# Patient Record
Sex: FEMALE | Race: WHITE | HISPANIC OR LATINO | Employment: OTHER | URBAN - METROPOLITAN AREA
[De-identification: names, ages, dates, MRNs, and addresses within clinical notes are randomized per-mention and may not be internally consistent; named-entity substitution may affect disease eponyms.]

---

## 2017-02-03 DIAGNOSIS — R14.0 ABDOMINAL BLOATING: ICD-10-CM

## 2017-02-03 DIAGNOSIS — K21.9 GASTROESOPHAGEAL REFLUX DISEASE, ESOPHAGITIS PRESENCE NOT SPECIFIED: ICD-10-CM

## 2017-02-03 RX ORDER — OMEPRAZOLE 20 MG/1
CAPSULE, DELAYED RELEASE ORAL
Qty: 90 CAPSULE | Refills: 0 | Status: SHIPPED | OUTPATIENT
Start: 2017-02-03 | End: 2017-05-23 | Stop reason: SDUPTHER

## 2017-03-13 RX ORDER — VALSARTAN 160 MG/1
160 TABLET ORAL DAILY
Qty: 90 TABLET | Refills: 1 | Status: SHIPPED | OUTPATIENT
Start: 2017-03-13 | End: 2017-09-15 | Stop reason: SDUPTHER

## 2017-03-13 RX ORDER — LEVOTHYROXINE SODIUM 125 UG/1
125 TABLET ORAL DAILY
Qty: 90 TABLET | Refills: 1 | Status: SHIPPED | OUTPATIENT
Start: 2017-03-13 | End: 2017-09-15 | Stop reason: SDUPTHER

## 2017-05-23 ENCOUNTER — OFFICE VISIT (OUTPATIENT)
Dept: FAMILY MEDICINE | Facility: CLINIC | Age: 82
End: 2017-05-23
Payer: MEDICARE

## 2017-05-23 VITALS
DIASTOLIC BLOOD PRESSURE: 78 MMHG | BODY MASS INDEX: 34.02 KG/M2 | HEIGHT: 63 IN | HEART RATE: 65 BPM | WEIGHT: 192 LBS | SYSTOLIC BLOOD PRESSURE: 136 MMHG | OXYGEN SATURATION: 97 %

## 2017-05-23 DIAGNOSIS — R07.81 COSTAL MARGIN PAIN: ICD-10-CM

## 2017-05-23 DIAGNOSIS — K76.0 FATTY LIVER: ICD-10-CM

## 2017-05-23 DIAGNOSIS — E78.5 DYSLIPIDEMIA: ICD-10-CM

## 2017-05-23 DIAGNOSIS — I10 ESSENTIAL HYPERTENSION: ICD-10-CM

## 2017-05-23 DIAGNOSIS — N18.30 CKD (CHRONIC KIDNEY DISEASE) STAGE 3, GFR 30-59 ML/MIN: Primary | ICD-10-CM

## 2017-05-23 DIAGNOSIS — Z23 NEED FOR SHINGLES VACCINE: ICD-10-CM

## 2017-05-23 DIAGNOSIS — R14.0 ABDOMINAL BLOATING: ICD-10-CM

## 2017-05-23 DIAGNOSIS — K21.9 GASTROESOPHAGEAL REFLUX DISEASE, ESOPHAGITIS PRESENCE NOT SPECIFIED: ICD-10-CM

## 2017-05-23 PROCEDURE — 3078F DIAST BP <80 MM HG: CPT | Mod: S$GLB,,, | Performed by: FAMILY MEDICINE

## 2017-05-23 PROCEDURE — 99214 OFFICE O/P EST MOD 30 MIN: CPT | Mod: S$GLB,,, | Performed by: FAMILY MEDICINE

## 2017-05-23 PROCEDURE — 1160F RVW MEDS BY RX/DR IN RCRD: CPT | Mod: S$GLB,,, | Performed by: FAMILY MEDICINE

## 2017-05-23 PROCEDURE — 99999 PR PBB SHADOW E&M-EST. PATIENT-LVL IV: CPT | Mod: PBBFAC,,, | Performed by: FAMILY MEDICINE

## 2017-05-23 PROCEDURE — 1159F MED LIST DOCD IN RCRD: CPT | Mod: S$GLB,,, | Performed by: FAMILY MEDICINE

## 2017-05-23 PROCEDURE — 99499 UNLISTED E&M SERVICE: CPT | Mod: S$GLB,,, | Performed by: FAMILY MEDICINE

## 2017-05-23 PROCEDURE — 1125F AMNT PAIN NOTED PAIN PRSNT: CPT | Mod: S$GLB,,, | Performed by: FAMILY MEDICINE

## 2017-05-23 PROCEDURE — 3075F SYST BP GE 130 - 139MM HG: CPT | Mod: S$GLB,,, | Performed by: FAMILY MEDICINE

## 2017-05-23 RX ORDER — OMEPRAZOLE 20 MG/1
CAPSULE, DELAYED RELEASE ORAL
Qty: 90 CAPSULE | Refills: 0 | Status: SHIPPED | OUTPATIENT
Start: 2017-05-23 | End: 2017-09-15 | Stop reason: SDUPTHER

## 2017-05-23 RX ORDER — TRAMADOL HYDROCHLORIDE AND ACETAMINOPHEN 37.5; 325 MG/1; MG/1
1 TABLET, FILM COATED ORAL 2 TIMES DAILY PRN
Qty: 40 TABLET | Refills: 0 | Status: SHIPPED | OUTPATIENT
Start: 2017-05-23 | End: 2017-06-02

## 2017-05-23 NOTE — PROGRESS NOTES
Subjective:       Patient ID: Celine Mahan is a 81 y.o. female.    Chief Complaint: Follow-up; Back Pain; and Knee Pain    81 years old female came to the clinic with right costal pain for the last several months.  The pain is 3 out of 10 in intensity on and overall aggravated with activity and better with rest.  Patient with normal colonoscopy and ultrasound with evidence of fatty liver and chronic kidney disease.  Patient with a BMI of 34 currently trying to lose weight.  Patient with decreased kidney function but stable in comparison with previous reports.  Blood pressure today stable.  No chest pain palpitations orthopnea or PND.      Back Pain   Pertinent negatives include no chest pain.   Knee Pain        Review of Systems   Constitutional: Negative.    HENT: Negative.    Eyes: Negative.    Respiratory: Negative.    Cardiovascular: Negative.  Negative for chest pain, palpitations and leg swelling.   Gastrointestinal: Negative.    Genitourinary: Negative.    Musculoskeletal: Positive for back pain.   Skin: Negative.    Neurological: Negative.    Psychiatric/Behavioral: Negative.        Objective:      Physical Exam   Constitutional: She is oriented to person, place, and time. She appears well-developed and well-nourished. No distress.   HENT:   Head: Normocephalic and atraumatic.   Right Ear: External ear normal.   Left Ear: External ear normal.   Nose: Nose normal.   Mouth/Throat: Oropharynx is clear and moist. No oropharyngeal exudate.   Eyes: Conjunctivae and EOM are normal. Pupils are equal, round, and reactive to light. Right eye exhibits no discharge. Left eye exhibits no discharge. No scleral icterus.   Neck: Normal range of motion. Neck supple. No JVD present. No tracheal deviation present. No thyromegaly present.   Cardiovascular: Normal rate, regular rhythm, normal heart sounds and intact distal pulses.  Exam reveals no gallop and no friction rub.    No murmur heard.  Pulmonary/Chest: Effort normal  and breath sounds normal. No stridor. No respiratory distress. She has no wheezes. She has no rales. She exhibits no tenderness.   Abdominal: Soft. Bowel sounds are normal. She exhibits no distension and no mass. There is tenderness in the right upper quadrant. There is no rebound and no guarding.   Musculoskeletal: Normal range of motion. She exhibits no edema or tenderness.   Lymphadenopathy:     She has no cervical adenopathy.   Neurological: She is alert and oriented to person, place, and time. She has normal reflexes. No cranial nerve deficit. She exhibits normal muscle tone. Coordination normal.   Skin: Skin is warm and dry. No rash noted. She is not diaphoretic. No erythema. No pallor.   Psychiatric: She has a normal mood and affect. Her behavior is normal. Judgment and thought content normal.       Assessment:       1. CKD (chronic kidney disease) stage 3, GFR 30-59 ml/min    2. Essential hypertension    3. Costal margin pain    4. Fatty liver    5. BMI 34.0-34.9,adult    6. Dyslipidemia    7. Gastroesophageal reflux disease, esophagitis presence not specified    8. Abdominal bloating    9. Need for shingles vaccine        Plan:         Celine was seen today for follow-up, back pain and knee pain.    Diagnoses and all orders for this visit:    CKD (chronic kidney disease) stage 3, GFR 30-59 ml/min  -     Comprehensive metabolic panel; Future    Essential hypertension  -     Comprehensive metabolic panel; Future  -     Lipid panel; Future  -     CBC auto differential; Future    Costal margin pain    Fatty liver  -     Comprehensive metabolic panel; Future    BMI 34.0-34.9,adult  -     Lipid panel; Future    Dyslipidemia  -     Comprehensive metabolic panel; Future  -     Lipid panel; Future    Gastroesophageal reflux disease, esophagitis presence not specified  -     omeprazole (PRILOSEC) 20 MG capsule; TAKE 1 CAPSULE(20 MG) BY MOUTH BEFORE BREAKFAST    Abdominal bloating  -     omeprazole (PRILOSEC) 20 MG  capsule; TAKE 1 CAPSULE(20 MG) BY MOUTH BEFORE BREAKFAST    Need for shingles vaccine  -     Discontinue: zoster vaccine live, PF, (ZOSTAVAX, PF,) 19,400 unit/0.65 mL injection; Inject 19,400 Units into the skin once.  -     zoster vaccine live, PF, (ZOSTAVAX, PF,) 19,400 unit/0.65 mL injection; Inject 19,400 Units into the skin once.    Other orders  -     tramadol-acetaminophen 37.5-325 mg (ULTRACET) 37.5-325 mg Tab; Take 1 tablet by mouth 2 (two) times daily as needed for Pain.    Continue monitoring blood pressure at home, low sodium diet.   Diet and physical activity to promote weight loss.  Antireflux measures.

## 2017-05-23 NOTE — PATIENT INSTRUCTIONS
Consejos para controlar el reflujo de ácido (agruras)    Para controlar el reflujo de ácido es necesario hacer algunos cambios básicos en la alimentación y el estilo de cristina. Los siguientes consejos pueden ser suficientes para aliviar el malestar.  Vigile lo que come  · Evite los alimentos grasosos o demasiado picantes.  · Coma menos alimentos ácidos, chrissy cítricos y alimentos a base de tomates, ya que pueden agravar los síntomas.  · Limite el consumo de alcohol, cafeína y bebidas gaseosas. Todas estas bebidas aumentan el reflujo.  · Intente limitar el consumo de chocolate y menta. Ruthann puede empeorar el reflujo de ácido en algunas personas.  Vigile cuándo come  · Evite acostarse eduar 3 horas después de rosibel comido.  · No coma nada antes de irse a la cama.  Mantenga la yg levantada  Mantener la yg y el pecho elevados unas 4 a 6 pulgadas, le ayudará a aliviar el reflujo cuando esté acostado. Ponga soportes debajo de la cabecera de la cama para elevarla.  Otros cambios  · Pierda el exceso de peso.  · No joe ejercicio poco antes de acostarse.  · Evite usar ropas demasiado ajustadas.  · Limite el uso de aspirina e ibuprofeno.  · Deje de fumar.   Date Last Reviewed: 3/14/2014  © 0774-2578 [employer]. [address] [place] [address], [place] [address]. Todos los derechos reservados. Esta información no pretende sustituir la atención médica profesional. Sólo conde médico puede diagnosticar y tratar un problema de celio.

## 2017-05-24 ENCOUNTER — LAB VISIT (OUTPATIENT)
Dept: LAB | Facility: HOSPITAL | Age: 82
End: 2017-05-24
Attending: FAMILY MEDICINE
Payer: MEDICARE

## 2017-05-24 DIAGNOSIS — K76.0 FATTY LIVER: ICD-10-CM

## 2017-05-24 DIAGNOSIS — N18.30 CKD (CHRONIC KIDNEY DISEASE) STAGE 3, GFR 30-59 ML/MIN: ICD-10-CM

## 2017-05-24 DIAGNOSIS — I10 ESSENTIAL HYPERTENSION: ICD-10-CM

## 2017-05-24 DIAGNOSIS — E78.5 DYSLIPIDEMIA: ICD-10-CM

## 2017-05-24 LAB
ALBUMIN SERPL BCP-MCNC: 3.6 G/DL
ALP SERPL-CCNC: 82 U/L
ALT SERPL W/O P-5'-P-CCNC: 12 U/L
ANION GAP SERPL CALC-SCNC: 9 MMOL/L
AST SERPL-CCNC: 17 U/L
BASOPHILS # BLD AUTO: 0.06 K/UL
BASOPHILS NFR BLD: 1.3 %
BILIRUB SERPL-MCNC: 0.5 MG/DL
BUN SERPL-MCNC: 19 MG/DL
CALCIUM SERPL-MCNC: 9 MG/DL
CHLORIDE SERPL-SCNC: 107 MMOL/L
CHOLEST/HDLC SERPL: 3.3 {RATIO}
CO2 SERPL-SCNC: 25 MMOL/L
CREAT SERPL-MCNC: 1.3 MG/DL
DIFFERENTIAL METHOD: ABNORMAL
EOSINOPHIL # BLD AUTO: 0.4 K/UL
EOSINOPHIL NFR BLD: 8.2 %
ERYTHROCYTE [DISTWIDTH] IN BLOOD BY AUTOMATED COUNT: 14.8 %
EST. GFR  (AFRICAN AMERICAN): 44.5 ML/MIN/1.73 M^2
EST. GFR  (NON AFRICAN AMERICAN): 38.6 ML/MIN/1.73 M^2
GLUCOSE SERPL-MCNC: 95 MG/DL
HCT VFR BLD AUTO: 41.3 %
HDL/CHOLESTEROL RATIO: 30.4 %
HDLC SERPL-MCNC: 181 MG/DL
HDLC SERPL-MCNC: 55 MG/DL
HGB BLD-MCNC: 13.2 G/DL
LDLC SERPL CALC-MCNC: 105.8 MG/DL
LYMPHOCYTES # BLD AUTO: 1.2 K/UL
LYMPHOCYTES NFR BLD: 25.7 %
MCH RBC QN AUTO: 28.1 PG
MCHC RBC AUTO-ENTMCNC: 32 %
MCV RBC AUTO: 88 FL
MONOCYTES # BLD AUTO: 0.3 K/UL
MONOCYTES NFR BLD: 6.7 %
NEUTROPHILS # BLD AUTO: 2.7 K/UL
NEUTROPHILS NFR BLD: 57.7 %
NONHDLC SERPL-MCNC: 126 MG/DL
PLATELET # BLD AUTO: 257 K/UL
PMV BLD AUTO: 10.7 FL
POTASSIUM SERPL-SCNC: 4.2 MMOL/L
PROT SERPL-MCNC: 7.4 G/DL
RBC # BLD AUTO: 4.7 M/UL
SODIUM SERPL-SCNC: 141 MMOL/L
TRIGL SERPL-MCNC: 101 MG/DL
WBC # BLD AUTO: 4.63 K/UL

## 2017-05-24 PROCEDURE — 85025 COMPLETE CBC W/AUTO DIFF WBC: CPT

## 2017-05-24 PROCEDURE — 36415 COLL VENOUS BLD VENIPUNCTURE: CPT | Mod: PO

## 2017-05-24 PROCEDURE — 80053 COMPREHEN METABOLIC PANEL: CPT

## 2017-05-24 PROCEDURE — 80061 LIPID PANEL: CPT

## 2017-07-18 ENCOUNTER — OFFICE VISIT (OUTPATIENT)
Dept: FAMILY MEDICINE | Facility: CLINIC | Age: 82
End: 2017-07-18
Payer: MEDICARE

## 2017-07-18 ENCOUNTER — LAB VISIT (OUTPATIENT)
Dept: LAB | Facility: HOSPITAL | Age: 82
End: 2017-07-18
Attending: FAMILY MEDICINE
Payer: MEDICARE

## 2017-07-18 VITALS
OXYGEN SATURATION: 96 % | WEIGHT: 187.81 LBS | SYSTOLIC BLOOD PRESSURE: 134 MMHG | BODY MASS INDEX: 33.28 KG/M2 | HEART RATE: 62 BPM | HEIGHT: 63 IN | DIASTOLIC BLOOD PRESSURE: 64 MMHG

## 2017-07-18 DIAGNOSIS — R10.2 PELVIC PAIN IN FEMALE: ICD-10-CM

## 2017-07-18 DIAGNOSIS — R10.32 LLQ ABDOMINAL PAIN: ICD-10-CM

## 2017-07-18 DIAGNOSIS — I10 ESSENTIAL HYPERTENSION: Primary | ICD-10-CM

## 2017-07-18 DIAGNOSIS — K57.30 DIVERTICULOSIS OF LARGE INTESTINE WITHOUT HEMORRHAGE: ICD-10-CM

## 2017-07-18 DIAGNOSIS — I10 ESSENTIAL HYPERTENSION: ICD-10-CM

## 2017-07-18 LAB
ANION GAP SERPL CALC-SCNC: 8 MMOL/L
BASOPHILS # BLD AUTO: 0.06 K/UL
BASOPHILS NFR BLD: 1 %
BUN SERPL-MCNC: 18 MG/DL
CALCIUM SERPL-MCNC: 8.9 MG/DL
CHLORIDE SERPL-SCNC: 104 MMOL/L
CO2 SERPL-SCNC: 27 MMOL/L
CREAT SERPL-MCNC: 1.3 MG/DL
DIFFERENTIAL METHOD: ABNORMAL
EOSINOPHIL # BLD AUTO: 0.3 K/UL
EOSINOPHIL NFR BLD: 4.3 %
ERYTHROCYTE [DISTWIDTH] IN BLOOD BY AUTOMATED COUNT: 14.8 %
EST. GFR  (AFRICAN AMERICAN): 44.1 ML/MIN/1.73 M^2
EST. GFR  (NON AFRICAN AMERICAN): 38.3 ML/MIN/1.73 M^2
GLUCOSE SERPL-MCNC: 84 MG/DL
HCT VFR BLD AUTO: 39.9 %
HGB BLD-MCNC: 12.9 G/DL
LYMPHOCYTES # BLD AUTO: 1.7 K/UL
LYMPHOCYTES NFR BLD: 28.6 %
MCH RBC QN AUTO: 28.2 PG
MCHC RBC AUTO-ENTMCNC: 32.3 %
MCV RBC AUTO: 87 FL
MONOCYTES # BLD AUTO: 0.4 K/UL
MONOCYTES NFR BLD: 7.1 %
NEUTROPHILS # BLD AUTO: 3.5 K/UL
NEUTROPHILS NFR BLD: 58.8 %
PLATELET # BLD AUTO: 268 K/UL
PMV BLD AUTO: 10.3 FL
POTASSIUM SERPL-SCNC: 4.2 MMOL/L
RBC # BLD AUTO: 4.58 M/UL
SODIUM SERPL-SCNC: 139 MMOL/L
WBC # BLD AUTO: 5.88 K/UL

## 2017-07-18 PROCEDURE — 80048 BASIC METABOLIC PNL TOTAL CA: CPT

## 2017-07-18 PROCEDURE — 85025 COMPLETE CBC W/AUTO DIFF WBC: CPT

## 2017-07-18 PROCEDURE — 99999 PR PBB SHADOW E&M-EST. PATIENT-LVL III: CPT | Mod: PBBFAC,,, | Performed by: FAMILY MEDICINE

## 2017-07-18 PROCEDURE — 1159F MED LIST DOCD IN RCRD: CPT | Mod: S$GLB,,, | Performed by: FAMILY MEDICINE

## 2017-07-18 PROCEDURE — 99214 OFFICE O/P EST MOD 30 MIN: CPT | Mod: S$GLB,,, | Performed by: FAMILY MEDICINE

## 2017-07-18 PROCEDURE — 99499 UNLISTED E&M SERVICE: CPT | Mod: S$GLB,,, | Performed by: FAMILY MEDICINE

## 2017-07-18 PROCEDURE — 1126F AMNT PAIN NOTED NONE PRSNT: CPT | Mod: S$GLB,,, | Performed by: FAMILY MEDICINE

## 2017-07-18 PROCEDURE — 36415 COLL VENOUS BLD VENIPUNCTURE: CPT | Mod: PO

## 2017-07-18 RX ORDER — CIPROFLOXACIN 250 MG/1
250 TABLET, FILM COATED ORAL 2 TIMES DAILY
Qty: 20 TABLET | Refills: 0 | Status: SHIPPED | OUTPATIENT
Start: 2017-07-18 | End: 2017-07-28

## 2017-07-18 RX ORDER — METRONIDAZOLE 250 MG/1
250 TABLET ORAL 3 TIMES DAILY
Qty: 30 TABLET | Refills: 0 | Status: SHIPPED | OUTPATIENT
Start: 2017-07-18 | End: 2017-07-28

## 2017-07-18 NOTE — PROGRESS NOTES
Subjective:       Patient ID: Celine Mahan is a 82 y.o. female.    Chief Complaint: Hip Pain    82 years old female who came to the clinic with left lower quadrant abdominal and pelvic pain for the last week.  The pain is 6 out of 10 of intensity on and off aggravated with activity and better with rest.  No fevers or chills.  Last colonoscopy with evidence of diverticulosis.  Patient reported she is to have her ovaries after hysterectomy.      Hip Pain        Review of Systems   Constitutional: Negative.    HENT: Negative.    Eyes: Negative.    Respiratory: Negative.    Cardiovascular: Negative.  Negative for chest pain, palpitations and leg swelling.   Gastrointestinal: Negative.    Endocrine: Negative for cold intolerance, heat intolerance, polydipsia, polyphagia and polyuria.   Genitourinary: Negative.    Musculoskeletal: Negative.    Skin: Negative.    Neurological: Negative.    Psychiatric/Behavioral: Negative.        Objective:      Physical Exam   Constitutional: She is oriented to person, place, and time. She appears well-developed and well-nourished. No distress.   HENT:   Head: Normocephalic and atraumatic.   Right Ear: External ear normal.   Left Ear: External ear normal.   Nose: Nose normal.   Mouth/Throat: Oropharynx is clear and moist. No oropharyngeal exudate.   Eyes: Conjunctivae and EOM are normal. Pupils are equal, round, and reactive to light. Right eye exhibits no discharge. Left eye exhibits no discharge. No scleral icterus.   Neck: Normal range of motion. Neck supple. No JVD present. No tracheal deviation present. No thyromegaly present.   Cardiovascular: Normal rate, regular rhythm, normal heart sounds and intact distal pulses.  Exam reveals no gallop and no friction rub.    No murmur heard.  Pulmonary/Chest: Effort normal and breath sounds normal. No stridor. No respiratory distress. She has no wheezes. She has no rales. She exhibits no tenderness.   Abdominal: Soft. Bowel sounds are  normal. She exhibits no distension and no mass. There is tenderness in the left lower quadrant. There is no rebound, no guarding, no tenderness at McBurney's point and negative Edwards's sign.       Musculoskeletal: Normal range of motion. She exhibits no edema or tenderness.   Lymphadenopathy:     She has no cervical adenopathy.   Neurological: She is alert and oriented to person, place, and time. She has normal reflexes. No cranial nerve deficit. She exhibits normal muscle tone. Coordination normal.   Skin: Skin is warm and dry. No rash noted. She is not diaphoretic. No erythema. No pallor.   Psychiatric: She has a normal mood and affect. Her behavior is normal. Judgment and thought content normal.       Assessment:       1. Essential hypertension    2. Pelvic pain in female    3. Diverticulosis of large intestine without hemorrhage    4. LLQ abdominal pain        Plan:         Celine was seen today for hip pain.    Diagnoses and all orders for this visit:    Essential hypertension  -     CBC auto differential; Future  -     Urinalysis  -     Basic metabolic panel; Future    Pelvic pain in female  -     CBC auto differential; Future  -     Urinalysis  -     Urine culture  -     Basic metabolic panel; Future  -     US Pelvis Complete Non OB; Future    Diverticulosis of large intestine without hemorrhage  -     ciprofloxacin HCl (CIPRO) 250 MG tablet; Take 1 tablet (250 mg total) by mouth 2 (two) times daily.  -     metronidazole (FLAGYL) 250 MG tablet; Take 1 tablet (250 mg total) by mouth 3 (three) times daily.    LLQ abdominal pain  -     ciprofloxacin HCl (CIPRO) 250 MG tablet; Take 1 tablet (250 mg total) by mouth 2 (two) times daily.  -     metronidazole (FLAGYL) 250 MG tablet; Take 1 tablet (250 mg total) by mouth 3 (three) times daily.    Continue monitoring blood pressure at home, low sodium diet.  Continue monitoring blood sugar at home,ADA diet.

## 2017-07-19 LAB
BACTERIA UR CULT: NORMAL
BACTERIA UR CULT: NORMAL

## 2017-08-07 ENCOUNTER — HOSPITAL ENCOUNTER (OUTPATIENT)
Dept: RADIOLOGY | Facility: HOSPITAL | Age: 82
Discharge: HOME | End: 2017-08-07
Attending: FAMILY MEDICINE
Payer: MEDICARE

## 2017-08-07 DIAGNOSIS — R10.2 PELVIC PAIN IN FEMALE: ICD-10-CM

## 2017-08-07 PROCEDURE — 76856 US EXAM PELVIC COMPLETE: CPT | Mod: 26,,, | Performed by: RADIOLOGY

## 2017-08-07 PROCEDURE — 76830 TRANSVAGINAL US NON-OB: CPT | Mod: 26,,, | Performed by: RADIOLOGY

## 2017-08-07 PROCEDURE — 76856 US EXAM PELVIC COMPLETE: CPT | Mod: TC

## 2017-08-09 ENCOUNTER — TELEPHONE (OUTPATIENT)
Dept: FAMILY MEDICINE | Facility: CLINIC | Age: 82
End: 2017-08-09

## 2017-08-09 DIAGNOSIS — R19.00 PELVIC MASS IN FEMALE: Primary | ICD-10-CM

## 2017-08-09 NOTE — TELEPHONE ENCOUNTER
Patient with abnormal pelvic ultrasound.  I tried to contact the patient several times with no success.    Abdominal pelvic CT scan was ordered.  Please contact the patient for the appointment.

## 2017-08-09 NOTE — TELEPHONE ENCOUNTER
Called pt and informed her of ultrasound results and apt for ct scan on 08/14/17 @ 5pm fasting 4 hrs prior to ct.

## 2017-08-14 ENCOUNTER — HOSPITAL ENCOUNTER (OUTPATIENT)
Dept: RADIOLOGY | Facility: HOSPITAL | Age: 82
Discharge: HOME | End: 2017-08-14
Attending: FAMILY MEDICINE
Payer: MEDICARE

## 2017-08-14 DIAGNOSIS — R19.00 PELVIC MASS IN FEMALE: ICD-10-CM

## 2017-08-14 PROCEDURE — 74178 CT ABD&PLV WO CNTR FLWD CNTR: CPT | Mod: TC

## 2017-08-14 PROCEDURE — 25500020 PHARM REV CODE 255: Performed by: FAMILY MEDICINE

## 2017-08-14 PROCEDURE — 74178 CT ABD&PLV WO CNTR FLWD CNTR: CPT | Mod: 26,,, | Performed by: RADIOLOGY

## 2017-08-14 RX ADMIN — IOHEXOL 100 ML: 350 INJECTION, SOLUTION INTRAVENOUS at 06:08

## 2017-08-14 RX ADMIN — IOHEXOL 15 ML: 350 INJECTION, SOLUTION INTRAVENOUS at 05:08

## 2017-08-14 RX ADMIN — IOHEXOL 15 ML: 350 INJECTION, SOLUTION INTRAVENOUS at 04:08

## 2017-08-16 ENCOUNTER — TELEPHONE (OUTPATIENT)
Dept: FAMILY MEDICINE | Facility: CLINIC | Age: 82
End: 2017-08-16

## 2017-08-16 DIAGNOSIS — R19.00 PELVIC MASS IN FEMALE: Primary | ICD-10-CM

## 2017-08-16 NOTE — TELEPHONE ENCOUNTER
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  I tried to contact the patient and her daughter multiple times without success.     I placed a referral for GYN oncology as soon as possible.    Please try to contact the patient again to talk personally  with me.

## 2017-08-17 NOTE — TELEPHONE ENCOUNTER
Spoke with pt informed her Dr Story schedule a follow up appointment to review results of her  exams. Pt schedule for tomorrow 08/18/17 at 10:40 am. Pt verbalize understanding.

## 2017-08-17 NOTE — TELEPHONE ENCOUNTER
----- Message from Sherri Stoner sent at 8/16/2017  3:09 PM CDT -----  Contact: [phone]/self  Patient called in returning your call. Please advise.

## 2017-08-18 ENCOUNTER — OFFICE VISIT (OUTPATIENT)
Dept: FAMILY MEDICINE | Facility: CLINIC | Age: 82
End: 2017-08-18
Payer: MEDICARE

## 2017-08-18 VITALS
BODY MASS INDEX: 33.2 KG/M2 | DIASTOLIC BLOOD PRESSURE: 72 MMHG | SYSTOLIC BLOOD PRESSURE: 130 MMHG | WEIGHT: 187.38 LBS | OXYGEN SATURATION: 97 % | HEART RATE: 83 BPM | HEIGHT: 63 IN

## 2017-08-18 DIAGNOSIS — I10 ESSENTIAL HYPERTENSION: ICD-10-CM

## 2017-08-18 DIAGNOSIS — R10.2 PELVIC PAIN IN FEMALE: ICD-10-CM

## 2017-08-18 DIAGNOSIS — R19.00 PELVIC MASS IN FEMALE: Primary | ICD-10-CM

## 2017-08-18 PROCEDURE — 3078F DIAST BP <80 MM HG: CPT | Mod: S$GLB,,, | Performed by: FAMILY MEDICINE

## 2017-08-18 PROCEDURE — 1159F MED LIST DOCD IN RCRD: CPT | Mod: S$GLB,,, | Performed by: FAMILY MEDICINE

## 2017-08-18 PROCEDURE — 99214 OFFICE O/P EST MOD 30 MIN: CPT | Mod: S$GLB,,, | Performed by: FAMILY MEDICINE

## 2017-08-18 PROCEDURE — 3008F BODY MASS INDEX DOCD: CPT | Mod: S$GLB,,, | Performed by: FAMILY MEDICINE

## 2017-08-18 PROCEDURE — 1125F AMNT PAIN NOTED PAIN PRSNT: CPT | Mod: S$GLB,,, | Performed by: FAMILY MEDICINE

## 2017-08-18 PROCEDURE — 99999 PR PBB SHADOW E&M-EST. PATIENT-LVL III: CPT | Mod: PBBFAC,,, | Performed by: FAMILY MEDICINE

## 2017-08-18 PROCEDURE — 3075F SYST BP GE 130 - 139MM HG: CPT | Mod: S$GLB,,, | Performed by: FAMILY MEDICINE

## 2017-08-18 PROCEDURE — 99499 UNLISTED E&M SERVICE: CPT | Mod: S$GLB,,, | Performed by: FAMILY MEDICINE

## 2017-08-18 NOTE — PATIENT INSTRUCTIONS
Coma alimentos saludables para conde corazón  Lo que coma tiene un gran impacto en la celio de conde corazón. De hecho, si come de manera más ezio podrá disminuir muchos de zari riesgos cardíacos al mismo tiempo. Por ejemplo, le ayudará a manejar conde peso, zari niveles de colesterol y conde presión arterial. Aquí encontrará consejos para hacer cambios en conde dieta que le harán marian a conde corazón sin dejar de lado todos los alimentos y los sabores que más le gustan.  Cómo comenzar  · Hable con conde proveedor de atención médica para que le aconseje sobre planes de alimentación, por ejemplo, la dieta DASH o la dieta mediterránea. También es posible que le remita a un dietista.  · Cambie algunas cosas por vez. Dese tiempo para acostumbrarse a algunos cambios en conde alimentación antes de hacer más cambios.  · Intente crear un plan de alimentación saludable y sabroso que pueda mantener el endy de conde cristina.    Metas para ibeth alimentación saludable  A continuación, verá algunos consejos para mejorar zari hábitos de alimentación.  · Coma menos grasas saturadas y grasas trans. Las grasas saturadas elevan zari niveles de colesterol, por eso, coma lo menos posible de estas grasas. Están en alimentos tales chrissy las logan grasas, la leche entera, el queso entero y los aceites de villeda y de keerthi. Evite las grasas trans porque bajan conde colesterol millard además de subir conde colesterol shayy. Las grasas trans se encuentran más que nada en los alimentos procesados.  · Coma menos sodio (sal). Consumir demasiada sal puede subirle la presión arterial. Reduzca la cantidad de sodio que ingiere a 2,300 mg diarios o menos según le recomiende conde proveedor de atención médica. Salir a comer con menos frecuencia y elegir menos alimentos procesados son dos excelentes maneras de reducir la cantidad de sal que consume.  · Cuente las calorías. Ibeth caloría es ibeth unidad de energía. Conde cuerpo quema calorías para obtener combustible, ana si usted come más calorías que  las que conde cuerpo consume, las calorías adicionales se guardan en forma de grasa. Conde proveedor de atención médica le ayudará a elaborar un plan de dieta para manejar zari calorías. Es probable que incluya comer alimentos más saludables y hacer actividad física con regularidad. Para ayudarle a llevar la cuenta de conde progreso, tenga un diario en el que vaya anotando lo que come y la frecuencia con que hace actividad física.  Elija los alimentos adecuados  Trate de que estos alimentos kyra los pilares de conde dieta. Si tiene diabetes, puede que le den otras recomendaciones diferentes de las que se mencionan aquí.  · Frutas y vegetales: brindan muchos nutrientes sin demasiadas calorías. Cuando coma, llene la mitad de conde plato con estos alimentos. A la segunda mitad del plato, divídala entre granos integrales y proteínas magras.  · Granos integrales: tienen mucha fibra, vitaminas y nutrientes. Es ibeth buena opción incluir pan, pasta y arroz integrales.  · Proteínas magras: le aportan nutrición con menos grasas. Son buenas opciones el pescado, el leena sin piel y los frijoles.  · Productos lácteos bajos en grasa o sin grasa: ofrecen nutrientes sin mucha grasa. Pruebe consumir leche, queso o yogur bajos en grasa o sin grasa.  · Grasas saludables: pueden ser buenas para conde corazón si las come en cantidades moderadas. Son las grasas no saturadas, chrissy el aceite de lawson, las nueces y los pescados. Intente comer al menos dos porciones a la semana de algún pescado graso, chrissy salmón, nikko, caballa, trucha arcoíris y atún albacora (rodriguez). Ruthann tipo de pescados contienen ácidos grasos omega-3, los cuales son especialmente buenos para conde corazón. Las semillas de anshu constituyen otra denise de grasa saludable para conde corazón.  Más información sobre la alimentación saludable para el corazón    Lizzy las etiquetas de los alimentos  La alimentación saludable comienza en la sunny de alimentos. Preste atención a las etiquetas de  nutrición que traen los alimentos empaquetados. Busque productos altos en fibra y proteínas, y bajos en grasa saturada, colesterol y sodio. Evite los productos que contienen grasas trans. También preste mucha atención a las porciones que come. Por ejemplo, si piensa comer dos porciones, duplique todas las cantidades que figuran en la etiqueta.  Prepare la comida correctamente  Mily parte clave de la alimentación saludable es reducir la cantidad de js y grasa que agrega a zari comidas. Busque en Internet recetas con menos contenido de grasa y sodio. También puede probar los siguientes consejos:  · Quite la grasa de la carne y la piel del leena antes de cocinar.  · Quite la grasa que queda en la superficie de sopas y salsas.  · Ase, hierva, hornee, grille, cocine al vapor o en microondas conde comida sin agregarle grasas.  · Elija ingredientes que le den sabor a zari comidas sin cargarla con calorías, grasa o sodio. Pruebe los siguientes ingredientes: rábano picante, salsa picante, anca, mostaza, aderezos sin grasa para ensaladas y vinagre. Si desea hierbas y especias sin js, pruebe albahaca, cilantro, brendon, garima y mccann.   Date Last Reviewed: 6/25/2015  © 6038-5146 [employer]. [address] [place] [address], [place] [address]. Todos los derechos reservados. Esta información no pretende sustituir la atención médica profesional. Sólo conde médico puede diagnosticar y tratar un problema de celio.

## 2017-08-18 NOTE — PROGRESS NOTES
Subjective:       Patient ID: Celine Mahan is a 82 y.o. female.    Chief Complaint: Follow-up (results ) and Hypertension    82 years old female came to the clinic for preoperative last follow-up.  CT scan with  evidence of possible neoplastic process.  Patient still with mild pain over the pelvic area.  No weight loss fever or chills.  Blood pressure today stable.  No chest pain palpitations orthopnea or PND.      Hypertension   Pertinent negatives include no chest pain or palpitations.     Review of Systems   Constitutional: Negative.    HENT: Negative.    Eyes: Negative.    Respiratory: Negative.    Cardiovascular: Negative.  Negative for chest pain, palpitations and leg swelling.   Gastrointestinal: Negative.    Genitourinary: Negative.    Musculoskeletal: Negative.    Skin: Negative.    Neurological: Negative.    Psychiatric/Behavioral: Negative.        Objective:      Physical Exam   Constitutional: She is oriented to person, place, and time. She appears well-developed and well-nourished. No distress.   HENT:   Head: Normocephalic and atraumatic.   Right Ear: External ear normal.   Left Ear: External ear normal.   Nose: Nose normal.   Mouth/Throat: Oropharynx is clear and moist. No oropharyngeal exudate.   Eyes: Conjunctivae and EOM are normal. Pupils are equal, round, and reactive to light. Right eye exhibits no discharge. Left eye exhibits no discharge. No scleral icterus.   Neck: Normal range of motion. Neck supple. No JVD present. No tracheal deviation present. No thyromegaly present.   Cardiovascular: Normal rate, regular rhythm, normal heart sounds and intact distal pulses.  Exam reveals no gallop and no friction rub.    No murmur heard.  Pulmonary/Chest: Effort normal and breath sounds normal. No stridor. No respiratory distress. She has no wheezes. She has no rales. She exhibits no tenderness.   Abdominal: Soft. Bowel sounds are normal. She exhibits no distension and no mass. There is tenderness in  the suprapubic area. There is no rebound and no guarding.   Musculoskeletal: Normal range of motion. She exhibits no edema or tenderness.   Lymphadenopathy:     She has no cervical adenopathy.   Neurological: She is alert and oriented to person, place, and time. She has normal reflexes. No cranial nerve deficit. She exhibits normal muscle tone. Coordination normal.   Skin: Skin is warm and dry. No rash noted. She is not diaphoretic. No erythema. No pallor.   Psychiatric: She has a normal mood and affect. Her behavior is normal. Judgment and thought content normal.       Assessment:       1. Pelvic mass in female    2. Essential hypertension    3. Pelvic pain in female        Plan:         Celine was seen today for follow-up and hypertension.    Diagnoses and all orders for this visit:    Pelvic mass in female    Essential hypertension    Pelvic pain in female    Continue monitoring blood pressure at home, low sodium diet.  OB/GYN oncology referral.

## 2017-08-21 ENCOUNTER — INITIAL CONSULT (OUTPATIENT)
Dept: GYNECOLOGIC ONCOLOGY | Facility: CLINIC | Age: 82
End: 2017-08-21
Payer: MEDICARE

## 2017-08-21 VITALS
BODY MASS INDEX: 33.28 KG/M2 | RESPIRATION RATE: 16 BRPM | HEART RATE: 62 BPM | HEIGHT: 63 IN | DIASTOLIC BLOOD PRESSURE: 81 MMHG | SYSTOLIC BLOOD PRESSURE: 188 MMHG | WEIGHT: 187.81 LBS

## 2017-08-21 DIAGNOSIS — Z01.818 PRE-OP EVALUATION: Primary | ICD-10-CM

## 2017-08-21 DIAGNOSIS — R19.00 PELVIC MASS: ICD-10-CM

## 2017-08-21 PROCEDURE — 99205 OFFICE O/P NEW HI 60 MIN: CPT | Mod: S$GLB,,, | Performed by: OBSTETRICS & GYNECOLOGY

## 2017-08-21 PROCEDURE — 99999 PR PBB SHADOW E&M-EST. PATIENT-LVL III: CPT | Mod: PBBFAC,,, | Performed by: OBSTETRICS & GYNECOLOGY

## 2017-08-21 PROCEDURE — 1159F MED LIST DOCD IN RCRD: CPT | Mod: S$GLB,,, | Performed by: OBSTETRICS & GYNECOLOGY

## 2017-08-21 PROCEDURE — 3077F SYST BP >= 140 MM HG: CPT | Mod: S$GLB,,, | Performed by: OBSTETRICS & GYNECOLOGY

## 2017-08-21 PROCEDURE — 3008F BODY MASS INDEX DOCD: CPT | Mod: S$GLB,,, | Performed by: OBSTETRICS & GYNECOLOGY

## 2017-08-21 PROCEDURE — 1126F AMNT PAIN NOTED NONE PRSNT: CPT | Mod: S$GLB,,, | Performed by: OBSTETRICS & GYNECOLOGY

## 2017-08-21 PROCEDURE — 3079F DIAST BP 80-89 MM HG: CPT | Mod: S$GLB,,, | Performed by: OBSTETRICS & GYNECOLOGY

## 2017-08-21 RX ORDER — GARLIC 1000 MG
CAPSULE ORAL DAILY
COMMUNITY

## 2017-08-21 NOTE — LETTER
August 25, 2017      Anil Wills MD  [address] [facility] [address]           [facility]  [address] [facility], [address]  [facility] [address]  Phone: [phone]  Fax: [phone]          Patient: Celine Mahan   MR Number: [id number]   YOB: 1935   Date of Visit: 8/21/2017       Dear Dr. Anil Wills:    Thank you for referring Celine Mahan to me for evaluation. Attached you will find relevant portions of my assessment and plan of care.    If you have questions, please do not hesitate to call me. I look forward to following Celine Mahan along with you.    Sincerely,    Mark Juan  CC:  No Recipients    If you would like to receive this communication electronically, please contact [email or web address] or [phone] to request more information on [employer] access.    For providers and/or their staff who would like to refer a patient to [facility], please contact us through our one-stop-shop provider referral line, [facility], at [phone].    If you feel you have received this communication in error or would no longer like to receive these types of communications, please e-mail [email or web address]

## 2017-08-22 ENCOUNTER — TELEPHONE (OUTPATIENT)
Dept: GYNECOLOGIC ONCOLOGY | Facility: CLINIC | Age: 82
End: 2017-08-22

## 2017-08-22 DIAGNOSIS — R19.00 PELVIC MASS: Primary | ICD-10-CM

## 2017-08-25 ENCOUNTER — LAB VISIT (OUTPATIENT)
Dept: LAB | Facility: OTHER | Age: 82
End: 2017-08-25
Attending: OBSTETRICS & GYNECOLOGY
Payer: MEDICARE

## 2017-08-25 DIAGNOSIS — R19.00 PELVIC MASS: ICD-10-CM

## 2017-08-25 DIAGNOSIS — Z01.818 PRE-OP EVALUATION: ICD-10-CM

## 2017-08-25 LAB
CANCER AG125 SERPL-ACNC: 849 U/ML
CREAT SERPL-MCNC: 1.3 MG/DL
EST. GFR  (AFRICAN AMERICAN): 44 ML/MIN/1.73 M^2
EST. GFR  (NON AFRICAN AMERICAN): 38 ML/MIN/1.73 M^2

## 2017-08-25 PROCEDURE — 36415 COLL VENOUS BLD VENIPUNCTURE: CPT

## 2017-08-25 PROCEDURE — 86304 IMMUNOASSAY TUMOR CA 125: CPT

## 2017-08-25 PROCEDURE — 82565 ASSAY OF CREATININE: CPT

## 2017-08-27 PROBLEM — R19.00 PELVIC MASS: Status: ACTIVE | Noted: 2017-08-27

## 2017-08-27 RX ORDER — LIDOCAINE HYDROCHLORIDE 10 MG/ML
1 INJECTION, SOLUTION EPIDURAL; INFILTRATION; INTRACAUDAL; PERINEURAL ONCE
Status: CANCELLED | OUTPATIENT
Start: 2017-08-27 | End: 2017-08-27

## 2017-08-27 RX ORDER — SODIUM CHLORIDE 9 MG/ML
INJECTION, SOLUTION INTRAVENOUS CONTINUOUS
Status: CANCELLED | OUTPATIENT
Start: 2017-08-27

## 2017-08-27 NOTE — PROGRESS NOTES
"Subjective:      Patient ID: Celine Mahan is a 82 y.o. female.    Chief Complaint: Consult      HPI  Patient is an 81yo female who presents today as a referral from Dr. Wills for pelvic mass. Patient reports LLQ pain for approximately 3 months which prompted evaluation. Available imaging reviewed.    Pelvic US 8/7/17 There is a complex echogenicity midline pelvic mass measuring 18.1 x 8.6 x 7.6 cm.  Ovaries not visualized.    CT A&P 8/9/17 Large solid/cystic mass within the lower abdomen/upper pelvis that is most concerning for a malignant neoplasm, likely of ovarian origin given its location.  Correlation with previous surgical history is recommended noting evidence of previous hysterectomy.  Mass abuts and displaces the adjacent bladder without definite CT findings to suggest hema invasion. Abdominal and pelvic lymphadenopathy concerning for lymphatic spread of neoplasm with index lymph nodes as above.     Medical history is significant for CKD (Cr 1.3), HTN, hypothyroid, HTN, HLD. She has a personal history of breast cancer in 1995 treated with mastectomy and adjuvant chemotherapy she reports. Last MMG 11/2016 normal. Adbominal surgery include cholecystectomy, TVH, xlap/removal ovary for ectopic. She reports that her physician told her "some ovary remains in situ". Family history significant for mother with pancreatic cancer.      Review of Systems   Constitutional: Negative for appetite change, chills, fatigue and fever.   HENT: Negative for mouth sores.    Respiratory: Negative for cough and shortness of breath.    Cardiovascular: Negative for leg swelling.   Gastrointestinal: Positive for abdominal pain. Negative for blood in stool, constipation and diarrhea.   Endocrine: Negative for cold intolerance.   Genitourinary: Negative for dysuria and vaginal bleeding.   Musculoskeletal: Negative for myalgias.   Skin: Negative for rash.   Allergic/Immunologic: Negative.    Neurological: Negative for weakness " and numbness.   Hematological: Negative for adenopathy. Does not bruise/bleed easily.   Psychiatric/Behavioral: Negative for confusion.        Past Medical History:   Diagnosis Date    Breast cancer     CKD (chronic kidney disease) stage 3, GFR 30-59 ml/min     Hyperlipidemia     Hypertension     Obesity     Osteopenia     Overactive bladder     Pelvic mass 8/27/2017    Thyroid disease      Past Surgical History:   Procedure Laterality Date    bt catarac surgery      CHOLECYSTECTOMY      HYSTERECTOMY      lt mastectomy       Family History   Problem Relation Age of Onset    Cancer Mother      pancreas ca     Social History     Social History    Marital status:      Spouse name: N/A    Number of children: N/A    Years of education: N/A     Occupational History    Not on file.     Social History Main Topics    Smoking status: Former Smoker     Types: Cigarettes     Quit date: 1/1/1977    Smokeless tobacco: Never Used    Alcohol use No    Drug use: No    Sexual activity: Not Currently     Other Topics Concern    Not on file     Social History Narrative    No narrative on file     Current Outpatient Prescriptions   Medication Sig    amlodipine (NORVASC) 5 MG tablet Take 1 tablet (5 mg total) by mouth once daily.    garlic 1,000 mg Cap Take by mouth.    levothyroxine (SYNTHROID) 125 MCG tablet Take 1 tablet (125 mcg total) by mouth once daily.    omeprazole (PRILOSEC) 20 MG capsule TAKE 1 CAPSULE(20 MG) BY MOUTH BEFORE BREAKFAST    simethicone (PHAZYME) 250 mg Cap Take 1 capsule by mouth 2 (two) times daily as needed (bloating).    valsartan (DIOVAN) 160 MG tablet Take 1 tablet (160 mg total) by mouth once daily.    meclizine (ANTIVERT) 12.5 mg tablet Take 1 tablet (12.5 mg total) by mouth 3 (three) times daily as needed for Dizziness.    polyethylene glycol (GOLYTELY,NULYTELY) 236-22.74-6.74 gram suspension Take as directed    pravastatin (PRAVACHOL) 20 MG tablet Take 20 mg by  mouth once daily.      predniSONE (DELTASONE) 20 MG tablet TAKE 1 TABLET BY MOUTH EVERY DAY    tobramycin sulfate 0.3% (TOBREX) 0.3 % ophthalmic solution Apply two drops to wound site right big toe twice daily.     No current facility-administered medications for this visit.      Review of patient's allergies indicates:  No Known Allergies    Objective:   Physical Exam:   Constitutional: She is oriented to person, place, and time. She appears well-developed and well-nourished.    HENT:   Head: Normocephalic and atraumatic.    Eyes: EOM are normal. Pupils are equal, round, and reactive to light.    Neck: Normal range of motion. Neck supple. No thyromegaly present.    Cardiovascular: Normal rate, regular rhythm and intact distal pulses.     Pulmonary/Chest: Effort normal and breath sounds normal. No respiratory distress. She has no wheezes.        Abdominal: Soft. Bowel sounds are normal. She exhibits no distension, no ascites and no mass. There is no tenderness.     Genitourinary: Rectum normal and vagina normal. Pelvic exam was performed with patient supine. There is no lesion on the right labia. There is no lesion on the left labia. Uterus is absent. Left adnexum displays mass and fullness. Vaginal cuff normal.Cervix exhibits absence.   Genitourinary Comments: There is a palpable mass in the pelvis, feels somewhat mobile, firm.            Musculoskeletal: Normal range of motion and moves all extremeties.      Lymphadenopathy:     She has no cervical adenopathy.        Right: No inguinal and no supraclavicular adenopathy present.        Left: No inguinal and no supraclavicular adenopathy present.    Neurological: She is alert and oriented to person, place, and time.    Skin: Skin is warm and dry. No rash noted.    Psychiatric: She has a normal mood and affect.       Assessment:     1. Pre-op evaluation    2. Pelvic mass        Plan:     Orders Placed This Encounter   Procedures    CT Chest With Contrast    CA  125    Creatinine, serum    SCHEDULED EKG 12-LEAD (to [place])       I discussed with the patient and her family member who was present with her at today's visit the differential diagnosis for a pelvic mass in a postmenopausal female including benign, borderline and malignant process. I have told them that I am concerned for a malignant process given her imaging findings. I have recommended surgical exploration and excision for diagnostic and therapeutic purposes. She desires to proceed. The risks, benefits, and indications of the procedure were discussed with the patient and her family members if present.  These included bleeding, transfusion, infection, damage to surrounding tissues (bowel, bladder, ureter), wound separation, perioperative cardiac events, VTE, pneumonia, and possible death.  She voiced understanding, all questions were answered and consents were signed.  1. Plan for xlap/mass excision/staging 9/21/17  2. Will obtain CT chest for metastatic survey  3. Tumor marker   4. Preop anesthesia consult and EKG  5. Will need cancer genetics given personal history of breast cancer, likely new ovarian cancer, mother with history pancreatic cancer

## 2017-08-28 ENCOUNTER — TELEPHONE (OUTPATIENT)
Dept: GYNECOLOGIC ONCOLOGY | Facility: CLINIC | Age: 82
End: 2017-08-28

## 2017-08-28 ENCOUNTER — HOSPITAL ENCOUNTER (OUTPATIENT)
Dept: RADIOLOGY | Facility: OTHER | Age: 82
Discharge: HOME | End: 2017-08-28
Attending: OBSTETRICS & GYNECOLOGY
Payer: MEDICARE

## 2017-08-28 ENCOUNTER — HOSPITAL ENCOUNTER (OUTPATIENT)
Dept: CARDIOLOGY | Facility: OTHER | Age: 82
Discharge: HOME | End: 2017-08-28
Attending: OBSTETRICS & GYNECOLOGY
Payer: MEDICARE

## 2017-08-28 DIAGNOSIS — R19.00 PELVIC MASS: ICD-10-CM

## 2017-08-28 DIAGNOSIS — Z01.818 PRE-OP EVALUATION: ICD-10-CM

## 2017-08-28 PROCEDURE — 71250 CT THORAX DX C-: CPT | Mod: TC

## 2017-08-28 PROCEDURE — 93005 ELECTROCARDIOGRAM TRACING: CPT

## 2017-08-28 PROCEDURE — 71250 CT THORAX DX C-: CPT | Mod: 26,,, | Performed by: RADIOLOGY

## 2017-08-29 ENCOUNTER — TELEPHONE (OUTPATIENT)
Dept: GYNECOLOGIC ONCOLOGY | Facility: CLINIC | Age: 82
End: 2017-08-29

## 2017-08-29 NOTE — TELEPHONE ENCOUNTER
Called patient to review CT and elevated . Ct chest show no pulmonary nodules, it does show some adenopathy.  is elevated. Surgery is scheduled for 9/21/17.      I will try to reach her daughter as well who was present with her at our office visit. No answer.     Provided our office number should they have any questions.

## 2017-08-29 NOTE — TELEPHONE ENCOUNTER
----- Message from Catherine Haney sent at 8/29/2017  9:33 AM CDT -----  Contact: Caitlin (Daughter)  Caitlin states she missed a call from  on yesterday and is returning that call    Contact number [phone]  Thanks

## 2017-09-01 ENCOUNTER — INITIAL CONSULT (OUTPATIENT)
Dept: INTERNAL MEDICINE | Facility: CLINIC | Age: 82
End: 2017-09-01
Payer: MEDICARE

## 2017-09-01 VITALS
HEIGHT: 63 IN | TEMPERATURE: 98 F | WEIGHT: 184 LBS | DIASTOLIC BLOOD PRESSURE: 75 MMHG | OXYGEN SATURATION: 95 % | RESPIRATION RATE: 16 BRPM | SYSTOLIC BLOOD PRESSURE: 130 MMHG | HEART RATE: 82 BPM | BODY MASS INDEX: 32.6 KG/M2

## 2017-09-01 DIAGNOSIS — Z01.818 PREOP EXAMINATION: Primary | ICD-10-CM

## 2017-09-01 DIAGNOSIS — E78.5 DYSLIPIDEMIA: ICD-10-CM

## 2017-09-01 DIAGNOSIS — N18.30 CKD (CHRONIC KIDNEY DISEASE) STAGE 3, GFR 30-59 ML/MIN: ICD-10-CM

## 2017-09-01 DIAGNOSIS — I51.9 SYSTOLIC DYSFUNCTION, LEFT VENTRICLE: ICD-10-CM

## 2017-09-01 DIAGNOSIS — I10 ESSENTIAL HYPERTENSION: ICD-10-CM

## 2017-09-01 DIAGNOSIS — R60.9 EDEMA, UNSPECIFIED TYPE: ICD-10-CM

## 2017-09-01 DIAGNOSIS — Z78.9 LIMB ALERT CARE STATUS: ICD-10-CM

## 2017-09-01 DIAGNOSIS — R39.9 LOWER URINARY TRACT SYMPTOMS (LUTS): ICD-10-CM

## 2017-09-01 DIAGNOSIS — R19.00 PELVIC MASS: ICD-10-CM

## 2017-09-01 DIAGNOSIS — T88.59XD COMPLICATION OF ANESTHESIA, SUBSEQUENT ENCOUNTER: ICD-10-CM

## 2017-09-01 DIAGNOSIS — I44.7 LBBB (LEFT BUNDLE BRANCH BLOCK): ICD-10-CM

## 2017-09-01 PROBLEM — T88.59XA COMPLICATION OF ANESTHESIA: Status: ACTIVE | Noted: 2017-09-01

## 2017-09-01 PROCEDURE — 99999 PR PBB SHADOW E&M-EST. PATIENT-LVL III: CPT | Mod: PBBFAC,,, | Performed by: HOSPITALIST

## 2017-09-01 PROCEDURE — 3075F SYST BP GE 130 - 139MM HG: CPT | Mod: S$GLB,,, | Performed by: HOSPITALIST

## 2017-09-01 PROCEDURE — 99499 UNLISTED E&M SERVICE: CPT | Mod: S$GLB,,, | Performed by: HOSPITALIST

## 2017-09-01 PROCEDURE — 3008F BODY MASS INDEX DOCD: CPT | Mod: S$GLB,,, | Performed by: HOSPITALIST

## 2017-09-01 PROCEDURE — 99214 OFFICE O/P EST MOD 30 MIN: CPT | Mod: S$GLB,,, | Performed by: HOSPITALIST

## 2017-09-01 PROCEDURE — 3078F DIAST BP <80 MM HG: CPT | Mod: S$GLB,,, | Performed by: HOSPITALIST

## 2017-09-01 PROCEDURE — 1159F MED LIST DOCD IN RCRD: CPT | Mod: S$GLB,,, | Performed by: HOSPITALIST

## 2017-09-01 NOTE — PROGRESS NOTES
[facility] - Pre Op Consult  Progress Note    Patient Name: Celine Mahan  MRN: [id number]  Date of Evaluation- 09/01/2017  PCP- Anil Wills MD    Future cases for Celine Mahan [[id number]]     Case ID Status Date Time David Procedure Provider Location    [id number] [facility] 9/21/2017  7:00  EXPLORATORY-LAPAROTOMY Iesha Feliciano MD [[id number]] [facility] OR 2ND FLR          HPI:  History of present illness- I had the pleasure of meeting this pleasant 82 y.o. lady in the pre op clinic prior to her elective Gynecological surgery. The patient is new to me . Celine was accompanied by daughter Stacia.    I have obtained the history by speaking to the patient and by reviewing the electronic health records.    Events leading up to surgery / History of presenting illness -     She had left lower abdominal pain close to groin , 3 months ago , lasting for 2 months and saw her doctor and further evaluation lead to the finding of a pelvic mass   No longer has abdominal pain       Relevant health conditions of significance for the perioperative period/ History of presenting illness -    Subjectively describes health as not too bad   Still working 4 days in a week , house supervisor , house keeping, does cooking    Hypertension ,On medication  Usual BP readings 130/70-80's     She has a personal history of Left breast cancer in 1995 treated with mastectomy and adjuvant chemotherapy   Gets annual mammograms, doing well  Limb alert LUE     Occasional hemorrhoidal bleeding     CKD (chronic kidney disease) stage 3, GFR 30-59 ml/min   Stages of CKD discussed  Occasionally uses Advil for knee pains  Avoidance of Chronic NSAID use discussed    EKG showed Left bundle branch block - since age 40 when she had Graves disease ( had heart beat in 200 at that time)  Had radioiodine treatment for graves disease  Not known to have Heart attack, CAD,Chest pain    Obesity-- suggested weight loss  Not known to have Diabetes Mellitus, sleep apnea, liver  problem         Subjective/ Objective:          Chief complaint-Preoperative evaluation, Perioperative Medical management, complication reduction plan     Relevant health conditions of significance for the perioperative period/ History of presenting illness -    Active cardiac conditions- none    Revised cardiac risk index predictors- high-risk type of surgery    Functional capacity -Examples of physical activity active for her age, does driving , cooking, house keeping, shopping,lives in a 2 story house and does stairs twice  Daily, has stairs at work ,goes to [employer], house work and can take a flight of stairs holding on to the railing----- She can undertake all the above activities without  chest pain,chest tightness, Shortness of breath ,dizziness,lightheadedness making her exercise tolerance more  than 4 Mets.       Review of Systems   Constitutional: Negative for chills and fever.        No unusual weight changes   HENT:        STOPBANG score 3/8    Elevated BP  Age over 50   Neck size over 40 CM     Eyes:        No new visual changes  wears reading glasses   Respiratory:        No cough , phlegm, hemoptysis     Cardiovascular:        As noted   Gastrointestinal:        No overt GI/ blood losses  Bowel movements- Regular    Endocrine:        Prednisone use > 20 mg daily for 3 weeks- none   Genitourinary: Negative for dysuria.        No hesitancy  Has frequency  Feels like emptying bladder well   Musculoskeletal:        Knee pains- not new   Skin: Negative for rash.   Neurological: Negative for syncope.        No unilateral weakness   Hematological:        Current use of Anticoagulants  none   Psychiatric/Behavioral:        No Depression,Anxiety        Past Medical History:   Diagnosis Date    Breast cancer     CKD (chronic kidney disease) stage 3, GFR 30-59 ml/min     Hyperlipidemia     Hypertension     Obesity     Osteopenia     Overactive bladder     Pelvic mass 8/27/2017    Thyroid disease    no  vascular stenting  Family History   Problem Relation Age of Onset    Cancer Mother      pancreas ca     Past Surgical History:   Procedure Laterality Date    bt catarac surgery      CHOLECYSTECTOMY      HYSTERECTOMY      lt mastectomy     No bleeding , cardiac problems , PONV with previous surgeries/ procedures   Takes bigger doses for anaesthesia, daughter reports waking up during anesthesia   FH- No anesthesia, thrombosis , early onset heart disease in family   Medications and Allergies reviewed in epic.   Lives alone, going to stay with daughter post op    Review of Medicine tests    EKG- I had independently reviewed the EKG from--8/28/2017   It was reported to be showing     Normal sinus rhythm  Left bundle branch block  Abnormal ECG    2003 echo LVEF 45 %    Review of clinical lab tests-Date--8/25/2017 - Creatinine-1.3  Date--7/18/2017 Hemoglobin--12.9 Platelet count--268    .lrrex      Physical Exam   Constitutional: She appears well-developed.   HENT:   Head: Normocephalic.       Physical Exam   Constitutional: She appears well-developed.   HENT:   Head: Normocephalic.     Constitutional- Vitals - Body mass index is 32.59 kg/m².,   Vitals:    09/01/17 0700   BP: 130/75   Pulse: 82   Resp: 16   Temp: 98.1 °F (36.7 °C)     General appearance-Conscious,Coherent  Eyes- No conjunctival icterus,pupils  round , reactive to light  and  bilateral intra ocular lenses  ENT-Oral cavity- moist ,  upper denture and lower denture  , Hearing grossly normal   Neck- No thyromegaly ,Trachea -central, No jugular venous distension,   No Carotid Bruit   Cardiovascular -Heart Sounds- Normal  and  no murmur   , No gallop rhythm   Respiratory - Normal Respiratory Effort, Normal breath sounds, crepitations bases,  no wheeze  and  no forced expiratory wheeze    Peripheral pitting pedal edema-- mild and  bilateral lower extremity telangiectasia , no calf pain   Gastrointestinal -Soft abdomen, up to umbilicus  Mass felt ,mid lower  abdomen  Tender,Liver,Spleen not palpable. No-- free fluid and shifting dullness  Musculoskeletal- No finger Clubbing. Strength grossly normal   Lymphatic-No Palpable cervical, axillary,Inguinal lymphadenopathy   Psychiatric - normal effect,Orientation  Rt Dorsalis pedis pulses-palpable    Lt Dorsalis pedis pulses- palpable   Rt Posterior tibial pulses -palpable   Left posterior tibial pulses -palpable   Miscellaneous -  Surgical scarlower abdomen  and  no renal bruit    Investigations  Lab and Imaging have been reviewed in epic.      Review of old records- Was done and information gathered regards to events leading to surgery and health conditions of significance in the perioperative period.        Assessment/Plan:     CKD (chronic kidney disease) stage 3, GFR 30-59 ml/min   I  suggest monitoring renal function, in put and out put status loi-operatively. I  suggest avoiding nephrotoxic medication including NSAIDs, COX2 inhibitors, intravenous contrast agent,avoiding hypotension to prevent further renal impairment.     Pelvic mass  For OR    Hypertension  Hypertension-  Blood pressure is acceptable .  I suggest holding -Valsartan- on the morning of the surgery and can continue that  post operatively under blood pressure, electrolyte and renal function monitoring as long as they are acceptable.I suggest addressing pain control as uncontrolled pain can increased blood pressure     Dyslipidemia  Not on medication   Does not like taking medication    LBBB (left bundle branch block)  No suggestion of symptomatic Coronary artery disease , heart failure   Active for her age and still works  Chronic Left bundle branch block for 40 plus years   2004 echo LVEF 45 %  Offered Cardiology , Echo pre op evaluation that patient , daughter deferred given how well she is , I am comfortable with that decision      Systolic dysfunction, left ventricle  Patient has history of heart failure that seems well compensated . Please keep a  "record of the Input and Out put and weigh patient daily so that fluid overload can be detected and acted upon promptly . I suggest providing low  sodium diet   I suggest avoidance of the ordering of continuous IV fluids and if IV fluids are required ,to order for a specific duration of time and consider ordering lower IV fluid rate     Limb alert care status  Left upper extremity     Lower urinary tract symptoms (LUTS)  Risk of post op urinary retention    Complication of anesthesia  takes bigger doses for anaesthesia, daughter reports waking up during anesthesia in [place]      Edema  Edema- I suggested avoidance of added salt,avoidance of NSAID's and suggested Limb elevation and linda hose use      Preoperative cardiac risk assessment-  The patient does not have any active cardiac conditions . Revised cardiac risk index predictors- 1---.Functional capacity is more than 4 Mets. She will be undergoing a Gynecological procedure that carries a high risk     The estimated risk of the rate of adverse cardiac outcomes  0.9%    No further cardiac work up is indicated prior to proceeding with the surgery       American Society of Anesthesiologists Physical status classification ( ASA ) class- - 3    Postoperative pulmonary complication risk assessment    /75 Comment: left arm  Pulse 82   Temp 98.1 °F (36.7 °C)   Resp 16   Ht 5' 3" (1.6 m)   Wt 83.5 kg (184 lb)   SpO2 95%   Breastfeeding? No   BMI 32.59 kg/m²     ARISCAT ( [place]) risk index- risk class -  Low,   if duration of surgery is under or equal to 2 hours ,intermediate  if duration of surgery is 2-3 hours , higher- if duration of surgery is over 3 hours     Jenn Respiratory failure index- percentage risk of respiratory failure- 0.5 %      Preventive perioperative care    Thromboembolic prophylaxis:  Her risk factors for thrombosis include possible neoplasia  obesity, surgical procedure and age.I suggest  thromboembolic prophylaxis ( " mechanical/pharmacological, weighing the risk benefits of pharmacological agent use considering loi procedural bleeding )  during the perioperative period.I suggested being active in the post operative period.      Postoperative pulmonary complication prophylaxis-Risk factors for post operative pulmonary complications include obesity  age over 65 years, ASA class >2 and proximity of the surgical site to the lungs- I suggest incentive spirometry use, early ambulation and end tidal carbon dioxide monitoring  , oral care , head end of bed elevation     Renal complication prophylaxis-Risk factors for renal complications include pre-existing renal disease, age and hypertension . I suggest keeping her well hydrated and avoidance/ minimizing the use of  NSAID's,HAIR 2 Inhibitors ,IV contrast if possible in the perioperative period.I suggested drinking 2 litre's of water a day      Surgical site Infection Prophylaxis-I  suggest appropriate antibiotic for Prophylaxis against Surgical site infections     Delirium prophylaxis-Risk factors - Advanced Age - I suggest avoidance / minimizing the use of  Benzodiazepines ( unless the patient has been taking it on a regular basis ),Anticholinergic medication,Antihistamines ( like  Benadryl).I suggest minimizing the use of opioid medication and use of IV tylenol,if it is appropriate. I suggest using the lowest possible dose of opioids for the shortest duration possible in the perioperative period. I suggest to Keep shades/blinds open during the day, lights off and shades closed at night to encourage normal sleep/wake cycle.I encourage the presence of the family member with the patient at all times, if at all possible as mental status changes can be picked up early by the family members and they help with reorientation. I encouraged the presence of family to help with orientation in the perioperative period. Benadryl avoidance suggested      In view of gynecological procedure the  patient  is at risk of postoperative urinary retention.  I suggest avoidance / minimizing the of  Benzodiazepines,Anticholinergic medication,antihistamines ( Benadryl) , if possible in the perioperative period. I suggest using the minimum possible use of opioids for the minimum period of time in the perioperative period. Benadryl avoidance suggested      This visit was focused on Preoperative evaluation, Perioperative Medical management, complication reduction plans. I suggest that the patient follows up with primary care or relevant sub specialists for ongoing health care.    I appreciate the opportunity to be involved in this patients care. Please feel free to contact me if there were any questions about this consultation.    Patient is optimized    Martha Pineda MD  Perioperative Medicine  [facility]   Pager [phone]  -----    9/19- 12 58     Called to follow up - unable to speak to her , daughter  ----    9/20- 12 58     Called to follow up - unable to speak / leave a message

## 2017-09-01 NOTE — LETTER
September 1, 2017      Iesha Feliciano MD  [address] [facility] [facility] [address]           [facility] - Pre Op Consult  [address] [address]  Phone: [phone]          Patient: Celine Mahan   MR Number: [id number]   YOB: 1935   Date of Visit: 9/1/2017       Dear Dr. Iesha Feliciano:    Thank you for referring Celine Mahan to me for evaluation. Attached you will find relevant portions of my assessment and plan of care.    If you have questions, please do not hesitate to call me. I look forward to following Celine Mahan along with you.    Sincerely,    Martha Pineda MD    Enclosure  CC:  Anil Wills MD    If you would like to receive this communication electronically, please contact [email or web address] or [phone] to request more information on [employer] [facility] access.    For providers and/or their staff who would like to refer a patient to [facility], please contact us through our one-stop-shop provider referral line, [facility], at [phone].    If you feel you have received this communication in error or would no longer like to receive these types of communications, please e-mail [email or web address]

## 2017-09-01 NOTE — ASSESSMENT & PLAN NOTE
Edema- I suggested avoidance of added salt,avoidance of NSAID's and suggested Limb elevation and linda hose use

## 2017-09-01 NOTE — ASSESSMENT & PLAN NOTE
Hypertension-  Blood pressure is acceptable .  I suggest holding -Valsartan- on the morning of the surgery and can continue that  post operatively under blood pressure, electrolyte and renal function monitoring as long as they are acceptable.I suggest addressing pain control as uncontrolled pain can increased blood pressure

## 2017-09-01 NOTE — ASSESSMENT & PLAN NOTE
No suggestion of symptomatic Coronary artery disease , heart failure   Active for her age and still works  Chronic Left bundle branch block for 40 plus years   2004 echo LVEF 45 %  Offered Cardiology , Echo pre op evaluation that patient , daughter deferred given how well she is , I am comfortable with that decision

## 2017-09-01 NOTE — ASSESSMENT & PLAN NOTE
I  suggest monitoring renal function, in put and out put status loi-operatively. I  suggest avoiding nephrotoxic medication including NSAIDs, COX2 inhibitors, intravenous contrast agent,avoiding hypotension to prevent further renal impairment.

## 2017-09-01 NOTE — ASSESSMENT & PLAN NOTE
Patient has history of heart failure that seems well compensated . Please keep a record of the Input and Out put and weigh patient daily so that fluid overload can be detected and acted upon promptly . I suggest providing low  sodium diet   I suggest avoidance of the ordering of continuous IV fluids and if IV fluids are required ,to order for a specific duration of time and consider ordering lower IV fluid rate

## 2017-09-01 NOTE — HPI
History of present illness- I had the pleasure of meeting this pleasant 82 y.o. lady in the pre op clinic prior to her elective Gynecological surgery. The patient is new to me . Celine was accompanied by daughter Stacia.    I have obtained the history by speaking to the patient and by reviewing the electronic health records.    Events leading up to surgery / History of presenting illness -     She had left lower abdominal pain close to groin , 3 months ago , lasting for 2 months and saw her doctor and further evaluation lead to the finding of a pelvic mass   No longer has abdominal pain       Relevant health conditions of significance for the perioperative period/ History of presenting illness -    Subjectively describes health as not too bad   Still working 4 days in a week , house supervisor , house keeping, does cooking    Hypertension ,On medication  Usual BP readings 130/70-80's     She has a personal history of Left breast cancer in 1995 treated with mastectomy and adjuvant chemotherapy   Gets annual mammograms, doing well  Limb alert LUE     Occasional hemorrhoidal bleeding     CKD (chronic kidney disease) stage 3, GFR 30-59 ml/min   Stages of CKD discussed  Occasionally uses Advil for knee pains  Avoidance of Chronic NSAID use discussed    EKG showed Left bundle branch block - since age 40 when she had Graves disease ( had heart beat in 200 at that time)  Had radioiodine treatment for graves disease  Not known to have Heart attack, CAD,Chest pain    Obesity-- suggested weight loss  Not known to have Diabetes Mellitus, sleep apnea, liver problem

## 2017-09-01 NOTE — OUTPATIENT SUBJECTIVE & OBJECTIVE
Outpatient Subjective & Objective     Chief complaint-Preoperative evaluation, Perioperative Medical management, complication reduction plan     Relevant health conditions of significance for the perioperative period/ History of presenting illness -    Active cardiac conditions- none    Revised cardiac risk index predictors- high-risk type of surgery    Functional capacity -Examples of physical activity active for her age, does driving , cooking, house keeping, shopping,lives in a 2 story house and does stairs twice  Daily, has stairs at work ,goes to [employer], house work and can take a flight of stairs holding on to the railing----- She can undertake all the above activities without  chest pain,chest tightness, Shortness of breath ,dizziness,lightheadedness making her exercise tolerance more  than 4 Mets.       Review of Systems   Constitutional: Negative for chills and fever.        No unusual weight changes   HENT:        STOPBANG score 3/8    Elevated BP  Age over 50   Neck size over 40 CM     Eyes:        No new visual changes  wears reading glasses   Respiratory:        No cough , phlegm, hemoptysis     Cardiovascular:        As noted   Gastrointestinal:        No overt GI/ blood losses  Bowel movements- Regular    Endocrine:        Prednisone use > 20 mg daily for 3 weeks- none   Genitourinary: Negative for dysuria.        No hesitancy  Has frequency  Feels like emptying bladder well   Musculoskeletal:        Knee pains- not new   Skin: Negative for rash.   Neurological: Negative for syncope.        No unilateral weakness   Hematological:        Current use of Anticoagulants  none   Psychiatric/Behavioral:        No Depression,Anxiety        Past Medical History:   Diagnosis Date    Breast cancer     CKD (chronic kidney disease) stage 3, GFR 30-59 ml/min     Hyperlipidemia     Hypertension     Obesity     Osteopenia     Overactive bladder     Pelvic mass 8/27/2017    Thyroid disease    no vascular  stenting  Family History   Problem Relation Age of Onset    Cancer Mother      pancreas ca     Past Surgical History:   Procedure Laterality Date    bt catarac surgery      CHOLECYSTECTOMY      HYSTERECTOMY      lt mastectomy     No bleeding , cardiac problems , PONV with previous surgeries/ procedures   Takes bigger doses for anaesthesia, daughter reports waking up during anesthesia   FH- No anesthesia, thrombosis , early onset heart disease in family   Medications and Allergies reviewed in epic.   Lives alone, going to stay with daughter post op    Review of Medicine tests    EKG- I had independently reviewed the EKG from--8/28/2017   It was reported to be showing     Normal sinus rhythm  Left bundle branch block  Abnormal ECG    2003 echo LVEF 45 %    Review of clinical lab tests-Date--8/25/2017 - Creatinine-1.3  Date--7/18/2017 Hemoglobin--12.9 Platelet count--268    .lrrex      Physical Exam   Constitutional: She appears well-developed.   HENT:   Head: Normocephalic.       Physical Exam   Constitutional: She appears well-developed.   HENT:   Head: Normocephalic.     Constitutional- Vitals - Body mass index is 32.59 kg/m².,   Vitals:    09/01/17 0700   BP: 130/75   Pulse: 82   Resp: 16   Temp: 98.1 °F (36.7 °C)     General appearance-Conscious,Coherent  Eyes- No conjunctival icterus,pupils  round , reactive to light  and  bilateral intra ocular lenses  ENT-Oral cavity- moist ,  upper denture and lower denture  , Hearing grossly normal   Neck- No thyromegaly ,Trachea -central, No jugular venous distension,   No Carotid Bruit   Cardiovascular -Heart Sounds- Normal  and  no murmur   , No gallop rhythm   Respiratory - Normal Respiratory Effort, Normal breath sounds, crepitations bases,  no wheeze  and  no forced expiratory wheeze    Peripheral pitting pedal edema-- mild and  bilateral lower extremity telangiectasia , no calf pain   Gastrointestinal -Soft abdomen, up to umbilicus  Mass felt ,mid lower abdomen   Tender,Liver,Spleen not palpable. No-- free fluid and shifting dullness  Musculoskeletal- No finger Clubbing. Strength grossly normal   Lymphatic-No Palpable cervical, axillary,Inguinal lymphadenopathy   Psychiatric - normal effect,Orientation  Rt Dorsalis pedis pulses-palpable    Lt Dorsalis pedis pulses- palpable   Rt Posterior tibial pulses -palpable   Left posterior tibial pulses -palpable   Miscellaneous -  Surgical scarlower abdomen  and  no renal bruit    Investigations  Lab and Imaging have been reviewed in epic.      Review of old records- Was done and information gathered regards to events leading to surgery and health conditions of significance in the perioperative period.    Outpatient Subjective & Objective

## 2017-09-13 ENCOUNTER — ANESTHESIA EVENT (OUTPATIENT)
Dept: SURGERY | Facility: HOSPITAL | Age: 82
DRG: 738 | End: 2017-09-13
Payer: MEDICARE

## 2017-09-13 DIAGNOSIS — Z01.818 PREOPERATIVE TESTING: Primary | ICD-10-CM

## 2017-09-13 NOTE — ANESTHESIA PREPROCEDURE EVALUATION
"Pre Admission Screening  Sari Meadows RN      []Hide copied text  Anesthesia Assessment: Preoperative EQUATION     Planned Procedure: Procedure(s) (LRB):  EXPLORATORY-LAPAROTOMY (N/A)  STAGING (N/A)  Requested Anesthesia Type:General  Surgeon: Iesha Feliciano MD  Service: General  Known or anticipated Date of Surgery:9/21/2017     Surgeon notes: reviewed     Electronic QUestionnaire Assessment completed via nurse interview with patient.         No AQ     Triage considerations:      The patient has no apparent active cardiac condition (No unstable coronary Syndrome such as severe unstable angina or recent [<1 month] myocardial infarction, decompensated CHF, severe valvular   disease or significant arrhythmia)     Previous anesthesia records:GETA, MAC and Complications noted-states she "woke up" during appendectomy and mastectomy in [place]   Colonoscopy:  Airway/Jaw/Neck:  Airway Findings: Mouth Opening: Normal Tongue: Normal  General Airway Assessment: Adult  Mallampati: II  TM Distance: Normal, at least 6 cm  Jaw/Neck Findings:     Neck ROM: Normal ROM      Dental:  Dental Findings: In tact      Last PCP note: within 1 month , [facility]  Subspecialty notes: n/a     Other important co-morbidities: HTN/HLP, Hypothyroidism, CKD3     Tests already available:  Available tests,  within 1 month , within [facility] . 8/28/17 EKG. 7/18/17 CBC, BMP. 5/24/17 CMP.                            Instructions given. (See in Nurse's note)     Optimization:  Anesthesia Preop Clinic Assessment  Indicated-POC 9/14    Medical Opinion Indicated-was cleared by Edwin 9/1/17                                        Plan:    Testing:  T&S   Pre-anesthesia  visit                                        Visit focus: concerns in complex and/or prolonged anesthesia, past history of problem with anesthesia                           Consultation:IM Perioperative Hospitalist-cleared 9/1                           Patient  has " previously scheduled Medical Appointment:none     Navigation: Tests Scheduled. 9/14                        Consults scheduled.                        Results will be tracked by Preop Clinic.                               Electronically signed by Sari Meadows RN at 9/13/2017  1:48 PM        Pre-admit on 9/21/2017            Detailed Report                                                                                                                         09/13/2017  Celine Mahan is a 82 y.o., female.    Anesthesia Evaluation         Review of Systems  Anesthesia Hx:  History of prior surgery of interest to airway management or planning: Previous anesthesia: MAC Colonoscopy 2015 with MAC.  Procedure performed at an [facility]. 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  Denies Sleep Apnea.    Renal/:   Chronic Renal Disease (CKD3)    Hepatic/GI:   GERD    Musculoskeletal:   Arthritis (bilat knees)     OB/GYN/PEDS:  Pelvic mass   Neurological:   Denies CVA. Denies Seizures.    Endocrine:   Denies Diabetes. Hypothyroidism    Psych:  Psychiatric Normal           Physical Exam  General:  Well nourished, Obesity    Airway/Jaw/Neck:  Airway Findings: Mouth Opening: Normal Tongue: Normal  General Airway Assessment: Adult  Jaw/Neck Findings:     Neck ROM: Extension Decreased, Mod., Decreased Lateral Motion, to the right, to the left      Dental:  Dental Findings: Upper Dentures, Lower Dentures   Chest/Lungs:  Chest/Lungs Findings: Clear to auscultation, Normal Respiratory  Rate     Heart/Vascular:  Heart Findings: Rate: Normal  Rhythm: Regular Rhythm  Sounds: Normal        Mental Status:  Mental Status Findings:  Cooperative, Alert and Oriented       Pt was seen in POC 9/15/17; Medical optimization: please see EPIC notes for recommendations of pre-op medical consultant, Dr Pineda, for perioperative medical management./Taylor Turner RN           Anesthesia Plan  Type of Anesthesia, risks & benefits discussed:  Anesthesia Type:  general  Patient's Preference: Proceed with anesthesia understanding that the risks are very small but could be serious or life threatening.  Intra-op Monitoring Plan: standard ASA monitors  Intra-op Monitoring Plan Comments:   Post Op Pain Control Plan:   Post Op Pain Control Plan Comments:   Induction:   IV  Beta Blocker:  Patient is not currently on a Beta-Blocker (No further documentation required).       Informed Consent: Patient understands risks and agrees with Anesthesia plan.  Questions answered. Anesthesia consent signed with patient.  ASA Score: 2     Day of Surgery Review of History & Physical: I have interviewed and examined the patient. I have reviewed the patient's H&P dated:            Ready For Surgery From Anesthesia Perspective.

## 2017-09-13 NOTE — PRE ADMISSION SCREENING
"Anesthesia Assessment: Preoperative EQUATION    Planned Procedure: Procedure(s) (LRB):  EXPLORATORY-LAPAROTOMY (N/A)  STAGING (N/A)  Requested Anesthesia Type:General  Surgeon: Iesha Feliciano MD  Service: General  Known or anticipated Date of Surgery:9/21/2017    Surgeon notes: reviewed    Electronic QUestionnaire Assessment completed via nurse interview with patient.        No AQ    Triage considerations:     The patient has no apparent active cardiac condition (No unstable coronary Syndrome such as severe unstable angina or recent [<1 month] myocardial infarction, decompensated CHF, severe valvular   disease or significant arrhythmia)    Previous anesthesia records:GETA, MAC and Complications noted-states she "woke up" during appendectomy and mastectomy in [place]   Colonoscopy:  Airway/Jaw/Neck:  Airway Findings: Mouth Opening: Normal Tongue: Normal  General Airway Assessment: Adult  Mallampati: II  TM Distance: Normal, at least 6 cm  Jaw/Neck Findings:     Neck ROM: Normal ROM      Dental:  Dental Findings: In tact     Last PCP note: within 1 month , within [facility] 9/1 [facility]  Subspecialty notes: n/a    Other important co-morbidities: HTN/HLP, Hypothyroidism, CKD3     Tests already available:  Available tests,  within 1 month , within [facility] . 8/28/17 EKG. 7/18/17 CBC, BMP. 5/24/17 CMP.            Instructions given. (See in Nurse's note)    Optimization:  Anesthesia Preop Clinic Assessment  Indicated-POC 9/14    Medical Opinion Indicated-was cleared by Edwin 9/1/17          Plan:    Testing:  T&S   Pre-anesthesia  visit       Visit focus: concerns in complex and/or prolonged anesthesia, past history of problem with anesthesia     Consultation:IM Perioperative Hospitalist-cleared 9/1     Patient  has previously scheduled Medical Appointment:none    Navigation: Tests Scheduled. 9/14             Consults scheduled.             Results will be tracked by Preop Clinic.                 "

## 2017-09-15 ENCOUNTER — HOSPITAL ENCOUNTER (OUTPATIENT)
Dept: PREADMISSION TESTING | Facility: HOSPITAL | Age: 82
Discharge: HOME | End: 2017-09-15
Attending: ANESTHESIOLOGY
Payer: MEDICARE

## 2017-09-15 VITALS
RESPIRATION RATE: 18 BRPM | WEIGHT: 186.31 LBS | TEMPERATURE: 97 F | OXYGEN SATURATION: 97 % | DIASTOLIC BLOOD PRESSURE: 70 MMHG | HEIGHT: 63 IN | SYSTOLIC BLOOD PRESSURE: 159 MMHG | HEART RATE: 62 BPM | BODY MASS INDEX: 33.01 KG/M2

## 2017-09-15 DIAGNOSIS — K21.9 GASTROESOPHAGEAL REFLUX DISEASE, ESOPHAGITIS PRESENCE NOT SPECIFIED: ICD-10-CM

## 2017-09-15 DIAGNOSIS — R14.0 ABDOMINAL BLOATING: ICD-10-CM

## 2017-09-15 RX ORDER — OMEPRAZOLE 20 MG/1
CAPSULE, DELAYED RELEASE ORAL
Qty: 90 CAPSULE | Refills: 0 | Status: SHIPPED | OUTPATIENT
Start: 2017-09-15 | End: 2017-12-28 | Stop reason: SDUPTHER

## 2017-09-15 RX ORDER — VALSARTAN 160 MG/1
160 TABLET ORAL DAILY
Qty: 90 TABLET | Refills: 0 | Status: SHIPPED | OUTPATIENT
Start: 2017-09-15 | End: 2017-12-20 | Stop reason: SDUPTHER

## 2017-09-15 RX ORDER — LEVOTHYROXINE SODIUM 125 UG/1
125 TABLET ORAL DAILY
Qty: 90 TABLET | Refills: 0 | Status: SHIPPED | OUTPATIENT
Start: 2017-09-15 | End: 2017-12-15 | Stop reason: SDUPTHER

## 2017-09-15 NOTE — DISCHARGE INSTRUCTIONS
Your surgery has been scheduled for:__________________________________________    You should report to:  ____[facility], located on the [place] side of the first floor of the           [facility] ([phone])  ____[facility], located on the [facility] side of the            Second floor of the [facility] ([phone])  ____3rd Floor [facility] located on the [facility] side of the [facility] [phone]  Please Note   - Tell your doctor if you take Aspirin, products containing Aspirin, herbal medications  or blood thinners, such as Coumadin, Ticlid, or Plavix.  (Consult your provider regarding holding or stopping before surgery).  - Arrange for someone to drive you home following surgery.  You will not be allowed to leave the surgical facility alone or drive yourself home following sedation and anesthesia.  Before Surgery  - Stop taking all herbal medications 14days prior to surgery  - No Motrin/Advil (Ibuprofen) 7 days before surgery  - No Aleve (Naproxen) 7 days before surgery  - Stop Taking Asprin, products containing Asprin _____days before surgery  - Stop taking blood thinners_______days before surgery  - Refrain from drinking alcoholic beverages for 24hours before and after surgery  - Stop or limit smoking _________days before surgery  Night before Surgery  - DO NOT EAT OR DRINK ANYTHING AFTER MIDNIGHT, INCLUDING GUM, HARD CANDY, MINTS, OR CHEWING TOBACCO.  - Take a shower or bath (shower is recommended).  Bathe with Hibiclens soap or an antibacterial soap from the neck down.  If not supplied by your surgeon, hibiclens soap will need to be purchased over the counter in pharmacy.  Rinse soap off thoroughly.  - Shampoo your hair with your regular shampoo  The Day of Surgery  - Take another bath or shower with hibiclens or any antibacterial soap, to reduce the chance of infection.  - Take heart and blood  pressure medications with a small sip of water, as advised by the perioperative team.  - Do not take fluid pills  - You may brush your teeth and rinse your mouth, but do not swall any additional water.   - Do not apply perfumes, powder, body lotions or deodorant on the day of surgery.  - Nail polish should be removed.  - Do not wear makeup or moisturizer  - Wear comfortable clothes, such as a button front shirt and loose fitting pants.  - Leave all jewelry, including body piercings, and valuables at home.    - Bring any devices you will neeed after surgery such as crutches or canes.  - If you have sleep apnea, please bring your CPAP machine  In the event that your physical condition changes including the onset of a cold or respiratory illness, or if you have to delay or cancel your surgery, please notify your surgeon.Anesthesia: General Anesthesia  Youre due to have surgery. During surgery, youll be given medication called anesthesia. (It is also called anesthetic.) This will keep you comfortable and pain-free. Your anesthesia provider will use general anesthesia. This sheet tells you more about it.  What is general anesthesia?     You are watched continuously during your procedure by the anesthesia provider   General anesthesia puts you into a state like deep sleep. It goes into the bloodstream (IV anesthetics), into the lungs (gas anesthetics), or both. You feel nothing during the procedure. You will not remember it. During the procedure, the anesthesia provider monitors you continuously. He or she checks your heart rate and rhythm, blood pressure, breathing, and blood oxygen.  · IV Anesthetics. IV anesthetics are given through an IV line in your arm. Theyre often given first. This is so you are asleep before a gas anesthetic is started. Some kinds of IV anesthetics relieve pain. Others relax you. Your doctor will decide which kind is best in your case.  · Gas Anesthetics. Gas anesthetics are breathed into the  lungs. They are often used to keep you asleep. They can be given through a facemask or a tube placed in your larynx or trachea (breathing tube).  ? If you have a facemask, your anesthesia provider will most likely place it over your nose and mouth while youre still awake. Youll breathe oxygen through the mask as your IV anesthetic is started. Gas anesthetic may be added through the mask.  ? If you have a tube in the larynx or trachea, it will be inserted into your throat after youre asleep.  Anesthesia tools and medications  You will likely have:  · IV anesthetics. These are put into an IV line into your bloodstream.  · Gas anesthetics. You breathe these anesthetics into your lungs, where they pass into your bloodstream.  · Pulse oximeter. This is a small clip that is attached to the end of your finger. This measures your blood oxygen level.  · Electrocardiography leads (electrodes). These are small sticky pads that are placed on your chest. They record your heart rate and rhythm.  · Blood pressure cuff. This reads your blood pressure.  Risks and possible complications  General anesthesia has some risks. These include:  · Breathing problems  · Nausea and vomiting  · Sore throat or hoarseness (usually temporary)  · Allergic reaction to the anesthetic  · Irregular heartbeat (rare)  · Cardiac arrest (rare)   Anesthesia safety  · Follow all instructions you are given for how long not to eat or drink before your procedure.  · Be sure your doctor knows what medications and drugs you take. This includes over-the-counter medications, herbs, supplements, alcohol or other drugs. You will be asked when those were last taken.  · Have an adult family member or friend drive you home after the procedure.  · For the first 24 hours after your surgery:  ? Do not drive or use heavy equipment.  ? Have a trusted family member or spouse make important decisions or sign documents.  ? Avoid alcohol.  ? Have a responsible adult stay with  you. He or she can watch for problems and help keep you safe.  Date Last Reviewed: 10/16/2014  © 8854-0498 [employer] [address] [place] [address], [place] [address]. All rights reserved. This information is not intended as a substitute for professional medical care. Always follow your healthcare professional's instructions.

## 2017-09-19 ENCOUNTER — TELEPHONE (OUTPATIENT)
Dept: FAMILY MEDICINE | Facility: CLINIC | Age: 82
End: 2017-09-19

## 2017-09-19 NOTE — TELEPHONE ENCOUNTER
Spoke with pt inform her Rxs was sent on 09/15 to her [facility] pharmacy and requested. RX is ready for . Pt voices understanding.

## 2017-09-19 NOTE — TELEPHONE ENCOUNTER
----- Message from Tere Esquivel sent at 9/18/2017  3:35 PM CDT -----  Contact: [phone]/self  Pt requesting to speak with you about her prescription.  Please call and advise

## 2017-09-20 ENCOUNTER — TELEPHONE (OUTPATIENT)
Dept: GYNECOLOGIC ONCOLOGY | Facility: CLINIC | Age: 82
End: 2017-09-20

## 2017-09-20 NOTE — TELEPHONE ENCOUNTER
Called pt to remind her of her arrival time for her surgery on tomorrow no answer. I was unable to leave a message on her phone. I will try to call her again later.  MA/LPN

## 2017-09-21 ENCOUNTER — HOSPITAL ENCOUNTER (INPATIENT)
Facility: HOSPITAL | Age: 82
LOS: 3 days | Discharge: HOME | DRG: 738 | End: 2017-09-24
Attending: OBSTETRICS & GYNECOLOGY | Admitting: OBSTETRICS & GYNECOLOGY
Payer: MEDICARE

## 2017-09-21 ENCOUNTER — ANESTHESIA (OUTPATIENT)
Dept: SURGERY | Facility: HOSPITAL | Age: 82
DRG: 738 | End: 2017-09-21
Payer: MEDICARE

## 2017-09-21 DIAGNOSIS — R00.1 BRADYCARDIA: ICD-10-CM

## 2017-09-21 DIAGNOSIS — R19.00 PELVIC MASS: ICD-10-CM

## 2017-09-21 DIAGNOSIS — Z98.890 S/P EXPLORATORY LAPAROTOMY: Primary | ICD-10-CM

## 2017-09-21 DIAGNOSIS — Z01.818 PRE-OP EVALUATION: ICD-10-CM

## 2017-09-21 DIAGNOSIS — I49.49 ECTOPIC BEATS: ICD-10-CM

## 2017-09-21 DIAGNOSIS — Z98.890 S/P EXPLORATORY LAPAROTOMY: ICD-10-CM

## 2017-09-21 PROBLEM — E03.9 HYPOTHYROID: Status: ACTIVE | Noted: 2017-09-21

## 2017-09-21 LAB
ABO + RH BLD: NORMAL
ALBUMIN SERPL BCP-MCNC: 3 G/DL
ALP SERPL-CCNC: 68 U/L
ALT SERPL W/O P-5'-P-CCNC: 14 U/L
ANION GAP SERPL CALC-SCNC: 9 MMOL/L
AST SERPL-CCNC: 23 U/L
BASOPHILS # BLD AUTO: 0.03 K/UL
BASOPHILS NFR BLD: 0.2 %
BILIRUB SERPL-MCNC: 0.3 MG/DL
BLD GP AB SCN CELLS X3 SERPL QL: NORMAL
BUN SERPL-MCNC: 19 MG/DL
CALCIUM SERPL-MCNC: 7.6 MG/DL
CHLORIDE SERPL-SCNC: 107 MMOL/L
CO2 SERPL-SCNC: 19 MMOL/L
CREAT SERPL-MCNC: 1.4 MG/DL
DIFFERENTIAL METHOD: ABNORMAL
EOSINOPHIL # BLD AUTO: 0.1 K/UL
EOSINOPHIL NFR BLD: 0.6 %
ERYTHROCYTE [DISTWIDTH] IN BLOOD BY AUTOMATED COUNT: 15.5 %
EST. GFR  (AFRICAN AMERICAN): 40.4 ML/MIN/1.73 M^2
EST. GFR  (NON AFRICAN AMERICAN): 35 ML/MIN/1.73 M^2
GLUCOSE SERPL-MCNC: 334 MG/DL
HCT VFR BLD AUTO: 40.9 %
HGB BLD-MCNC: 13.3 G/DL
LYMPHOCYTES # BLD AUTO: 1.1 K/UL
LYMPHOCYTES NFR BLD: 8.9 %
MAGNESIUM SERPL-MCNC: 2.2 MG/DL
MCH RBC QN AUTO: 27.9 PG
MCHC RBC AUTO-ENTMCNC: 32.5 G/DL
MCV RBC AUTO: 86 FL
MONOCYTES # BLD AUTO: 0.6 K/UL
MONOCYTES NFR BLD: 5.1 %
NEUTROPHILS # BLD AUTO: 10.3 K/UL
NEUTROPHILS NFR BLD: 84.8 %
PHOSPHATE SERPL-MCNC: 3 MG/DL
PLATELET # BLD AUTO: 220 K/UL
PMV BLD AUTO: 10.1 FL
POCT GLUCOSE: 196 MG/DL (ref 70–110)
POTASSIUM SERPL-SCNC: 5.2 MMOL/L
PROT SERPL-MCNC: 6.3 G/DL
RBC # BLD AUTO: 4.77 M/UL
SODIUM SERPL-SCNC: 135 MMOL/L
WBC # BLD AUTO: 12.16 K/UL

## 2017-09-21 PROCEDURE — 82962 GLUCOSE BLOOD TEST: CPT | Performed by: OBSTETRICS & GYNECOLOGY

## 2017-09-21 PROCEDURE — 20600001 HC STEP DOWN PRIVATE ROOM

## 2017-09-21 PROCEDURE — 86901 BLOOD TYPING SEROLOGIC RH(D): CPT

## 2017-09-21 PROCEDURE — 36415 COLL VENOUS BLD VENIPUNCTURE: CPT

## 2017-09-21 PROCEDURE — 25000003 PHARM REV CODE 250: Performed by: NURSE ANESTHETIST, CERTIFIED REGISTERED

## 2017-09-21 PROCEDURE — 63600175 PHARM REV CODE 636 W HCPCS

## 2017-09-21 PROCEDURE — 36000708 HC OR TIME LEV III 1ST 15 MIN: Performed by: OBSTETRICS & GYNECOLOGY

## 2017-09-21 PROCEDURE — 25000003 PHARM REV CODE 250

## 2017-09-21 PROCEDURE — 25000003 PHARM REV CODE 250: Performed by: OBSTETRICS & GYNECOLOGY

## 2017-09-21 PROCEDURE — D9220A PRA ANESTHESIA: Mod: CRNA,,, | Performed by: NURSE ANESTHETIST, CERTIFIED REGISTERED

## 2017-09-21 PROCEDURE — 0UT60ZZ RESECTION OF LEFT FALLOPIAN TUBE, OPEN APPROACH: ICD-10-PCS | Performed by: OBSTETRICS & GYNECOLOGY

## 2017-09-21 PROCEDURE — 63600175 PHARM REV CODE 636 W HCPCS: Performed by: OBSTETRICS & GYNECOLOGY

## 2017-09-21 PROCEDURE — 99900035 HC TECH TIME PER 15 MIN (STAT)

## 2017-09-21 PROCEDURE — 84100 ASSAY OF PHOSPHORUS: CPT

## 2017-09-21 PROCEDURE — 80053 COMPREHEN METABOLIC PANEL: CPT

## 2017-09-21 PROCEDURE — 49205 PR EXCISION/DESTRUCTION OPEN ABDOMINAL TUMORS >10.0 CM: CPT | Mod: ,,, | Performed by: OBSTETRICS & GYNECOLOGY

## 2017-09-21 PROCEDURE — C9399 UNCLASSIFIED DRUGS OR BIOLOG: HCPCS | Performed by: NURSE ANESTHETIST, CERTIFIED REGISTERED

## 2017-09-21 PROCEDURE — 63600175 PHARM REV CODE 636 W HCPCS: Performed by: STUDENT IN AN ORGANIZED HEALTH CARE EDUCATION/TRAINING PROGRAM

## 2017-09-21 PROCEDURE — 0UT10ZZ RESECTION OF LEFT OVARY, OPEN APPROACH: ICD-10-PCS | Performed by: OBSTETRICS & GYNECOLOGY

## 2017-09-21 PROCEDURE — 88305 TISSUE EXAM BY PATHOLOGIST: CPT | Mod: 26,,, | Performed by: PATHOLOGY

## 2017-09-21 PROCEDURE — 36000709 HC OR TIME LEV III EA ADD 15 MIN: Performed by: OBSTETRICS & GYNECOLOGY

## 2017-09-21 PROCEDURE — 83735 ASSAY OF MAGNESIUM: CPT

## 2017-09-21 PROCEDURE — 88309 TISSUE EXAM BY PATHOLOGIST: CPT | Mod: 26,,, | Performed by: PATHOLOGY

## 2017-09-21 PROCEDURE — 25000003 PHARM REV CODE 250: Performed by: STUDENT IN AN ORGANIZED HEALTH CARE EDUCATION/TRAINING PROGRAM

## 2017-09-21 PROCEDURE — 86900 BLOOD TYPING SEROLOGIC ABO: CPT

## 2017-09-21 PROCEDURE — D9220A PRA ANESTHESIA: Mod: ANES,,, | Performed by: ANESTHESIOLOGY

## 2017-09-21 PROCEDURE — 93010 ELECTROCARDIOGRAM REPORT: CPT | Mod: ,,, | Performed by: INTERNAL MEDICINE

## 2017-09-21 PROCEDURE — 37000009 HC ANESTHESIA EA ADD 15 MINS: Performed by: OBSTETRICS & GYNECOLOGY

## 2017-09-21 PROCEDURE — 94799 UNLISTED PULMONARY SVC/PX: CPT

## 2017-09-21 PROCEDURE — 71000039 HC RECOVERY, EACH ADD'L HOUR: Performed by: OBSTETRICS & GYNECOLOGY

## 2017-09-21 PROCEDURE — 63600175 PHARM REV CODE 636 W HCPCS: Performed by: NURSE ANESTHETIST, CERTIFIED REGISTERED

## 2017-09-21 PROCEDURE — C9290 INJ, BUPIVACAINE LIPOSOME: HCPCS | Performed by: OBSTETRICS & GYNECOLOGY

## 2017-09-21 PROCEDURE — 85025 COMPLETE CBC W/AUTO DIFF WBC: CPT

## 2017-09-21 PROCEDURE — 27201423 OPTIME MED/SURG SUP & DEVICES STERILE SUPPLY: Performed by: OBSTETRICS & GYNECOLOGY

## 2017-09-21 PROCEDURE — 37000008 HC ANESTHESIA 1ST 15 MINUTES: Performed by: OBSTETRICS & GYNECOLOGY

## 2017-09-21 PROCEDURE — 88305 TISSUE EXAM BY PATHOLOGIST: CPT | Performed by: PATHOLOGY

## 2017-09-21 PROCEDURE — 27100025 HC TUBING, SET FLUID WARMER: Performed by: NURSE ANESTHETIST, CERTIFIED REGISTERED

## 2017-09-21 PROCEDURE — 71000033 HC RECOVERY, INTIAL HOUR: Performed by: OBSTETRICS & GYNECOLOGY

## 2017-09-21 RX ORDER — PROPOFOL 10 MG/ML
VIAL (ML) INTRAVENOUS
Status: DISCONTINUED | OUTPATIENT
Start: 2017-09-21 | End: 2017-09-21

## 2017-09-21 RX ORDER — ACETAMINOPHEN 10 MG/ML
INJECTION, SOLUTION INTRAVENOUS
Status: DISCONTINUED | OUTPATIENT
Start: 2017-09-21 | End: 2017-09-21

## 2017-09-21 RX ORDER — ONDANSETRON 8 MG/1
8 TABLET, ORALLY DISINTEGRATING ORAL EVERY 8 HOURS PRN
Status: DISCONTINUED | OUTPATIENT
Start: 2017-09-21 | End: 2017-09-24 | Stop reason: HOSPADM

## 2017-09-21 RX ORDER — ACETAMINOPHEN 10 MG/ML
1000 INJECTION, SOLUTION INTRAVENOUS EVERY 8 HOURS
Status: COMPLETED | OUTPATIENT
Start: 2017-09-21 | End: 2017-09-22

## 2017-09-21 RX ORDER — ENOXAPARIN SODIUM 100 MG/ML
40 INJECTION SUBCUTANEOUS DAILY
Qty: 8.4 ML | Refills: 0 | Status: SHIPPED | OUTPATIENT
Start: 2017-09-21 | End: 2017-10-12

## 2017-09-21 RX ORDER — PANTOPRAZOLE SODIUM 40 MG/1
40 TABLET, DELAYED RELEASE ORAL DAILY
Status: DISCONTINUED | OUTPATIENT
Start: 2017-09-21 | End: 2017-09-24 | Stop reason: HOSPADM

## 2017-09-21 RX ORDER — FENTANYL CITRATE 50 UG/ML
INJECTION, SOLUTION INTRAMUSCULAR; INTRAVENOUS
Status: DISCONTINUED | OUTPATIENT
Start: 2017-09-21 | End: 2017-09-21

## 2017-09-21 RX ORDER — BUPIVACAINE HYDROCHLORIDE 2.5 MG/ML
INJECTION, SOLUTION EPIDURAL; INFILTRATION; INTRACAUDAL
Status: DISCONTINUED | OUTPATIENT
Start: 2017-09-21 | End: 2017-09-21 | Stop reason: HOSPADM

## 2017-09-21 RX ORDER — LIDOCAINE HCL/PF 100 MG/5ML
SYRINGE (ML) INTRAVENOUS
Status: DISCONTINUED | OUTPATIENT
Start: 2017-09-21 | End: 2017-09-21

## 2017-09-21 RX ORDER — DEXTROSE MONOHYDRATE, SODIUM CHLORIDE, AND POTASSIUM CHLORIDE 50; 1.49; 4.5 G/1000ML; G/1000ML; G/1000ML
INJECTION, SOLUTION INTRAVENOUS
Status: COMPLETED
Start: 2017-09-21 | End: 2017-09-21

## 2017-09-21 RX ORDER — HYDROMORPHONE HYDROCHLORIDE 1 MG/ML
0.2 INJECTION, SOLUTION INTRAMUSCULAR; INTRAVENOUS; SUBCUTANEOUS EVERY 5 MIN PRN
Status: DISCONTINUED | OUTPATIENT
Start: 2017-09-21 | End: 2017-09-21 | Stop reason: HOSPADM

## 2017-09-21 RX ORDER — HYDROMORPHONE HYDROCHLORIDE 1 MG/ML
0.5 INJECTION, SOLUTION INTRAMUSCULAR; INTRAVENOUS; SUBCUTANEOUS
Status: DISCONTINUED | OUTPATIENT
Start: 2017-09-21 | End: 2017-09-24 | Stop reason: HOSPADM

## 2017-09-21 RX ORDER — PRAVASTATIN SODIUM 10 MG/1
20 TABLET ORAL NIGHTLY
Status: DISCONTINUED | OUTPATIENT
Start: 2017-09-21 | End: 2017-09-24 | Stop reason: HOSPADM

## 2017-09-21 RX ORDER — SODIUM CHLORIDE 0.9 % (FLUSH) 0.9 %
3 SYRINGE (ML) INJECTION
Status: DISCONTINUED | OUTPATIENT
Start: 2017-09-21 | End: 2017-09-24 | Stop reason: HOSPADM

## 2017-09-21 RX ORDER — LIDOCAINE HYDROCHLORIDE 10 MG/ML
1 INJECTION, SOLUTION EPIDURAL; INFILTRATION; INTRACAUDAL; PERINEURAL ONCE
Status: COMPLETED | OUTPATIENT
Start: 2017-09-21 | End: 2017-09-21

## 2017-09-21 RX ORDER — VECURONIUM BROMIDE FOR INJECTION 1 MG/ML
INJECTION, POWDER, LYOPHILIZED, FOR SOLUTION INTRAVENOUS
Status: DISCONTINUED | OUTPATIENT
Start: 2017-09-21 | End: 2017-09-21

## 2017-09-21 RX ORDER — MIDAZOLAM HYDROCHLORIDE 1 MG/ML
INJECTION, SOLUTION INTRAMUSCULAR; INTRAVENOUS
Status: DISCONTINUED | OUTPATIENT
Start: 2017-09-21 | End: 2017-09-21

## 2017-09-21 RX ORDER — ONDANSETRON 2 MG/ML
4 INJECTION INTRAMUSCULAR; INTRAVENOUS DAILY PRN
Status: DISCONTINUED | OUTPATIENT
Start: 2017-09-21 | End: 2017-09-21 | Stop reason: HOSPADM

## 2017-09-21 RX ORDER — ROCURONIUM BROMIDE 10 MG/ML
INJECTION, SOLUTION INTRAVENOUS
Status: DISCONTINUED | OUTPATIENT
Start: 2017-09-21 | End: 2017-09-21

## 2017-09-21 RX ORDER — DEXTROSE MONOHYDRATE, SODIUM CHLORIDE, AND POTASSIUM CHLORIDE 50; 1.49; 4.5 G/1000ML; G/1000ML; G/1000ML
INJECTION, SOLUTION INTRAVENOUS CONTINUOUS
Status: DISCONTINUED | OUTPATIENT
Start: 2017-09-21 | End: 2017-09-21

## 2017-09-21 RX ORDER — ENOXAPARIN SODIUM 100 MG/ML
40 INJECTION SUBCUTANEOUS
Status: DISCONTINUED | OUTPATIENT
Start: 2017-09-22 | End: 2017-09-24 | Stop reason: HOSPADM

## 2017-09-21 RX ORDER — ONDANSETRON 2 MG/ML
INJECTION INTRAMUSCULAR; INTRAVENOUS
Status: DISCONTINUED | OUTPATIENT
Start: 2017-09-21 | End: 2017-09-21

## 2017-09-21 RX ORDER — SUCCINYLCHOLINE CHLORIDE 20 MG/ML
INJECTION INTRAMUSCULAR; INTRAVENOUS
Status: DISCONTINUED | OUTPATIENT
Start: 2017-09-21 | End: 2017-09-21

## 2017-09-21 RX ORDER — METOCLOPRAMIDE HYDROCHLORIDE 5 MG/ML
5 INJECTION INTRAMUSCULAR; INTRAVENOUS EVERY 6 HOURS PRN
Status: DISCONTINUED | OUTPATIENT
Start: 2017-09-21 | End: 2017-09-24 | Stop reason: HOSPADM

## 2017-09-21 RX ORDER — DIPHENHYDRAMINE HYDROCHLORIDE 50 MG/ML
25 INJECTION INTRAMUSCULAR; INTRAVENOUS EVERY 6 HOURS PRN
Status: DISCONTINUED | OUTPATIENT
Start: 2017-09-21 | End: 2017-09-21 | Stop reason: HOSPADM

## 2017-09-21 RX ORDER — SODIUM CHLORIDE 9 MG/ML
INJECTION, SOLUTION INTRAVENOUS CONTINUOUS
Status: DISCONTINUED | OUTPATIENT
Start: 2017-09-21 | End: 2017-09-21

## 2017-09-21 RX ORDER — HYDROMORPHONE HYDROCHLORIDE 1 MG/ML
INJECTION, SOLUTION INTRAMUSCULAR; INTRAVENOUS; SUBCUTANEOUS
Status: COMPLETED
Start: 2017-09-21 | End: 2017-09-21

## 2017-09-21 RX ORDER — SODIUM CHLORIDE 9 MG/ML
INJECTION, SOLUTION INTRAVENOUS CONTINUOUS
Status: DISCONTINUED | OUTPATIENT
Start: 2017-09-21 | End: 2017-09-22

## 2017-09-21 RX ADMIN — SODIUM CHLORIDE, SODIUM GLUCONATE, SODIUM ACETATE, POTASSIUM CHLORIDE, MAGNESIUM CHLORIDE, SODIUM PHOSPHATE, DIBASIC, AND POTASSIUM PHOSPHATE: .53; .5; .37; .037; .03; .012; .00082 INJECTION, SOLUTION INTRAVENOUS at 10:09

## 2017-09-21 RX ADMIN — PANTOPRAZOLE SODIUM 40 MG: 40 TABLET, DELAYED RELEASE ORAL at 01:09

## 2017-09-21 RX ADMIN — VECURONIUM BROMIDE FOR INJECTION 1 MG: 1 INJECTION, POWDER, LYOPHILIZED, FOR SOLUTION INTRAVENOUS at 09:09

## 2017-09-21 RX ADMIN — SODIUM CHLORIDE: 0.9 INJECTION, SOLUTION INTRAVENOUS at 12:09

## 2017-09-21 RX ADMIN — HYDROMORPHONE HYDROCHLORIDE 0.2 MG: 1 INJECTION, SOLUTION INTRAMUSCULAR; INTRAVENOUS; SUBCUTANEOUS at 11:09

## 2017-09-21 RX ADMIN — VECURONIUM BROMIDE FOR INJECTION 2 MG: 1 INJECTION, POWDER, LYOPHILIZED, FOR SOLUTION INTRAVENOUS at 08:09

## 2017-09-21 RX ADMIN — HYDROMORPHONE HYDROCHLORIDE 0.5 MG: 1 INJECTION, SOLUTION INTRAMUSCULAR; INTRAVENOUS; SUBCUTANEOUS at 09:09

## 2017-09-21 RX ADMIN — SODIUM CHLORIDE, SODIUM GLUCONATE, SODIUM ACETATE, POTASSIUM CHLORIDE, MAGNESIUM CHLORIDE, SODIUM PHOSPHATE, DIBASIC, AND POTASSIUM PHOSPHATE: .53; .5; .37; .037; .03; .012; .00082 INJECTION, SOLUTION INTRAVENOUS at 07:09

## 2017-09-21 RX ADMIN — FENTANYL CITRATE 50 MCG: 50 INJECTION, SOLUTION INTRAMUSCULAR; INTRAVENOUS at 09:09

## 2017-09-21 RX ADMIN — IBUPROFEN 800 MG: 800 INJECTION INTRAVENOUS at 12:09

## 2017-09-21 RX ADMIN — FENTANYL CITRATE 50 MCG: 50 INJECTION, SOLUTION INTRAMUSCULAR; INTRAVENOUS at 08:09

## 2017-09-21 RX ADMIN — ACETAMINOPHEN 1000 MG: 10 INJECTION, SOLUTION INTRAVENOUS at 03:09

## 2017-09-21 RX ADMIN — ACETAMINOPHEN 1000 MG: 10 INJECTION, SOLUTION INTRAVENOUS at 09:09

## 2017-09-21 RX ADMIN — DEXTROSE MONOHYDRATE, SODIUM CHLORIDE, AND POTASSIUM CHLORIDE 1000 ML: 50; 1.49; 4.5 INJECTION, SOLUTION INTRAVENOUS at 11:09

## 2017-09-21 RX ADMIN — MIDAZOLAM HYDROCHLORIDE 1 MG: 1 INJECTION, SOLUTION INTRAMUSCULAR; INTRAVENOUS at 06:09

## 2017-09-21 RX ADMIN — PRAVASTATIN SODIUM 20 MG: 10 TABLET ORAL at 09:09

## 2017-09-21 RX ADMIN — ROCURONIUM BROMIDE 10 MG: 10 INJECTION, SOLUTION INTRAVENOUS at 08:09

## 2017-09-21 RX ADMIN — SUGAMMADEX 200 MG: 100 INJECTION, SOLUTION INTRAVENOUS at 10:09

## 2017-09-21 RX ADMIN — ROCURONIUM BROMIDE 25 MG: 10 INJECTION, SOLUTION INTRAVENOUS at 07:09

## 2017-09-21 RX ADMIN — HYDROMORPHONE HYDROCHLORIDE 0.2 MG: 1 INJECTION, SOLUTION INTRAMUSCULAR; INTRAVENOUS; SUBCUTANEOUS at 12:09

## 2017-09-21 RX ADMIN — Medication 2 G: at 07:09

## 2017-09-21 RX ADMIN — IBUPROFEN 800 MG: 800 INJECTION INTRAVENOUS at 11:09

## 2017-09-21 RX ADMIN — Medication 1000 MG: at 08:09

## 2017-09-21 RX ADMIN — LEVOTHYROXINE SODIUM 125 MCG: 25 TABLET ORAL at 01:09

## 2017-09-21 RX ADMIN — EPHEDRINE SULFATE 10 MG: 50 INJECTION, SOLUTION INTRAMUSCULAR; INTRAVENOUS; SUBCUTANEOUS at 08:09

## 2017-09-21 RX ADMIN — PROPOFOL 140 MG: 10 INJECTION, EMULSION INTRAVENOUS at 07:09

## 2017-09-21 RX ADMIN — LIDOCAINE HYDROCHLORIDE 10 MG: 10 INJECTION, SOLUTION EPIDURAL; INFILTRATION; INTRACAUDAL; PERINEURAL at 06:09

## 2017-09-21 RX ADMIN — SODIUM CHLORIDE: 0.9 INJECTION, SOLUTION INTRAVENOUS at 06:09

## 2017-09-21 RX ADMIN — FENTANYL CITRATE 50 MCG: 50 INJECTION, SOLUTION INTRAMUSCULAR; INTRAVENOUS at 07:09

## 2017-09-21 RX ADMIN — ACETAMINOPHEN 1000 MG: 10 INJECTION, SOLUTION INTRAVENOUS at 08:09

## 2017-09-21 RX ADMIN — FENTANYL CITRATE 100 MCG: 50 INJECTION, SOLUTION INTRAMUSCULAR; INTRAVENOUS at 07:09

## 2017-09-21 RX ADMIN — ONDANSETRON 4 MG: 2 INJECTION INTRAMUSCULAR; INTRAVENOUS at 10:09

## 2017-09-21 RX ADMIN — ROCURONIUM BROMIDE 10 MG: 10 INJECTION, SOLUTION INTRAVENOUS at 07:09

## 2017-09-21 RX ADMIN — LIDOCAINE HYDROCHLORIDE 100 MG: 20 INJECTION, SOLUTION INTRAVENOUS at 07:09

## 2017-09-21 RX ADMIN — ROCURONIUM BROMIDE 5 MG: 10 INJECTION, SOLUTION INTRAVENOUS at 07:09

## 2017-09-21 RX ADMIN — DEXTROSE MONOHYDRATE, SODIUM CHLORIDE, AND POTASSIUM CHLORIDE 1000 ML: 50; 4.5; 1.49 INJECTION, SOLUTION INTRAVENOUS at 11:09

## 2017-09-21 RX ADMIN — IBUPROFEN 800 MG: 800 INJECTION INTRAVENOUS at 05:09

## 2017-09-21 RX ADMIN — SUCCINYLCHOLINE CHLORIDE 140 MG: 20 INJECTION, SOLUTION INTRAMUSCULAR; INTRAVENOUS at 07:09

## 2017-09-21 NOTE — H&P
"                                                       History and Physical Exam     Subjective:   Patient ID: Celine Mahan is a 82 y.o. female.     Chief Complaint: Consult     HPI  Patient is an 81yo female who presents today as a referral from Dr. Wills for pelvic mass. Patient reports LLQ pain for approximately 3 months which prompted evaluation. Available imaging reviewed.     Pelvic US 8/7/17 There is a complex echogenicity midline pelvic mass measuring 18.1 x 8.6 x 7.6 cm.  Ovaries not visualized.     CT A&P 8/9/17 Large solid/cystic mass within the lower abdomen/upper pelvis that is most concerning for a malignant neoplasm, likely of ovarian origin given its location.  Correlation with previous surgical history is recommended noting evidence of previous hysterectomy.  Mass abuts and displaces the adjacent bladder without definite CT findings to suggest hema invasion. Abdominal and pelvic lymphadenopathy concerning for lymphatic spread of neoplasm with index lymph nodes as above.      Medical history is significant for CKD (Cr 1.3), HTN, hypothyroid, HTN, HLD. She has a personal history of breast cancer in 1995 treated with mastectomy and adjuvant chemotherapy she reports. Last MMG 11/2016 normal. Adbominal surgery include cholecystectomy, TVH, xlap/removal ovary for ectopic. She reports that her physician told her "some ovary remains in situ". Family history significant for mother with pancreatic cancer.      Review of Systems   Constitutional: Negative for appetite change, chills, fatigue and fever.   HENT: Negative for mouth sores.    Respiratory: Negative for cough and shortness of breath.    Cardiovascular: Negative for leg swelling.   Gastrointestinal: Positive for abdominal pain. Negative for blood in stool, constipation and diarrhea.   Endocrine: Negative for cold intolerance.   Genitourinary: Negative for dysuria and vaginal bleeding.   Musculoskeletal: Negative for myalgias.   Skin: " Negative for rash.   Allergic/Immunologic: Negative.    Neurological: Negative for weakness and numbness.   Hematological: Negative for adenopathy. Does not bruise/bleed easily.   Psychiatric/Behavioral: Negative for confusion.              Past Medical History:   Diagnosis Date    Breast cancer      CKD (chronic kidney disease) stage 3, GFR 30-59 ml/min      Hyperlipidemia      Hypertension      Obesity      Osteopenia      Overactive bladder      Pelvic mass 8/27/2017    Thyroid disease              Past Surgical History:   Procedure Laterality Date    bt catarac surgery        CHOLECYSTECTOMY        HYSTERECTOMY        lt mastectomy                 Family History   Problem Relation Age of Onset    Cancer Mother         pancreas ca      Social History   Social History            Social History    Marital status:        Spouse name: N/A    Number of children: N/A    Years of education: N/A          Occupational History    Not on file.            Social History Main Topics    Smoking status: Former Smoker       Types: Cigarettes       Quit date: 1/1/1977    Smokeless tobacco: Never Used    Alcohol use No    Drug use: No    Sexual activity: Not Currently           Other Topics Concern    Not on file          Social History Narrative    No narrative on file              Current Outpatient Prescriptions   Medication Sig    amlodipine (NORVASC) 5 MG tablet Take 1 tablet (5 mg total) by mouth once daily.    garlic 1,000 mg Cap Take by mouth.    levothyroxine (SYNTHROID) 125 MCG tablet Take 1 tablet (125 mcg total) by mouth once daily.    omeprazole (PRILOSEC) 20 MG capsule TAKE 1 CAPSULE(20 MG) BY MOUTH BEFORE BREAKFAST    simethicone (PHAZYME) 250 mg Cap Take 1 capsule by mouth 2 (two) times daily as needed (bloating).    valsartan (DIOVAN) 160 MG tablet Take 1 tablet (160 mg total) by mouth once daily.    meclizine (ANTIVERT) 12.5 mg tablet Take 1 tablet (12.5 mg total) by mouth 3  (three) times daily as needed for Dizziness.    polyethylene glycol (GOLYTELY,NULYTELY) 236-22.74-6.74 gram suspension Take as directed    pravastatin (PRAVACHOL) 20 MG tablet Take 20 mg by mouth once daily.      predniSONE (DELTASONE) 20 MG tablet TAKE 1 TABLET BY MOUTH EVERY DAY    tobramycin sulfate 0.3% (TOBREX) 0.3 % ophthalmic solution Apply two drops to wound site right big toe twice daily.      No current facility-administered medications for this visit.       Review of patient's allergies indicates:  No Known Allergies     Objective:   Physical Exam:   Constitutional: She is oriented to person, place, and time. She appears well-developed and well-nourished.    HENT:   Head: Normocephalic and atraumatic.    Eyes: EOM are normal. Pupils are equal, round, and reactive to light.    Neck: Normal range of motion. Neck supple. No thyromegaly present.    Cardiovascular: Normal rate, regular rhythm and intact distal pulses.     Pulmonary/Chest: Effort normal and breath sounds normal. No respiratory distress. She has no wheezes.      Abdominal: Soft. Bowel sounds are normal. She exhibits no distension, no ascites and no mass. There is no tenderness.     Genitourinary: Rectum normal and vagina normal. Pelvic exam was performed with patient supine. There is no lesion on the right labia. There is no lesion on the left labia. Uterus is absent. Left adnexum displays mass and fullness. Vaginal cuff normal.Cervix exhibits absence.   Genitourinary Comments: There is a palpable mass in the pelvis, feels somewhat mobile, firm.            Musculoskeletal: Normal range of motion and moves all extremeties.      Lymphadenopathy:     She has no cervical adenopathy.        Right: No inguinal and no supraclavicular adenopathy present.        Left: No inguinal and no supraclavicular adenopathy present.    Neurological: She is alert and oriented to person, place, and time.    Skin: Skin is warm and dry. No rash noted.     Psychiatric: She has a normal mood and affect.         Assessment:      1. Pre-op evaluation    2. Pelvic mass          Plan:      Orders Placed This Encounter   Procedures    CT Chest With Contrast        Creatinine, serum    SCHEDULED EKG 12-LEAD (to [place])       I discussed with the patient and her family member who was present with her at today's visit the differential diagnosis for a pelvic mass in a postmenopausal female including benign, borderline and malignant process. I have told them that I am concerned for a malignant process given her imaging findings. I have recommended surgical exploration and excision for diagnostic and therapeutic purposes. She desires to proceed. The risks, benefits, and indications of the procedure were discussed with the patient and her family members if present.  These included bleeding, transfusion, infection, damage to surrounding tissues (bowel, bladder, ureter), wound separation, perioperative cardiac events, VTE, pneumonia, and possible death.  She voiced understanding, all questions were answered and consents were signed.  1. Plan for xlap/mass excision/staging 9/21/17  2. Will obtain CT chest for metastatic survey  3. Tumor marker   4. Preop anesthesia consult and EKG  5. Will need cancer genetics given personal history of breast cancer, likely new ovarian cancer, mother with history pancreatic cancer    Plan:   PE in pre-op is reassuring  No changes since last clinic visit 8/28/17 as seen above  Blood consents explained and signed in preop  All questions answered  To OR for X-lap, pelvic mass resection and possible staging  Admit to gyn-onc for routine mgmt post-op    Arlen Yo MD, PhD  OBGYN, PGY-2

## 2017-09-21 NOTE — NURSING
Skin Note:  No skin breakdowns noted.  Bruises noted to rt arm, came up with blood pressure cuff in place.  Patient didn't get calcium gluconate ivpb.

## 2017-09-21 NOTE — HOSPITAL COURSE
09/21/2017: Admit to Gyn-Onc; to OR for scheduled procedure; routine post-op care  09/22/2017: POD #1 s/p X-lap, LSO, omentectomy. Doing well. Routine advances.   09/23/2017: POD#2- Passing flatus but had some nausea without vomiting.  Changed diet from regular to clear liquids. Pain controlled  09/24/2017: Nausea improved and resolved.  Will advance to regular diet.  Otherwise meeting milestones.

## 2017-09-21 NOTE — PLAN OF CARE
Problem: Patient Care Overview  Goal: Plan of Care Review  Outcome: Ongoing (interventions implemented as appropriate)  Vital signs stable. Afebrile. Drowsy but oriented and following commands. Pain controlled with PRN medications. Denies nausea. Surgical dressing remains CDI. IVF changed per order due to lab values. Mckeon intact and draining. No family available for visit.  Will continue to monitor.

## 2017-09-21 NOTE — NURSING TRANSFER
Nursing Transfer Note      9/21/2017  Transfer To: 807a    Transfer via stretcher    Transfer with iv infusion    Transported by pct    Medicines sent: no    Chart send with patient: No    Notified: unable to contact family over the phone

## 2017-09-21 NOTE — MEDICAL/APP STUDENT
GYNONC    82 year old female s/p day zero of exlap, ometectomy for a pelvic mass. Currently complains of abdominal pain, denies chest pain, shortness of breath, headache, numbness or tingling. Daughter at bedside.  Has not ambulated, no bowel movements, no nausea.      Vitals:    09/21/17 1300 09/21/17 1315 09/21/17 1330 09/21/17 1345   BP: (!) 141/64 (!) 122/58 (!) 123/58 (!) 148/66   BP Location: Right arm   Right arm   Patient Position: Lying   Lying   Pulse: 68 70 65 65   Resp: 15 15 16 15   Temp: 97.3 °F (36.3 °C)      TempSrc: Tympanic      SpO2: 99% 98% 98% 99%   Weight:       Height:         Non toxic appearing and speaking in full sentences  Cardio  -s1 and s2 no added heart sounds  Resp  Good air movement. Exam limited by pain and pt compliance.  Abdomen  -soft, approprately tender, quiet bowel sounds heard throughout  -no signs of infection at incision site  Neuro  -grossly intact    82 year old female s/p exlap and omectomemy for a pelvic mass. She is doing well.   1.s/p exlap/omectomemy  -hydromorphone .5 q3 ordered for pain control. Monitor pain control closely  -DVT prophylaxis in place  -NPO, give maintence fluids (recommend 120 ml/hr)   -goals to dispo:   -eating, drinking on own   -ambulation on own   -bowel movement and spontaneous micturition   -d/c home with daughter  2. HTN. Dyslipidemia, Hypothyroidism   -continue home medications (Valsartan 160 qd, pravastatin 20 qd, levothyroxine 125 qd, amlodipine 5 qd, pantoprazole 40 qd,)

## 2017-09-21 NOTE — NURSING
Call placed to [facility] the pharmacist , I asked about the patient calcium gluconate that we have been waiting for and [facility] has been waiting for it since 1330 today.

## 2017-09-21 NOTE — TRANSFER OF CARE
"Anesthesia Transfer of Care Note    Patient: Celine Mahan    Procedure(s) Performed: Procedure(s) (LRB):  EXPLORATORY-LAPAROTOMY (N/A)  SALPINGO-OOPHERECTOMY (Left)  OMENTECTOMY (Bilateral)    Patient location: PACU    Anesthesia Type: general    Transport from OR: Transported from OR on 6-10 L/min O2 by face mask with adequate spontaneous ventilation    Post pain: adequate analgesia    Post assessment: no apparent anesthetic complications and tolerated procedure well    Post vital signs: stable    Level of consciousness: sedated and responds to stimulation    Nausea/Vomiting: no nausea/vomiting    Complications: none    Transfer of care protocol was followed      Last vitals:   Visit Vitals  BP (!) 147/69 (Patient Position: Lying)   Pulse 69   Temp 35.9 °C (96.6 °F) (Axillary)   Resp (!) 21   Ht 5' 3" (1.6 m)   Wt 83.5 kg (184 lb)   SpO2 95%   Breastfeeding? No   BMI 32.59 kg/m²     "

## 2017-09-21 NOTE — OP NOTE
DATE OF PROCEDURE:  09/21/2017.    SURGEON:  Iesha Feliciano M.D.    ASSISTANTS:  Gela Souza M.D. (RES), and Arlen Yo M.D. (RES).    PREOPERATIVE DIAGNOSES:  1.  A 20 cm complex pelvic mass.  2.  Elevated CA-125.  3.  Adenopathy.  4.  Personal history of breast cancer.  5.  Chronic kidney disease.  6.  Hypertension.  7.  Hyperlipidemia.    POSTOPERATIVE DIAGNOSES:  1.  A 20 cm complex pelvic mass.  2.  Elevated CA-125.  3.  Adenopathy.  4.  Personal history of breast cancer.  5.  Chronic kidney disease.  6.  Hypertension.  7.  Hyperlipidemia.    PROCEDURES PERFORMED:  Exploratory laparotomy, left salpingo-oophorectomy, and   omentectomy.    ANESTHESIA:  General endotracheal anesthesia.    SPECIMENS REMOVED:  1.  Left fallopian tube and ovary.  2.  Omentum.    ESTIMATED BLOOD LOSS:  350 mL.    COMPLICATIONS:  None.    FINDINGS:  Upon exploration of the abdomen, there was a large 20 cm complex   adnexal mass that appeared to be arising from the left fallopian tube and ovary.    Uterus was surgically absent and the right fallopian tube and ovary was   surgically absent.  The mass had dense adhesions to the mesentery of the   rectosigmoid colon as well as the pelvic peritoneum and bladder.  The omentum   did not have any large tumor implants; however, there was apparent miliary   implants within the omentum.  The surrounding peritoneum was smooth.  The   diaphragms were smooth.  The liver capsule and splenic capsule were smooth.    There was no other obvious evidence of disease.  At the conclusion of the   procedure, there was no gross residual disease.    PROCEDURE IN DETAIL:  The patient was taken to the Operating Room.  Informed   consent had been obtained.  She underwent general endotracheal anesthesia   without difficulty and was prepped and draped in the normal sterile fashion in   the dorsal lithotomy position.  Timeout was performed.  All parties agreed to   the planned procedure.  Perioperative  antibiotics were administered.  Mckeon   catheter had been placed under sterile conditions.  A midline vertical skin   incision was made with a scalpel and carried down to the underlying layer of   fascia.  The fascia was incised in the midline and the incision extended   superiorly and inferiorly.  The rectus muscles were then divided.  The   peritoneum was identified, tented up with two tonsil clamps, and entered sharply   with Metzenbaum scissors.  The peritoneal incision was then extended superiorly   and inferiorly with good visualization of bowel and bladder.  There were some   omental adhesions to the anterior abdominal wall, which were taken down with   Bovie cautery to allow exposure of the pelvic organs.  A Bookwalter retractor   was then assembled and bowel was packed away with moist laparotomy sponges.  The   survey of the abdomen and pelvis had revealed the above findings.  In order to   reflect the large intraabdominal pelvic mass, we had to dissect this away from   the mesentery of the rectosigmoid colon using sharp and blunt dissection.  We   then identified a remnant of the round ligament on the patient's left,   cauterized and transected, and opened the posterior leaf of the broad ligament   on the left, facilitating access to the retroperitoneum.  We then identified the   left ureter and the left infundibulopelvic ligament was then skeletonized and   doubly clamped with Rudi hysterectomy clamps with good visualization of the   ureter beneath.  This was doubly tied.  We then released the remaining of the   peritoneal attachments to the left pelvic sidewall on the left.  Large mass   resection involving the left fallopian tube and ovary was then handed off to   Pathology for permanent section.  We did identify the ureter on the right and it   was noted to be peristalsing both equally and briskly with the left.  We then   proceeded with omentectomy.  The avascular plane of the transverse colon was    opened with Bovie cautery identification of the lesser sac.  We then completed   an omentectomy by serially dividing, transecting, and cauterized with the   LigaSure device along the greater curvature of the stomach.  At this point, the   procedure was deemed complete.  The laparotomy sponges were removed from the   abdomen.  Bookwalter retractor was taken down.  Exparel was placed superiorly   and inferiorly to the fascia.  The fascia was then reapproximated with a #1   looped PDS in a running fashion.  The pelvis had been irrigated, cleared of all   clot and debris, and was noted to be hemostatic.  The subcutaneous tissue was   then irrigated.  This was reapproximated with a Vicryl suture in a running   fashion.  Skin was closed with 4-0 Monocryl in a subcuticular fashion and topped   with a sterile dressing.  The patient tolerated the procedure well.  Sponge,   lap, needle, and instrument counts were correct x2 as reported by the   circulating nurse.  She was awakened from anesthesia and taken to Recovery in   stable condition.      JARRED  dd: 09/21/2017 11:14:34 (CDT)  td: 09/21/2017 16:44:58 (CDT)  Doc ID   #[id number]  Job ID #[id number]    CC:

## 2017-09-21 NOTE — PLAN OF CARE
Pt comfortable, denies pain, no n/v, vital signs within normal ranges, no n/v. Criteria met for transfer

## 2017-09-21 NOTE — PROGRESS NOTES
[facility]  Gynecologic Oncology  Progress Note      Patient Name: Celine Mahan  MRN: [id number]  Admission Date: 9/21/2017  Hospital Length of Stay: 0 days  Attending Provider: Iesha Feliciano MD  Primary Care Provider: Anil Wills MD  Principal Problem: S/P exploratory laparotomy    Follow-up For: Procedure(s) (LRB):  EXPLORATORY-LAPAROTOMY (N/A)  SALPINGO-OOPHERECTOMY (Left)  OMENTECTOMY (Bilateral)  Post-Operative Day: Day of Surgery  Subjective:      History of Present Illness:  Celine Mahan is a 82 y.o. female here for scheduled X-lap, pelvic mass resection and staging.      Hospital Course:  09/21/2017: Admit to Gyn-Onc; to OR for scheduled procedure; routine post-op care    Interval History:   POD #0: Doing well. Pain controlled with IV ibuprofen/tylenol. Mckeon catheter in place draining clear yellow urine. Not ambulating. IS at bedside. Patient has mild pain. -flatus/-BM.    Scheduled Meds:   acetaminophen  1,000 mg Intravenous Q8H    calcium gluconate IVPB  1,000 mg Intravenous Once    [START ON 9/22/2017] enoxparin  40 mg Subcutaneous Q24H    ibuprofen  800 mg Intravenous Q6H    levothyroxine  125 mcg Oral Daily    pantoprazole  40 mg Oral Daily    pravastatin  20 mg Oral QHS    sugammadex         Continuous Infusions:   sodium chloride 0.9% 125 mL/hr at 09/21/17 1249     PRN Meds:HYDROmorphone, metoclopramide HCl, ondansetron, sodium chloride 0.9%    Review of patient's allergies indicates:  No Known Allergies    Objective:     Vital Signs (Most Recent):  Temp: 97.6 °F (36.4 °C) (09/21/17 1559)  Pulse: 70 (09/21/17 1559)  Resp: 16 (09/21/17 1559)  BP: 128/61 (09/21/17 1559)  SpO2: 97 % (09/21/17 1559) Vital Signs (24h Range):  Temp:  [96.6 °F (35.9 °C)-98.4 °F (36.9 °C)] 97.6 °F (36.4 °C)  Pulse:  [59-80] 70  Resp:  [11-22] 16  SpO2:  [89 %-100 %] 97 %  BP: (106-156)/(55-74) 128/61     Weight: 83.5 kg (184 lb)  Body mass index is 32.59 kg/m².    Intake/Output -  Last 3 Shifts       09/19 0700 - 09/20 0659 09/20 0700 - 09/21 0659 09/21 0700 - 09/22 0659    I.V. (mL/kg)   2100 (25.1)    Total Intake(mL/kg)   2100 (25.1)    Urine (mL/kg/hr)   550 (0.7)    Other   300 (0.4)    Blood   350 (0.4)    Total Output     1200    Net     +900                  Physical Exam:   Constitutional: She is oriented to person, place, and time. She appears well-developed and well-nourished. No distress.    HENT:   Head: Normocephalic and atraumatic.    Eyes: EOM are normal. Pupils are equal, round, and reactive to light.    Neck: Normal range of motion. Neck supple.    Cardiovascular: Normal rate, regular rhythm, normal heart sounds and intact distal pulses.  Exam reveals no edema.     Pulmonary/Chest: Effort normal and breath sounds normal. No respiratory distress.        Abdominal: Soft. Bowel sounds are normal. She exhibits distension (mild) and abdominal incision (midline incision is bandaged with moderate shadowing; no erythema/induration/excessive drainage). There is tenderness (appropriately TTP). There is no rebound and no guarding.     Genitourinary:   Genitourinary Comments: Mckeon in place draining clear, yellow urine           Musculoskeletal: Normal range of motion.       Neurological: She is alert and oriented to person, place, and time.    Skin: Skin is warm and dry. No rash noted. She is not diaphoretic.    Psychiatric: She has a normal mood and affect. Her behavior is normal. Judgment and thought content normal.       Lines/Drains/Airways     Drain                 Urethral Catheter 09/21/17 0743 Non-latex;Straight-tip 16 Fr. less than 1 day          Peripheral Intravenous Line                 Peripheral IV - Single Lumen 08/14/17 1750 Right Antecubital 37 days         Peripheral IV - Single Lumen 09/21/17 0651 Right Hand less than 1 day         Peripheral IV - Single Lumen 09/21/17 0735 Right Wrist less than 1 day              Laboratory:    Recent Labs  Lab 09/21/17  1123   WBC  12.16   RBC 4.77   HGB 13.3   HCT 40.9      MCV 86   MCH 27.9   MCHC 32.5       Recent Labs  Lab 09/21/17  1123   *   K 5.2*      CO2 19*   BUN 19   CREATININE 1.4   *   PROT 6.3   BILITOT 0.3   ALKPHOS 68   ALT 14   AST 23   MG 2.2   PHOS 3.0       Diagnostic Results:  Labs: Reviewed  No new imaging    Assessment/Plan:     * S/P exploratory laparotomy/LSO/omentectomy    - POD #1 s/p X-lap, LSO, omentectomy  - Diet: regular  - Pain: IV ibuprofen/tylenol, dilaudid BTP  - AM Labs: CBC, BMP, Mag, Phos   - PRN: senna, benadryl, phenergan, zofran, simethicone  - UOP: Azevedo in place; d/c azevedo on POD #1        Hypothyroid    - continue home 125 mcg synthroid        Pelvic mass    - s/p X-lap, LSO, omentectomy        Dyslipidemia    - continue home pravastatin 20 mg        Hypertension    - restart valsartan 160 mg, norvasc 5 mg on discharge or PRN          VTE Risk Mitigation         Ordered     enoxaparin injection 40 mg  Every 24 hours (non-standard times)     Route:  Subcutaneous        09/21/17 1114     High Risk of VTE  Once      09/21/17 1117     Place KLAUDIA hose  Until discontinued      09/21/17 1114     Place sequential compression device  Until discontinued      09/21/17 1114        Was azevedo catheter removed? No: Plan to d/c Azevedo on POD #1    Arlen Yo MD  Gynecologic Oncology  [facility]

## 2017-09-21 NOTE — NURSING
Spoke with Lam the nurse from [facility] , he said when he gets the calcium he will send it to us.  I told him I have placed several calls to pharmacy and sent notes that we need the calcium gluconate that wasn't given in pacu.

## 2017-09-21 NOTE — PROGRESS NOTES
Increased ectopy noted on telemetry. Dr Malone notified and orders for Labs and EKG placed. Will carry out orders and continue to monitor.

## 2017-09-21 NOTE — ASSESSMENT & PLAN NOTE
- POD #1 s/p X-lap, LSO, omentectomy  - Diet: regular  - Pain: IV ibuprofen/tylenol, dilaudid BTP  - AM Labs: CBC, BMP, Mag, Phos   - PRN: senna, benadryl, phenergan, zofran, simethicone  - UOP: Azevedo in place; d/c azevedo on POD #1

## 2017-09-21 NOTE — NURSING
Patient  Came from [facility], awake , alert and oriented x4.  Skin warm and dry to touch.  Daughter helped with translating Estonian.  Respirations even and unlabored.  Has rt hand iv with normal saline at 125cc/hr without difficulty.  Patient is npo except sips with water.  Has midline with island dressing with small amount of bloody drainage noted under the dressing.  No bowel sounds audible.  Has azevedo catheter with yellow urine to gu bag emptied 250cc of yellow urine.  Bruising noted to rt arm. Teds/scds on bilaterally. No complaints voiced.

## 2017-09-22 PROBLEM — R19.00 PELVIC MASS: Status: RESOLVED | Noted: 2017-08-27 | Resolved: 2017-09-22

## 2017-09-22 LAB
ANION GAP SERPL CALC-SCNC: 9 MMOL/L
BASOPHILS # BLD AUTO: 0.02 K/UL
BASOPHILS NFR BLD: 0.3 %
BUN SERPL-MCNC: 17 MG/DL
CALCIUM SERPL-MCNC: 7.8 MG/DL
CHLORIDE SERPL-SCNC: 107 MMOL/L
CO2 SERPL-SCNC: 21 MMOL/L
CREAT SERPL-MCNC: 1.2 MG/DL
DIFFERENTIAL METHOD: ABNORMAL
EOSINOPHIL # BLD AUTO: 0.2 K/UL
EOSINOPHIL NFR BLD: 3.1 %
ERYTHROCYTE [DISTWIDTH] IN BLOOD BY AUTOMATED COUNT: 16.3 %
EST. GFR  (AFRICAN AMERICAN): 48.6 ML/MIN/1.73 M^2
EST. GFR  (NON AFRICAN AMERICAN): 42.2 ML/MIN/1.73 M^2
GLUCOSE SERPL-MCNC: 108 MG/DL
HCT VFR BLD AUTO: 34.3 %
HGB BLD-MCNC: 11.1 G/DL
LYMPHOCYTES # BLD AUTO: 0.9 K/UL
LYMPHOCYTES NFR BLD: 14.1 %
MAGNESIUM SERPL-MCNC: 2 MG/DL
MCH RBC QN AUTO: 27.9 PG
MCHC RBC AUTO-ENTMCNC: 32.4 G/DL
MCV RBC AUTO: 86 FL
MONOCYTES # BLD AUTO: 0.4 K/UL
MONOCYTES NFR BLD: 7.2 %
NEUTROPHILS # BLD AUTO: 4.6 K/UL
NEUTROPHILS NFR BLD: 75 %
PHOSPHATE SERPL-MCNC: 3.2 MG/DL
PLATELET # BLD AUTO: 209 K/UL
PMV BLD AUTO: 10.1 FL
POTASSIUM SERPL-SCNC: 4.1 MMOL/L
RBC # BLD AUTO: 3.98 M/UL
SODIUM SERPL-SCNC: 137 MMOL/L
WBC # BLD AUTO: 6.1 K/UL

## 2017-09-22 PROCEDURE — 36415 COLL VENOUS BLD VENIPUNCTURE: CPT

## 2017-09-22 PROCEDURE — 84100 ASSAY OF PHOSPHORUS: CPT

## 2017-09-22 PROCEDURE — 25000003 PHARM REV CODE 250: Performed by: OBSTETRICS & GYNECOLOGY

## 2017-09-22 PROCEDURE — 63600175 PHARM REV CODE 636 W HCPCS: Performed by: OBSTETRICS & GYNECOLOGY

## 2017-09-22 PROCEDURE — 20600001 HC STEP DOWN PRIVATE ROOM

## 2017-09-22 PROCEDURE — 99233 SBSQ HOSP IP/OBS HIGH 50: CPT | Mod: ,,, | Performed by: OBSTETRICS & GYNECOLOGY

## 2017-09-22 PROCEDURE — 99024 POSTOP FOLLOW-UP VISIT: CPT | Mod: ,,, | Performed by: OBSTETRICS & GYNECOLOGY

## 2017-09-22 PROCEDURE — 83735 ASSAY OF MAGNESIUM: CPT

## 2017-09-22 PROCEDURE — 85025 COMPLETE CBC W/AUTO DIFF WBC: CPT

## 2017-09-22 PROCEDURE — 25000003 PHARM REV CODE 250: Performed by: STUDENT IN AN ORGANIZED HEALTH CARE EDUCATION/TRAINING PROGRAM

## 2017-09-22 PROCEDURE — 80048 BASIC METABOLIC PNL TOTAL CA: CPT

## 2017-09-22 RX ORDER — IBUPROFEN 600 MG/1
600 TABLET ORAL EVERY 6 HOURS
Status: DISCONTINUED | OUTPATIENT
Start: 2017-09-22 | End: 2017-09-24 | Stop reason: HOSPADM

## 2017-09-22 RX ORDER — OXYCODONE AND ACETAMINOPHEN 10; 325 MG/1; MG/1
1 TABLET ORAL EVERY 6 HOURS PRN
Status: DISCONTINUED | OUTPATIENT
Start: 2017-09-22 | End: 2017-09-24 | Stop reason: HOSPADM

## 2017-09-22 RX ORDER — OXYCODONE AND ACETAMINOPHEN 5; 325 MG/1; MG/1
1 TABLET ORAL EVERY 6 HOURS PRN
Status: DISCONTINUED | OUTPATIENT
Start: 2017-09-22 | End: 2017-09-24 | Stop reason: HOSPADM

## 2017-09-22 RX ADMIN — IBUPROFEN 600 MG: 600 TABLET, FILM COATED ORAL at 11:09

## 2017-09-22 RX ADMIN — IBUPROFEN 600 MG: 600 TABLET, FILM COATED ORAL at 05:09

## 2017-09-22 RX ADMIN — OXYCODONE HYDROCHLORIDE AND ACETAMINOPHEN 1 TABLET: 10; 325 TABLET ORAL at 11:09

## 2017-09-22 RX ADMIN — IBUPROFEN 800 MG: 800 INJECTION INTRAVENOUS at 06:09

## 2017-09-22 RX ADMIN — PRAVASTATIN SODIUM 20 MG: 10 TABLET ORAL at 10:09

## 2017-09-22 RX ADMIN — OXYCODONE HYDROCHLORIDE AND ACETAMINOPHEN 1 TABLET: 10; 325 TABLET ORAL at 04:09

## 2017-09-22 RX ADMIN — ENOXAPARIN SODIUM 40 MG: 100 INJECTION SUBCUTANEOUS at 06:09

## 2017-09-22 RX ADMIN — PANTOPRAZOLE SODIUM 40 MG: 40 TABLET, DELAYED RELEASE ORAL at 08:09

## 2017-09-22 RX ADMIN — OXYCODONE HYDROCHLORIDE AND ACETAMINOPHEN 1 TABLET: 5; 325 TABLET ORAL at 11:09

## 2017-09-22 RX ADMIN — ACETAMINOPHEN 1000 MG: 10 INJECTION, SOLUTION INTRAVENOUS at 05:09

## 2017-09-22 RX ADMIN — HYDROMORPHONE HYDROCHLORIDE 0.5 MG: 1 INJECTION, SOLUTION INTRAMUSCULAR; INTRAVENOUS; SUBCUTANEOUS at 09:09

## 2017-09-22 RX ADMIN — LEVOTHYROXINE SODIUM 125 MCG: 25 TABLET ORAL at 05:09

## 2017-09-22 NOTE — PLAN OF CARE
Problem: Patient Care Overview  Goal: Plan of Care Review  Outcome: Ongoing (interventions implemented as appropriate)  Patient remains free from falls bed alarm on. Patient has complaints of abdominal pain PRN IV pain medication administered full relief noted. Azevedo draining clear yellow urine order noted to d/c azevedo at 0600.

## 2017-09-22 NOTE — PROGRESS NOTES
[facility]  Gynecologic Oncology  Progress Note      Patient Name: Celine Mahan  MRN: [id number]  Admission Date: 9/21/2017  Hospital Length of Stay: 1 days  Attending Provider: Iesha Feliciano MD  Primary Care Provider: Anil Wills MD  Principal Problem: S/P exploratory laparotomy    Follow-up For: Procedure(s) (LRB):  EXPLORATORY-LAPAROTOMY (N/A)  SALPINGO-OOPHERECTOMY (Left)  OMENTECTOMY (Bilateral)  Post-Operative Day: 1 Day Post-Op  Subjective:      History of Present Illness:  Celine Mahan is a 82 y.o. female here for scheduled X-lap, pelvic mass resection and staging.    Hospital Course:  09/21/2017: Admit to Gyn-Onc; to OR for scheduled procedure; routine post-op care  09/22/2017: POD #1 s/p X-lap, LSO, omentectomy. Doing well. Routine advances.     Interval History:   POD #1: Doing well. Pain controlled with IV ibuprofen and tylenol. Resting comfortably. Has tolerated sips of fluid with meds. Denies fever, chills, N/V, CP. Mckeon catheter in place, set to be removed this AM.     Scheduled Meds:   enoxparin  40 mg Subcutaneous Q24H    ibuprofen  800 mg Intravenous Q6H    levothyroxine  125 mcg Oral Daily    pantoprazole  40 mg Oral Daily    pravastatin  20 mg Oral QHS     Continuous Infusions:   sodium chloride 0.9% Stopped (09/21/17 2310)     PRN Meds:HYDROmorphone, metoclopramide HCl, ondansetron, sodium chloride 0.9%    Review of patient's allergies indicates:  No Known Allergies    Objective:     Vital Signs (Most Recent):  Temp: 98.4 °F (36.9 °C) (09/22/17 0000)  Pulse: 67 (09/22/17 0000)  Resp: 20 (09/22/17 0000)  BP: (!) 109/54 (09/22/17 0000)  SpO2: 95 % (09/22/17 0000) Vital Signs (24h Range):  Temp:  [96.6 °F (35.9 °C)-98.4 °F (36.9 °C)] 98.4 °F (36.9 °C)  Pulse:  [59-80] 67  Resp:  [11-22] 20  SpO2:  [89 %-100 %] 95 %  BP: (106-156)/(54-74) 109/54     Weight: 83.5 kg (184 lb)  Body mass index is 32.59 kg/m².    Intake/Output - Last 3 Shifts       09/20 0700 -  09/21 0659 09/21 0700 - 09/22 0659    I.V. (mL/kg)  3247.9 (38.9)    IV Piggyback  1050    Total Intake(mL/kg)  4297.9 (51.5)    Urine (mL/kg/hr)  1525 (0.8)    Other  300 (0.1)    Stool  0 (0)    Blood  350 (0.2)    Total Output   2175    Net   +2122.9          Stool Occurrence  0 x           Physical Exam:   Constitutional: She is oriented to person, place, and time. She appears well-developed and well-nourished. No distress.    HENT:   Head: Normocephalic and atraumatic.    Eyes: EOM are normal. Pupils are equal, round, and reactive to light.    Neck: Normal range of motion. Neck supple.    Cardiovascular: Normal rate, regular rhythm, normal heart sounds and intact distal pulses.  Exam reveals no edema.     Pulmonary/Chest: Effort normal and breath sounds normal. No respiratory distress.        Abdominal: Soft. Bowel sounds are normal. She exhibits distension (mild) and abdominal incision (midline incision is bandaged with moderate shadowing; no erythema/induration/excessive drainage). There is tenderness (appropriately TTP). There is no rebound and no guarding.     Genitourinary:   Genitourinary Comments: Mckeon in place draining clear, yellow urine           Musculoskeletal: Normal range of motion.       Neurological: She is alert and oriented to person, place, and time.    Skin: Skin is warm and dry. No rash noted. She is not diaphoretic.    Psychiatric: She has a normal mood and affect. Her behavior is normal. Judgment and thought content normal.     Lines/Drains/Airways     Drain                 Urethral Catheter 09/21/17 0743 Non-latex;Straight-tip 16 Fr. less than 1 day          Peripheral Intravenous Line                 Peripheral IV - Single Lumen 08/14/17 1750 Right Antecubital 38 days         Peripheral IV - Single Lumen 09/21/17 0651 Right Hand less than 1 day         Peripheral IV - Single Lumen 09/21/17 0735 Right Wrist less than 1 day              Laboratory:  CBC:   Recent Labs  Lab 09/21/17  1123    WBC 12.16   HGB 13.3   HCT 40.9       and CMP:   Recent Labs  Lab 09/21/17  1123   *   K 5.2*      CO2 19*   *   BUN 19   CREATININE 1.4   CALCIUM 7.6*   PROT 6.3   ALBUMIN 3.0*   BILITOT 0.3   ALKPHOS 68   AST 23   ALT 14   ANIONGAP 9   EGFRNONAA 35.0*   AM CBC, CMP, Mg, Phos are pending    Diagnostic Results:  No new imaging    Assessment/Plan:     * S/P exploratory laparotomy/LSO/omentectomy    - POD #1 s/p X-lap, LSO, omentectomy  - Diet: regular  - Pain: IV ibuprofen/tylenol, dilaudid BTP  - AM Labs: CBC, BMP, Mag, Phos   - PRN: senna, benadryl, phenergan, zofran, simethicone  - UOP: Azevedo in place; d/c azevedo on POD #1  - PPX: KLAUDIA, SCD, lovenox 40 mg daily  - SW arranging outpatient lovenox rx        Hypothyroid    - continue home 125 mcg synthroid        Dyslipidemia    - continue home pravastatin 20 mg        Hypertension    - restart valsartan 160 mg, norvasc 5 mg on discharge or PRN          VTE Risk Mitigation         Ordered     enoxaparin injection 40 mg  Every 24 hours (non-standard times)     Route:  Subcutaneous        09/21/17 1114     High Risk of VTE  Once      09/21/17 1117     Place KLAUDIA hose  Until discontinued      09/21/17 1114     Place sequential compression device  Until discontinued      09/21/17 1114          Was azevedo catheter removed? No: Azevedo in place at time of exam. Ordered to be discontinued today, POD #1.    Arlen Yo MD  Gynecologic Oncology  [facility]

## 2017-09-22 NOTE — NURSING
Per MD order azevedo catheter discontinued. 10cc saline removed and catheter d/c patient tolerated well. Notified patient to call when she has to get up. Patient requested sanitary pads. Will order.

## 2017-09-22 NOTE — ANESTHESIA POSTPROCEDURE EVALUATION
"Anesthesia Post Evaluation    Patient: Celine Mahna    Procedure(s) Performed: Procedure(s) (LRB):  EXPLORATORY-LAPAROTOMY (N/A)  SALPINGO-OOPHERECTOMY (Left)  OMENTECTOMY (Bilateral)    Final Anesthesia Type: general  Patient location during evaluation: GI PACU  Patient participation: Yes- Able to Participate  Level of consciousness: awake and alert  Post-procedure vital signs: reviewed and stable  Pain management: adequate  Airway patency: patent  PONV status at discharge: No PONV  Anesthetic complications: no      Cardiovascular status: blood pressure returned to baseline  Respiratory status: unassisted  Hydration status: euvolemic  Follow-up not needed.        Visit Vitals  BP (!) 117/57 (BP Location: Right arm, Patient Position: Lying)   Pulse 68   Temp 37 °C (98.6 °F) (Oral)   Resp 20   Ht 5' 3" (1.6 m)   Wt 83.5 kg (184 lb)   SpO2 96%   Breastfeeding? No   BMI 32.59 kg/m²       Pain/Priscila Score: Pain Assessment Performed: Yes (9/22/2017  4:30 AM)  Presence of Pain: denies (9/22/2017  4:30 AM)  Pain Rating Prior to Med Admin: 4 (9/22/2017  5:17 AM)  Pain Rating Post Med Admin: 2 (9/22/2017  7:04 AM)  Priscila Score: 10 (9/21/2017  1:30 PM)      "

## 2017-09-22 NOTE — SUBJECTIVE & OBJECTIVE
Interval History:   POD #1: Doing well. Pain controlled with IV ibuprofen and tylenol. Resting comfortably. Has tolerated sips of fluid with meds. Denies fever, chills, N/V, CP. Mckeon catheter in place, set to be removed this AM.     Scheduled Meds:   enoxparin  40 mg Subcutaneous Q24H    ibuprofen  800 mg Intravenous Q6H    levothyroxine  125 mcg Oral Daily    pantoprazole  40 mg Oral Daily    pravastatin  20 mg Oral QHS     Continuous Infusions:   sodium chloride 0.9% Stopped (09/21/17 2310)     PRN Meds:HYDROmorphone, metoclopramide HCl, ondansetron, sodium chloride 0.9%    Review of patient's allergies indicates:  No Known Allergies    Objective:     Vital Signs (Most Recent):  Temp: 98.4 °F (36.9 °C) (09/22/17 0000)  Pulse: 67 (09/22/17 0000)  Resp: 20 (09/22/17 0000)  BP: (!) 109/54 (09/22/17 0000)  SpO2: 95 % (09/22/17 0000) Vital Signs (24h Range):  Temp:  [96.6 °F (35.9 °C)-98.4 °F (36.9 °C)] 98.4 °F (36.9 °C)  Pulse:  [59-80] 67  Resp:  [11-22] 20  SpO2:  [89 %-100 %] 95 %  BP: (106-156)/(54-74) 109/54     Weight: 83.5 kg (184 lb)  Body mass index is 32.59 kg/m².    Intake/Output - Last 3 Shifts       09/20 0700 - 09/21 0659 09/21 0700 - 09/22 0659    I.V. (mL/kg)  3247.9 (38.9)    IV Piggyback  1050    Total Intake(mL/kg)  4297.9 (51.5)    Urine (mL/kg/hr)  1525 (0.8)    Other  300 (0.1)    Stool  0 (0)    Blood  350 (0.2)    Total Output   2175    Net   +2122.9          Stool Occurrence  0 x           Physical Exam:   Constitutional: She is oriented to person, place, and time. She appears well-developed and well-nourished. No distress.    HENT:   Head: Normocephalic and atraumatic.    Eyes: EOM are normal. Pupils are equal, round, and reactive to light.    Neck: Normal range of motion. Neck supple.    Cardiovascular: Normal rate, regular rhythm, normal heart sounds and intact distal pulses.  Exam reveals no edema.     Pulmonary/Chest: Effort normal and breath sounds normal. No respiratory distress.         Abdominal: Soft. Bowel sounds are normal. She exhibits distension (mild) and abdominal incision (midline incision is bandaged with moderate shadowing; no erythema/induration/excessive drainage). There is tenderness (appropriately TTP). There is no rebound and no guarding.     Genitourinary:   Genitourinary Comments: Mckeon in place draining clear, yellow urine           Musculoskeletal: Normal range of motion.       Neurological: She is alert and oriented to person, place, and time.    Skin: Skin is warm and dry. No rash noted. She is not diaphoretic.    Psychiatric: She has a normal mood and affect. Her behavior is normal. Judgment and thought content normal.     Lines/Drains/Airways     Drain                 Urethral Catheter 09/21/17 0743 Non-latex;Straight-tip 16 Fr. less than 1 day          Peripheral Intravenous Line                 Peripheral IV - Single Lumen 08/14/17 1750 Right Antecubital 38 days         Peripheral IV - Single Lumen 09/21/17 0651 Right Hand less than 1 day         Peripheral IV - Single Lumen 09/21/17 0735 Right Wrist less than 1 day              Laboratory:  CBC:   Recent Labs  Lab 09/21/17  1123   WBC 12.16   HGB 13.3   HCT 40.9       and CMP:   Recent Labs  Lab 09/21/17  1123   *   K 5.2*      CO2 19*   *   BUN 19   CREATININE 1.4   CALCIUM 7.6*   PROT 6.3   ALBUMIN 3.0*   BILITOT 0.3   ALKPHOS 68   AST 23   ALT 14   ANIONGAP 9   EGFRNONAA 35.0*   AM CBC, CMP, Mg, Phos are pending    Diagnostic Results:  No new imaging

## 2017-09-22 NOTE — PROGRESS NOTES
Received call from the primary team about the concern for the cost of Lovenox. She is a potential discharge for the weekend. Prescription sent down to the [facility] pharmacy already. Contacted the pharmacy and the it is ready and the cost will be $10.

## 2017-09-22 NOTE — PLAN OF CARE
Patient is POD #1 s/p xlap/omentectomy.  Pain is tolerable and advancing diet as tolerated.  Planning for a possible d/c this weekend if stable.  No HH/DME needs.  RX for Lovenox was sent to [facility] pharmacy.  Patient will need education of Lovenox administration prior to d/c.  CM sent a message to gyn onc clinic to schedule a f/u in 4 weeks.  No other needs anticipated.         09/22/17 1319   Final Note   Assessment Type Final Discharge Note   Discharge Disposition Home   Hospital Follow Up  Appt(s) scheduled? (message sent to gyn onc clinic)   Discharge plans and expectations educations in teach back method with documentation complete? Yes   Right Care Referral Info   Post Acute Recommendation No Care

## 2017-09-22 NOTE — ASSESSMENT & PLAN NOTE
- POD #1 s/p X-lap, LSO, omentectomy  - Diet: regular  - Pain: IV ibuprofen/tylenol, dilaudid BTP  - AM Labs: CBC, BMP, Mag, Phos   - PRN: senna, benadryl, phenergan, zofran, simethicone  - UOP: Azevedo in place; d/c azevedo on POD #1  - PPX: KLAUDIA, SCD, lovenox 40 mg daily  -  arranging outpatient lovenox rx

## 2017-09-22 NOTE — MEDICAL/APP STUDENT
[facility]  [place] MS3    82 year old female s/p 1 day exlap, omectomy and LSO for a pelvic mass.   Overnight: no acute events, denies chest pain, shortness of breath, nausea, numbness or tingling.   Pain controlled with medication  Has passed flatus  Has not ambulated      Vitals:    09/21/17 1345 09/21/17 1559 09/21/17 1945 09/22/17 0000   BP: (!) 148/66 128/61 (!) 142/62 (!) 109/54   BP Location: Right arm Right arm Right arm Right arm   Patient Position: Lying Lying Lying Lying   Pulse: 65 70 67 67   Resp: 15 16 18 20   Temp:  97.6 °F (36.4 °C) 98.2 °F (36.8 °C) 98.4 °F (36.9 °C)   TempSrc:  Oral Oral Oral   SpO2: 99% 97% 98% 95%   Weight:       Height:       Input 4297.9  Output 1650    Awake, alert and oriented. Speaking in full sentences. Non-toxic appearing. Azevedo in place  Cardio: s1 and s2 no added sounds. Pulses regular and strong  Resp: good air movement, no accessory muscle use  Abdomen: soft, tender (left>right and appropriately) bowel sounds heard throughout  Neuro: CN 2-12 grossly intact, Tone, Power, Coordination and Sensation normal.     82 year old female s/p day 1 exlap, LSO, omentectomy. She is doing well    1. S/p day 1 surgery  Pain controlled with medication.  D/C azevedo at 0600, awaiting spontaneous micturition  Encouraged to ambulate and to use incentive spirometer when ready  goals to dispo:              -eating, drinking on own              -ambulation on own              -bowel movement and spontaneous micturition              -d/c home with daughter  2. HTN. Dyslipidemia, Hypothyroidism  continue home medications (Valsartan 160 qd, pravastatin 20 qd, levothyroxine 125 qd, amlodipine 5 qd, pantoprazole 40 qd,)

## 2017-09-22 NOTE — PLAN OF CARE
Problem: Patient Care Overview  Goal: Plan of Care Review  Outcome: Ongoing (interventions implemented as appropriate)  Plan of care reviewed with patient and family.  Fall precautions maintained, side rail up x2, call light in reach, bed in low position, bed locked, nonskid socks on.  Teds /scds .  Patient tolerating  Regular diet without difficulty.  Ambulating , voiding and tolerating a regular diet without difficulty.  Requesting pain meds during the shift and getting relief.

## 2017-09-23 LAB
ANION GAP SERPL CALC-SCNC: 4 MMOL/L
ANISOCYTOSIS BLD QL SMEAR: SLIGHT
BASOPHILS # BLD AUTO: 0.02 K/UL
BASOPHILS NFR BLD: 0.3 %
BUN SERPL-MCNC: 17 MG/DL
CALCIUM SERPL-MCNC: 8 MG/DL
CHLORIDE SERPL-SCNC: 107 MMOL/L
CO2 SERPL-SCNC: 25 MMOL/L
CREAT SERPL-MCNC: 1.4 MG/DL
DIFFERENTIAL METHOD: ABNORMAL
EOSINOPHIL # BLD AUTO: 0.3 K/UL
EOSINOPHIL NFR BLD: 5 %
ERYTHROCYTE [DISTWIDTH] IN BLOOD BY AUTOMATED COUNT: 16.3 %
EST. GFR  (AFRICAN AMERICAN): 40.4 ML/MIN/1.73 M^2
EST. GFR  (NON AFRICAN AMERICAN): 35 ML/MIN/1.73 M^2
GLUCOSE SERPL-MCNC: 167 MG/DL
HCT VFR BLD AUTO: 32.5 %
HGB BLD-MCNC: 10.5 G/DL
LYMPHOCYTES # BLD AUTO: 0.5 K/UL
LYMPHOCYTES NFR BLD: 7 %
MAGNESIUM SERPL-MCNC: 2.2 MG/DL
MCH RBC QN AUTO: 28 PG
MCHC RBC AUTO-ENTMCNC: 32.3 G/DL
MCV RBC AUTO: 87 FL
MONOCYTES # BLD AUTO: 0.3 K/UL
MONOCYTES NFR BLD: 4 %
NEUTROPHILS # BLD AUTO: 5.4 K/UL
NEUTROPHILS NFR BLD: 83.7 %
PHOSPHATE SERPL-MCNC: 2.1 MG/DL
PLATELET # BLD AUTO: 213 K/UL
PLATELET BLD QL SMEAR: ABNORMAL
PMV BLD AUTO: 9.5 FL
POTASSIUM SERPL-SCNC: 4.2 MMOL/L
RBC # BLD AUTO: 3.75 M/UL
SODIUM SERPL-SCNC: 136 MMOL/L
WBC # BLD AUTO: 6.54 K/UL

## 2017-09-23 PROCEDURE — 83735 ASSAY OF MAGNESIUM: CPT

## 2017-09-23 PROCEDURE — 93005 ELECTROCARDIOGRAM TRACING: CPT

## 2017-09-23 PROCEDURE — 85025 COMPLETE CBC W/AUTO DIFF WBC: CPT

## 2017-09-23 PROCEDURE — 20600001 HC STEP DOWN PRIVATE ROOM

## 2017-09-23 PROCEDURE — 25000003 PHARM REV CODE 250: Performed by: STUDENT IN AN ORGANIZED HEALTH CARE EDUCATION/TRAINING PROGRAM

## 2017-09-23 PROCEDURE — 25000003 PHARM REV CODE 250: Performed by: OBSTETRICS & GYNECOLOGY

## 2017-09-23 PROCEDURE — 84100 ASSAY OF PHOSPHORUS: CPT

## 2017-09-23 PROCEDURE — 63600175 PHARM REV CODE 636 W HCPCS: Performed by: OBSTETRICS & GYNECOLOGY

## 2017-09-23 PROCEDURE — 80048 BASIC METABOLIC PNL TOTAL CA: CPT

## 2017-09-23 PROCEDURE — 36415 COLL VENOUS BLD VENIPUNCTURE: CPT

## 2017-09-23 PROCEDURE — 99024 POSTOP FOLLOW-UP VISIT: CPT | Mod: GC,,, | Performed by: OBSTETRICS & GYNECOLOGY

## 2017-09-23 PROCEDURE — 93010 ELECTROCARDIOGRAM REPORT: CPT | Mod: ,,, | Performed by: INTERNAL MEDICINE

## 2017-09-23 RX ORDER — DEXTROSE MONOHYDRATE, SODIUM CHLORIDE, AND POTASSIUM CHLORIDE 50; 1.49; 4.5 G/1000ML; G/1000ML; G/1000ML
INJECTION, SOLUTION INTRAVENOUS CONTINUOUS
Status: DISCONTINUED | OUTPATIENT
Start: 2017-09-23 | End: 2017-09-24

## 2017-09-23 RX ORDER — SIMETHICONE 80 MG
1 TABLET,CHEWABLE ORAL 3 TIMES DAILY PRN
Status: DISCONTINUED | OUTPATIENT
Start: 2017-09-23 | End: 2017-09-24 | Stop reason: HOSPADM

## 2017-09-23 RX ADMIN — ONDANSETRON 8 MG: 8 TABLET, ORALLY DISINTEGRATING ORAL at 08:09

## 2017-09-23 RX ADMIN — PRAVASTATIN SODIUM 20 MG: 10 TABLET ORAL at 08:09

## 2017-09-23 RX ADMIN — ENOXAPARIN SODIUM 40 MG: 100 INJECTION SUBCUTANEOUS at 06:09

## 2017-09-23 RX ADMIN — IBUPROFEN 600 MG: 600 TABLET, FILM COATED ORAL at 01:09

## 2017-09-23 RX ADMIN — PANTOPRAZOLE SODIUM 40 MG: 40 TABLET, DELAYED RELEASE ORAL at 08:09

## 2017-09-23 RX ADMIN — IBUPROFEN 600 MG: 600 TABLET, FILM COATED ORAL at 11:09

## 2017-09-23 RX ADMIN — SIMETHICONE CHEW TAB 80 MG 80 MG: 80 TABLET ORAL at 03:09

## 2017-09-23 RX ADMIN — LEVOTHYROXINE SODIUM 125 MCG: 25 TABLET ORAL at 06:09

## 2017-09-23 RX ADMIN — DEXTROSE MONOHYDRATE, SODIUM CHLORIDE, AND POTASSIUM CHLORIDE: 50; 4.5; 1.49 INJECTION, SOLUTION INTRAVENOUS at 11:09

## 2017-09-23 RX ADMIN — IBUPROFEN 600 MG: 600 TABLET, FILM COATED ORAL at 06:09

## 2017-09-23 RX ADMIN — SIMETHICONE CHEW TAB 80 MG 80 MG: 80 TABLET ORAL at 08:09

## 2017-09-23 RX ADMIN — OXYCODONE HYDROCHLORIDE AND ACETAMINOPHEN 1 TABLET: 10; 325 TABLET ORAL at 04:09

## 2017-09-23 RX ADMIN — ONDANSETRON 8 MG: 8 TABLET, ORALLY DISINTEGRATING ORAL at 03:09

## 2017-09-23 RX ADMIN — IBUPROFEN 600 MG: 600 TABLET, FILM COATED ORAL at 05:09

## 2017-09-23 NOTE — ASSESSMENT & PLAN NOTE
- POD #2 s/p X-lap, LSO, omentectomy  - Diet: Will change from regular to clear liquids. Will monitor for nausea/vomiting  - Pain: PO ibuprofen and percocet and dilaudid for BTP  - Ordered CBC, BMP, Mag, Phos this AM.  Pending  - PRN: senna, benadryl, phenergan, zofran, simethicone  - PPX: KLAUDIA, SCD, lovenox 40 mg daily

## 2017-09-23 NOTE — PLAN OF CARE
Problem: Patient Care Overview  Goal: Plan of Care Review  Outcome: Ongoing (interventions implemented as appropriate)  Patient AAOX4, Yi first language, and family at bedside throughout shift. Fall precautions maintained. Patient ambulated to bathroom and in hallway with walk by assistance. PRN zofran and simethicone given today. IV fluids initiated today and patient changed to clear liquids. Midline incision intake and dry. Patient stable, will continue to monitor.

## 2017-09-23 NOTE — ASSESSMENT & PLAN NOTE
- Home meds held  -BP: (111-128)/(55-61) 122/60  - restart valsartan 160 mg, norvasc 5 mg on discharge or PRN

## 2017-09-23 NOTE — PLAN OF CARE
Problem: Patient Care Overview  Goal: Plan of Care Review  Outcome: Ongoing (interventions implemented as appropriate)  POC reviewed with pt and her family, both verbalized understanding. AAOx4. VSS. Pt complains of pain, controlled with prn and scheduled pain meds. Midline dressing intact. Pt up to the bathroom, remains free from falls and injuries. Pt resting with call light in reach. Granddaughter at the bedside. Will continue to monitor.

## 2017-09-23 NOTE — PROGRESS NOTES
[facility]  Gynecologic Oncology  Progress Note      Patient Name: Celine Mahan  MRN: [id number]  Admission Date: 9/21/2017  Hospital Length of Stay: 2 days  Attending Provider: Iesha Feliciano MD  Primary Care Provider: Anil Wills MD  Principal Problem: S/P exploratory laparotomy    Follow-up For: Procedure(s) (LRB):  EXPLORATORY-LAPAROTOMY (N/A)  SALPINGO-OOPHERECTOMY (Left)  OMENTECTOMY (Bilateral)  Post-Operative Day: 2 Days Post-Op  Subjective:      History of Present Illness:  Celine Mahan is a 82 y.o. female here for scheduled X-lap, pelvic mass resection and staging.      Hospital Course:  09/21/2017: Admit to Gyn-Onc; to OR for scheduled procedure; routine post-op care  09/22/2017: POD #1 s/p X-lap, LSO, omentectomy. Doing well. Routine advances.   09/23/2017: POD#2- Passing flatus but had some nausea without vomiting.  Changed diet from regular to clear liquids. Pain controlled    Interval History:   POD #2: Doing well. Pain controlled with PO pain medication.  Pt with some nausea this AM after eating breakfast.  No vomiting.  Able to tolerate PO last night.  Symptoms started this AM.  Passing flatus. No BMs.  Urinating without difficulty. Denies fever, chills, N/V, CP.     Scheduled Meds:   enoxparin  40 mg Subcutaneous Q24H    ibuprofen  600 mg Oral Q6H    levothyroxine  125 mcg Oral Daily    pantoprazole  40 mg Oral Daily    pravastatin  20 mg Oral QHS     Continuous Infusions:   dextrose 5 % and 0.45 % NaCl with KCl 20 mEq       PRN Meds:HYDROmorphone, metoclopramide HCl, ondansetron, oxycodone-acetaminophen, oxycodone-acetaminophen, sodium chloride 0.9%    Review of patient's allergies indicates:  No Known Allergies    Objective:     Vital Signs (Most Recent):  Temp: 98.3 °F (36.8 °C) (09/23/17 0842)  Pulse: 68 (09/23/17 0842)  Resp: 17 (09/23/17 0842)  BP: 122/60 (09/23/17 0842)  SpO2: 95 % (09/23/17 0842) Vital Signs (24h Range):  Temp:  [98.1 °F (36.7  °C)-98.4 °F (36.9 °C)] 98.3 °F (36.8 °C)  Pulse:  [68-82] 68  Resp:  [17-20] 17  SpO2:  [95 %-96 %] 95 %  BP: (111-128)/(55-61) 122/60     Weight: 83.5 kg (184 lb)  Body mass index is 32.59 kg/m².    Intake/Output - Last 3 Shifts       09/21 0700 - 09/22 0659 09/22 0700 - 09/23 0659 09/23 0700 - 09/24 0659    P.O.  755 200    I.V. (mL/kg) 3268.8 (39.1)      IV Piggyback 1150      Total Intake(mL/kg) 4418.8 (52.9) 755 (9) 200 (2.4)    Urine (mL/kg/hr) 1525 (0.8) 700 (0.3) 400 (1.2)    Other 300 (0.1)      Stool 0 (0)      Blood 350 (0.2)      Total Output 2175 700 400    Net +2243.8 +55 -200           Urine Occurrence  2 x     Stool Occurrence 0 x             Physical Exam:   Constitutional: She is oriented to person, place, and time. She appears well-developed and well-nourished. No distress.    HENT:   Head: Normocephalic and atraumatic.    Eyes: EOM are normal. Pupils are equal, round, and reactive to light.    Neck: Normal range of motion. Neck supple.    Cardiovascular: Normal rate, regular rhythm, normal heart sounds and intact distal pulses.  Exam reveals no edema.     Pulmonary/Chest: Effort normal and breath sounds normal. No respiratory distress.        Abdominal: Soft. Bowel sounds are normal. She exhibits abdominal incision (midline incision is bandaged with moderate shadowing; no erythema/induration/excessive drainage). Distention: mild. There is tenderness (appropriately TTP). There is no rebound and no guarding.   Mild distension.  No rebound/guarding             Musculoskeletal: Normal range of motion.       Neurological: She is alert and oriented to person, place, and time.    Skin: Skin is warm and dry. No rash noted. She is not diaphoretic.    Psychiatric: She has a normal mood and affect. Her behavior is normal. Judgment and thought content normal.     Lines/Drains/Airways     Peripheral Intravenous Line                 Peripheral IV - Single Lumen 08/14/17 1750 Right Antecubital 39 days               Laboratory:  CBC:     Recent Labs  Lab 09/21/17  1123 09/22/17  0504   WBC 12.16 6.10   HGB 13.3 11.1*   HCT 40.9 34.3*    209    and CMP:     Recent Labs  Lab 09/21/17  1123 09/22/17  0504   * 137   K 5.2* 4.1    107   CO2 19* 21*   * 108   BUN 19 17   CREATININE 1.4 1.2   CALCIUM 7.6* 7.8*   PROT 6.3  --    ALBUMIN 3.0*  --    BILITOT 0.3  --    ALKPHOS 68  --    AST 23  --    ALT 14  --    ANIONGAP 9 9   EGFRNONAA 35.0* 42.2*   AM CBC, CMP, Mg, Phos are pending    Diagnostic Results:  No new imaging    Assessment/Plan:     * S/P exploratory laparotomy/LSO/omentectomy    - POD #2 s/p X-lap, LSO, omentectomy  - Diet: Will change from regular to clear liquids. Will monitor for nausea/vomiting  - Pain: PO ibuprofen and percocet and dilaudid for BTP  - Ordered CBC, BMP, Mag, Phos this AM.  Pending  - PRN: senna, benadryl, phenergan, zofran, simethicone  - PPX: KLAUDIA, SCD, lovenox 40 mg daily          Hypothyroid    - continue home 125 mcg synthroid        Dyslipidemia    - continue home statin        Hypertension    - Home meds held  -BP: (111-128)/(55-61) 122/60  - restart valsartan 160 mg, norvasc 5 mg on discharge or PRN          VTE Risk Mitigation         Ordered     enoxaparin injection 40 mg  Every 24 hours (non-standard times)     Route:  Subcutaneous        09/21/17 1114     High Risk of VTE  Once      09/21/17 1117     Place KLAUDIA hose  Until discontinued      09/21/17 1114     Place sequential compression device  Until discontinued      09/21/17 1114          Was azevedo catheter removed? Yes    Gela Souza MD  Gynecologic Oncology  [facility]

## 2017-09-23 NOTE — SUBJECTIVE & OBJECTIVE
Interval History:   POD #2: Doing well. Pain controlled with PO pain medication.  Pt with some nausea this AM after eating breakfast.  No vomiting.  Able to tolerate PO last night.  Symptoms started this AM.  Passing flatus. No BMs.  Urinating without difficulty. Denies fever, chills, N/V, CP.     Scheduled Meds:   enoxparin  40 mg Subcutaneous Q24H    ibuprofen  600 mg Oral Q6H    levothyroxine  125 mcg Oral Daily    pantoprazole  40 mg Oral Daily    pravastatin  20 mg Oral QHS     Continuous Infusions:   dextrose 5 % and 0.45 % NaCl with KCl 20 mEq       PRN Meds:HYDROmorphone, metoclopramide HCl, ondansetron, oxycodone-acetaminophen, oxycodone-acetaminophen, sodium chloride 0.9%    Review of patient's allergies indicates:  No Known Allergies    Objective:     Vital Signs (Most Recent):  Temp: 98.3 °F (36.8 °C) (09/23/17 0842)  Pulse: 68 (09/23/17 0842)  Resp: 17 (09/23/17 0842)  BP: 122/60 (09/23/17 0842)  SpO2: 95 % (09/23/17 0842) Vital Signs (24h Range):  Temp:  [98.1 °F (36.7 °C)-98.4 °F (36.9 °C)] 98.3 °F (36.8 °C)  Pulse:  [68-82] 68  Resp:  [17-20] 17  SpO2:  [95 %-96 %] 95 %  BP: (111-128)/(55-61) 122/60     Weight: 83.5 kg (184 lb)  Body mass index is 32.59 kg/m².    Intake/Output - Last 3 Shifts       09/21 0700 - 09/22 0659 09/22 0700 - 09/23 0659 09/23 0700 - 09/24 0659    P.O.  755 200    I.V. (mL/kg) 3268.8 (39.1)      IV Piggyback 1150      Total Intake(mL/kg) 4418.8 (52.9) 755 (9) 200 (2.4)    Urine (mL/kg/hr) 1525 (0.8) 700 (0.3) 400 (1.2)    Other 300 (0.1)      Stool 0 (0)      Blood 350 (0.2)      Total Output 2175 700 400    Net +2243.8 +55 -200           Urine Occurrence  2 x     Stool Occurrence 0 x             Physical Exam:   Constitutional: She is oriented to person, place, and time. She appears well-developed and well-nourished. No distress.    HENT:   Head: Normocephalic and atraumatic.    Eyes: EOM are normal. Pupils are equal, round, and reactive to light.    Neck: Normal range  of motion. Neck supple.    Cardiovascular: Normal rate, regular rhythm, normal heart sounds and intact distal pulses.  Exam reveals no edema.     Pulmonary/Chest: Effort normal and breath sounds normal. No respiratory distress.        Abdominal: Soft. Bowel sounds are normal. She exhibits abdominal incision (midline incision is bandaged with moderate shadowing; no erythema/induration/excessive drainage). Distention: mild. There is tenderness (appropriately TTP). There is no rebound and no guarding.   Mild distension.  No rebound/guarding             Musculoskeletal: Normal range of motion.       Neurological: She is alert and oriented to person, place, and time.    Skin: Skin is warm and dry. No rash noted. She is not diaphoretic.    Psychiatric: She has a normal mood and affect. Her behavior is normal. Judgment and thought content normal.     Lines/Drains/Airways     Peripheral Intravenous Line                 Peripheral IV - Single Lumen 08/14/17 1750 Right Antecubital 39 days              Laboratory:  CBC:     Recent Labs  Lab 09/21/17  1123 09/22/17  0504   WBC 12.16 6.10   HGB 13.3 11.1*   HCT 40.9 34.3*    209    and CMP:     Recent Labs  Lab 09/21/17  1123 09/22/17  0504   * 137   K 5.2* 4.1    107   CO2 19* 21*   * 108   BUN 19 17   CREATININE 1.4 1.2   CALCIUM 7.6* 7.8*   PROT 6.3  --    ALBUMIN 3.0*  --    BILITOT 0.3  --    ALKPHOS 68  --    AST 23  --    ALT 14  --    ANIONGAP 9 9   EGFRNONAA 35.0* 42.2*   AM CBC, CMP, Mg, Phos are pending    Diagnostic Results:  No new imaging

## 2017-09-24 VITALS
BODY MASS INDEX: 32.6 KG/M2 | TEMPERATURE: 98 F | HEART RATE: 50 BPM | HEIGHT: 63 IN | DIASTOLIC BLOOD PRESSURE: 56 MMHG | WEIGHT: 184 LBS | OXYGEN SATURATION: 94 % | SYSTOLIC BLOOD PRESSURE: 120 MMHG | RESPIRATION RATE: 17 BRPM

## 2017-09-24 PROBLEM — R00.1 BRADYCARDIA: Status: ACTIVE | Noted: 2017-09-24

## 2017-09-24 PROCEDURE — 99024 POSTOP FOLLOW-UP VISIT: CPT | Mod: GC,,, | Performed by: OBSTETRICS & GYNECOLOGY

## 2017-09-24 PROCEDURE — 25000003 PHARM REV CODE 250: Performed by: OBSTETRICS & GYNECOLOGY

## 2017-09-24 PROCEDURE — 25000003 PHARM REV CODE 250: Performed by: STUDENT IN AN ORGANIZED HEALTH CARE EDUCATION/TRAINING PROGRAM

## 2017-09-24 PROCEDURE — 63600175 PHARM REV CODE 636 W HCPCS: Performed by: OBSTETRICS & GYNECOLOGY

## 2017-09-24 RX ORDER — IBUPROFEN 600 MG/1
600 TABLET ORAL EVERY 6 HOURS PRN
Qty: 30 TABLET | Refills: 0 | Status: SHIPPED | OUTPATIENT
Start: 2017-09-24 | End: 2017-11-24

## 2017-09-24 RX ORDER — OXYCODONE AND ACETAMINOPHEN 5; 325 MG/1; MG/1
1 TABLET ORAL EVERY 6 HOURS PRN
Qty: 30 TABLET | Refills: 0 | Status: SHIPPED | OUTPATIENT
Start: 2017-09-24 | End: 2017-10-17 | Stop reason: ALTCHOICE

## 2017-09-24 RX ORDER — IBUPROFEN 800 MG/1
800 TABLET ORAL EVERY 8 HOURS PRN
Qty: 30 TABLET | Refills: 0 | Status: SHIPPED | OUTPATIENT
Start: 2017-09-24 | End: 2017-09-24 | Stop reason: HOSPADM

## 2017-09-24 RX ADMIN — ENOXAPARIN SODIUM 40 MG: 100 INJECTION SUBCUTANEOUS at 06:09

## 2017-09-24 RX ADMIN — LEVOTHYROXINE SODIUM 125 MCG: 25 TABLET ORAL at 05:09

## 2017-09-24 RX ADMIN — IBUPROFEN 600 MG: 600 TABLET, FILM COATED ORAL at 05:09

## 2017-09-24 NOTE — ASSESSMENT & PLAN NOTE
- POD #3 s/p X-lap, LSO, omentectomy  - Diet: On clear liquids yesterday.  Tolerated well.  Will advance to regular diet today  - Pain: PO ibuprofen and percocet and dilaudid for BTP  - Electrolytes normal.  H/H stable  - PRN: senna, benadryl, phenergan, zofran, simethicone  - PPX: KLAUDIA, SCD, lovenox 40 mg daily  - Dispo: Pt desires discharge.  Consider DC this afternoon if tolerates breakfast and lunch without issues

## 2017-09-24 NOTE — SUBJECTIVE & OBJECTIVE
Interval History:   POD #3: Doing well. Pain controlled with PO pain medication.  Pt reports that nausea improved and resolved yesterday.  Daughter at bedside feels that nausea yesterday was related to receiving pain medication on empty stomach that subsequently made her dizzy.  No nausea this AM. No vomiting.  Able to tolerate PO last night.  Passing flatus. No BMs.  Urinating without difficulty.Ambulated multiple times yesterday. Denies fever, chills, N/V, CP.     Scheduled Meds:   enoxparin  40 mg Subcutaneous Q24H    ibuprofen  600 mg Oral Q6H    levothyroxine  125 mcg Oral Daily    pantoprazole  40 mg Oral Daily    pravastatin  20 mg Oral QHS     Continuous Infusions:   dextrose 5 % and 0.45 % NaCl with KCl 20 mEq 100 mL/hr at 09/23/17 2319     PRN Meds:HYDROmorphone, metoclopramide HCl, ondansetron, oxycodone-acetaminophen, oxycodone-acetaminophen, simethicone, sodium chloride 0.9%    Review of patient's allergies indicates:  No Known Allergies    Objective:     Vital Signs (Most Recent):  Temp: 98.4 °F (36.9 °C) (09/24/17 0723)  Pulse: (!) 50 (09/24/17 0723)  Resp: 17 (09/24/17 0723)  BP: (!) 120/56 (09/24/17 0723)  SpO2: (!) 94 % (09/24/17 0723) Vital Signs (24h Range):  Temp:  [98.2 °F (36.8 °C)-98.5 °F (36.9 °C)] 98.4 °F (36.9 °C)  Pulse:  [42-80] 50  Resp:  [16-20] 17  SpO2:  [94 %-97 %] 94 %  BP: (113-136)/(53-64) 120/56     Weight: 83.5 kg (184 lb)  Body mass index is 32.59 kg/m².    Intake/Output - Last 3 Shifts       09/22 0700 - 09/23 0659 09/23 0700 - 09/24 0659 09/24 0700 - 09/25 0659    P.O. 755 250     I.V. (mL/kg)  293.3 (3.5)     IV Piggyback       Total Intake(mL/kg) 755 (9) 543.3 (6.5)     Urine (mL/kg/hr) 900 (0.4) 2000 (1)     Other       Stool       Blood       Total Output 900 2000      Net -145 -1456.7             Urine Occurrence 2 x      Stool Occurrence   0 x           Physical Exam:   Constitutional: She is oriented to person, place, and time. She appears well-developed and  well-nourished. No distress.    HENT:   Head: Normocephalic and atraumatic.    Eyes: EOM are normal. Pupils are equal, round, and reactive to light.    Neck: Normal range of motion. Neck supple.    Cardiovascular: Normal rate, regular rhythm, normal heart sounds and intact distal pulses.  Exam reveals no edema.     Pulmonary/Chest: Effort normal and breath sounds normal. No respiratory distress.        Abdominal: Soft. Bowel sounds are normal. She exhibits abdominal incision (midline incision is bandaged with moderate shadowing; no erythema/induration/excessive drainage). Distention: mild. There is no tenderness. There is no rebound and no guarding.   No rebound/guarding             Musculoskeletal: Normal range of motion.       Neurological: She is alert and oriented to person, place, and time.    Skin: Skin is warm and dry. No rash noted. She is not diaphoretic.    Psychiatric: She has a normal mood and affect. Her behavior is normal. Judgment and thought content normal.     Lines/Drains/Airways     Peripheral Intravenous Line                 Peripheral IV - Single Lumen 09/23/17 2145 Right Forearm less than 1 day              Laboratory:  CBC:     Recent Labs  Lab 09/23/17  1206   WBC 6.54   HGB 10.5*   HCT 32.5*       and CMP:     Recent Labs  Lab 09/23/17  1206      K 4.2      CO2 25   *   BUN 17   CREATININE 1.4   CALCIUM 8.0*   ANIONGAP 4*   EGFRNONAA 35.0*       Diagnostic Results:  No new imaging

## 2017-09-24 NOTE — ASSESSMENT & PLAN NOTE
- Pt with intermittent bradycardia overnight  - Bradycardia only when resting.  Asymptomatic  - EKG normal. No concerning signs

## 2017-09-24 NOTE — PLAN OF CARE
Problem: Patient Care Overview  Goal: Plan of Care Review  Outcome: Ongoing (interventions implemented as appropriate)  Patient remains free from falls and injury this shift. Bed in low, locked position with call bell in reach. Family at bedside. Patient encouraged to call for assistance when getting out of bed. Patient verbalized understanding. All belongings within reach. VS stable. Pt had HR of 47 at 1930- EKG done and showed sinus bradycardia. Afebrile. Complained of gas pain- simethicone given once. Will continue to monitor.

## 2017-09-24 NOTE — PROGRESS NOTES
[facility]  Gynecologic Oncology  Progress Note      Patient Name: Celine Mahan  MRN: [id number]  Admission Date: 9/21/2017  Hospital Length of Stay: 3 days  Attending Provider: Iesha Feliciano MD  Primary Care Provider: Anil Wills MD  Principal Problem: S/P exploratory laparotomy    Follow-up For: Procedure(s) (LRB):  EXPLORATORY-LAPAROTOMY (N/A)  SALPINGO-OOPHERECTOMY (Left)  OMENTECTOMY (Bilateral)  Post-Operative Day: 3 Days Post-Op  Subjective:      History of Present Illness:  Celine Mahan is a 82 y.o. female here for scheduled X-lap, pelvic mass resection and staging.      Hospital Course:  09/21/2017: Admit to Gyn-Onc; to OR for scheduled procedure; routine post-op care  09/22/2017: POD #1 s/p X-lap, LSO, omentectomy. Doing well. Routine advances.   09/23/2017: POD#2- Passing flatus but had some nausea without vomiting.  Changed diet from regular to clear liquids. Pain controlled  09/24/2017: Nausea improved and resolved.  Will advance to regular diet.  Otherwise meeting milestones.      Interval History:   POD #3: Doing well. Pain controlled with PO pain medication.  Pt reports that nausea improved and resolved yesterday.  Daughter at bedside feels that nausea yesterday was related to receiving pain medication on empty stomach that subsequently made her dizzy.  No nausea this AM. No vomiting.  Able to tolerate PO last night.  Passing flatus. No BMs.  Urinating without difficulty.Ambulated multiple times yesterday. Denies fever, chills, N/V, CP.     Scheduled Meds:   enoxparin  40 mg Subcutaneous Q24H    ibuprofen  600 mg Oral Q6H    levothyroxine  125 mcg Oral Daily    pantoprazole  40 mg Oral Daily    pravastatin  20 mg Oral QHS     Continuous Infusions:   dextrose 5 % and 0.45 % NaCl with KCl 20 mEq 100 mL/hr at 09/23/17 7939     PRN Meds:HYDROmorphone, metoclopramide HCl, ondansetron, oxycodone-acetaminophen, oxycodone-acetaminophen, simethicone, sodium chloride  0.9%    Review of patient's allergies indicates:  No Known Allergies    Objective:     Vital Signs (Most Recent):  Temp: 98.4 °F (36.9 °C) (09/24/17 0723)  Pulse: (!) 50 (09/24/17 0723)  Resp: 17 (09/24/17 0723)  BP: (!) 120/56 (09/24/17 0723)  SpO2: (!) 94 % (09/24/17 0723) Vital Signs (24h Range):  Temp:  [98.2 °F (36.8 °C)-98.5 °F (36.9 °C)] 98.4 °F (36.9 °C)  Pulse:  [42-80] 50  Resp:  [16-20] 17  SpO2:  [94 %-97 %] 94 %  BP: (113-136)/(53-64) 120/56     Weight: 83.5 kg (184 lb)  Body mass index is 32.59 kg/m².    Intake/Output - Last 3 Shifts       09/22 0700 - 09/23 0659 09/23 0700 - 09/24 0659 09/24 0700 - 09/25 0659    P.O. 755 250     I.V. (mL/kg)  293.3 (3.5)     IV Piggyback       Total Intake(mL/kg) 755 (9) 543.3 (6.5)     Urine (mL/kg/hr) 900 (0.4) 2000 (1)     Other       Stool       Blood       Total Output 900 2000      Net -145 -1456.7             Urine Occurrence 2 x      Stool Occurrence   0 x           Physical Exam:   Constitutional: She is oriented to person, place, and time. She appears well-developed and well-nourished. No distress.    HENT:   Head: Normocephalic and atraumatic.    Eyes: EOM are normal. Pupils are equal, round, and reactive to light.    Neck: Normal range of motion. Neck supple.    Cardiovascular: Normal rate, regular rhythm, normal heart sounds and intact distal pulses.  Exam reveals no edema.     Pulmonary/Chest: Effort normal and breath sounds normal. No respiratory distress.        Abdominal: Soft. Bowel sounds are normal. She exhibits abdominal incision (midline incision is bandaged with moderate shadowing; no erythema/induration/excessive drainage). Distention: mild. There is no tenderness. There is no rebound and no guarding.   No rebound/guarding             Musculoskeletal: Normal range of motion.       Neurological: She is alert and oriented to person, place, and time.    Skin: Skin is warm and dry. No rash noted. She is not diaphoretic.    Psychiatric: She has a  normal mood and affect. Her behavior is normal. Judgment and thought content normal.     Lines/Drains/Airways     Peripheral Intravenous Line                 Peripheral IV - Single Lumen 09/23/17 2145 Right Forearm less than 1 day              Laboratory:  CBC:     Recent Labs  Lab 09/23/17  1206   WBC 6.54   HGB 10.5*   HCT 32.5*       and CMP:     Recent Labs  Lab 09/23/17  1206      K 4.2      CO2 25   *   BUN 17   CREATININE 1.4   CALCIUM 8.0*   ANIONGAP 4*   EGFRNONAA 35.0*       Diagnostic Results:  No new imaging    Assessment/Plan:     * S/P exploratory laparotomy/LSO/omentectomy    - POD #3 s/p X-lap, LSO, omentectomy  - Diet: On clear liquids yesterday.  Tolerated well.  Will advance to regular diet today  - Pain: PO ibuprofen and percocet and dilaudid for BTP  - Electrolytes normal.  H/H stable  - PRN: senna, benadryl, phenergan, zofran, simethicone  - PPX: KLAUDIA, SCD, lovenox 40 mg daily  - Dispo: Pt desires discharge.  Consider DC this afternoon if tolerates breakfast and lunch without issues        Bradycardia    - Pt with intermittent bradycardia overnight  - Bradycardia only when resting.  Asymptomatic  - EKG sinus bradycardia.  Notes left bundle branch block but this has been present on every other EKG that patient has had in past  - HR improved this AM. 70s-80s with ambulation and sitting up        Hypothyroid    - continue home 125 mcg synthroid        Dyslipidemia    - continue home statin        Hypertension    - Home meds held  - BP: (113-136)/(53-64) 120/56  - restart valsartan 160 mg, norvasc 5 mg on discharge or PRN          VTE Risk Mitigation         Ordered     enoxaparin injection 40 mg  Every 24 hours (non-standard times)     Route:  Subcutaneous        09/21/17 1114     High Risk of VTE  Once      09/21/17 1117     Place KLAUDIA hose  Until discontinued      09/21/17 1114     Place sequential compression device  Until discontinued      09/21/17 1114          Dispo:  Pt doing well this AM and desires discharge.  Has good support at home, as daughter is NICU nurse.  Will consider discharge if tolerating PO without difficulty.    Gela Souza MD  Gynecologic Oncology  [facility]

## 2017-09-24 NOTE — DISCHARGE SUMMARY
[facility]  Gynecologic Oncology  Discharge Summary    Patient Name: Celine Mahan  MRN: [id number]  Admission Date: 9/21/2017  Hospital Length of Stay: 3 days  Discharge Date and Time:  09/24/2017  Attending Physician: Iesha Feliciano MD   Discharging Provider: Gela Souza MD  Primary Care Provider: Anil Wills MD    HPI:   Celine Mahan is a 82 y.o. female here for scheduled X-lap, pelvic mass resection and staging.      Hospital Course:  09/21/2017: Admit to Gyn-Onc; to OR for scheduled procedure; routine post-op care  09/22/2017: POD #1 s/p X-lap, LSO, omentectomy. Doing well. Routine advances.   09/23/2017: POD#2- Passing flatus but had some nausea without vomiting.  Changed diet from regular to clear liquids. Pain controlled  09/24/2017: Nausea improved and resolved.  Advanced to regular diet and tolerating well.  Otherwise meeting milestones.  Discharge today with plans to follow up with Dr. Feliciano in 2 weeks    Procedure(s) (LRB):  EXPLORATORY-LAPAROTOMY (N/A)  SALPINGO-OOPHERECTOMY (Left)  OMENTECTOMY (Bilateral)         Significant Diagnostic Studies: Labs:   BMP:   Recent Labs  Lab 09/23/17  1206   *      K 4.2      CO2 25   BUN 17   CREATININE 1.4   CALCIUM 8.0*   MG 2.2    and CBC   Recent Labs  Lab 09/23/17  1206   WBC 6.54   HGB 10.5*   HCT 32.5*          Pending Diagnostic Studies:     Procedure Component Value Units Date/Time    Magnesium [[id number]] Collected:  09/21/17 1117    Order Status:  Sent Lab Status:  In process Updated:  09/21/17 1117    Specimen:  Blood from Blood     Phosphorus [[id number]] Collected:  09/21/17 1117    Order Status:  Sent Lab Status:  In process Updated:  09/21/17 1117    Specimen:  Blood from Blood         Final Active Diagnoses:    Diagnosis Date Noted POA    PRINCIPAL PROBLEM:  S/P exploratory laparotomy/LSO/omentectomy [Z98.890] 09/21/2017 Not Applicable    Bradycardia [R00.1] 09/24/2017 No    Hypothyroid  [E03.9] 09/21/2017 Yes    Dyslipidemia [E78.5] 08/22/2012 Yes    Hypertension [I10] 08/22/2012 Yes      Problems Resolved During this Admission:    Diagnosis Date Noted Date Resolved POA    Pre-op evaluation [Z01.818] 09/21/2017 09/21/2017 Not Applicable    Pre-op evaluation [Z01.818]  09/21/2017 Not Applicable    Pelvic mass [R19.00] 08/27/2017 09/22/2017 Yes        Does this patient meet criteria for extended DVT prophylaxis? Yes, patient was prescribed Lovenox for 21 days 2/2 ex-lap surgery and cancer diagnosis    Discharged Condition: good    Disposition: Home or Self Care    Follow Up:  Follow-up Information     Iesha Feliciano MD In 2 weeks.    Specialty:  Gynecologic Oncology  Why:  follow up- as scheduled by clinic  Contact information:  [address]  [facility] [address]  [phone]                 Patient Instructions:     Diet general     Activity as tolerated     Call MD for:  persistent dizziness, light-headedness, or visual disturbances     Call MD for:  worsening rash     Call MD for:  severe persistent headache     Call MD for:  difficulty breathing or increased cough     Call MD for:  redness, tenderness, or signs of infection (pain, swelling, redness, odor or green/yellow discharge around incision site)     Call MD for:  persistent nausea and vomiting or diarrhea     Call MD for:  temperature >100.4     Call MD for:  severe uncontrolled pain       Medications:  Reconciled Home Medications:   Current Discharge Medication List      START taking these medications    Details   enoxaparin (LOVENOX) 40 mg/0.4 mL Syrg Inject 0.4 mLs (40 mg total) into the skin once daily.  Qty: 8.4 mL, Refills: 0      !! ibuprofen (ADVIL,MOTRIN) 600 MG tablet Take 1 tablet (600 mg total) by mouth every 6 (six) hours as needed for Pain.  Qty: 30 tablet, Refills: 0      oxycodone-acetaminophen (PERCOCET) 5-325 mg per tablet Take 1 tablet by mouth every 6 (six) hours as needed.  Qty: 30 tablet, Refills: 0        !! - Potential duplicate medications found. Please discuss with provider.      CONTINUE these medications which have NOT CHANGED    Details   amlodipine (NORVASC) 5 MG tablet Take 1 tablet (5 mg total) by mouth once daily.  Qty: 90 tablet, Refills: 1      !! IBUPROFEN (ADVIL ORAL) Take by mouth as needed.      levothyroxine (SYNTHROID) 125 MCG tablet Take 1 tablet (125 mcg total) by mouth once daily.  Qty: 90 tablet, Refills: 0      omeprazole (PRILOSEC) 20 MG capsule TAKE 1 CAPSULE(20 MG) BY MOUTH BEFORE BREAKFAST PRN  Qty: 90 capsule, Refills: 0    Associated Diagnoses: Abdominal bloating; Gastroesophageal reflux disease, esophagitis presence not specified      pravastatin (PRAVACHOL) 20 MG tablet Take 20 mg by mouth every evening.       TRAMADOL HCL (TRAMADOL ORAL) Take by mouth as needed.      valsartan (DIOVAN) 160 MG tablet Take 1 tablet (160 mg total) by mouth once daily.  Qty: 90 tablet, Refills: 0      garlic 1,000 mg Cap Take by mouth once daily.       polyethylene glycol (GOLYTELY,NULYTELY) 236-22.74-6.74 gram suspension Take as directed  Qty: 1 Bottle, Refills: 0    Associated Diagnoses: Rectal bleeding      predniSONE (DELTASONE) 20 MG tablet TAKE 1 TABLET BY MOUTH EVERY DAY  Qty: 7 tablet, Refills: 0      simethicone (PHAZYME) 250 mg Cap Take 1 capsule by mouth 2 (two) times daily as needed (bloating).  Qty: 60 capsule, Refills: 0    Associated Diagnoses: Abdominal bloating       !! - Potential duplicate medications found. Please discuss with provider.          Gela Souza MD  Gynecologic Oncology  [facility]

## 2017-09-24 NOTE — PROGRESS NOTES
Road Test  Oxygen-Patient tolerating room air, no distress.  Ambulation-Patient up with assistance.  Devices-Patient going home with no devices  Tolerating-Regular diet.  Elimination-Patient voiding without difficulty.  Self Care-Performs self care with assistance.  Teaching-Verbal and written discharge teaching given.     Patient tolerates room air, ambulates with assistance, regular diet, voiding without difficulty, and is going home with no devices. Peripheral IV removed, catheter intact, dressed with dry gauze and coban. No bleeding present. Patient going home with her grandson. Discharge paperwork discussed. Medications reviewed; paper prescriptions provided to patient. Patient has all belongings and has no questions at this time. Patient wants to shower before discharge and awaiting ride.

## 2017-09-24 NOTE — ASSESSMENT & PLAN NOTE
- Home meds held  - BP: (113-136)/(53-64) 120/56  - restart valsartan 160 mg, norvasc 5 mg on discharge or PRN

## 2017-10-02 NOTE — PHYSICIAN QUERY
PT Name: Celine Mahan  MR #: [id number]    Physician Query Form - Pathology Findings Clarification     CDS/: Elidia Coyle RN  CCDS               Contact information: [email or web address]  This form is a permanent document in the medical record.     Query Date: October 2, 2017      By submitting this query, we are merely seeking further clarification of documentation.  Please utilize your independent clinical judgment when addressing the question(s) below.      The medical record contains the following:     Findings Supporting Clinical Information Location in Medical Record   Pelvic mass        82 y.o. female here for scheduled X-lap, pelvic mass resection and staging    SPECIMEN  1) Left tube and ovary.  2) Omentum.  FINAL PATHOLOGIC DIAGNOSIS  1. LEFT OVARY:  HIGH GRADE ADENOCARCINOMA  2. OMENTUM:  NO CARCINOMA IDENTIFIED  Left fallopian tube: No definitive fallopian tube identified   Discharge Summary             Pathology report 9/26     Please document the clinical significance of the Pathologists findings of left ovarian adenocarcinoma, and no left fallopian tube identified:          [ X ] I agree with the Pathology Findings        [  ] I do not agree with the Pathology Findings        [  ] Clinically Insignificant        [  ] Clinically Undetermined        [  ] Other/Clarification of Findings: ______________________________________________    Please document in your progress notes daily for the duration of treatment until resolved and include in your discharge summary.

## 2017-10-04 ENCOUNTER — OFFICE VISIT (OUTPATIENT)
Dept: GYNECOLOGIC ONCOLOGY | Facility: CLINIC | Age: 82
End: 2017-10-04
Payer: MEDICARE

## 2017-10-04 VITALS
DIASTOLIC BLOOD PRESSURE: 70 MMHG | WEIGHT: 178.63 LBS | BODY MASS INDEX: 31.64 KG/M2 | HEART RATE: 77 BPM | SYSTOLIC BLOOD PRESSURE: 148 MMHG

## 2017-10-04 DIAGNOSIS — C56.9 MALIGNANT NEOPLASM OF OVARY, UNSPECIFIED LATERALITY: ICD-10-CM

## 2017-10-04 DIAGNOSIS — Z98.890 S/P EXPLORATORY LAPAROTOMY: Primary | ICD-10-CM

## 2017-10-04 PROCEDURE — 99999 PR PBB SHADOW E&M-EST. PATIENT-LVL II: CPT | Mod: PBBFAC,,, | Performed by: OBSTETRICS & GYNECOLOGY

## 2017-10-04 PROCEDURE — 99499 UNLISTED E&M SERVICE: CPT | Mod: S$GLB,,, | Performed by: OBSTETRICS & GYNECOLOGY

## 2017-10-04 PROCEDURE — 99024 POSTOP FOLLOW-UP VISIT: CPT | Mod: S$GLB,,, | Performed by: OBSTETRICS & GYNECOLOGY

## 2017-10-04 NOTE — LETTER
October 8, 2017        Anil Wills MD  [address] [address]             [facility] Gynecologic Oncology  [address], [address]  [place] [facility] [address]  Phone: [phone]  Fax: [phone]   Patient: Celine Mahan   MR Number: [id number]   YOB: 1935   Date of Visit: 10/4/2017       Dear Dr. Wills:    Thank you for referring Celine Mahan to me for evaluation. Below are the relevant portions of my assessment and plan of care.            If you have questions, please do not hesitate to call me. I look forward to following Celine along with you.    Sincerely,      Iesha Feliciano MD           CC  No Recipients

## 2017-10-08 PROBLEM — C56.9 OVARIAN CANCER: Status: ACTIVE | Noted: 2017-10-08

## 2017-10-08 NOTE — PROGRESS NOTES
"Subjective:      Patient ID: Celine Mahan is a 82 y.o. female.    Chief Complaint: Post-op Evaluation      HPI  Patient is an 83yo female who originally presented as a referral from Dr. Wills for pelvic mass. Patient reports LLQ pain for approximately 3 months which prompted evaluation. Available imaging reviewed.     Pelvic US 8/7/17 There is a complex echogenicity midline pelvic mass measuring 18.1 x 8.6 x 7.6 cm.  Ovaries not visualized.     CT A&P 8/9/17 Large solid/cystic mass within the lower abdomen/upper pelvis that is most concerning for a malignant neoplasm, likely of ovarian origin given its location.  Correlation with previous surgical history is recommended noting evidence of previous hysterectomy.  Mass abuts and displaces the adjacent bladder without definite CT findings to suggest hema invasion. Abdominal and pelvic lymphadenopathy concerning for lymphatic spread of neoplasm with index lymph nodes as above.      Medical history is significant for CKD (Cr 1.3), HTN, hypothyroid, HTN, HLD. She has a personal history of breast cancer in 1995 treated with mastectomy and adjuvant chemotherapy she reports. Last MMG 11/2016 normal. Adbominal surgery include cholecystectomy, TVH, xlap/removal ovary for ectopic. She reports that her physician told her "some ovary remains in situ". Family history significant for mother with pancreatic cancer.     CT chest 8/28/17 showed mediastinal adenopathy consistent with Stage IV disease.   RECIST Summary:  Mediastinal adenopathy:  1. Preaortic abnormal node measuring 1.7 cm short axis.  2. Pretracheal node measures 1.6 cm short axis.    She underwent surgical cytoreduction with xlap/LSO/omentectomy 9/21/17. Pathology showed high grade adenocarcinoma.     She presents today for post op visit. Recovering well from surgery. Up and about, eating, +BM.     Review of Systems   Constitutional: Negative for appetite change, chills, fatigue and fever.   HENT: Negative " for mouth sores.    Respiratory: Negative for cough and shortness of breath.    Cardiovascular: Negative for leg swelling.   Gastrointestinal: Negative for abdominal pain, blood in stool, constipation and diarrhea.   Endocrine: Negative for cold intolerance.   Genitourinary: Negative for dysuria and vaginal bleeding.   Musculoskeletal: Negative for myalgias.   Skin: Negative for rash.   Allergic/Immunologic: Negative.    Neurological: Negative for weakness and numbness.   Hematological: Negative for adenopathy. Does not bruise/bleed easily.   Psychiatric/Behavioral: Negative for confusion.        Objective:   Physical Exam:   Constitutional: She is oriented to person, place, and time. She appears well-developed and well-nourished.    HENT:   Head: Normocephalic and atraumatic.    Eyes: EOM are normal. Pupils are equal, round, and reactive to light.    Neck: Normal range of motion. Neck supple. No thyromegaly present.    Cardiovascular: Normal rate, regular rhythm and intact distal pulses.     Pulmonary/Chest: Effort normal and breath sounds normal. No respiratory distress. She has no wheezes.        Abdominal: Soft. Bowel sounds are normal. She exhibits abdominal incision. She exhibits no distension, no ascites and no mass. There is no tenderness.   Midline incision healing well             Musculoskeletal: Normal range of motion and moves all extremeties.      Lymphadenopathy:     She has no cervical adenopathy.        Right: No supraclavicular adenopathy present.        Left: No supraclavicular adenopathy present.    Neurological: She is alert and oriented to person, place, and time.    Skin: Skin is warm and dry. No rash noted.    Psychiatric: She has a normal mood and affect.       Assessment:     1. S/P exploratory laparotomy/LSO/omentectomy    2. Malignant neoplasm of ovary, unspecified laterality        Plan:   No orders of the defined types were placed in this encounter.    I discussed with the patient and  her daughter the natural history of ovarian cancer and the need for adjuvant platinum based chemotherapy. She would like to do this on the [facility] if possible as that is closer to home. Will discuss referral to Dr. Reinoso. She now has a personal history of breast and ovarian cancer. Indications for genetic testing reviewed and genetics protocol started for testing. Will RTC in 2 weeks for follow up post op visit and treatment planning.

## 2017-10-17 ENCOUNTER — OFFICE VISIT (OUTPATIENT)
Dept: GYNECOLOGIC ONCOLOGY | Facility: CLINIC | Age: 82
End: 2017-10-17
Payer: MEDICARE

## 2017-10-17 VITALS
BODY MASS INDEX: 31.67 KG/M2 | SYSTOLIC BLOOD PRESSURE: 147 MMHG | HEART RATE: 84 BPM | DIASTOLIC BLOOD PRESSURE: 85 MMHG | WEIGHT: 178.81 LBS | RESPIRATION RATE: 18 BRPM

## 2017-10-17 DIAGNOSIS — C56.9 MALIGNANT NEOPLASM OF OVARY, UNSPECIFIED LATERALITY: Primary | ICD-10-CM

## 2017-10-17 DIAGNOSIS — Z98.890 S/P EXPLORATORY LAPAROTOMY: ICD-10-CM

## 2017-10-17 PROCEDURE — 99024 POSTOP FOLLOW-UP VISIT: CPT | Mod: S$GLB,,, | Performed by: OBSTETRICS & GYNECOLOGY

## 2017-10-17 PROCEDURE — 99999 PR PBB SHADOW E&M-EST. PATIENT-LVL III: CPT | Mod: PBBFAC,,, | Performed by: OBSTETRICS & GYNECOLOGY

## 2017-10-17 PROCEDURE — 99499 UNLISTED E&M SERVICE: CPT | Mod: S$GLB,,, | Performed by: OBSTETRICS & GYNECOLOGY

## 2017-10-18 ENCOUNTER — OFFICE VISIT (OUTPATIENT)
Dept: HEMATOLOGY/ONCOLOGY | Facility: CLINIC | Age: 82
End: 2017-10-18
Payer: MEDICARE

## 2017-10-18 ENCOUNTER — LAB VISIT (OUTPATIENT)
Dept: LAB | Facility: HOSPITAL | Age: 82
End: 2017-10-18
Attending: INTERNAL MEDICINE
Payer: MEDICARE

## 2017-10-18 VITALS
TEMPERATURE: 98 F | WEIGHT: 178.81 LBS | DIASTOLIC BLOOD PRESSURE: 64 MMHG | BODY MASS INDEX: 31.68 KG/M2 | HEIGHT: 63 IN | HEART RATE: 91 BPM | OXYGEN SATURATION: 95 % | SYSTOLIC BLOOD PRESSURE: 120 MMHG

## 2017-10-18 DIAGNOSIS — Z51.11 ENCOUNTER FOR CHEMOTHERAPY MANAGEMENT: ICD-10-CM

## 2017-10-18 DIAGNOSIS — Z98.890 S/P EXPLORATORY LAPAROTOMY: ICD-10-CM

## 2017-10-18 DIAGNOSIS — C56.9 MALIGNANT NEOPLASM OF OVARY, UNSPECIFIED LATERALITY: Primary | ICD-10-CM

## 2017-10-18 DIAGNOSIS — C56.9 MALIGNANT NEOPLASM OF OVARY, UNSPECIFIED LATERALITY: ICD-10-CM

## 2017-10-18 PROBLEM — M85.80 OSTEOPENIA: Status: ACTIVE | Noted: 2017-10-18

## 2017-10-18 LAB
ALBUMIN SERPL BCP-MCNC: 3.5 G/DL
ALP SERPL-CCNC: 74 U/L
ALT SERPL W/O P-5'-P-CCNC: 14 U/L
ANION GAP SERPL CALC-SCNC: 7 MMOL/L
AST SERPL-CCNC: 18 U/L
BASOPHILS # BLD AUTO: 0.04 K/UL
BASOPHILS NFR BLD: 0.8 %
BILIRUB SERPL-MCNC: 0.4 MG/DL
BUN SERPL-MCNC: 22 MG/DL
CALCIUM SERPL-MCNC: 9.1 MG/DL
CANCER AG125 SERPL-ACNC: 230 U/ML
CHLORIDE SERPL-SCNC: 109 MMOL/L
CO2 SERPL-SCNC: 26 MMOL/L
CREAT SERPL-MCNC: 1.4 MG/DL
DIFFERENTIAL METHOD: ABNORMAL
EOSINOPHIL # BLD AUTO: 0.8 K/UL
EOSINOPHIL NFR BLD: 15.3 %
ERYTHROCYTE [DISTWIDTH] IN BLOOD BY AUTOMATED COUNT: 15.7 %
EST. GFR  (AFRICAN AMERICAN): 40 ML/MIN/1.73 M^2
EST. GFR  (NON AFRICAN AMERICAN): 35 ML/MIN/1.73 M^2
GLUCOSE SERPL-MCNC: 106 MG/DL
HCT VFR BLD AUTO: 37.4 %
HGB BLD-MCNC: 12.5 G/DL
LYMPHOCYTES # BLD AUTO: 1.2 K/UL
LYMPHOCYTES NFR BLD: 23.6 %
MAGNESIUM SERPL-MCNC: 2.4 MG/DL
MCH RBC QN AUTO: 28.5 PG
MCHC RBC AUTO-ENTMCNC: 33.4 G/DL
MCV RBC AUTO: 85 FL
MONOCYTES # BLD AUTO: 0.4 K/UL
MONOCYTES NFR BLD: 8.3 %
NEUTROPHILS # BLD AUTO: 2.7 K/UL
NEUTROPHILS NFR BLD: 51.8 %
PLATELET # BLD AUTO: 281 K/UL
PMV BLD AUTO: 9.9 FL
POTASSIUM SERPL-SCNC: 4.2 MMOL/L
PROT SERPL-MCNC: 7.4 G/DL
RBC # BLD AUTO: 4.39 M/UL
SODIUM SERPL-SCNC: 142 MMOL/L
WBC # BLD AUTO: 5.18 K/UL

## 2017-10-18 PROCEDURE — 99499 UNLISTED E&M SERVICE: CPT | Mod: S$GLB,,, | Performed by: INTERNAL MEDICINE

## 2017-10-18 PROCEDURE — 83735 ASSAY OF MAGNESIUM: CPT

## 2017-10-18 PROCEDURE — 99999 PR PBB SHADOW E&M-EST. PATIENT-LVL III: CPT | Mod: PBBFAC,,, | Performed by: INTERNAL MEDICINE

## 2017-10-18 PROCEDURE — 80053 COMPREHEN METABOLIC PANEL: CPT

## 2017-10-18 PROCEDURE — 99215 OFFICE O/P EST HI 40 MIN: CPT | Mod: S$GLB,,, | Performed by: INTERNAL MEDICINE

## 2017-10-18 PROCEDURE — 85025 COMPLETE CBC W/AUTO DIFF WBC: CPT

## 2017-10-18 PROCEDURE — 86304 IMMUNOASSAY TUMOR CA 125: CPT

## 2017-10-18 PROCEDURE — 36415 COLL VENOUS BLD VENIPUNCTURE: CPT

## 2017-10-18 RX ORDER — PROCHLORPERAZINE MALEATE 10 MG
10 TABLET ORAL EVERY 6 HOURS PRN
Qty: 30 TABLET | Refills: 1 | Status: SHIPPED | OUTPATIENT
Start: 2017-10-18 | End: 2018-01-19 | Stop reason: SDUPTHER

## 2017-10-18 NOTE — PROGRESS NOTES
"Subjective:       Patient ID: Celine Mahan is a 82 y.o. female.    Chief Complaint: Follow-up (transfer from [facility])    HPI     Pt transferring care    HPI ( per Dr. Feliciano) Patient is an 81yo female who originally presented as a referral from Dr. Wills for pelvic mass. Patient reports LLQ pain for approximately 3 months which prompted evaluation.      Pelvic US 8/7/17 There is a complex echogenicity midline pelvic mass measuring 18.1 x 8.6 x 7.6 cm.  Ovaries not visualized.     CT A&P 8/9/17 Large solid/cystic mass within the lower abdomen/upper pelvis that is most concerning for a malignant neoplasm, likely of ovarian origin given its location.  Correlation with previous surgical history is recommended noting evidence of previous hysterectomy.  Mass abuts and displaces the adjacent bladder without definite CT findings to suggest hema invasion. Abdominal and pelvic lymphadenopathy concerning for lymphatic spread of neoplasm with index lymph nodes as above.      Medical history is significant for CKD (Cr 1.3), HTN, hypothyroid, HTN, HLD. She has a personal history of breast cancer in 1995 treated with mastectomy and adjuvant chemotherapy she reports. Last MMG 11/2016 normal. Adbominal surgery include cholecystectomy, TVH, xlap/removal ovary for ectopic. She reports that her physician told her "some ovary remains in situ". Family history significant for mother with pancreatic cancer.      CT chest 8/28/17 showed mediastinal adenopathy consistent with Stage IV disease.   RECIST Summary:  Mediastinal adenopathy:  1. Preaortic abnormal node measuring 1.7 cm short axis.  2. Pretracheal node measures 1.6 cm short axis.     She underwent surgical cytoreduction with xlap/LSO/omentectomy 9/21/17. Pathology showed high grade adenocarcinoma.        She is followed by Dr. Feliciano  She is here to establish care to begin adjuvant chemo  Accompanied by her dtr  Doing well  No N/V  No SOB/CP  No Abd pain     Past Medical " "History:   Diagnosis Date    Breast cancer     Cataract     CKD (chronic kidney disease) stage 3, GFR 30-59 ml/min     Hyperlipidemia     Hypertension     Hypothyroidism     Obesity     Osteopenia     Ovarian cancer 10/8/2017    Overactive bladder     Pelvic mass 8/27/2017    Thyroid disease          Past Surgical History:   Procedure Laterality Date    bt catarac surgery      CHOLECYSTECTOMY      HYSTERECTOMY      MASTECTOMY Left              Review of Systems   Constitutional: Negative for appetite change, fatigue, fever and unexpected weight change.   HENT: Negative for mouth sores.    Eyes: Negative for visual disturbance.   Respiratory: Negative for cough and shortness of breath.    Cardiovascular: Negative for chest pain.   Gastrointestinal: Negative for abdominal pain and diarrhea.   Genitourinary: Negative for frequency.   Musculoskeletal: Negative for back pain.   Skin: Negative for rash.   Neurological: Negative for headaches.   Hematological: Negative for adenopathy.   Psychiatric/Behavioral: The patient is not nervous/anxious.        Objective:       Vitals:    10/18/17 0959   BP: 120/64   BP Location: Right arm   Patient Position: Sitting   BP Method: Medium (Automatic)   Pulse: 91   Temp: 98 °F (36.7 °C)   TempSrc: Oral   SpO2: 95%   Weight: 81.1 kg (178 lb 12.7 oz)   Height: 5' 3" (1.6 m)       Physical Exam   Constitutional: She is oriented to person, place, and time. She appears well-developed and well-nourished.   HENT:   Head: Normocephalic.   Mouth/Throat: Oropharynx is clear and moist. No oropharyngeal exudate.   Eyes: Conjunctivae and lids are normal. Pupils are equal, round, and reactive to light. No scleral icterus.   Neck: Normal range of motion. Neck supple. No thyromegaly present.   Cardiovascular: Normal rate, regular rhythm and normal heart sounds.    No murmur heard.  Pulmonary/Chest: Breath sounds normal. She has no wheezes. She has no rales.   Abdominal: Soft. Bowel " sounds are normal. She exhibits no distension. There is no hepatosplenomegaly. There is no tenderness.   Well-healed midline incision   Musculoskeletal: Normal range of motion. She exhibits no edema or tenderness.   Lymphadenopathy:     She has no cervical adenopathy.     She has no axillary adenopathy.        Right: No supraclavicular adenopathy present.        Left: No supraclavicular adenopathy present.   Neurological: She is alert and oriented to person, place, and time. No cranial nerve deficit. Coordination normal.   Skin: Skin is warm and dry. No ecchymosis, no petechiae and no rash noted. No erythema.   Psychiatric: She has a normal mood and affect.         Labs:   Lab Results   Component Value Date    WBC 5.18 10/18/2017    HGB 12.5 10/18/2017    HCT 37.4 10/18/2017    MCV 85 10/18/2017     10/18/2017       Assessment:       1. Malignant neoplasm of ovary, unspecified laterality    2. S/P exploratory laparotomy/LSO/omentectomy    3. Encounter for chemotherapy management        Plan:     Patient with ovarian cancer status post debulking surgery 9/21/2017 in need for adjuvant platinum-based chemotherapy with carbo/taxol . Discussed pathology findings and principle of adjuvant chemotherapy.  Discussed the risks  of chemotherapy include but not limited to hair and skin changes, bone marrow damage (anemia, thrombocytopenia, immune suppression, neutropenia), allergic reactions, diarrhea, constipation, mouth sores, neuropathy, secondary cancers, damage at injection sites and nearly death.  Patient does not have long-term IV access.  She elects to hold off.  She will undergo formal chemotherapy teaching.  Prescription provided for anti-emetic therapy.  She will be provided prescription for Decadron premed. Genetic testing planned  per Dr. Feliciano.  Plan testing today including CBC, CMP, magnesium and  level. Informed consent obtained.  Plan to initiate cycle 1 of carbo/taxol  next week All questions  posed answered to patient's satisfaction    Greater 45 minutes spent during this visit of which greater than 50% devoted to counseling and coordination of care regarding diagnosis and management plan.    Cc: Iesha Feliciano M.D.

## 2017-10-18 NOTE — Clinical Note
Handout on carbo/taxol INformed consent  Cbc,cmp , mag today Chemo next week on 27th  Cbc,cmp prior to f/u  Coordinate f/u with next week

## 2017-10-25 DIAGNOSIS — C56.9 OVARIAN CANCER: Primary | ICD-10-CM

## 2017-10-25 RX ORDER — DEXAMETHASONE 4 MG/1
8 TABLET ORAL EVERY 12 HOURS
Qty: 40 TABLET | Refills: 0 | Status: SHIPPED | OUTPATIENT
Start: 2017-10-25 | End: 2017-11-04

## 2017-10-26 ENCOUNTER — TELEPHONE (OUTPATIENT)
Dept: INFUSION THERAPY | Facility: HOSPITAL | Age: 82
End: 2017-10-26

## 2017-10-26 RX ORDER — SODIUM CHLORIDE 0.9 % (FLUSH) 0.9 %
10 SYRINGE (ML) INJECTION
Status: CANCELLED | OUTPATIENT
Start: 2017-10-26

## 2017-10-26 RX ORDER — EPINEPHRINE 0.3 MG/.3ML
0.3 INJECTION SUBCUTANEOUS ONCE AS NEEDED
Status: CANCELLED | OUTPATIENT
Start: 2017-10-26 | End: 2017-10-26

## 2017-10-26 RX ORDER — DIPHENHYDRAMINE HYDROCHLORIDE 50 MG/ML
50 INJECTION INTRAMUSCULAR; INTRAVENOUS ONCE AS NEEDED
Status: CANCELLED | OUTPATIENT
Start: 2017-10-26 | End: 2017-10-26

## 2017-10-26 RX ORDER — FAMOTIDINE 10 MG/ML
20 INJECTION INTRAVENOUS
Status: CANCELLED | OUTPATIENT
Start: 2017-10-26

## 2017-10-26 RX ORDER — HEPARIN 100 UNIT/ML
500 SYRINGE INTRAVENOUS
Status: CANCELLED | OUTPATIENT
Start: 2017-10-26

## 2017-10-26 NOTE — TELEPHONE ENCOUNTER
Chemotherapy Education    Celine Mahan attended chemotherapy class with daughter.  [facility] cancer resource binder provided to patient as well as handouts on Taxol and Carboplatin.  Most common side effects and regimen reviewed.  Information provided for all support groups and additional relevant resources.  Also provided my contact information for any future questions or concerns.   Maximum support offered.  Schedule provided and reviewed.  Reminded to  Decadron today & start tonight.  MyRisk testing redone today.

## 2017-10-27 ENCOUNTER — INFUSION (OUTPATIENT)
Dept: INFUSION THERAPY | Facility: HOSPITAL | Age: 82
End: 2017-10-27
Attending: INTERNAL MEDICINE
Payer: MEDICARE

## 2017-10-27 VITALS — DIASTOLIC BLOOD PRESSURE: 62 MMHG | SYSTOLIC BLOOD PRESSURE: 131 MMHG | HEART RATE: 72 BPM | RESPIRATION RATE: 16 BRPM

## 2017-10-27 DIAGNOSIS — C56.9 MALIGNANT NEOPLASM OF OVARY, UNSPECIFIED LATERALITY: Primary | ICD-10-CM

## 2017-10-27 PROCEDURE — 96375 TX/PRO/DX INJ NEW DRUG ADDON: CPT

## 2017-10-27 PROCEDURE — 96417 CHEMO IV INFUS EACH ADDL SEQ: CPT

## 2017-10-27 PROCEDURE — 96415 CHEMO IV INFUSION ADDL HR: CPT

## 2017-10-27 PROCEDURE — 96413 CHEMO IV INFUSION 1 HR: CPT

## 2017-10-27 PROCEDURE — 63600175 PHARM REV CODE 636 W HCPCS: Performed by: INTERNAL MEDICINE

## 2017-10-27 PROCEDURE — S0028 INJECTION, FAMOTIDINE, 20 MG: HCPCS | Performed by: INTERNAL MEDICINE

## 2017-10-27 PROCEDURE — 25000003 PHARM REV CODE 250: Performed by: INTERNAL MEDICINE

## 2017-10-27 PROCEDURE — 96367 TX/PROPH/DG ADDL SEQ IV INF: CPT

## 2017-10-27 RX ORDER — FAMOTIDINE 10 MG/ML
20 INJECTION INTRAVENOUS
Status: COMPLETED | OUTPATIENT
Start: 2017-10-27 | End: 2017-10-27

## 2017-10-27 RX ORDER — FAMOTIDINE 10 MG/ML
20 INJECTION INTRAVENOUS
Status: DISCONTINUED | OUTPATIENT
Start: 2017-10-27 | End: 2017-10-27

## 2017-10-27 RX ADMIN — DIPHENHYDRAMINE HYDROCHLORIDE 50 MG: 50 INJECTION INTRAMUSCULAR; INTRAVENOUS at 11:10

## 2017-10-27 RX ADMIN — CARBOPLATIN 390 MG: 10 INJECTION, SOLUTION INTRAVENOUS at 03:10

## 2017-10-27 RX ADMIN — FAMOTIDINE 20 MG: 10 INJECTION, SOLUTION INTRAVENOUS at 11:10

## 2017-10-27 RX ADMIN — PACLITAXEL 330 MG: 6 INJECTION, SOLUTION, CONCENTRATE INTRAVENOUS at 12:10

## 2017-10-27 RX ADMIN — PALONOSETRON HYDROCHLORIDE: 0.25 INJECTION INTRAVENOUS at 11:10

## 2017-10-29 NOTE — PROGRESS NOTES
"Subjective:      Patient ID: Celine Mahan is a 82 y.o. female.    Chief Complaint: Follow-up      HPI  Patient is an 83yo female who originally presented as a referral from Dr. Wills for pelvic mass. Patient reports LLQ pain for approximately 3 months which prompted evaluation. Available imaging reviewed.     Pelvic US 8/7/17 There is a complex echogenicity midline pelvic mass measuring 18.1 x 8.6 x 7.6 cm.  Ovaries not visualized.     CT A&P 8/9/17 Large solid/cystic mass within the lower abdomen/upper pelvis that is most concerning for a malignant neoplasm, likely of ovarian origin given its location.  Correlation with previous surgical history is recommended noting evidence of previous hysterectomy.  Mass abuts and displaces the adjacent bladder without definite CT findings to suggest hema invasion. Abdominal and pelvic lymphadenopathy concerning for lymphatic spread of neoplasm with index lymph nodes as above.      Medical history is significant for CKD (Cr 1.3), HTN, hypothyroid, HTN, HLD. She has a personal history of breast cancer in 1995 treated with mastectomy and adjuvant chemotherapy she reports. Last MMG 11/2016 normal. Adbominal surgery include cholecystectomy, TVH, xlap/removal ovary for ectopic. She reports that her physician told her "some ovary remains in situ". Family history significant for mother with pancreatic cancer.      CT chest 8/28/17 showed mediastinal adenopathy consistent with Stage IV disease.   RECIST Summary:  Mediastinal adenopathy:  1. Preaortic abnormal node measuring 1.7 cm short axis.  2. Pretracheal node measures 1.6 cm short axis.     She underwent surgical cytoreduction with xlap/LSO/omentectomy 9/21/17. Pathology showed high grade adenocarcinoma.      She presents today for final post op visit. Continues to recover well from surgery. I have recommended adjuvant platinum/txane based chemotherapy, she will be getting this on the [facility] [place]/ Dr. Reinoso since this is " closer to her home and daughter.    Needs genetic testing.    Review of Systems   Constitutional: Negative for appetite change, chills, fatigue and fever.   HENT: Negative for mouth sores.    Respiratory: Negative for cough and shortness of breath.    Cardiovascular: Negative for leg swelling.   Gastrointestinal: Negative for abdominal pain, blood in stool, constipation and diarrhea.   Endocrine: Negative for cold intolerance.   Genitourinary: Negative for dysuria and vaginal bleeding.   Musculoskeletal: Negative for myalgias.   Skin: Negative for rash.   Allergic/Immunologic: Negative.    Neurological: Negative for weakness and numbness.   Hematological: Negative for adenopathy. Does not bruise/bleed easily.   Psychiatric/Behavioral: Negative for confusion.       Objective:   Physical Exam:   Constitutional: She is oriented to person, place, and time. She appears well-developed and well-nourished.    HENT:   Head: Normocephalic and atraumatic.    Eyes: EOM are normal. Pupils are equal, round, and reactive to light.    Neck: Normal range of motion. Neck supple. No thyromegaly present.    Cardiovascular: Normal rate, regular rhythm and intact distal pulses.     Pulmonary/Chest: Effort normal and breath sounds normal. No respiratory distress. She has no wheezes.        Abdominal: Soft. Bowel sounds are normal. She exhibits abdominal incision. She exhibits no distension, no ascites and no mass. There is no tenderness.   Incision well healed             Musculoskeletal: Normal range of motion and moves all extremeties.      Lymphadenopathy:     She has no cervical adenopathy.        Right: No supraclavicular adenopathy present.        Left: No supraclavicular adenopathy present.    Neurological: She is alert and oriented to person, place, and time.    Skin: Skin is warm and dry. No rash noted.    Psychiatric: She has a normal mood and affect.       Assessment:     1. Malignant neoplasm of ovary, unspecified laterality     2. S/P exploratory laparotomy/LSO/omentectomy        Plan:   No orders of the defined types were placed in this encounter.    Normal final post op exam. To start adjuvant chemotherapy with Dr. Reinoso on the [facility] [place]. Genetic testing sent today. Will follow up with me at the completion of chemotherapy.

## 2017-10-31 DIAGNOSIS — R11.0 CHEMOTHERAPY-INDUCED NAUSEA: Primary | ICD-10-CM

## 2017-10-31 DIAGNOSIS — T45.1X5A CHEMOTHERAPY-INDUCED NAUSEA: Primary | ICD-10-CM

## 2017-10-31 RX ORDER — ONDANSETRON HYDROCHLORIDE 8 MG/1
8 TABLET, FILM COATED ORAL EVERY 8 HOURS PRN
Qty: 30 TABLET | Refills: 2 | Status: SHIPPED | OUTPATIENT
Start: 2017-10-31 | End: 2018-01-19 | Stop reason: SDUPTHER

## 2017-11-08 ENCOUNTER — TELEPHONE (OUTPATIENT)
Dept: HEMATOLOGY/ONCOLOGY | Facility: CLINIC | Age: 82
End: 2017-11-08

## 2017-11-08 NOTE — TELEPHONE ENCOUNTER
----- Message from Sade Hussein sent at 11/8/2017  1:05 PM CST -----  Contact: Stacia Mahan (Daughter)  X   1st Request  _  2nd Request  _  3rd Request        Who: Stacia Mahan (Daughter)    Why: Requesting a call back in regards to specimen that was collected on 10/26 for genetic testing.  states they have not received the specimen.    What Number to Call Back: [phone]    When to Expect a call back: (Within 24 hours)    Please return the call at earliest convenience. Thanks!

## 2017-11-08 NOTE — TELEPHONE ENCOUNTER
Called pt's daughter & explained that myRisk was sent to [facility], but because pt has a history of both breast & ovarian ca, a different kit needs to be used.  They are mailing me the test & I will draw next week at office visit.  Daughter voiced understanding.

## 2017-11-15 ENCOUNTER — LAB VISIT (OUTPATIENT)
Dept: LAB | Facility: HOSPITAL | Age: 82
End: 2017-11-15
Attending: INTERNAL MEDICINE
Payer: MEDICARE

## 2017-11-15 DIAGNOSIS — C56.9 MALIGNANT NEOPLASM OF OVARY, UNSPECIFIED LATERALITY: ICD-10-CM

## 2017-11-15 LAB
ALBUMIN SERPL BCP-MCNC: 3.5 G/DL
ALP SERPL-CCNC: 76 U/L
ALT SERPL W/O P-5'-P-CCNC: 17 U/L
ANION GAP SERPL CALC-SCNC: 10 MMOL/L
AST SERPL-CCNC: 21 U/L
BASOPHILS # BLD AUTO: 0.05 K/UL
BASOPHILS NFR BLD: 1.1 %
BILIRUB SERPL-MCNC: 0.3 MG/DL
BUN SERPL-MCNC: 20 MG/DL
CALCIUM SERPL-MCNC: 9.4 MG/DL
CHLORIDE SERPL-SCNC: 107 MMOL/L
CO2 SERPL-SCNC: 22 MMOL/L
CREAT SERPL-MCNC: 1.4 MG/DL
DIFFERENTIAL METHOD: ABNORMAL
EOSINOPHIL # BLD AUTO: 0.3 K/UL
EOSINOPHIL NFR BLD: 6.5 %
ERYTHROCYTE [DISTWIDTH] IN BLOOD BY AUTOMATED COUNT: 15.6 %
EST. GFR  (AFRICAN AMERICAN): 40 ML/MIN/1.73 M^2
EST. GFR  (NON AFRICAN AMERICAN): 35 ML/MIN/1.73 M^2
GLUCOSE SERPL-MCNC: 105 MG/DL
HCT VFR BLD AUTO: 39 %
HGB BLD-MCNC: 12.9 G/DL
LYMPHOCYTES # BLD AUTO: 1.3 K/UL
LYMPHOCYTES NFR BLD: 28.5 %
MCH RBC QN AUTO: 28.5 PG
MCHC RBC AUTO-ENTMCNC: 33.1 G/DL
MCV RBC AUTO: 86 FL
MONOCYTES # BLD AUTO: 0.4 K/UL
MONOCYTES NFR BLD: 9.1 %
NEUTROPHILS # BLD AUTO: 2.5 K/UL
NEUTROPHILS NFR BLD: 56.1 %
PLATELET # BLD AUTO: ABNORMAL K/UL
PMV BLD AUTO: ABNORMAL FL
POTASSIUM SERPL-SCNC: 4.9 MMOL/L
PROT SERPL-MCNC: 7.3 G/DL
RBC # BLD AUTO: 4.53 M/UL
SODIUM SERPL-SCNC: 139 MMOL/L
WBC # BLD AUTO: 4.6 K/UL

## 2017-11-15 PROCEDURE — 80053 COMPREHEN METABOLIC PANEL: CPT

## 2017-11-15 PROCEDURE — 36415 COLL VENOUS BLD VENIPUNCTURE: CPT

## 2017-11-15 PROCEDURE — 85025 COMPLETE CBC W/AUTO DIFF WBC: CPT

## 2017-11-17 ENCOUNTER — OFFICE VISIT (OUTPATIENT)
Dept: HEMATOLOGY/ONCOLOGY | Facility: CLINIC | Age: 82
End: 2017-11-17
Payer: MEDICARE

## 2017-11-17 ENCOUNTER — INFUSION (OUTPATIENT)
Dept: INFUSION THERAPY | Facility: HOSPITAL | Age: 82
End: 2017-11-17
Attending: INTERNAL MEDICINE
Payer: MEDICARE

## 2017-11-17 VITALS
DIASTOLIC BLOOD PRESSURE: 67 MMHG | TEMPERATURE: 98 F | OXYGEN SATURATION: 97 % | HEIGHT: 63 IN | BODY MASS INDEX: 31.23 KG/M2 | HEART RATE: 67 BPM | WEIGHT: 176.25 LBS | SYSTOLIC BLOOD PRESSURE: 136 MMHG

## 2017-11-17 DIAGNOSIS — C56.9 MALIGNANT NEOPLASM OF OVARY, UNSPECIFIED LATERALITY: Primary | ICD-10-CM

## 2017-11-17 DIAGNOSIS — R11.0 CHEMOTHERAPY-INDUCED NAUSEA: ICD-10-CM

## 2017-11-17 DIAGNOSIS — Z51.11 ENCOUNTER FOR CHEMOTHERAPY MANAGEMENT: ICD-10-CM

## 2017-11-17 DIAGNOSIS — T45.1X5A CHEMOTHERAPY-INDUCED NAUSEA: ICD-10-CM

## 2017-11-17 PROCEDURE — 99499 UNLISTED E&M SERVICE: CPT | Mod: S$GLB,,, | Performed by: INTERNAL MEDICINE

## 2017-11-17 PROCEDURE — 63600175 PHARM REV CODE 636 W HCPCS: Performed by: INTERNAL MEDICINE

## 2017-11-17 PROCEDURE — 96413 CHEMO IV INFUSION 1 HR: CPT

## 2017-11-17 PROCEDURE — 96375 TX/PRO/DX INJ NEW DRUG ADDON: CPT

## 2017-11-17 PROCEDURE — 96417 CHEMO IV INFUS EACH ADDL SEQ: CPT

## 2017-11-17 PROCEDURE — S0028 INJECTION, FAMOTIDINE, 20 MG: HCPCS | Performed by: INTERNAL MEDICINE

## 2017-11-17 PROCEDURE — 96367 TX/PROPH/DG ADDL SEQ IV INF: CPT

## 2017-11-17 PROCEDURE — 99214 OFFICE O/P EST MOD 30 MIN: CPT | Mod: S$GLB,,, | Performed by: INTERNAL MEDICINE

## 2017-11-17 PROCEDURE — 96415 CHEMO IV INFUSION ADDL HR: CPT

## 2017-11-17 PROCEDURE — 99999 PR PBB SHADOW E&M-EST. PATIENT-LVL III: CPT | Mod: PBBFAC,,, | Performed by: INTERNAL MEDICINE

## 2017-11-17 PROCEDURE — 25000003 PHARM REV CODE 250: Performed by: INTERNAL MEDICINE

## 2017-11-17 RX ORDER — FAMOTIDINE 10 MG/ML
20 INJECTION INTRAVENOUS
Status: COMPLETED | OUTPATIENT
Start: 2017-11-17 | End: 2017-11-17

## 2017-11-17 RX ORDER — HEPARIN 100 UNIT/ML
500 SYRINGE INTRAVENOUS
Status: CANCELLED | OUTPATIENT
Start: 2017-11-17

## 2017-11-17 RX ORDER — EPINEPHRINE 0.3 MG/.3ML
0.3 INJECTION SUBCUTANEOUS ONCE AS NEEDED
Status: CANCELLED | OUTPATIENT
Start: 2017-11-17 | End: 2017-11-17

## 2017-11-17 RX ORDER — FAMOTIDINE 10 MG/ML
20 INJECTION INTRAVENOUS
Status: CANCELLED | OUTPATIENT
Start: 2017-11-17

## 2017-11-17 RX ORDER — DIPHENHYDRAMINE HYDROCHLORIDE 50 MG/ML
50 INJECTION INTRAMUSCULAR; INTRAVENOUS ONCE AS NEEDED
Status: CANCELLED | OUTPATIENT
Start: 2017-11-17 | End: 2017-11-17

## 2017-11-17 RX ORDER — SODIUM CHLORIDE 0.9 % (FLUSH) 0.9 %
10 SYRINGE (ML) INJECTION
Status: CANCELLED | OUTPATIENT
Start: 2017-11-17

## 2017-11-17 RX ADMIN — CARBOPLATIN 390 MG: 10 INJECTION, SOLUTION INTRAVENOUS at 02:11

## 2017-11-17 RX ADMIN — FAMOTIDINE 20 MG: 10 INJECTION, SOLUTION INTRAVENOUS at 11:11

## 2017-11-17 RX ADMIN — DIPHENHYDRAMINE HYDROCHLORIDE 50 MG: 50 INJECTION INTRAMUSCULAR; INTRAVENOUS at 10:11

## 2017-11-17 RX ADMIN — DEXAMETHASONE SODIUM PHOSPHATE: 10 INJECTION, SOLUTION INTRAMUSCULAR; INTRAVENOUS at 10:11

## 2017-11-17 RX ADMIN — PACLITAXEL 330 MG: 6 INJECTION, SOLUTION INTRAVENOUS at 11:11

## 2017-11-17 NOTE — PROGRESS NOTES
"Subjective:       Patient ID: Celine Mahan is a 82 y.o. female.    Chief Complaint: Follow-up    HPI     Pt transferring care    HPI ( per Dr. Feliciano) Patient is an 83yo female who originally presented as a referral from Dr. Wills for pelvic mass. Patient reports LLQ pain for approximately 3 months which prompted evaluation.      Pelvic US 8/7/17 There is a complex echogenicity midline pelvic mass measuring 18.1 x 8.6 x 7.6 cm.  Ovaries not visualized.     CT A&P 8/9/17 Large solid/cystic mass within the lower abdomen/upper pelvis that is most concerning for a malignant neoplasm, likely of ovarian origin given its location.  Correlation with previous surgical history is recommended noting evidence of previous hysterectomy.  Mass abuts and displaces the adjacent bladder without definite CT findings to suggest hema invasion. Abdominal and pelvic lymphadenopathy concerning for lymphatic spread of neoplasm with index lymph nodes as above.      Medical history is significant for CKD (Cr 1.3), HTN, hypothyroid, HTN, HLD. She has a personal history of breast cancer in 1995 treated with mastectomy and adjuvant chemotherapy she reports. Last MMG 11/2016 normal. Adbominal surgery include cholecystectomy, TVH, xlap/removal ovary for ectopic. She reports that her physician told her "some ovary remains in situ". Family history significant for mother with pancreatic cancer.      CT chest 8/28/17 showed mediastinal adenopathy consistent with Stage IV disease.   RECIST Summary:  Mediastinal adenopathy:  1. Preaortic abnormal node measuring 1.7 cm short axis.  2. Pretracheal node measures 1.6 cm short axis.     She underwent surgical cytoreduction with xlap/LSO/omentectomy 9/21/17. Pathology showed high grade adenocarcinoma.        She is followed by Dr. Feliciano  She has transferred care to undergo  adjuvant chemo at this location     S/p cycle 1 chemo completed 10/26/2017    Accompanied by dtr   She reports mild fatigue  Mild " "nausea w/out vomiting- relieved with prescribed antiemetic regimen  Appetite and weight stable  No SOB/CP    No bleeding- nasal/urinary /rectal      Past Medical History:   Diagnosis Date    Breast cancer     Cataract     CKD (chronic kidney disease) stage 3, GFR 30-59 ml/min     Hyperlipidemia     Hypertension     Hypothyroidism     Obesity     Osteopenia     Ovarian cancer 10/8/2017    Overactive bladder     Pelvic mass 8/27/2017    Thyroid disease          Past Surgical History:   Procedure Laterality Date    bt catarac surgery      CHOLECYSTECTOMY      HYSTERECTOMY      MASTECTOMY Left              Review of Systems   Constitutional: Positive for fatigue. Negative for appetite change, fever and unexpected weight change.   HENT: Negative for mouth sores.    Eyes: Negative for visual disturbance.   Respiratory: Negative for cough and shortness of breath.    Cardiovascular: Negative for chest pain.   Gastrointestinal: Negative for abdominal pain and diarrhea.   Genitourinary: Negative for frequency.   Musculoskeletal: Negative for back pain.   Skin: Negative for rash.   Neurological: Negative for headaches.   Hematological: Negative for adenopathy.   Psychiatric/Behavioral: The patient is not nervous/anxious.        Objective:       Vitals:    11/17/17 0848   BP: 136/67   BP Location: Right arm   Patient Position: Sitting   BP Method: Medium (Automatic)   Pulse: 67   Temp: 97.9 °F (36.6 °C)   TempSrc: Oral   SpO2: 97%   Weight: 79.9 kg (176 lb 4.1 oz)   Height: 5' 3" (1.6 m)       Physical Exam   Constitutional: She is oriented to person, place, and time. She appears well-developed and well-nourished.   HENT:   Head: Normocephalic.   Mouth/Throat: Oropharynx is clear and moist. No oropharyngeal exudate.   Eyes: Conjunctivae and lids are normal. Pupils are equal, round, and reactive to light. No scleral icterus.   Neck: Normal range of motion. Neck supple. No thyromegaly present.   Cardiovascular: " Normal rate, regular rhythm and normal heart sounds.    No murmur heard.  Pulmonary/Chest: Breath sounds normal. She has no wheezes. She has no rales.   Abdominal: Soft. Bowel sounds are normal. She exhibits no distension. There is no hepatosplenomegaly. There is no tenderness.   Well-healed midline incision   Musculoskeletal: Normal range of motion. She exhibits no edema or tenderness.   Lymphadenopathy:     She has no cervical adenopathy.     She has no axillary adenopathy.        Right: No supraclavicular adenopathy present.        Left: No supraclavicular adenopathy present.   Neurological: She is alert and oriented to person, place, and time. No cranial nerve deficit. Coordination normal.   Skin: Skin is warm and dry. No ecchymosis, no petechiae and no rash noted. No erythema.   Psychiatric: She has a normal mood and affect.         Labs:   Lab Results   Component Value Date    WBC 4.60 11/15/2017    HGB 12.9 11/15/2017    HCT 39.0 11/15/2017    MCV 86 11/15/2017    PLT SEE COMMENT 11/15/2017     Component      Latest Ref Rng & Units 10/18/2017 8/25/2017         0 - 30 U/mL 230 (H) 849 (H)     Assessment:       1. Malignant neoplasm of ovary, unspecified laterality    2. Encounter for chemotherapy management    3. Chemotherapy-induced nausea        Plan:   Pt clinically stable   Patient with ovarian cancer status post debulking surgery 9/21/2017 undergoing adjuvant platinum-based chemotherapy with carbo/taxol .   Genetic testing planned  per Dr. Feliciano.   Proceed with Chemo today cycle 2 ( pending lab parameters)   Labs reviewed  Delay cycle 3 chemo to 11/12 ( per pt request)  Cont with prescribed antiemetic regimen      Cbc,cmp , MAG prior to f/u on 12/12/2017      Cc: Iesha Feliciano M.D.

## 2017-11-17 NOTE — Clinical Note
Chemo today Next chemo on 11/12 ( per pt request) Cbc,cmp , MAG prior to f/u Cooridnate f/u with next chemo

## 2017-11-17 NOTE — PROGRESS NOTES
Patient, Celine Mahan (MRN #[id number]), presented with a recent Platelet count less than 150 K/uL consistent with the definition of thrombocytopenia (ICD10 - D69.6).    Platelets   Date Value Ref Range Status   11/15/2017 SEE COMMENT 150 - 350 K/uL Final     Comment:     Unable to report platelet count due to clumping.     The patient's thrombocytopenia was monitored, evaluated, addressed and/or treated. This addendum to the medical record is made on 11/17/2017.

## 2017-11-17 NOTE — PLAN OF CARE
Problem: Patient Care Overview  Goal: Plan of Care Review  Outcome: Ongoing (interventions implemented as appropriate)  Discussed chemo and plan of care with daughter. Both verbalized understanding.

## 2017-11-24 ENCOUNTER — OFFICE VISIT (OUTPATIENT)
Dept: FAMILY MEDICINE | Facility: CLINIC | Age: 82
End: 2017-11-24
Payer: MEDICARE

## 2017-11-24 VITALS
DIASTOLIC BLOOD PRESSURE: 78 MMHG | BODY MASS INDEX: 30.46 KG/M2 | HEIGHT: 63 IN | SYSTOLIC BLOOD PRESSURE: 120 MMHG | HEART RATE: 68 BPM | WEIGHT: 171.94 LBS

## 2017-11-24 DIAGNOSIS — R63.0 LOSS OF APPETITE: ICD-10-CM

## 2017-11-24 DIAGNOSIS — R11.2 INTRACTABLE VOMITING WITH NAUSEA, UNSPECIFIED VOMITING TYPE: ICD-10-CM

## 2017-11-24 DIAGNOSIS — I10 ESSENTIAL HYPERTENSION: Primary | ICD-10-CM

## 2017-11-24 DIAGNOSIS — N18.30 CKD (CHRONIC KIDNEY DISEASE) STAGE 3, GFR 30-59 ML/MIN: ICD-10-CM

## 2017-11-24 PROCEDURE — 99214 OFFICE O/P EST MOD 30 MIN: CPT | Mod: S$GLB,,, | Performed by: FAMILY MEDICINE

## 2017-11-24 PROCEDURE — 99999 PR PBB SHADOW E&M-EST. PATIENT-LVL III: CPT | Mod: PBBFAC,,, | Performed by: FAMILY MEDICINE

## 2017-11-24 PROCEDURE — 99499 UNLISTED E&M SERVICE: CPT | Mod: S$GLB,,, | Performed by: FAMILY MEDICINE

## 2017-11-24 RX ORDER — MIRTAZAPINE 15 MG/1
15 TABLET, FILM COATED ORAL NIGHTLY
Qty: 30 TABLET | Refills: 11 | Status: SHIPPED | OUTPATIENT
Start: 2017-11-24 | End: 2018-02-23

## 2017-11-24 NOTE — PROGRESS NOTES
Subjective:       Patient ID: Celine Mahan is a 82 y.o. female.    Chief Complaint: Annual Exam    82 years old female came to the clinic who came to the clinic with loss of appetite associated with nausea and vomiting sometimes.  Patient currently with chemotherapy for ovarian cancer.  Patient requests a medicine to help her with the appetite.  Patient with decreased kidney function but stable in comparison with prior reports.      Review of Systems   Constitutional: Positive for fatigue.   HENT: Negative.    Eyes: Negative.    Respiratory: Negative.    Cardiovascular: Negative.    Gastrointestinal: Positive for nausea and vomiting.   Genitourinary: Negative.    Musculoskeletal: Negative.    Skin: Negative.    Neurological: Negative.    Psychiatric/Behavioral: Negative.        Objective:      Physical Exam   Constitutional: She is oriented to person, place, and time. She appears well-developed and well-nourished. No distress.   HENT:   Head: Normocephalic and atraumatic.   Right Ear: External ear normal.   Left Ear: External ear normal.   Nose: Nose normal.   Mouth/Throat: Oropharynx is clear and moist. No oropharyngeal exudate.   Eyes: Conjunctivae and EOM are normal. Pupils are equal, round, and reactive to light. Right eye exhibits no discharge. Left eye exhibits no discharge. No scleral icterus.   Neck: Normal range of motion. Neck supple. No JVD present. No tracheal deviation present. No thyromegaly present.   Cardiovascular: Normal rate, regular rhythm, normal heart sounds and intact distal pulses.  Exam reveals no gallop and no friction rub.    No murmur heard.  Pulmonary/Chest: Effort normal and breath sounds normal. No stridor. No respiratory distress. She has no wheezes. She has no rales. She exhibits no tenderness.   Abdominal: Soft. Bowel sounds are normal. She exhibits no distension and no mass. There is no tenderness. There is no rebound and no guarding.   Musculoskeletal: Normal range of motion.  She exhibits no edema or tenderness.   Lymphadenopathy:     She has no cervical adenopathy.   Neurological: She is alert and oriented to person, place, and time. She has normal reflexes. No cranial nerve deficit. She exhibits normal muscle tone. Coordination normal.   Skin: Skin is warm and dry. No rash noted. She is not diaphoretic. No erythema. No pallor.   Psychiatric: She has a normal mood and affect. Her behavior is normal. Judgment and thought content normal.       Assessment:       1. Essential hypertension    2. Intractable vomiting with nausea, unspecified vomiting type    3. CKD (chronic kidney disease) stage 3, GFR 30-59 ml/min    4. Loss of appetite        Plan:         Celine was seen today for annual exam.    Diagnoses and all orders for this visit:    Essential hypertension    Intractable vomiting with nausea, unspecified vomiting type    CKD (chronic kidney disease) stage 3, GFR 30-59 ml/min    Loss of appetite  -     mirtazapine (REMERON) 15 MG tablet; Take 1 tablet (15 mg total) by mouth every evening.    Continue monitoring blood pressure at home, low sodium diet.  Decrease kidney function.  Ibuprofen was discontinued.  Continue only with tramadol as needed for the pain.  Continue with Compazine and Zofran.

## 2017-11-27 ENCOUNTER — TELEPHONE (OUTPATIENT)
Dept: GYNECOLOGIC ONCOLOGY | Facility: CLINIC | Age: 82
End: 2017-11-27

## 2017-11-27 NOTE — TELEPHONE ENCOUNTER
Called Alex back in reference to a sample for Mrs Mahan MRN# [id number] left a message via voice mail for her to call me back at her earliest convenience,  MA/LPN

## 2017-12-05 ENCOUNTER — LAB VISIT (OUTPATIENT)
Dept: LAB | Facility: HOSPITAL | Age: 82
End: 2017-12-05
Attending: INTERNAL MEDICINE
Payer: MEDICARE

## 2017-12-05 DIAGNOSIS — C56.9 MALIGNANT NEOPLASM OF OVARY, UNSPECIFIED LATERALITY: ICD-10-CM

## 2017-12-05 LAB
ALBUMIN SERPL BCP-MCNC: 3.5 G/DL
ALP SERPL-CCNC: 85 U/L
ALT SERPL W/O P-5'-P-CCNC: 16 U/L
ANION GAP SERPL CALC-SCNC: 6 MMOL/L
AST SERPL-CCNC: 18 U/L
BASOPHILS # BLD AUTO: 0.04 K/UL
BASOPHILS NFR BLD: 1.1 %
BILIRUB SERPL-MCNC: 0.3 MG/DL
BUN SERPL-MCNC: 22 MG/DL
CALCIUM SERPL-MCNC: 9.4 MG/DL
CANCER AG125 SERPL-ACNC: 37 U/ML
CHLORIDE SERPL-SCNC: 108 MMOL/L
CO2 SERPL-SCNC: 25 MMOL/L
CREAT SERPL-MCNC: 1.3 MG/DL
DIFFERENTIAL METHOD: ABNORMAL
EOSINOPHIL # BLD AUTO: 0.2 K/UL
EOSINOPHIL NFR BLD: 5.9 %
ERYTHROCYTE [DISTWIDTH] IN BLOOD BY AUTOMATED COUNT: 16.7 %
EST. GFR  (AFRICAN AMERICAN): 44 ML/MIN/1.73 M^2
EST. GFR  (NON AFRICAN AMERICAN): 38 ML/MIN/1.73 M^2
GLUCOSE SERPL-MCNC: 89 MG/DL
HCT VFR BLD AUTO: 34.6 %
HGB BLD-MCNC: 11.6 G/DL
LYMPHOCYTES # BLD AUTO: 1.6 K/UL
LYMPHOCYTES NFR BLD: 41.9 %
MCH RBC QN AUTO: 29.2 PG
MCHC RBC AUTO-ENTMCNC: 33.5 G/DL
MCV RBC AUTO: 87 FL
MONOCYTES # BLD AUTO: 0.4 K/UL
MONOCYTES NFR BLD: 9.4 %
NEUTROPHILS # BLD AUTO: 1.5 K/UL
NEUTROPHILS NFR BLD: 40.9 %
PLATELET # BLD AUTO: 183 K/UL
PMV BLD AUTO: 9.1 FL
POTASSIUM SERPL-SCNC: 5 MMOL/L
PROT SERPL-MCNC: 7.2 G/DL
RBC # BLD AUTO: 3.97 M/UL
SODIUM SERPL-SCNC: 139 MMOL/L
WBC # BLD AUTO: 3.72 K/UL

## 2017-12-05 PROCEDURE — 80053 COMPREHEN METABOLIC PANEL: CPT

## 2017-12-05 PROCEDURE — 85025 COMPLETE CBC W/AUTO DIFF WBC: CPT

## 2017-12-05 PROCEDURE — 86304 IMMUNOASSAY TUMOR CA 125: CPT

## 2017-12-05 PROCEDURE — 36415 COLL VENOUS BLD VENIPUNCTURE: CPT

## 2017-12-07 RX ORDER — FAMOTIDINE 10 MG/ML
20 INJECTION INTRAVENOUS
Status: CANCELLED | OUTPATIENT
Start: 2017-12-08

## 2017-12-07 RX ORDER — DIPHENHYDRAMINE HYDROCHLORIDE 50 MG/ML
50 INJECTION INTRAMUSCULAR; INTRAVENOUS ONCE AS NEEDED
Status: CANCELLED | OUTPATIENT
Start: 2017-12-08 | End: 2017-12-07

## 2017-12-07 RX ORDER — EPINEPHRINE 0.3 MG/.3ML
0.3 INJECTION SUBCUTANEOUS ONCE AS NEEDED
Status: CANCELLED | OUTPATIENT
Start: 2017-12-08 | End: 2017-12-07

## 2017-12-07 RX ORDER — SODIUM CHLORIDE 0.9 % (FLUSH) 0.9 %
10 SYRINGE (ML) INJECTION
Status: CANCELLED | OUTPATIENT
Start: 2017-12-08

## 2017-12-07 RX ORDER — HEPARIN 100 UNIT/ML
500 SYRINGE INTRAVENOUS
Status: CANCELLED | OUTPATIENT
Start: 2017-12-08

## 2017-12-08 ENCOUNTER — INFUSION (OUTPATIENT)
Dept: INFUSION THERAPY | Facility: HOSPITAL | Age: 82
End: 2017-12-08
Attending: INTERNAL MEDICINE
Payer: MEDICARE

## 2017-12-08 ENCOUNTER — OFFICE VISIT (OUTPATIENT)
Dept: HEMATOLOGY/ONCOLOGY | Facility: CLINIC | Age: 82
End: 2017-12-08
Payer: MEDICARE

## 2017-12-08 VITALS
SYSTOLIC BLOOD PRESSURE: 148 MMHG | BODY MASS INDEX: 31.56 KG/M2 | OXYGEN SATURATION: 96 % | HEART RATE: 86 BPM | HEIGHT: 63 IN | DIASTOLIC BLOOD PRESSURE: 75 MMHG | TEMPERATURE: 98 F | WEIGHT: 178.13 LBS

## 2017-12-08 DIAGNOSIS — D64.81 ANEMIA DUE TO CHEMOTHERAPY: ICD-10-CM

## 2017-12-08 DIAGNOSIS — T45.1X5A ANEMIA DUE TO CHEMOTHERAPY: ICD-10-CM

## 2017-12-08 DIAGNOSIS — Z98.890 S/P EXPLORATORY LAPAROTOMY: ICD-10-CM

## 2017-12-08 DIAGNOSIS — Z51.11 ENCOUNTER FOR CHEMOTHERAPY MANAGEMENT: ICD-10-CM

## 2017-12-08 DIAGNOSIS — C56.9 MALIGNANT NEOPLASM OF OVARY, UNSPECIFIED LATERALITY: Primary | ICD-10-CM

## 2017-12-08 PROCEDURE — 63600175 PHARM REV CODE 636 W HCPCS: Performed by: INTERNAL MEDICINE

## 2017-12-08 PROCEDURE — 96415 CHEMO IV INFUSION ADDL HR: CPT

## 2017-12-08 PROCEDURE — 96413 CHEMO IV INFUSION 1 HR: CPT

## 2017-12-08 PROCEDURE — 99999 PR PBB SHADOW E&M-EST. PATIENT-LVL IV: CPT | Mod: PBBFAC,,, | Performed by: INTERNAL MEDICINE

## 2017-12-08 PROCEDURE — 96375 TX/PRO/DX INJ NEW DRUG ADDON: CPT

## 2017-12-08 PROCEDURE — 99499 UNLISTED E&M SERVICE: CPT | Mod: S$GLB,,, | Performed by: INTERNAL MEDICINE

## 2017-12-08 PROCEDURE — 96367 TX/PROPH/DG ADDL SEQ IV INF: CPT

## 2017-12-08 PROCEDURE — 96417 CHEMO IV INFUS EACH ADDL SEQ: CPT

## 2017-12-08 PROCEDURE — S0028 INJECTION, FAMOTIDINE, 20 MG: HCPCS | Performed by: INTERNAL MEDICINE

## 2017-12-08 PROCEDURE — 99214 OFFICE O/P EST MOD 30 MIN: CPT | Mod: S$GLB,,, | Performed by: INTERNAL MEDICINE

## 2017-12-08 PROCEDURE — 25000003 PHARM REV CODE 250: Performed by: INTERNAL MEDICINE

## 2017-12-08 RX ORDER — FAMOTIDINE 10 MG/ML
20 INJECTION INTRAVENOUS
Status: COMPLETED | OUTPATIENT
Start: 2017-12-08 | End: 2017-12-08

## 2017-12-08 RX ORDER — DUREZOL 0.5 MG/ML
EMULSION OPHTHALMIC
Refills: 1 | COMMUNITY
Start: 2017-11-22 | End: 2017-12-28

## 2017-12-08 RX ADMIN — PALONOSETRON HYDROCHLORIDE: 0.25 INJECTION INTRAVENOUS at 10:12

## 2017-12-08 RX ADMIN — CARBOPLATIN 405 MG: 10 INJECTION, SOLUTION INTRAVENOUS at 01:12

## 2017-12-08 RX ADMIN — PACLITAXEL 330 MG: 6 INJECTION, SOLUTION, CONCENTRATE INTRAVENOUS at 10:12

## 2017-12-08 RX ADMIN — DIPHENHYDRAMINE HYDROCHLORIDE 50 MG: 50 INJECTION INTRAMUSCULAR; INTRAVENOUS at 09:12

## 2017-12-08 RX ADMIN — FAMOTIDINE 20 MG: 10 INJECTION INTRAVENOUS at 10:12

## 2017-12-08 NOTE — PROGRESS NOTES
"Subjective:       Patient ID: Celine Mahan is a 82 y.o. female.    Chief Complaint: Follow-up (pre-chemo)    HPI     Pt transferring care    HPI ( per Dr. Feliciano) Patient is an 81yo female who originally presented as a referral from Dr. Wills for pelvic mass. Patient reports LLQ pain for approximately 3 months which prompted evaluation.      Pelvic US 8/7/17 There is a complex echogenicity midline pelvic mass measuring 18.1 x 8.6 x 7.6 cm.  Ovaries not visualized.     CT A&P 8/9/17 Large solid/cystic mass within the lower abdomen/upper pelvis that is most concerning for a malignant neoplasm, likely of ovarian origin given its location.  Correlation with previous surgical history is recommended noting evidence of previous hysterectomy.  Mass abuts and displaces the adjacent bladder without definite CT findings to suggest hema invasion. Abdominal and pelvic lymphadenopathy concerning for lymphatic spread of neoplasm with index lymph nodes as above.      Medical history is significant for CKD (Cr 1.3), HTN, hypothyroid, HTN, HLD. She has a personal history of breast cancer in 1995 treated with mastectomy and adjuvant chemotherapy she reports. Last MMG 11/2016 normal. Adbominal surgery include cholecystectomy, TVH, xlap/removal ovary for ectopic. She reports that her physician told her "some ovary remains in situ". Family history significant for mother with pancreatic cancer.      CT chest 8/28/17 showed mediastinal adenopathy consistent with Stage IV disease.   RECIST Summary:  Mediastinal adenopathy:  1. Preaortic abnormal node measuring 1.7 cm short axis.  2. Pretracheal node measures 1.6 cm short axis.     She underwent surgical cytoreduction with xlap/LSO/omentectomy 9/21/17. Pathology showed high grade adenocarcinoma.        She is followed by Dr. Feliciano  She has transferred care to undergo  adjuvant chemo at this location     S/p cycle 2  chemo completed 11/17/2017    Accompanied by dtr     She is doing " "well  Mild fatigue  No N/V  Appetite and weight stable  No SOB/CP  No fevers    No bleeding- nasal/urinary /rectal      [facility] ANALYSIS NEG    Past Medical History:   Diagnosis Date    Breast cancer     Cataract     CKD (chronic kidney disease) stage 3, GFR 30-59 ml/min     Hyperlipidemia     Hypertension     Hypothyroidism     Obesity     Osteopenia     Ovarian cancer 10/8/2017    Overactive bladder     Pelvic mass 8/27/2017    Thyroid disease          Past Surgical History:   Procedure Laterality Date    bt catarac surgery      CHOLECYSTECTOMY      HYSTERECTOMY      MASTECTOMY Left              Review of Systems   Constitutional: Positive for fatigue. Negative for appetite change, fever and unexpected weight change.   HENT: Negative for mouth sores.    Eyes: Negative for visual disturbance.   Respiratory: Negative for cough and shortness of breath.    Cardiovascular: Negative for chest pain.   Gastrointestinal: Negative for abdominal pain and diarrhea.   Genitourinary: Negative for frequency.   Musculoskeletal: Negative for back pain.   Skin: Negative for rash.   Neurological: Negative for headaches.   Hematological: Negative for adenopathy.   Psychiatric/Behavioral: The patient is not nervous/anxious.        Objective:       Vitals:    12/08/17 0910   BP: (!) 148/75   BP Location: Right arm   Patient Position: Sitting   BP Method: Medium (Automatic)   Pulse: 86   Temp: 97.6 °F (36.4 °C)   TempSrc: Oral   SpO2: 96%   Weight: 80.8 kg (178 lb 2.1 oz)   Height: 5' 3" (1.6 m)       Physical Exam   Constitutional: She is oriented to person, place, and time. She appears well-developed and well-nourished.   HENT:   Head: Normocephalic.   Mouth/Throat: Oropharynx is clear and moist. No oropharyngeal exudate.   Eyes: Conjunctivae and lids are normal. Pupils are equal, round, and reactive to light. No scleral icterus.   Neck: Normal range of motion. Neck supple. No thyromegaly present.   Cardiovascular: Normal " rate, regular rhythm and normal heart sounds.    No murmur heard.  Pulmonary/Chest: Breath sounds normal. She has no wheezes. She has no rales.   Abdominal: Soft. Bowel sounds are normal. She exhibits no distension. There is no hepatosplenomegaly. There is no tenderness.   Well-healed midline incision   Musculoskeletal: Normal range of motion. She exhibits no edema or tenderness.   Lymphadenopathy:     She has no cervical adenopathy.     She has no axillary adenopathy.        Right: No supraclavicular adenopathy present.        Left: No supraclavicular adenopathy present.   Neurological: She is alert and oriented to person, place, and time. No cranial nerve deficit. Coordination normal.   Skin: Skin is warm and dry. No ecchymosis, no petechiae and no rash noted. No erythema.   Psychiatric: She has a normal mood and affect.         Labs:   Lab Results   Component Value Date    WBC 3.72 (L) 12/05/2017    HGB 11.6 (L) 12/05/2017    HCT 34.6 (L) 12/05/2017    MCV 87 12/05/2017     12/05/2017     Component      Latest Ref Rng & Units 10/18/2017 8/25/2017         0 - 30 U/mL 230 (H) 849 (H)       Results for JONATAN SERRANO (MRN [id number]) as of 12/8/2017 10:41   Ref. Range 10/18/2017 11:03 12/5/2017 13:12    Latest Ref Range: 0 - 30 U/mL 230 (H) 37 (H)     Assessment:       1. Malignant neoplasm of ovary, unspecified laterality    2. S/P exploratory laparotomy/LSO/omentectomy    3. Encounter for chemotherapy management    4. Anemia due to chemotherapy        Plan:   Pt clinically stable   Patient with ovarian cancer status post debulking surgery 9/21/2017 undergoing adjuvant platinum-based chemotherapy with carbo/taxol .   [facility] ANALYSIS NEG  Proceed with Chemo today cycle 3   Hb 11.6g/dl - monitor      Cbc,cmp , MAG prior to f/u on 12/28/2017      Cc: Iesha Feliciano M.D.

## 2017-12-18 RX ORDER — LEVOTHYROXINE SODIUM 125 UG/1
TABLET ORAL
Qty: 90 TABLET | Refills: 0 | Status: SHIPPED | OUTPATIENT
Start: 2017-12-18 | End: 2018-03-17 | Stop reason: SDUPTHER

## 2017-12-20 RX ORDER — VALSARTAN 160 MG/1
160 TABLET ORAL DAILY
Qty: 90 TABLET | Refills: 0 | Status: SHIPPED | OUTPATIENT
Start: 2017-12-20 | End: 2018-06-23 | Stop reason: SDUPTHER

## 2017-12-28 ENCOUNTER — OFFICE VISIT (OUTPATIENT)
Dept: HEMATOLOGY/ONCOLOGY | Facility: CLINIC | Age: 82
End: 2017-12-28
Payer: MEDICARE

## 2017-12-28 VITALS
TEMPERATURE: 98 F | WEIGHT: 169.75 LBS | DIASTOLIC BLOOD PRESSURE: 57 MMHG | HEART RATE: 95 BPM | OXYGEN SATURATION: 95 % | SYSTOLIC BLOOD PRESSURE: 113 MMHG | BODY MASS INDEX: 30.07 KG/M2

## 2017-12-28 DIAGNOSIS — C56.9 MALIGNANT NEOPLASM OF OVARY, UNSPECIFIED LATERALITY: Primary | ICD-10-CM

## 2017-12-28 DIAGNOSIS — R14.0 ABDOMINAL BLOATING: ICD-10-CM

## 2017-12-28 DIAGNOSIS — K21.9 GASTROESOPHAGEAL REFLUX DISEASE, ESOPHAGITIS PRESENCE NOT SPECIFIED: ICD-10-CM

## 2017-12-28 DIAGNOSIS — R19.7 DIARRHEA: ICD-10-CM

## 2017-12-28 DIAGNOSIS — D69.59 CHEMOTHERAPY-INDUCED THROMBOCYTOPENIA: ICD-10-CM

## 2017-12-28 DIAGNOSIS — Z51.11 ENCOUNTER FOR CHEMOTHERAPY MANAGEMENT: ICD-10-CM

## 2017-12-28 DIAGNOSIS — T45.1X5A CHEMOTHERAPY-INDUCED THROMBOCYTOPENIA: ICD-10-CM

## 2017-12-28 PROCEDURE — 99999 PR PBB SHADOW E&M-EST. PATIENT-LVL III: CPT | Mod: PBBFAC,,, | Performed by: INTERNAL MEDICINE

## 2017-12-28 PROCEDURE — 99499 UNLISTED E&M SERVICE: CPT | Mod: S$GLB,,, | Performed by: INTERNAL MEDICINE

## 2017-12-28 PROCEDURE — 99214 OFFICE O/P EST MOD 30 MIN: CPT | Mod: S$GLB,,, | Performed by: INTERNAL MEDICINE

## 2017-12-28 RX ORDER — FAMOTIDINE 10 MG/ML
20 INJECTION INTRAVENOUS
Status: CANCELLED | OUTPATIENT
Start: 2017-12-29

## 2017-12-28 RX ORDER — SODIUM CHLORIDE 0.9 % (FLUSH) 0.9 %
10 SYRINGE (ML) INJECTION
Status: CANCELLED | OUTPATIENT
Start: 2017-12-29

## 2017-12-28 RX ORDER — DIPHENOXYLATE HYDROCHLORIDE AND ATROPINE SULFATE 2.5; .025 MG/1; MG/1
1 TABLET ORAL 4 TIMES DAILY PRN
Qty: 30 TABLET | Refills: 1 | Status: SHIPPED | OUTPATIENT
Start: 2017-12-28 | End: 2018-01-07

## 2017-12-28 RX ORDER — EPINEPHRINE 0.3 MG/.3ML
0.3 INJECTION SUBCUTANEOUS ONCE AS NEEDED
Status: CANCELLED | OUTPATIENT
Start: 2017-12-29 | End: 2017-12-29

## 2017-12-28 RX ORDER — OMEPRAZOLE 20 MG/1
CAPSULE, DELAYED RELEASE ORAL
Qty: 90 CAPSULE | Refills: 0 | Status: SHIPPED | OUTPATIENT
Start: 2017-12-28 | End: 2018-03-28 | Stop reason: SDUPTHER

## 2017-12-28 RX ORDER — HEPARIN 100 UNIT/ML
500 SYRINGE INTRAVENOUS
Status: CANCELLED | OUTPATIENT
Start: 2017-12-29

## 2017-12-28 RX ORDER — TRAMADOL HYDROCHLORIDE 50 MG/1
50 TABLET ORAL EVERY 8 HOURS PRN
Qty: 30 TABLET | Refills: 0 | Status: SHIPPED | OUTPATIENT
Start: 2017-12-28 | End: 2018-01-07

## 2017-12-28 RX ORDER — DIPHENHYDRAMINE HYDROCHLORIDE 50 MG/ML
50 INJECTION INTRAMUSCULAR; INTRAVENOUS ONCE AS NEEDED
Status: CANCELLED | OUTPATIENT
Start: 2017-12-29 | End: 2017-12-29

## 2017-12-28 NOTE — PROGRESS NOTES
"Subjective:       Patient ID: Celine Mahan is a 82 y.o. female.    Chief Complaint: Follow-up    HPI     Pt transferring care    HPI ( per Dr. Feliciano) Patient is an 83yo female who originally presented as a referral from Dr. Wills for pelvic mass. Patient reports LLQ pain for approximately 3 months which prompted evaluation.      Pelvic US 8/7/17 There is a complex echogenicity midline pelvic mass measuring 18.1 x 8.6 x 7.6 cm.  Ovaries not visualized.     CT A&P 8/9/17 Large solid/cystic mass within the lower abdomen/upper pelvis that is most concerning for a malignant neoplasm, likely of ovarian origin given its location.  Correlation with previous surgical history is recommended noting evidence of previous hysterectomy.  Mass abuts and displaces the adjacent bladder without definite CT findings to suggest hema invasion. Abdominal and pelvic lymphadenopathy concerning for lymphatic spread of neoplasm with index lymph nodes as above.      Medical history is significant for CKD (Cr 1.3), HTN, hypothyroid, HTN, HLD. She has a personal history of breast cancer in 1995 treated with mastectomy and adjuvant chemotherapy she reports. Last MMG 11/2016 normal. Adbominal surgery include cholecystectomy, TVH, xlap/removal ovary for ectopic. She reports that her physician told her "some ovary remains in situ". Family history significant for mother with pancreatic cancer.      CT chest 8/28/17 showed mediastinal adenopathy consistent with Stage IV disease.   RECIST Summary:  Mediastinal adenopathy:  1. Preaortic abnormal node measuring 1.7 cm short axis.  2. Pretracheal node measures 1.6 cm short axis.     She underwent surgical cytoreduction with xlap/LSO/omentectomy 9/21/17. Pathology showed high grade adenocarcinoma.        She is followed by Dr. Feliciano  She has transferred care to undergo  adjuvant chemo at this location     S/p cycle 3 chemo completed 12/8/2017    Accompanied by dtr     She reports loose stools 3-4 " days last week  She took Imodium w/out relief  No fevers  She continues with mild abd pain since her diagnosis  Sx's now resolved  Diminished appetite and mild wt loss  No melena, hematochezia   Mild fatigue  Mild nausea w/out vomiting  Appetite and weight stable  No SOB/CP      No bleeding- nasal/urinary /rectal      [facility] ANALYSIS NEG    Past Medical History:   Diagnosis Date    Breast cancer     Cataract     CKD (chronic kidney disease) stage 3, GFR 30-59 ml/min     Hyperlipidemia     Hypertension     Hypothyroidism     Obesity     Osteopenia     Ovarian cancer 10/8/2017    Overactive bladder     Pelvic mass 8/27/2017    Thyroid disease          Past Surgical History:   Procedure Laterality Date    bt catarac surgery      CHOLECYSTECTOMY      HYSTERECTOMY      MASTECTOMY Left              Review of Systems   Constitutional: Positive for appetite change, fatigue and unexpected weight change. Negative for fever.   HENT: Negative for mouth sores.    Eyes: Negative for visual disturbance.   Respiratory: Negative for cough and shortness of breath.    Cardiovascular: Negative for chest pain.   Gastrointestinal: Positive for abdominal pain. Negative for diarrhea.        Loose stools   Genitourinary: Negative for frequency.   Musculoskeletal: Negative for back pain.   Skin: Negative for rash.   Neurological: Negative for headaches.   Hematological: Negative for adenopathy.   Psychiatric/Behavioral: The patient is not nervous/anxious.        Objective:       Vitals:    12/28/17 0914   BP: (!) 113/57   BP Location: Right arm   Patient Position: Sitting   BP Method: Medium (Automatic)   Pulse: 95   Temp: 97.7 °F (36.5 °C)   TempSrc: Oral   SpO2: 95%   Weight: 77 kg (169 lb 12.1 oz)       Physical Exam   Constitutional: She is oriented to person, place, and time. She appears well-developed and well-nourished.   HENT:   Head: Normocephalic.   Mouth/Throat: Oropharynx is clear and moist. No oropharyngeal  exudate.   Eyes: Conjunctivae and lids are normal. Pupils are equal, round, and reactive to light. No scleral icterus.   Neck: Normal range of motion. Neck supple. No thyromegaly present.   Cardiovascular: Normal rate, regular rhythm and normal heart sounds.    No murmur heard.  Pulmonary/Chest: Breath sounds normal. She has no wheezes. She has no rales.   Abdominal: Soft. Bowel sounds are normal. She exhibits no distension. There is no hepatosplenomegaly. There is no tenderness.   Well-healed midline incision   Musculoskeletal: Normal range of motion. She exhibits no edema or tenderness.   Lymphadenopathy:     She has no cervical adenopathy.     She has no axillary adenopathy.        Right: No supraclavicular adenopathy present.        Left: No supraclavicular adenopathy present.   Neurological: She is alert and oriented to person, place, and time. No cranial nerve deficit. Coordination normal.   Skin: Skin is warm and dry. No ecchymosis, no petechiae and no rash noted. No erythema.   Psychiatric: She has a normal mood and affect.         Labs:   Lab Results   Component Value Date    WBC 3.16 (L) 12/27/2017    HGB 12.2 12/27/2017    HCT 35.8 (L) 12/27/2017    MCV 87 12/27/2017     (L) 12/27/2017     Component      Latest Ref Rng & Units 10/18/2017 8/25/2017         0 - 30 U/mL 230 (H) 849 (H)       Results for JONATAN SERRANO (MRN [id number]) as of 12/8/2017 10:41   Ref. Range 10/18/2017 11:03 12/5/2017 13:12    Latest Ref Range: 0 - 30 U/mL 230 (H) 37 (H)     Assessment:       1. Malignant neoplasm of ovary, unspecified laterality    2. Encounter for chemotherapy management    3. Chemotherapy-induced thrombocytopenia        Plan:   Pt clinically stable   Patient with ovarian cancer status post debulking surgery 9/21/2017 undergoing adjuvant platinum-based chemotherapy with carbo/taxol .   [facility] ANALYSIS NEG  S/p cycle 3 chemo completed 12/8/2017  Proceed with cycle 4       Cbc,cmp , MAG prior to f/u  on 1/17/2017      Cc: Iesha Feliciano M.D.

## 2017-12-29 ENCOUNTER — INFUSION (OUTPATIENT)
Dept: INFUSION THERAPY | Facility: HOSPITAL | Age: 82
End: 2017-12-29
Attending: INTERNAL MEDICINE
Payer: MEDICARE

## 2017-12-29 VITALS — HEART RATE: 84 BPM | DIASTOLIC BLOOD PRESSURE: 85 MMHG | RESPIRATION RATE: 16 BRPM | SYSTOLIC BLOOD PRESSURE: 159 MMHG

## 2017-12-29 DIAGNOSIS — C56.9 MALIGNANT NEOPLASM OF OVARY, UNSPECIFIED LATERALITY: Primary | ICD-10-CM

## 2017-12-29 PROCEDURE — 25000003 PHARM REV CODE 250: Performed by: INTERNAL MEDICINE

## 2017-12-29 PROCEDURE — 96367 TX/PROPH/DG ADDL SEQ IV INF: CPT

## 2017-12-29 PROCEDURE — 63600175 PHARM REV CODE 636 W HCPCS: Performed by: INTERNAL MEDICINE

## 2017-12-29 PROCEDURE — S0028 INJECTION, FAMOTIDINE, 20 MG: HCPCS | Performed by: INTERNAL MEDICINE

## 2017-12-29 PROCEDURE — 96417 CHEMO IV INFUS EACH ADDL SEQ: CPT

## 2017-12-29 PROCEDURE — 96375 TX/PRO/DX INJ NEW DRUG ADDON: CPT

## 2017-12-29 PROCEDURE — 96415 CHEMO IV INFUSION ADDL HR: CPT

## 2017-12-29 PROCEDURE — 96413 CHEMO IV INFUSION 1 HR: CPT

## 2017-12-29 RX ORDER — FAMOTIDINE 10 MG/ML
20 INJECTION INTRAVENOUS
Status: COMPLETED | OUTPATIENT
Start: 2017-12-29 | End: 2017-12-29

## 2017-12-29 RX ADMIN — CARBOPLATIN 370 MG: 10 INJECTION, SOLUTION INTRAVENOUS at 01:12

## 2017-12-29 RX ADMIN — PACLITAXEL 330 MG: 6 INJECTION, SOLUTION INTRAVENOUS at 10:12

## 2017-12-29 RX ADMIN — FAMOTIDINE 20 MG: 10 INJECTION, SOLUTION INTRAVENOUS at 10:12

## 2017-12-29 RX ADMIN — PALONOSETRON HYDROCHLORIDE: 0.25 INJECTION INTRAVENOUS at 09:12

## 2017-12-29 NOTE — PLAN OF CARE
Problem: Patient Care Overview  Goal: Plan of Care Review  Outcome: Ongoing (interventions implemented as appropriate)  Patient received Taxol and Carboplatin. Tolerated well. No reactions noted during visit. VSS. Patient received discharge instructions and verbalized understanding.

## 2018-01-15 ENCOUNTER — LAB VISIT (OUTPATIENT)
Dept: LAB | Facility: HOSPITAL | Age: 83
End: 2018-01-15
Attending: INTERNAL MEDICINE
Payer: MEDICARE

## 2018-01-15 DIAGNOSIS — Z51.11 ENCOUNTER FOR CHEMOTHERAPY MANAGEMENT: ICD-10-CM

## 2018-01-15 DIAGNOSIS — C56.9 MALIGNANT NEOPLASM OF OVARY, UNSPECIFIED LATERALITY: ICD-10-CM

## 2018-01-15 LAB
ALBUMIN SERPL BCP-MCNC: 3.7 G/DL
ALP SERPL-CCNC: 86 U/L
ALT SERPL W/O P-5'-P-CCNC: 20 U/L
ANION GAP SERPL CALC-SCNC: 9 MMOL/L
AST SERPL-CCNC: 23 U/L
BASOPHILS # BLD AUTO: 0.04 K/UL
BASOPHILS NFR BLD: 1.7 %
BILIRUB SERPL-MCNC: 0.5 MG/DL
BUN SERPL-MCNC: 14 MG/DL
CALCIUM SERPL-MCNC: 9.5 MG/DL
CANCER AG125 SERPL-ACNC: 23 U/ML
CHLORIDE SERPL-SCNC: 107 MMOL/L
CO2 SERPL-SCNC: 23 MMOL/L
CREAT SERPL-MCNC: 1.5 MG/DL
DIFFERENTIAL METHOD: ABNORMAL
EOSINOPHIL # BLD AUTO: 0.1 K/UL
EOSINOPHIL NFR BLD: 3.8 %
ERYTHROCYTE [DISTWIDTH] IN BLOOD BY AUTOMATED COUNT: 20.6 %
EST. GFR  (AFRICAN AMERICAN): 37 ML/MIN/1.73 M^2
EST. GFR  (NON AFRICAN AMERICAN): 32 ML/MIN/1.73 M^2
GLUCOSE SERPL-MCNC: 108 MG/DL
HCT VFR BLD AUTO: 31.4 %
HGB BLD-MCNC: 10.5 G/DL
LYMPHOCYTES # BLD AUTO: 1 K/UL
LYMPHOCYTES NFR BLD: 39.9 %
MAGNESIUM SERPL-MCNC: 1.8 MG/DL
MCH RBC QN AUTO: 30.1 PG
MCHC RBC AUTO-ENTMCNC: 33.4 G/DL
MCV RBC AUTO: 90 FL
MONOCYTES # BLD AUTO: 0.4 K/UL
MONOCYTES NFR BLD: 14.7 %
NEUTROPHILS # BLD AUTO: 0.9 K/UL
NEUTROPHILS NFR BLD: 38.2 %
PLATELET # BLD AUTO: 104 K/UL
PMV BLD AUTO: 9.9 FL
POTASSIUM SERPL-SCNC: 4 MMOL/L
PROT SERPL-MCNC: 7.2 G/DL
RBC # BLD AUTO: 3.49 M/UL
SODIUM SERPL-SCNC: 139 MMOL/L
WBC # BLD AUTO: 2.38 K/UL

## 2018-01-15 PROCEDURE — 80053 COMPREHEN METABOLIC PANEL: CPT

## 2018-01-15 PROCEDURE — 36415 COLL VENOUS BLD VENIPUNCTURE: CPT

## 2018-01-15 PROCEDURE — 85025 COMPLETE CBC W/AUTO DIFF WBC: CPT

## 2018-01-15 PROCEDURE — 86304 IMMUNOASSAY TUMOR CA 125: CPT

## 2018-01-15 PROCEDURE — 83735 ASSAY OF MAGNESIUM: CPT

## 2018-01-18 ENCOUNTER — TELEPHONE (OUTPATIENT)
Dept: HEMATOLOGY/ONCOLOGY | Facility: CLINIC | Age: 83
End: 2018-01-18

## 2018-01-18 NOTE — TELEPHONE ENCOUNTER
----- Message from Olamide Daniels RN sent at 1/18/2018  8:05 AM CST -----  Due for chemo tomorrow. On 1/15, ANC was 900. Can you call her in for recheck today?    Araceli

## 2018-01-19 ENCOUNTER — OFFICE VISIT (OUTPATIENT)
Dept: HEMATOLOGY/ONCOLOGY | Facility: CLINIC | Age: 83
End: 2018-01-19
Payer: MEDICARE

## 2018-01-19 ENCOUNTER — INFUSION (OUTPATIENT)
Dept: INFUSION THERAPY | Facility: HOSPITAL | Age: 83
End: 2018-01-19
Attending: INTERNAL MEDICINE
Payer: MEDICARE

## 2018-01-19 VITALS
WEIGHT: 165.69 LBS | OXYGEN SATURATION: 95 % | TEMPERATURE: 98 F | HEART RATE: 90 BPM | BODY MASS INDEX: 29.35 KG/M2 | DIASTOLIC BLOOD PRESSURE: 59 MMHG | SYSTOLIC BLOOD PRESSURE: 130 MMHG

## 2018-01-19 VITALS — RESPIRATION RATE: 16 BRPM | SYSTOLIC BLOOD PRESSURE: 135 MMHG | HEART RATE: 83 BPM | DIASTOLIC BLOOD PRESSURE: 60 MMHG

## 2018-01-19 DIAGNOSIS — C56.9 MALIGNANT NEOPLASM OF OVARY, UNSPECIFIED LATERALITY: Primary | ICD-10-CM

## 2018-01-19 DIAGNOSIS — T45.1X5A CHEMOTHERAPY-INDUCED THROMBOCYTOPENIA: ICD-10-CM

## 2018-01-19 DIAGNOSIS — T45.1X5A CHEMOTHERAPY-INDUCED NAUSEA: ICD-10-CM

## 2018-01-19 DIAGNOSIS — D70.1 CHEMOTHERAPY INDUCED NEUTROPENIA: ICD-10-CM

## 2018-01-19 DIAGNOSIS — C56.9 MALIGNANT NEOPLASM OF OVARY, UNSPECIFIED LATERALITY: ICD-10-CM

## 2018-01-19 DIAGNOSIS — N18.30 CKD (CHRONIC KIDNEY DISEASE) STAGE 3, GFR 30-59 ML/MIN: ICD-10-CM

## 2018-01-19 DIAGNOSIS — R63.0 ANOREXIA: ICD-10-CM

## 2018-01-19 DIAGNOSIS — D69.59 CHEMOTHERAPY-INDUCED THROMBOCYTOPENIA: ICD-10-CM

## 2018-01-19 DIAGNOSIS — R11.0 CHEMOTHERAPY-INDUCED NAUSEA: ICD-10-CM

## 2018-01-19 DIAGNOSIS — Z51.11 ENCOUNTER FOR CHEMOTHERAPY MANAGEMENT: ICD-10-CM

## 2018-01-19 DIAGNOSIS — T45.1X5A CHEMOTHERAPY INDUCED NEUTROPENIA: ICD-10-CM

## 2018-01-19 PROCEDURE — 96413 CHEMO IV INFUSION 1 HR: CPT

## 2018-01-19 PROCEDURE — 63600175 PHARM REV CODE 636 W HCPCS: Performed by: INTERNAL MEDICINE

## 2018-01-19 PROCEDURE — S0028 INJECTION, FAMOTIDINE, 20 MG: HCPCS | Performed by: INTERNAL MEDICINE

## 2018-01-19 PROCEDURE — 96375 TX/PRO/DX INJ NEW DRUG ADDON: CPT

## 2018-01-19 PROCEDURE — 99999 PR PBB SHADOW E&M-EST. PATIENT-LVL III: CPT | Mod: PBBFAC,,, | Performed by: INTERNAL MEDICINE

## 2018-01-19 PROCEDURE — 99499 UNLISTED E&M SERVICE: CPT | Mod: S$GLB,,, | Performed by: INTERNAL MEDICINE

## 2018-01-19 PROCEDURE — 96367 TX/PROPH/DG ADDL SEQ IV INF: CPT

## 2018-01-19 PROCEDURE — 99214 OFFICE O/P EST MOD 30 MIN: CPT | Mod: S$GLB,,, | Performed by: INTERNAL MEDICINE

## 2018-01-19 PROCEDURE — 96417 CHEMO IV INFUS EACH ADDL SEQ: CPT

## 2018-01-19 PROCEDURE — 96415 CHEMO IV INFUSION ADDL HR: CPT

## 2018-01-19 PROCEDURE — 25000003 PHARM REV CODE 250: Performed by: INTERNAL MEDICINE

## 2018-01-19 RX ORDER — FAMOTIDINE 10 MG/ML
20 INJECTION INTRAVENOUS
Status: CANCELLED | OUTPATIENT
Start: 2018-01-19

## 2018-01-19 RX ORDER — DIPHENHYDRAMINE HYDROCHLORIDE 50 MG/ML
50 INJECTION INTRAMUSCULAR; INTRAVENOUS ONCE AS NEEDED
Status: CANCELLED | OUTPATIENT
Start: 2018-01-19 | End: 2018-01-19

## 2018-01-19 RX ORDER — PROCHLORPERAZINE MALEATE 10 MG
10 TABLET ORAL EVERY 6 HOURS PRN
Qty: 30 TABLET | Refills: 1 | Status: SHIPPED | OUTPATIENT
Start: 2018-01-19 | End: 2019-01-09

## 2018-01-19 RX ORDER — CYPROHEPTADINE HYDROCHLORIDE 4 MG/1
4 TABLET ORAL 2 TIMES DAILY PRN
Qty: 60 TABLET | Refills: 0 | Status: SHIPPED | OUTPATIENT
Start: 2018-01-19 | End: 2018-02-23

## 2018-01-19 RX ORDER — TRAMADOL HYDROCHLORIDE 50 MG/1
50 TABLET ORAL EVERY 6 HOURS PRN
COMMUNITY
End: 2018-01-19 | Stop reason: SDUPTHER

## 2018-01-19 RX ORDER — HEPARIN 100 UNIT/ML
500 SYRINGE INTRAVENOUS
Status: CANCELLED | OUTPATIENT
Start: 2018-01-19

## 2018-01-19 RX ORDER — ONDANSETRON HYDROCHLORIDE 8 MG/1
8 TABLET, FILM COATED ORAL EVERY 8 HOURS PRN
Qty: 30 TABLET | Refills: 2 | Status: SHIPPED | OUTPATIENT
Start: 2018-01-19 | End: 2019-01-09

## 2018-01-19 RX ORDER — FAMOTIDINE 10 MG/ML
20 INJECTION INTRAVENOUS
Status: COMPLETED | OUTPATIENT
Start: 2018-01-19 | End: 2018-01-19

## 2018-01-19 RX ORDER — SODIUM CHLORIDE 0.9 % (FLUSH) 0.9 %
10 SYRINGE (ML) INJECTION
Status: CANCELLED | OUTPATIENT
Start: 2018-01-19

## 2018-01-19 RX ORDER — TRAMADOL HYDROCHLORIDE 50 MG/1
50 TABLET ORAL EVERY 6 HOURS PRN
Qty: 60 TABLET | Refills: 0 | Status: SHIPPED | OUTPATIENT
Start: 2018-01-19 | End: 2018-02-23

## 2018-01-19 RX ORDER — EPINEPHRINE 0.3 MG/.3ML
0.3 INJECTION SUBCUTANEOUS ONCE AS NEEDED
Status: CANCELLED | OUTPATIENT
Start: 2018-01-19 | End: 2018-01-19

## 2018-01-19 RX ADMIN — FAMOTIDINE 20 MG: 10 INJECTION INTRAVENOUS at 10:01

## 2018-01-19 RX ADMIN — DIPHENHYDRAMINE HYDROCHLORIDE 50 MG: 50 INJECTION INTRAMUSCULAR; INTRAVENOUS at 10:01

## 2018-01-19 RX ADMIN — PACLITAXEL 330 MG: 6 INJECTION, SOLUTION, CONCENTRATE INTRAVENOUS at 11:01

## 2018-01-19 RX ADMIN — CARBOPLATIN 370 MG: 10 INJECTION, SOLUTION INTRAVENOUS at 02:01

## 2018-01-19 RX ADMIN — DEXAMETHASONE SODIUM PHOSPHATE: 10 INJECTION, SOLUTION INTRAMUSCULAR; INTRAVENOUS at 10:01

## 2018-01-19 NOTE — PROGRESS NOTES
"Subjective:       Patient ID: Celine Mahan is a 82 y.o. female.    Chief Complaint: Follow-up    HPI     Diagnosis: Ovarian cancer status post debulking surgery 9/21/2017  Therapy: adjuvant chemo with carbo AUC 6 and taxol 175mg/m2 q 21d    Pt transferring care    HPI ( per Dr. Feliciano) Patient is an 81yo female who originally presented as a referral from Dr. Wills for pelvic mass. Patient reports LLQ pain for approximately 3 months which prompted evaluation.      Pelvic US 8/7/17 There is a complex echogenicity midline pelvic mass measuring 18.1 x 8.6 x 7.6 cm.  Ovaries not visualized.     CT A&P 8/9/17 Large solid/cystic mass within the lower abdomen/upper pelvis that is most concerning for a malignant neoplasm, likely of ovarian origin given its location.  Correlation with previous surgical history is recommended noting evidence of previous hysterectomy.  Mass abuts and displaces the adjacent bladder without definite CT findings to suggest hema invasion. Abdominal and pelvic lymphadenopathy concerning for lymphatic spread of neoplasm with index lymph nodes as above.      Medical history is significant for CKD (Cr 1.3), HTN, hypothyroid, HTN, HLD. She has a personal history of breast cancer in 1995 treated with mastectomy and adjuvant chemotherapy she reports. Last MMG 11/2016 normal. Adbominal surgery include cholecystectomy, TVH, xlap/removal ovary for ectopic. She reports that her physician told her "some ovary remains in situ". Family history significant for mother with pancreatic cancer.      CT chest 8/28/17 showed mediastinal adenopathy consistent with Stage IV disease.   RECIST Summary:  Mediastinal adenopathy:  1. Preaortic abnormal node measuring 1.7 cm short axis.  2. Pretracheal node measures 1.6 cm short axis.     She underwent surgical cytoreduction with xlap/LSO/omentectomy 9/21/17. Pathology showed high grade adenocarcinoma.        She is also  followed by Dr. Feliciano  She has transferred care " to undergo  adjuvant chemo at this location   She is undergoing adjuvant chemo with carbo AUC 6 and taxol 175mg/m2 q 21d    S/p cycle 4 chemo completed 12/29/2017    Accompanied by dtr   She reports mild fatigue  She has diminished appetite  Wt loss 3 lbs since last visit  No fevers  No melena, hematochezia or change in bowel habits  Mild nausea w/out vomiting  No SOB/CP      No bleeding- nasal/urinary /rectal      [facility] ANALYSIS NEG    Past Medical History:   Diagnosis Date    Breast cancer     Cataract     CKD (chronic kidney disease) stage 3, GFR 30-59 ml/min     Hyperlipidemia     Hypertension     Hypothyroidism     Obesity     Osteopenia     Ovarian cancer 10/8/2017    Overactive bladder     Pelvic mass 8/27/2017    Thyroid disease          Past Surgical History:   Procedure Laterality Date    bt catarac surgery      CHOLECYSTECTOMY      HYSTERECTOMY      MASTECTOMY Left              Review of Systems   Constitutional: Positive for appetite change, fatigue and unexpected weight change. Negative for fever.   HENT: Negative for mouth sores.    Eyes: Negative for visual disturbance.   Respiratory: Negative for cough and shortness of breath.    Cardiovascular: Negative for chest pain.   Gastrointestinal: Negative for abdominal pain and diarrhea.   Genitourinary: Negative for frequency.   Musculoskeletal: Negative for back pain.   Skin: Negative for rash.        No petechiae   Neurological: Negative for headaches.   Hematological: Negative for adenopathy.   Psychiatric/Behavioral: The patient is not nervous/anxious.        Objective:       Vitals:    01/19/18 0856   BP: (!) 130/59   BP Location: Left arm   Patient Position: Sitting   BP Method: Medium (Automatic)   Pulse: 90   Temp: 97.6 °F (36.4 °C)   TempSrc: Oral   SpO2: 95%   Weight: 75.1 kg (165 lb 10.8 oz)       Physical Exam   Constitutional: She is oriented to person, place, and time. She appears well-developed and well-nourished.   HENT:    Head: Normocephalic.   Mouth/Throat: Oropharynx is clear and moist. No oropharyngeal exudate.   Eyes: Conjunctivae and lids are normal. Pupils are equal, round, and reactive to light. No scleral icterus.   Neck: Normal range of motion. Neck supple. No thyromegaly present.   Cardiovascular: Normal rate, regular rhythm and normal heart sounds.    No murmur heard.  Pulmonary/Chest: Breath sounds normal. She has no wheezes. She has no rales.   Abdominal: Soft. Bowel sounds are normal. She exhibits no distension. There is no hepatosplenomegaly. There is no tenderness.   Well-healed midline incision   Musculoskeletal: Normal range of motion. She exhibits no edema or tenderness.   Lymphadenopathy:     She has no cervical adenopathy.     She has no axillary adenopathy.        Right: No supraclavicular adenopathy present.        Left: No supraclavicular adenopathy present.   Neurological: She is alert and oriented to person, place, and time. No cranial nerve deficit. Coordination normal.   Skin: Skin is warm and dry. No ecchymosis, no petechiae and no rash noted. No erythema.   Psychiatric: She has a normal mood and affect.         Labs:   Lab Results   Component Value Date    WBC 3.08 (L) 01/18/2018    HGB 10.4 (L) 01/18/2018    HCT 30.6 (L) 01/18/2018    MCV 89 01/18/2018     (L) 01/18/2018     ANC 1500    Component      Latest Ref Rng & Units 1/15/2018 12/5/2017 10/18/2017 8/25/2017         0 - 30 U/mL 23 37 (H) 230 (H) 849 (H)         Assessment:       1. Malignant neoplasm of ovary, unspecified laterality    2. Encounter for chemotherapy management    3. Chemotherapy-induced thrombocytopenia    4. Chemotherapy induced neutropenia    5. Anorexia    6. CKD (chronic kidney disease) stage 3, GFR 30-59 ml/min        Plan:   1- 6 Pt clinically stable   Patient with ovarian cancer status post debulking surgery 9/21/2017 undergoing adjuvant platinum-based chemotherapy with carbo/taxol .   [facility] ANALYSIS NEG  S/p  cycle 4 chemo completed  Proceed with cycle 5 adjuvant chemo with carbo/taxol   level improved  Plt ct 106k- asymptomatic  ANC 1500, afebrile  Proceed with cycle 5 of chemo   Cr level stable 1.5  Rx for Periactin for anorexia       Cbc,cmp , MAG prior to f/u  3 wks      Cc: Iesha Feliciano M.D.

## 2018-01-19 NOTE — PROGRESS NOTES
Patient, Celine Mahan (MRN #[id number]), presented with a recent Platelet count less than 150 K/uL consistent with the definition of thrombocytopenia (ICD10 - D69.6).    Platelets   Date Value Ref Range Status   01/18/2018 106 (L) 150 - 350 K/uL Final     The patient's thrombocytopenia was monitored, evaluated, addressed and/or treated. This addendum to the medical record is made on 01/19/2018.

## 2018-01-19 NOTE — PLAN OF CARE
Problem: Patient Care Overview  Goal: Plan of Care Review  Outcome: Ongoing (interventions implemented as appropriate)  Pt tolerated Carbo and Taxol. No reactions noted. Pt only ate breakfast. Pt states she hasn't been feeling well since last chemo. Encouraged increased po intake of fluids and food at home. Pt discharged with family.

## 2018-02-08 ENCOUNTER — LAB VISIT (OUTPATIENT)
Dept: LAB | Facility: HOSPITAL | Age: 83
End: 2018-02-08
Attending: INTERNAL MEDICINE
Payer: MEDICARE

## 2018-02-08 DIAGNOSIS — C56.9 MALIGNANT NEOPLASM OF OVARY, UNSPECIFIED LATERALITY: ICD-10-CM

## 2018-02-09 ENCOUNTER — OFFICE VISIT (OUTPATIENT)
Dept: HEMATOLOGY/ONCOLOGY | Facility: CLINIC | Age: 83
End: 2018-02-09
Payer: MEDICARE

## 2018-02-09 ENCOUNTER — INFUSION (OUTPATIENT)
Dept: INFUSION THERAPY | Facility: HOSPITAL | Age: 83
End: 2018-02-09
Attending: INTERNAL MEDICINE
Payer: MEDICARE

## 2018-02-09 VITALS — DIASTOLIC BLOOD PRESSURE: 57 MMHG | HEART RATE: 71 BPM | SYSTOLIC BLOOD PRESSURE: 124 MMHG | RESPIRATION RATE: 16 BRPM

## 2018-02-09 VITALS
WEIGHT: 163.94 LBS | TEMPERATURE: 98 F | OXYGEN SATURATION: 97 % | DIASTOLIC BLOOD PRESSURE: 68 MMHG | HEART RATE: 87 BPM | BODY MASS INDEX: 29.04 KG/M2 | SYSTOLIC BLOOD PRESSURE: 154 MMHG

## 2018-02-09 DIAGNOSIS — C56.9 OVARIAN CANCER: Primary | ICD-10-CM

## 2018-02-09 DIAGNOSIS — D64.81 ANTINEOPLASTIC CHEMOTHERAPY INDUCED ANEMIA: ICD-10-CM

## 2018-02-09 DIAGNOSIS — C56.9 MALIGNANT NEOPLASM OF OVARY, UNSPECIFIED LATERALITY: Primary | ICD-10-CM

## 2018-02-09 DIAGNOSIS — T45.1X5A ANTINEOPLASTIC CHEMOTHERAPY INDUCED ANEMIA: ICD-10-CM

## 2018-02-09 DIAGNOSIS — Z51.11 ENCOUNTER FOR CHEMOTHERAPY MANAGEMENT: ICD-10-CM

## 2018-02-09 LAB
ALBUMIN SERPL BCP-MCNC: 3.8 G/DL
ALP SERPL-CCNC: 94 U/L
ALT SERPL W/O P-5'-P-CCNC: 14 U/L
ANION GAP SERPL CALC-SCNC: 11 MMOL/L
AST SERPL-CCNC: 15 U/L
BASOPHILS # BLD AUTO: 0 K/UL
BASOPHILS NFR BLD: 0 %
BILIRUB SERPL-MCNC: 0.5 MG/DL
BUN SERPL-MCNC: 19 MG/DL
CALCIUM SERPL-MCNC: 9 MG/DL
CANCER AG125 SERPL-ACNC: 22 U/ML
CHLORIDE SERPL-SCNC: 107 MMOL/L
CO2 SERPL-SCNC: 20 MMOL/L
CREAT SERPL-MCNC: 1.4 MG/DL
DIFFERENTIAL METHOD: ABNORMAL
EOSINOPHIL # BLD AUTO: 0 K/UL
EOSINOPHIL NFR BLD: 0 %
ERYTHROCYTE [DISTWIDTH] IN BLOOD BY AUTOMATED COUNT: 24 %
EST. GFR  (AFRICAN AMERICAN): 40 ML/MIN/1.73 M^2
EST. GFR  (NON AFRICAN AMERICAN): 35 ML/MIN/1.73 M^2
GLUCOSE SERPL-MCNC: 138 MG/DL
HCT VFR BLD AUTO: 25.2 %
HGB BLD-MCNC: 8.8 G/DL
LYMPHOCYTES # BLD AUTO: 0.6 K/UL
LYMPHOCYTES NFR BLD: 12 %
MCH RBC QN AUTO: 33.2 PG
MCHC RBC AUTO-ENTMCNC: 34.9 G/DL
MCV RBC AUTO: 95 FL
MONOCYTES # BLD AUTO: 0.1 K/UL
MONOCYTES NFR BLD: 2.8 %
NEUTROPHILS # BLD AUTO: 3.9 K/UL
NEUTROPHILS NFR BLD: 85 %
PLATELET # BLD AUTO: 167 K/UL
PMV BLD AUTO: 9.3 FL
POTASSIUM SERPL-SCNC: 4.5 MMOL/L
PROT SERPL-MCNC: 7.5 G/DL
RBC # BLD AUTO: 2.65 M/UL
SODIUM SERPL-SCNC: 138 MMOL/L
WBC # BLD AUTO: 4.57 K/UL

## 2018-02-09 PROCEDURE — 36415 COLL VENOUS BLD VENIPUNCTURE: CPT

## 2018-02-09 PROCEDURE — 99499 UNLISTED E&M SERVICE: CPT | Mod: S$GLB,,, | Performed by: INTERNAL MEDICINE

## 2018-02-09 PROCEDURE — 96360 HYDRATION IV INFUSION INIT: CPT

## 2018-02-09 PROCEDURE — 1126F AMNT PAIN NOTED NONE PRSNT: CPT | Mod: S$GLB,,, | Performed by: INTERNAL MEDICINE

## 2018-02-09 PROCEDURE — 99214 OFFICE O/P EST MOD 30 MIN: CPT | Mod: S$GLB,,, | Performed by: INTERNAL MEDICINE

## 2018-02-09 PROCEDURE — 3008F BODY MASS INDEX DOCD: CPT | Mod: S$GLB,,, | Performed by: INTERNAL MEDICINE

## 2018-02-09 PROCEDURE — 1159F MED LIST DOCD IN RCRD: CPT | Mod: S$GLB,,, | Performed by: INTERNAL MEDICINE

## 2018-02-09 PROCEDURE — 86304 IMMUNOASSAY TUMOR CA 125: CPT

## 2018-02-09 PROCEDURE — 99999 PR PBB SHADOW E&M-EST. PATIENT-LVL III: CPT | Mod: PBBFAC,,, | Performed by: INTERNAL MEDICINE

## 2018-02-09 PROCEDURE — 85025 COMPLETE CBC W/AUTO DIFF WBC: CPT

## 2018-02-09 PROCEDURE — 80053 COMPREHEN METABOLIC PANEL: CPT

## 2018-02-09 PROCEDURE — 25000003 PHARM REV CODE 250: Performed by: INTERNAL MEDICINE

## 2018-02-09 RX ORDER — DIPHENHYDRAMINE HYDROCHLORIDE 50 MG/ML
50 INJECTION INTRAMUSCULAR; INTRAVENOUS ONCE AS NEEDED
Status: CANCELLED | OUTPATIENT
Start: 2018-02-09 | End: 2018-02-09

## 2018-02-09 RX ORDER — FAMOTIDINE 10 MG/ML
20 INJECTION INTRAVENOUS
Status: CANCELLED | OUTPATIENT
Start: 2018-02-09

## 2018-02-09 RX ORDER — HEPARIN 100 UNIT/ML
500 SYRINGE INTRAVENOUS
Status: CANCELLED | OUTPATIENT
Start: 2018-02-09

## 2018-02-09 RX ORDER — SODIUM CHLORIDE 0.9 % (FLUSH) 0.9 %
10 SYRINGE (ML) INJECTION
Status: CANCELLED | OUTPATIENT
Start: 2018-02-09

## 2018-02-09 RX ORDER — SODIUM CHLORIDE 9 MG/ML
INJECTION, SOLUTION INTRAVENOUS ONCE
Status: COMPLETED | OUTPATIENT
Start: 2018-02-09 | End: 2018-02-09

## 2018-02-09 RX ORDER — EPINEPHRINE 0.3 MG/.3ML
0.3 INJECTION SUBCUTANEOUS ONCE AS NEEDED
Status: CANCELLED | OUTPATIENT
Start: 2018-02-09 | End: 2018-02-09

## 2018-02-09 RX ADMIN — SODIUM CHLORIDE: 0.9 INJECTION, SOLUTION INTRAVENOUS at 01:02

## 2018-02-09 NOTE — PLAN OF CARE
Problem: Patient Care Overview  Goal: Plan of Care Review  Outcome: Ongoing (interventions implemented as appropriate)  Patient received 500ml NS. Tolerated well. VSS. Received discharge instructions and verbalized understanding.

## 2018-02-09 NOTE — PROGRESS NOTES
"Subjective:       Patient ID: Celine Mahan is a 82 y.o. female.    Chief Complaint: No chief complaint on file.    HPI     Diagnosis: Ovarian cancer status post debulking surgery 9/21/2017  Therapy: adjuvant chemo with carbo AUC 6 and taxol 175mg/m2 q 21d    Pt transferring care    HPI ( per Dr. Feliciano) Patient is an 83yo female who originally presented as a referral from Dr. Wills for pelvic mass. Patient reports LLQ pain for approximately 3 months which prompted evaluation.      Pelvic US 8/7/17 There is a complex echogenicity midline pelvic mass measuring 18.1 x 8.6 x 7.6 cm.  Ovaries not visualized.     CT A&P 8/9/17 Large solid/cystic mass within the lower abdomen/upper pelvis that is most concerning for a malignant neoplasm, likely of ovarian origin given its location.  Correlation with previous surgical history is recommended noting evidence of previous hysterectomy.  Mass abuts and displaces the adjacent bladder without definite CT findings to suggest hema invasion. Abdominal and pelvic lymphadenopathy concerning for lymphatic spread of neoplasm with index lymph nodes as above.      Medical history is significant for CKD (Cr 1.3), HTN, hypothyroid, HTN, HLD. She has a personal history of breast cancer in 1995 treated with mastectomy and adjuvant chemotherapy she reports. Last MMG 11/2016 normal. Adbominal surgery include cholecystectomy, TVH, xlap/removal ovary for ectopic. She reports that her physician told her "some ovary remains in situ". Family history significant for mother with pancreatic cancer.      CT chest 8/28/17 showed mediastinal adenopathy consistent with Stage IV disease.   RECIST Summary:  Mediastinal adenopathy:  1. Preaortic abnormal node measuring 1.7 cm short axis.  2. Pretracheal node measures 1.6 cm short axis.     She underwent surgical cytoreduction with xlap/LSO/omentectomy 9/21/17. Pathology showed high grade adenocarcinoma.        She is also  followed by Dr. Feliciano  She has " "transferred care to undergo  adjuvant chemo at this location   She is undergoing adjuvant chemo with carbo AUC 6 and taxol 175mg/m2 q 21d    S/p cycle 5 carbo/taxol completed    Accompanied by dtr       No labs done today  No Fevers  Pt reports " feeling bad.:   Mild fatigue  Mild weakness  Diminished appetite   She has had some wt loss  No melena, hematochezia or change in bowel habits  Mild nausea w/out vomiting  No SOB/CP      No bleeding- nasal/urinary /rectal      [facility] ANALYSIS NEG    Past Medical History:   Diagnosis Date    Breast cancer     Cataract     CKD (chronic kidney disease) stage 3, GFR 30-59 ml/min     Hyperlipidemia     Hypertension     Hypothyroidism     Obesity     Osteopenia     Ovarian cancer 10/8/2017    Overactive bladder     Pelvic mass 8/27/2017    Thyroid disease          Past Surgical History:   Procedure Laterality Date    bt catarac surgery      CHOLECYSTECTOMY      HYSTERECTOMY      MASTECTOMY Left              Review of Systems   Constitutional: Positive for appetite change, fatigue and unexpected weight change. Negative for fever.   HENT: Negative for mouth sores.    Eyes: Negative for visual disturbance.   Respiratory: Negative for cough and shortness of breath.    Cardiovascular: Negative for chest pain.   Gastrointestinal: Negative for abdominal pain and diarrhea.   Genitourinary: Negative for frequency.   Musculoskeletal: Negative for back pain.   Skin: Negative for rash.        No petechiae   Neurological: Positive for weakness. Negative for headaches.   Hematological: Negative for adenopathy.   Psychiatric/Behavioral: The patient is not nervous/anxious.        Objective:       Vitals:    02/09/18 0954   BP: (!) 154/68   BP Location: Right arm   Patient Position: Sitting   BP Method: Medium (Manual)   Pulse: 87   Temp: 98 °F (36.7 °C)   TempSrc: Oral   SpO2: 97%   Weight: 74.3 kg (163 lb 14.6 oz)       Physical Exam   Constitutional: She is oriented to person, " place, and time. She appears well-developed and well-nourished.   HENT:   Head: Normocephalic.   Mouth/Throat: Oropharynx is clear and moist. No oropharyngeal exudate.   Eyes: Conjunctivae and lids are normal. Pupils are equal, round, and reactive to light. No scleral icterus.   Neck: Normal range of motion. Neck supple. No thyromegaly present.   Cardiovascular: Normal rate, regular rhythm and normal heart sounds.    No murmur heard.  Pulmonary/Chest: Breath sounds normal. She has no wheezes. She has no rales.   Abdominal: Soft. Bowel sounds are normal. She exhibits no distension. There is no hepatosplenomegaly. There is no tenderness.   Well-healed midline incision   Musculoskeletal: Normal range of motion. She exhibits no edema or tenderness.   Lymphadenopathy:     She has no cervical adenopathy.     She has no axillary adenopathy.        Right: No supraclavicular adenopathy present.        Left: No supraclavicular adenopathy present.   Neurological: She is alert and oriented to person, place, and time. No cranial nerve deficit. Coordination normal.   Skin: Skin is warm and dry. No ecchymosis, no petechiae and no rash noted. No erythema.   Psychiatric: She has a normal mood and affect.         Labs:   Lab Results   Component Value Date    WBC 4.57 02/09/2018    HGB 8.8 (L) 02/09/2018    HCT 25.2 (L) 02/09/2018    MCV 95 02/09/2018     02/09/2018         Component      Latest Ref Rng & Units 1/15/2018 12/5/2017 10/18/2017 8/25/2017         0 - 30 U/mL 23 37 (H) 230 (H) 849 (H)         Assessment:       1. Malignant neoplasm of ovary, unspecified laterality    2. Encounter for chemotherapy management    3. Antineoplastic chemotherapy induced anemia        Plan:   1- 3 Pt clinically stable   Patient with ovarian cancer status post debulking surgery 9/21/2017 undergoing adjuvant platinum-based chemotherapy with carbo/taxol .   BRCA ANALYSIS NEG  S/p  cycle 5 adjuvant chemo with carbo/taxol   level  improved  Hold cycle 6 of chemo today  Cr level stable 1.5  Plan proceed with chemo cycle 6 next week      Cbc,cmp , MAG prior to f/u  3 wks      Cc: Iesha Feliciano M.D.

## 2018-02-12 ENCOUNTER — INFUSION (OUTPATIENT)
Dept: INFUSION THERAPY | Facility: HOSPITAL | Age: 83
End: 2018-02-12
Attending: INTERNAL MEDICINE
Payer: MEDICARE

## 2018-02-12 DIAGNOSIS — C56.9 MALIGNANT NEOPLASM OF OVARY, UNSPECIFIED LATERALITY: Primary | ICD-10-CM

## 2018-02-12 PROCEDURE — 25000003 PHARM REV CODE 250: Performed by: INTERNAL MEDICINE

## 2018-02-12 PROCEDURE — 96417 CHEMO IV INFUS EACH ADDL SEQ: CPT

## 2018-02-12 PROCEDURE — 96367 TX/PROPH/DG ADDL SEQ IV INF: CPT

## 2018-02-12 PROCEDURE — 63600175 PHARM REV CODE 636 W HCPCS: Performed by: INTERNAL MEDICINE

## 2018-02-12 PROCEDURE — S0028 INJECTION, FAMOTIDINE, 20 MG: HCPCS | Performed by: INTERNAL MEDICINE

## 2018-02-12 PROCEDURE — 96413 CHEMO IV INFUSION 1 HR: CPT

## 2018-02-12 PROCEDURE — 96375 TX/PRO/DX INJ NEW DRUG ADDON: CPT

## 2018-02-12 PROCEDURE — 96415 CHEMO IV INFUSION ADDL HR: CPT

## 2018-02-12 RX ORDER — FAMOTIDINE 10 MG/ML
20 INJECTION INTRAVENOUS
Status: COMPLETED | OUTPATIENT
Start: 2018-02-12 | End: 2018-02-12

## 2018-02-12 RX ADMIN — DIPHENHYDRAMINE HYDROCHLORIDE 50 MG: 50 INJECTION INTRAMUSCULAR; INTRAVENOUS at 11:02

## 2018-02-12 RX ADMIN — PALONOSETRON HYDROCHLORIDE: 0.25 INJECTION INTRAVENOUS at 11:02

## 2018-02-12 RX ADMIN — FAMOTIDINE 20 MG: 10 INJECTION INTRAVENOUS at 11:02

## 2018-02-12 RX ADMIN — PACLITAXEL 330 MG: 6 INJECTION, SOLUTION INTRAVENOUS at 12:02

## 2018-02-12 NOTE — PLAN OF CARE
Problem: Patient Care Overview  Goal: Plan of Care Review  Outcome: Ongoing (interventions implemented as appropriate)  Pt tolerated 6/6 Carbo, Taxol. No reactions noted. Pt did not eat lunch. States she will eat at home. RN encouraged increased po intake at home and take nausea meds as needed. Pt verbalized understanding and discharged with family.

## 2018-02-22 ENCOUNTER — TELEPHONE (OUTPATIENT)
Dept: HEMATOLOGY/ONCOLOGY | Facility: CLINIC | Age: 83
End: 2018-02-22

## 2018-02-22 NOTE — TELEPHONE ENCOUNTER
----- Message from Javan Rivers sent at 2/22/2018  9:57 AM CST -----  Contact: Stacia Mahan   Pt daughter,  Stacia Mahan, [phone] called seeking advise for Home Health Services for her mom. Please advise      Thank you

## 2018-02-23 ENCOUNTER — OFFICE VISIT (OUTPATIENT)
Dept: FAMILY MEDICINE | Facility: CLINIC | Age: 83
End: 2018-02-23
Payer: MEDICARE

## 2018-02-23 VITALS
DIASTOLIC BLOOD PRESSURE: 60 MMHG | BODY MASS INDEX: 28.35 KG/M2 | SYSTOLIC BLOOD PRESSURE: 128 MMHG | WEIGHT: 160 LBS | HEIGHT: 63 IN

## 2018-02-23 DIAGNOSIS — F33.1 MODERATE EPISODE OF RECURRENT MAJOR DEPRESSIVE DISORDER: ICD-10-CM

## 2018-02-23 DIAGNOSIS — D64.9 ANEMIA, UNSPECIFIED TYPE: ICD-10-CM

## 2018-02-23 DIAGNOSIS — E03.9 HYPOTHYROIDISM, UNSPECIFIED TYPE: ICD-10-CM

## 2018-02-23 DIAGNOSIS — I10 ESSENTIAL HYPERTENSION: Primary | ICD-10-CM

## 2018-02-23 DIAGNOSIS — R63.0 LOSS OF APPETITE: ICD-10-CM

## 2018-02-23 PROCEDURE — 99214 OFFICE O/P EST MOD 30 MIN: CPT | Mod: S$GLB,,, | Performed by: FAMILY MEDICINE

## 2018-02-23 PROCEDURE — 1159F MED LIST DOCD IN RCRD: CPT | Mod: S$GLB,,, | Performed by: FAMILY MEDICINE

## 2018-02-23 PROCEDURE — 1126F AMNT PAIN NOTED NONE PRSNT: CPT | Mod: S$GLB,,, | Performed by: FAMILY MEDICINE

## 2018-02-23 PROCEDURE — 99499 UNLISTED E&M SERVICE: CPT | Mod: S$GLB,,, | Performed by: FAMILY MEDICINE

## 2018-02-23 PROCEDURE — 99999 PR PBB SHADOW E&M-EST. PATIENT-LVL III: CPT | Mod: PBBFAC,,, | Performed by: FAMILY MEDICINE

## 2018-02-23 PROCEDURE — 3008F BODY MASS INDEX DOCD: CPT | Mod: S$GLB,,, | Performed by: FAMILY MEDICINE

## 2018-02-23 RX ORDER — MEGESTROL ACETATE 40 MG/ML
400 SUSPENSION ORAL EVERY OTHER DAY
Qty: 150 ML | Refills: 0 | Status: SHIPPED | OUTPATIENT
Start: 2018-02-23 | End: 2018-04-16

## 2018-02-23 RX ORDER — FERROUS SULFATE 325(65) MG
325 TABLET ORAL
Qty: 30 TABLET | Refills: 0 | Status: SHIPPED | OUTPATIENT
Start: 2018-02-23 | End: 2018-03-25

## 2018-02-23 NOTE — PROGRESS NOTES
Subjective:       Patient ID: Celine Mahan is a 82 y.o. female.    Chief Complaint: No chief complaint on file.    82 years old female came to the clinic for blood pressure check.  Blood pressure today stable.  No chest pain palpitations orthopnea or PND.  Patient with anemia after chemotherapy.  Patient feels tired and dizzy sometimes.  Patient with no recent thyroid testing.  Patient did not want depression treatment.  She reports significant side effects with the depression medicines.      Review of Systems   Constitutional: Negative.    HENT: Negative.    Eyes: Negative.    Respiratory: Negative.    Gastrointestinal: Negative.    Genitourinary: Negative.    Musculoskeletal: Negative.    Neurological: Negative.    Psychiatric/Behavioral: Negative.        Objective:      Physical Exam   Constitutional: She is oriented to person, place, and time. She appears well-developed and well-nourished. No distress.   HENT:   Head: Normocephalic and atraumatic.   Right Ear: External ear normal.   Left Ear: External ear normal.   Nose: Nose normal.   Mouth/Throat: Oropharynx is clear and moist. No oropharyngeal exudate.   Eyes: Conjunctivae and EOM are normal. Pupils are equal, round, and reactive to light. Right eye exhibits no discharge. Left eye exhibits no discharge. No scleral icterus.   Neck: Normal range of motion. Neck supple. No JVD present. No tracheal deviation present. No thyromegaly present.   Cardiovascular: Normal rate, regular rhythm, normal heart sounds and intact distal pulses.  Exam reveals no gallop and no friction rub.    No murmur heard.  Pulmonary/Chest: Effort normal and breath sounds normal. No stridor. No respiratory distress. She has no wheezes. She has no rales. She exhibits no tenderness.   Abdominal: Soft. Bowel sounds are normal. She exhibits no distension and no mass. There is no tenderness. There is no rebound and no guarding.   Musculoskeletal: Normal range of motion. She exhibits no edema  or tenderness.   Lymphadenopathy:     She has no cervical adenopathy.   Neurological: She is alert and oriented to person, place, and time. She has normal reflexes. No cranial nerve deficit. She exhibits normal muscle tone. Coordination and gait abnormal.   Skin: Skin is warm and dry. No rash noted. She is not diaphoretic. No erythema. No pallor.   Psychiatric: Her behavior is normal. Judgment and thought content normal. Her mood appears anxious. Her affect is not angry, not blunt, not labile and not inappropriate. She exhibits a depressed mood.       Assessment:       1. Essential hypertension    2. Loss of appetite    3. Moderate episode of recurrent major depressive disorder    4. Anemia, unspecified type    5. Hypothyroidism, unspecified type        Plan:         Diagnoses and all orders for this visit:    Essential hypertension    Loss of appetite  -     megestrol (MEGACE) 400 mg/10 mL (40 mg/mL) Susp; Take 10 mLs (400 mg total) by mouth every other day.    Moderate episode of recurrent major depressive disorder    Anemia, unspecified type  -     ferrous sulfate 325 mg (65 mg iron) Tab tablet; Take 1 tablet (325 mg total) by mouth daily with breakfast.    Hypothyroidism, unspecified type  -     TSH; Future  -     T3; Future  -     T4, free; Future    Continue monitoring blood pressure at home, low sodium diet.

## 2018-02-23 NOTE — PATIENT INSTRUCTIONS
Anemia  Ibeth persona tiene anemia cuando conde cuerpo no posee suficientes glóbulos rojos sanos. Los glóbulos rojos tarah parte de la abelardo y se encargan de transportar el oxígeno por todo el cuerpo. Ibeth proteína llamada hemoglobina les permite a los glóbulos rojos absorber y liberar el oxígeno. Si no tiene suficientes glóbulos rojos o hemoglobina, el cuerpo no recibe el oxígeno necesario y pueden aparecer síntomas de anemia.    Síntomas de la anemia  Algunas personas con anemia no tienen síntomas, ana la mayoría tiene síntomas que van de leves a graves. Estos pueden incluir:  · Cansancio (fatiga)  · Debilidad  · Palidez  · Falta de aire  · Mareos o desmayos  · Latidos acelerados (taquicardia)  · Problemas para realizar las actividades que se llevan a cabo normalmente  · Ictericia (ojos, piel o boca amarillentos; orina oscura)  Causas de la anemia  La anemia puede ocurrir cuando el cuerpo:  · Pierde demasiada abelardo  · No produce suficientes glóbulos rojos  · Elimina los glóbulos rojos más rápido de lo que los produce  · No produce ibeth cantidad normal de hemoglobina en los glóbulos rojos  Dichos problemas pueden ocurrir por varios motivos; por ejemplo:  · Ibeth afección de nacimiento (congénita o hereditaria), chrissy la anemia de células falciformes o la talasemia.  · El sangrado intenso por alguna razón, ya sea lesión, cirugía, parto o hasta períodos menstruales intensos.  · La falta de determinados nutrientes chrissy el jose, el folato o la vitamina B12, lo cual puede ocurrir debido a ibeth kelly alimentación. Ibeth afección chrissy la enfermedad celíaca o la enfermedad de Crohn también puede causar ibeth kelly absorción de los nutrientes.  · Algunas condiciones crónicas chrissy la diabetes, la artritis o la enfermedad renal.  · Ciertas infecciones crónicas chrissy la tuberculosis o el VIH.  · La exposición a ciertos medicamentos, chrissy los que se usan para la quimioterapia.  · Existen diferentes tipos de anemia. Conde médico puede  brindarle más información sobre el tipo de anemia que usted tiene y zari causas.  Diagnóstico de la anemia  Para diagnosticar la anemia, se realizan análisis de abelardo que pueden incluir:  · Recuento sanguíneo completo (CBC, por zari siglas en inglés): Esta prueba mide las cantidades de los diferentes tipos de células sanguíneas.  · Extensión de abelardo: Prueba que permite evaluar el tamaño y la forma de las células sanguíneas. Para realizar el análisis, se debe observar ibeth gota de abelardo con un microscopio. Se utiliza un colorante a fin de lograr que las células sanguíneas se vean mejor.  · Exámenes de jose: Miden la cantidad de jose que hay en la abelardo. El jose es necesario para producir hemoglobina en los RBC.  · Exámenes de vitamina B12 y folato. Estos exámenes comprueban la existencia de algunos de los componentes que ayudan a cait a los glóbulos rojos un tamaño y forma normales.  · Recuento de reticulocitos: Con esta prueba se puede medir la cantidad de nuevos glóbulos rojos que produce la médula ósea.  · Electroforesis de hemoglobina: Esta prueba sirve para descubrir problemas en la hemoglobina de los glóbulos rojos.  Tratamiento de la anemia  Los tratamientos que se utilizan dependen del tipo de anemia, conde causa y la gravedad de los síntomas. Los tratamientos pueden incluir:  · Cambios en la alimentación: Consisten en aumentar la cantidad de ciertos nutrientes en la dieta, chrissy el jose, la vitamina B12 o el folato. También es posible que conde médico le recete suplementos nutritivos.  · Medicamentos: Algunos medicamentos se utilizan para tratar la causa de la anemia y otros ayudan a crear nuevos glóbulos rojos o a aliviar los síntomas. Si algún medicamento le produce anemia, debe dejar de tomarlo o cambiarlo.  · Transfusiones de abelardo: Reemplazar parte de conde abelardo puede aumentar el número de glóbulos rojos sanos de conde cuerpo.  · Cirugía: En algunos casos, se puede realizar ibeth cirugía para tratar las  causas de la anemia. Si dicha cirugía es necesaria, el médico le explicará el procedimiento y le dará ibeth idea general de los beneficios y riesgos que puede tener para usted.  Inquietudes a oneida plazo  Algunas personas con ciertos tipos de anemia pueden recuperarse completamente ibeth vez terminado el tratamiento, ana otros tipos de anemia (en especial las que son de nacimiento) necesitan ser tratadas eduar toda la cristina. Conde médico le puede brindar más información al respecto.  Date Last Reviewed: 4/27/2015  © 1033-5239 [employer]. [address] [place] [id number]. Todos los derechos reservados. Esta información no pretende sustituir la atención médica profesional. Sólo conde médico puede diagnosticar y tratar un problema de celio.

## 2018-02-27 ENCOUNTER — TELEPHONE (OUTPATIENT)
Dept: FAMILY MEDICINE | Facility: CLINIC | Age: 83
End: 2018-02-27

## 2018-02-27 NOTE — TELEPHONE ENCOUNTER
Pt informed megace is not covered by ins, it has a lot of side effects per dr khanna, verb understanding

## 2018-02-27 NOTE — TELEPHONE ENCOUNTER
----- Message from Vilma Prajapati sent at 2/27/2018 10:50 AM CST -----  Contact: [phone]/ [facility]   Pharmacy called stating rx megestrol (MEGACE) 400 mg/10 mL (40 mg/mL) Susp needs prior authorization . Please advise

## 2018-03-02 ENCOUNTER — TELEPHONE (OUTPATIENT)
Dept: HEMATOLOGY/ONCOLOGY | Facility: CLINIC | Age: 83
End: 2018-03-02

## 2018-03-02 ENCOUNTER — OFFICE VISIT (OUTPATIENT)
Dept: HEMATOLOGY/ONCOLOGY | Facility: CLINIC | Age: 83
End: 2018-03-02
Payer: MEDICARE

## 2018-03-02 VITALS
DIASTOLIC BLOOD PRESSURE: 60 MMHG | OXYGEN SATURATION: 98 % | HEART RATE: 95 BPM | WEIGHT: 161.69 LBS | SYSTOLIC BLOOD PRESSURE: 106 MMHG | BODY MASS INDEX: 28.65 KG/M2 | TEMPERATURE: 98 F

## 2018-03-02 DIAGNOSIS — C56.9 MALIGNANT NEOPLASM OF OVARY: Primary | ICD-10-CM

## 2018-03-02 DIAGNOSIS — G62.0 CHEMOTHERAPY-INDUCED NEUROPATHY: ICD-10-CM

## 2018-03-02 DIAGNOSIS — T45.1X5A ANTINEOPLASTIC CHEMOTHERAPY INDUCED ANEMIA: ICD-10-CM

## 2018-03-02 DIAGNOSIS — Z98.890 S/P EXPLORATORY LAPAROTOMY: ICD-10-CM

## 2018-03-02 DIAGNOSIS — Z51.11 ENCOUNTER FOR CHEMOTHERAPY MANAGEMENT: ICD-10-CM

## 2018-03-02 DIAGNOSIS — T45.1X5A CHEMOTHERAPY-INDUCED NEUROPATHY: ICD-10-CM

## 2018-03-02 DIAGNOSIS — D64.81 ANTINEOPLASTIC CHEMOTHERAPY INDUCED ANEMIA: ICD-10-CM

## 2018-03-02 DIAGNOSIS — C56.9 MALIGNANT NEOPLASM OF OVARY, UNSPECIFIED LATERALITY: Primary | ICD-10-CM

## 2018-03-02 PROCEDURE — 99499 UNLISTED E&M SERVICE: CPT | Mod: S$GLB,,, | Performed by: INTERNAL MEDICINE

## 2018-03-02 PROCEDURE — 3078F DIAST BP <80 MM HG: CPT | Mod: S$GLB,,, | Performed by: INTERNAL MEDICINE

## 2018-03-02 PROCEDURE — 99214 OFFICE O/P EST MOD 30 MIN: CPT | Mod: S$GLB,,, | Performed by: INTERNAL MEDICINE

## 2018-03-02 PROCEDURE — 3074F SYST BP LT 130 MM HG: CPT | Mod: S$GLB,,, | Performed by: INTERNAL MEDICINE

## 2018-03-02 PROCEDURE — 99999 PR PBB SHADOW E&M-EST. PATIENT-LVL III: CPT | Mod: PBBFAC,,, | Performed by: INTERNAL MEDICINE

## 2018-03-02 RX ORDER — GABAPENTIN 300 MG/1
300 CAPSULE ORAL NIGHTLY
Qty: 30 CAPSULE | Refills: 2 | Status: SHIPPED | OUTPATIENT
Start: 2018-03-02 | End: 2018-04-16 | Stop reason: SDUPTHER

## 2018-03-02 NOTE — PROGRESS NOTES
"Subjective:       Patient ID: Celine Mahan is a 82 y.o. female.    Chief Complaint: Follow-up    HPI     Diagnosis: Ovarian cancer status post debulking surgery 9/21/2017  Therapy: S/p carbo/taxol x 6 completed 2/12/2018    Pt transferring care    HPI ( per Dr. Feliciano) Patient is an 81yo female who originally presented as a referral from Dr. Wills for pelvic mass. Patient reports LLQ pain for approximately 3 months which prompted evaluation.      Pelvic US 8/7/17 There is a complex echogenicity midline pelvic mass measuring 18.1 x 8.6 x 7.6 cm.  Ovaries not visualized.     CT A&P 8/9/17 Large solid/cystic mass within the lower abdomen/upper pelvis that is most concerning for a malignant neoplasm, likely of ovarian origin given its location.  Correlation with previous surgical history is recommended noting evidence of previous hysterectomy.  Mass abuts and displaces the adjacent bladder without definite CT findings to suggest hema invasion. Abdominal and pelvic lymphadenopathy concerning for lymphatic spread of neoplasm with index lymph nodes as above.      Medical history is significant for CKD (Cr 1.3), HTN, hypothyroid, HTN, HLD. She has a personal history of breast cancer in 1995 treated with mastectomy and adjuvant chemotherapy she reports. Last MMG 11/2016 normal. Adbominal surgery include cholecystectomy, TVH, xlap/removal ovary for ectopic. She reports that her physician told her "some ovary remains in situ". Family history significant for mother with pancreatic cancer.      CT chest 8/28/17 showed mediastinal adenopathy consistent with Stage IV disease.   RECIST Summary:  Mediastinal adenopathy:  1. Preaortic abnormal node measuring 1.7 cm short axis.  2. Pretracheal node measures 1.6 cm short axis.     She underwent surgical cytoreduction with xlap/LSO/omentectomy 9/21/17. Pathology showed high grade adenocarcinoma.        She is also  followed by Dr. Feliciano  She has transferred care to undergo  " adjuvant chemo at this location   She completed adjuvant chemo with carbo AUC 6 and taxol 175mg/m2 q 21d  2/12/2018      Accompanied by friend    Pt prescribed appetite stimulant  PT declined due to cost     No Fevers  Pt reports  Intermittent tingling in hands and feet   Mild fatigue  Mild weakness  Diminished appetite   She has had some wt loss  No melena, hematochezia or change in bowel habits  Mild nausea w/out vomiting  No SOB/CP      No bleeding- nasal/urinary /rectal      BRCA ANALYSIS NEG    Past Medical History:   Diagnosis Date    Breast cancer     Cataract     CKD (chronic kidney disease) stage 3, GFR 30-59 ml/min     Hyperlipidemia     Hypertension     Hypothyroidism     Obesity     Osteopenia     Ovarian cancer 10/8/2017    Overactive bladder     Pelvic mass 8/27/2017    Thyroid disease          Past Surgical History:   Procedure Laterality Date    bt catarac surgery      CHOLECYSTECTOMY      HYSTERECTOMY      MASTECTOMY Left              Review of Systems   Constitutional: Positive for appetite change, fatigue and unexpected weight change. Negative for fever.   HENT: Negative for mouth sores.    Eyes: Negative for visual disturbance.   Respiratory: Negative for cough and shortness of breath.    Cardiovascular: Negative for chest pain.   Gastrointestinal: Negative for abdominal pain and diarrhea.   Genitourinary: Negative for frequency.   Musculoskeletal: Negative for back pain.   Skin: Negative for rash.        No petechiae   Neurological: Positive for weakness. Negative for headaches.   Hematological: Negative for adenopathy.   Psychiatric/Behavioral: The patient is not nervous/anxious.        Objective:       Vitals:    03/02/18 0959   BP: 106/60   BP Location: Right arm   Patient Position: Sitting   BP Method: Medium (Manual)   Pulse: 95   Temp: 98 °F (36.7 °C)   TempSrc: Oral   SpO2: 98%   Weight: 73.4 kg (161 lb 11.3 oz)       Physical Exam   Constitutional: She is oriented to  person, place, and time. She appears well-developed and well-nourished.   HENT:   Head: Normocephalic.   Mouth/Throat: Oropharynx is clear and moist. No oropharyngeal exudate.   Eyes: Conjunctivae and lids are normal. Pupils are equal, round, and reactive to light. No scleral icterus.   Neck: Normal range of motion. Neck supple. No thyromegaly present.   Cardiovascular: Normal rate, regular rhythm and normal heart sounds.    No murmur heard.  Pulmonary/Chest: Breath sounds normal. She has no wheezes. She has no rales.   Abdominal: Soft. Bowel sounds are normal. She exhibits no distension. There is no hepatosplenomegaly. There is no tenderness.   Well-healed midline incision   Musculoskeletal: Normal range of motion. She exhibits no edema or tenderness.   Lymphadenopathy:     She has no cervical adenopathy.     She has no axillary adenopathy.        Right: No supraclavicular adenopathy present.        Left: No supraclavicular adenopathy present.   Neurological: She is alert and oriented to person, place, and time. No cranial nerve deficit. Coordination normal.   Skin: Skin is warm and dry. No ecchymosis, no petechiae and no rash noted. No erythema.   Psychiatric: She has a normal mood and affect.         Labs:   Lab Results   Component Value Date    WBC 2.79 (L) 02/27/2018    HGB 7.8 (L) 02/27/2018    HCT 22.9 (L) 02/27/2018     (H) 02/27/2018     02/27/2018         Component      Latest Ref Rng & Units 1/15/2018 12/5/2017 10/18/2017 8/25/2017         0 - 30 U/mL 23 37 (H) 230 (H) 849 (H)     Results for JONATAN SERRANO (MRN [id number]) as of 3/2/2018 10:07   Ref. Range 2/9/2018 12:40    Latest Ref Range: 0 - 30 U/mL 22       Assessment:       1. Malignant neoplasm of ovary, unspecified laterality    2. S/P exploratory laparotomy/LSO/omentectomy    3. Encounter for chemotherapy management    4. Chemotherapy-induced neuropathy    5. Antineoplastic chemotherapy induced anemia        Plan:   1- 5  Pt clinically stable   Patient with ovarian cancer status post debulking surgery 9/21/2017 undergoing adjuvant platinum-based chemotherapy with carbo/taxol .   BRCA ANALYSIS NEG   level improved  Cr level stable 1.5  S/p carbo/taxol x 6 completed 2/12/2018  Hb 7.8 g/l   Rx for gabapentin 300mg po qhs prescribed  cbc, FE studies, in 1mo     Follow-up with Dr. Feliciano to discuss maintenance chemo    Cbc,cmp ,  mg prior to f/u mos      Cc: Iesha Feliciano M.D.

## 2018-03-19 RX ORDER — LEVOTHYROXINE SODIUM 125 UG/1
TABLET ORAL
Qty: 90 TABLET | Refills: 0 | Status: SHIPPED | OUTPATIENT
Start: 2018-03-19 | End: 2018-06-22 | Stop reason: SDUPTHER

## 2018-03-28 ENCOUNTER — LAB VISIT (OUTPATIENT)
Dept: LAB | Facility: HOSPITAL | Age: 83
End: 2018-03-28
Attending: INTERNAL MEDICINE
Payer: MEDICARE

## 2018-03-28 DIAGNOSIS — R14.0 ABDOMINAL BLOATING: ICD-10-CM

## 2018-03-28 DIAGNOSIS — K21.9 GASTROESOPHAGEAL REFLUX DISEASE, ESOPHAGITIS PRESENCE NOT SPECIFIED: ICD-10-CM

## 2018-03-28 DIAGNOSIS — C56.9 MALIGNANT NEOPLASM OF OVARY: ICD-10-CM

## 2018-03-28 DIAGNOSIS — T45.1X5A ANTINEOPLASTIC CHEMOTHERAPY INDUCED ANEMIA: ICD-10-CM

## 2018-03-28 DIAGNOSIS — D64.81 ANTINEOPLASTIC CHEMOTHERAPY INDUCED ANEMIA: ICD-10-CM

## 2018-03-28 DIAGNOSIS — C56.9 MALIGNANT NEOPLASM OF OVARY, UNSPECIFIED LATERALITY: ICD-10-CM

## 2018-03-28 LAB
ALBUMIN SERPL BCP-MCNC: 3.5 G/DL
ALP SERPL-CCNC: 80 U/L
ALT SERPL W/O P-5'-P-CCNC: 10 U/L
ANION GAP SERPL CALC-SCNC: 9 MMOL/L
AST SERPL-CCNC: 16 U/L
BASOPHILS # BLD AUTO: 0.03 K/UL
BASOPHILS NFR BLD: 0.8 %
BILIRUB SERPL-MCNC: 0.4 MG/DL
BUN SERPL-MCNC: 18 MG/DL
CALCIUM SERPL-MCNC: 9.3 MG/DL
CANCER AG125 SERPL-ACNC: 12 U/ML
CHLORIDE SERPL-SCNC: 107 MMOL/L
CO2 SERPL-SCNC: 26 MMOL/L
CREAT SERPL-MCNC: 1.4 MG/DL
DIFFERENTIAL METHOD: ABNORMAL
EOSINOPHIL # BLD AUTO: 0.2 K/UL
EOSINOPHIL NFR BLD: 4.3 %
ERYTHROCYTE [DISTWIDTH] IN BLOOD BY AUTOMATED COUNT: 14 %
EST. GFR  (AFRICAN AMERICAN): 40 ML/MIN/1.73 M^2
EST. GFR  (NON AFRICAN AMERICAN): 35 ML/MIN/1.73 M^2
FERRITIN SERPL-MCNC: 108 NG/ML
GLUCOSE SERPL-MCNC: 105 MG/DL
HCT VFR BLD AUTO: 29.4 %
HGB BLD-MCNC: 9.2 G/DL
IRON SERPL-MCNC: 65 UG/DL
LYMPHOCYTES # BLD AUTO: 1 K/UL
LYMPHOCYTES NFR BLD: 27.8 %
MCH RBC QN AUTO: 35.4 PG
MCHC RBC AUTO-ENTMCNC: 31.3 G/DL
MCV RBC AUTO: 113 FL
MONOCYTES # BLD AUTO: 0.3 K/UL
MONOCYTES NFR BLD: 8.6 %
NEUTROPHILS # BLD AUTO: 2.1 K/UL
NEUTROPHILS NFR BLD: 58 %
PLATELET # BLD AUTO: 216 K/UL
PMV BLD AUTO: 9.1 FL
POTASSIUM SERPL-SCNC: 4 MMOL/L
PROT SERPL-MCNC: 6.9 G/DL
RBC # BLD AUTO: 2.6 M/UL
SATURATED IRON: 19 %
SODIUM SERPL-SCNC: 142 MMOL/L
TOTAL IRON BINDING CAPACITY: 348 UG/DL
TRANSFERRIN SERPL-MCNC: 235 MG/DL
VIT B12 SERPL-MCNC: 434 PG/ML
WBC # BLD AUTO: 3.7 K/UL

## 2018-03-28 PROCEDURE — 86304 IMMUNOASSAY TUMOR CA 125: CPT

## 2018-03-28 PROCEDURE — 82728 ASSAY OF FERRITIN: CPT

## 2018-03-28 PROCEDURE — 80053 COMPREHEN METABOLIC PANEL: CPT

## 2018-03-28 PROCEDURE — 36415 COLL VENOUS BLD VENIPUNCTURE: CPT

## 2018-03-28 PROCEDURE — 82607 VITAMIN B-12: CPT

## 2018-03-28 PROCEDURE — 83540 ASSAY OF IRON: CPT

## 2018-03-28 PROCEDURE — 85025 COMPLETE CBC W/AUTO DIFF WBC: CPT

## 2018-03-28 RX ORDER — OMEPRAZOLE 20 MG/1
CAPSULE, DELAYED RELEASE ORAL
Qty: 90 CAPSULE | Refills: 0 | Status: SHIPPED | OUTPATIENT
Start: 2018-03-28 | End: 2018-06-26 | Stop reason: SDUPTHER

## 2018-04-16 ENCOUNTER — OFFICE VISIT (OUTPATIENT)
Dept: HEMATOLOGY/ONCOLOGY | Facility: CLINIC | Age: 83
End: 2018-04-16
Payer: MEDICARE

## 2018-04-16 ENCOUNTER — OFFICE VISIT (OUTPATIENT)
Dept: GYNECOLOGIC ONCOLOGY | Facility: CLINIC | Age: 83
End: 2018-04-16
Payer: MEDICARE

## 2018-04-16 VITALS
SYSTOLIC BLOOD PRESSURE: 122 MMHG | HEART RATE: 72 BPM | HEIGHT: 63 IN | BODY MASS INDEX: 28.56 KG/M2 | DIASTOLIC BLOOD PRESSURE: 57 MMHG | WEIGHT: 161.19 LBS

## 2018-04-16 VITALS
OXYGEN SATURATION: 98 % | HEART RATE: 83 BPM | SYSTOLIC BLOOD PRESSURE: 128 MMHG | DIASTOLIC BLOOD PRESSURE: 60 MMHG | HEIGHT: 63 IN | BODY MASS INDEX: 28.46 KG/M2 | WEIGHT: 160.63 LBS | TEMPERATURE: 98 F

## 2018-04-16 DIAGNOSIS — G62.0 CHEMOTHERAPY-INDUCED NEUROPATHY: ICD-10-CM

## 2018-04-16 DIAGNOSIS — T45.1X5A CHEMOTHERAPY-INDUCED NEUROPATHY: ICD-10-CM

## 2018-04-16 DIAGNOSIS — Z92.21 HISTORY OF CHEMOTHERAPY: ICD-10-CM

## 2018-04-16 DIAGNOSIS — Z98.890 S/P EXPLORATORY LAPAROTOMY: ICD-10-CM

## 2018-04-16 DIAGNOSIS — C56.9 MALIGNANT NEOPLASM OF OVARY, UNSPECIFIED LATERALITY: Primary | ICD-10-CM

## 2018-04-16 PROCEDURE — 3078F DIAST BP <80 MM HG: CPT | Mod: CPTII,S$GLB,, | Performed by: OBSTETRICS & GYNECOLOGY

## 2018-04-16 PROCEDURE — 3078F DIAST BP <80 MM HG: CPT | Mod: CPTII,S$GLB,, | Performed by: INTERNAL MEDICINE

## 2018-04-16 PROCEDURE — 99213 OFFICE O/P EST LOW 20 MIN: CPT | Mod: S$GLB,,, | Performed by: INTERNAL MEDICINE

## 2018-04-16 PROCEDURE — 99999 PR PBB SHADOW E&M-EST. PATIENT-LVL III: CPT | Mod: PBBFAC,,, | Performed by: INTERNAL MEDICINE

## 2018-04-16 PROCEDURE — 99999 PR PBB SHADOW E&M-EST. PATIENT-LVL III: CPT | Mod: PBBFAC,,, | Performed by: OBSTETRICS & GYNECOLOGY

## 2018-04-16 PROCEDURE — 3074F SYST BP LT 130 MM HG: CPT | Mod: CPTII,S$GLB,, | Performed by: OBSTETRICS & GYNECOLOGY

## 2018-04-16 PROCEDURE — 99213 OFFICE O/P EST LOW 20 MIN: CPT | Mod: S$GLB,,, | Performed by: OBSTETRICS & GYNECOLOGY

## 2018-04-16 PROCEDURE — 3074F SYST BP LT 130 MM HG: CPT | Mod: CPTII,S$GLB,, | Performed by: INTERNAL MEDICINE

## 2018-04-16 RX ORDER — GABAPENTIN 300 MG/1
300 CAPSULE ORAL 3 TIMES DAILY
Qty: 90 CAPSULE | Refills: 2 | Status: SHIPPED | OUTPATIENT
Start: 2018-04-16 | End: 2018-06-26

## 2018-04-16 NOTE — PROGRESS NOTES
"Subjective:      Patient ID: Celine Mahan is a 82 y.o. female.    Chief Complaint: Follow-up (follow up after chemo )      HPI   She presents today after completing 6 cycles of Carbo/Taxol on 2/12/2018, on the [facility] w/ Dr. Reinoso. 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 Normalization of .      [id number]>22>12.    Oncologic history:  Patient is an 83yo female who originally presented as a referral from Dr. Wills for pelvic mass. Patient reports LLQ pain for approximately 3 months which prompted evaluation. Available imaging reviewed.     Pelvic US 8/7/17 There is a complex echogenicity midline pelvic mass measuring 18.1 x 8.6 x 7.6 cm.  Ovaries not visualized.     CT A&P 8/9/17 Large solid/cystic mass within the lower abdomen/upper pelvis that is most concerning for a malignant neoplasm, likely of ovarian origin given its location.  Correlation with previous surgical history is recommended noting evidence of previous hysterectomy.  Mass abuts and displaces the adjacent bladder without definite CT findings to suggest hema invasion. Abdominal and pelvic lymphadenopathy concerning for lymphatic spread of neoplasm with index lymph nodes as above.      Medical history is significant for CKD (Cr 1.3), HTN, hypothyroid, HTN, HLD. She has a personal history of breast cancer in 1995 treated with mastectomy and adjuvant chemotherapy she reports. Last MMG 11/2016 normal. Adbominal surgery include cholecystectomy, TVH, xlap/removal ovary for ectopic. She reports that her physician told her "some ovary remains in situ". Family history significant for mother with pancreatic cancer.      CT chest 8/28/17 showed mediastinal adenopathy consistent with Stage IV disease.   RECIST Summary:  Mediastinal adenopathy:  1. Preaortic abnormal node measuring 1.7 cm short axis.  2. Pretracheal node measures 1.6 cm short axis.     She underwent surgical cytoreduction with xlap/LSO/omentectomy 9/21/17. " Pathology showed high grade adenocarcinoma.          Review of Systems   Constitutional: Positive for fatigue. Negative for appetite change, chills, diaphoresis, fever and unexpected weight change.   Respiratory: Negative for chest tightness, shortness of breath and wheezing.    Cardiovascular: Negative for chest pain, palpitations and leg swelling.   Gastrointestinal: Positive for diarrhea (occasional). Negative for abdominal distention, abdominal pain, blood in stool, constipation, nausea and vomiting.   Genitourinary: Negative for difficulty urinating, dysuria, flank pain, frequency, genital sores, hematuria, pelvic pain, urgency, vaginal bleeding, vaginal discharge and vaginal pain.   Musculoskeletal: Negative for back pain.   Skin: Negative for color change.   Neurological: Positive for numbness (hands and feet). Negative for dizziness, light-headedness and headaches.   Hematological: Bruises/bleeds easily (dark nailbeds).   Psychiatric/Behavioral: Negative for agitation. The patient is not nervous/anxious.        Past Medical History:   Diagnosis Date    Breast cancer     Cataract     CKD (chronic kidney disease) stage 3, GFR 30-59 ml/min     Hyperlipidemia     Hypertension     Hypothyroidism     Obesity     Osteopenia     Ovarian cancer 10/8/2017    Overactive bladder     Pelvic mass 8/27/2017    Thyroid disease      Past Surgical History:   Procedure Laterality Date    bt catarac surgery      CHOLECYSTECTOMY      HYSTERECTOMY      MASTECTOMY Left      Family History   Problem Relation Age of Onset    Cancer Mother      pancreas ca     Social History     Social History    Marital status:      Spouse name: N/A    Number of children: N/A    Years of education: N/A     Occupational History    Not on file.     Social History Main Topics    Smoking status: Former Smoker     Types: Cigarettes     Quit date: 1/1/1977    Smokeless tobacco: Never Used    Alcohol use No    Drug use: No     Sexual activity: Not Currently     Other Topics Concern    Not on file     Social History Narrative    No narrative on file     Current Outpatient Prescriptions   Medication Sig    garlic 1,000 mg Cap Take by mouth once daily.     levothyroxine (SYNTHROID) 125 MCG tablet TAKE 1 TABLET(125 MCG) BY MOUTH EVERY DAY    omeprazole (PRILOSEC) 20 MG capsule TAKE 1 CAPSULE(20 MG) BY MOUTH BEFORE BREAKFAST AS NEEDED    ondansetron (ZOFRAN) 8 MG tablet Take 1 tablet (8 mg total) by mouth every 8 (eight) hours as needed for Nausea.    valsartan (DIOVAN) 160 MG tablet Take 1 tablet (160 mg total) by mouth once daily.    gabapentin (NEURONTIN) 300 MG capsule Take 1 capsule (300 mg total) by mouth every evening.    megestrol (MEGACE) 400 mg/10 mL (40 mg/mL) Susp Take 10 mLs (400 mg total) by mouth every other day.    prochlorperazine (COMPAZINE) 10 MG tablet Take 1 tablet (10 mg total) by mouth every 6 (six) hours as needed.     No current facility-administered medications for this visit.      Review of patient's allergies indicates:  No Known Allergies    Objective:   Physical Exam:   Constitutional: She is oriented to person, place, and time. She appears well-developed and well-nourished. No distress.    HENT:   Head: Normocephalic.     Neck: Normal range of motion.    Cardiovascular: Exam reveals no edema. Cyanosis: nailbeds.     Pulmonary/Chest: Effort normal. No respiratory distress.        Abdominal: Soft. She exhibits no distension, no fluid wave and no ascites.             Musculoskeletal: Moves all extremeties. She exhibits no edema.       Neurological: She is alert and oriented to person, place, and time.    Skin: Skin is warm and dry. No rash noted. No erythema. Cyanosis: nailbeds. No pallor.    Psychiatric: She has a normal mood and affect. Thought content normal.       Assessment:     1. Malignant neoplasm of ovary, unspecified laterality      - s/p 6 cycles adjuvant carbo/taxol  - good response with  normalization of     Plan:         I discussed with the patient and her family standard management includes surveillance from here. She is also a good candidate for PRIMA, clinical trial investigating parpi maintenance therapy after completion of frontline platinum based chemotherapy. She desires to proceed with trial. Will plan for scans and enrollment per protocol.

## 2018-04-16 NOTE — PROGRESS NOTES
"Subjective:       Patient ID: Celine Mahan is a 82 y.o. female.    Chief Complaint: Follow-up    HPI     Diagnosis: Ovarian cancer status post debulking surgery 9/21/2017  Therapy: S/p carbo/taxol x 6 completed 2/12/2018    Pt transferred care    HPI ( per Dr. Feliciano) Patient is an 83yo female who originally presented as a referral from Dr. Wills for pelvic mass. Patient reports LLQ pain for approximately 3 months which prompted evaluation.      Pelvic US 8/7/17 There is a complex echogenicity midline pelvic mass measuring 18.1 x 8.6 x 7.6 cm.  Ovaries not visualized.     CT A&P 8/9/17 Large solid/cystic mass within the lower abdomen/upper pelvis that is most concerning for a malignant neoplasm, likely of ovarian origin given its location.  Correlation with previous surgical history is recommended noting evidence of previous hysterectomy.  Mass abuts and displaces the adjacent bladder without definite CT findings to suggest hema invasion. Abdominal and pelvic lymphadenopathy concerning for lymphatic spread of neoplasm with index lymph nodes as above.      Medical history is significant for CKD (Cr 1.3), HTN, hypothyroid, HTN, HLD. She has a personal history of breast cancer in 1995 treated with mastectomy and adjuvant chemotherapy she reports. Last MMG 11/2016 normal. Adbominal surgery include cholecystectomy, TVH, xlap/removal ovary for ectopic. She reports that her physician told her "some ovary remains in situ". Family history significant for mother with pancreatic cancer.      CT chest 8/28/17 showed mediastinal adenopathy consistent with Stage IV disease.   RECIST Summary:  Mediastinal adenopathy:  1. Preaortic abnormal node measuring 1.7 cm short axis.  2. Pretracheal node measures 1.6 cm short axis.     She underwent surgical cytoreduction with xlap/LSO/omentectomy 9/21/17. Pathology showed high grade adenocarcinoma.        She is also  followed by Dr. Feliciano  She has transferred care to undergo  " "adjuvant chemo at this location   She completed adjuvant chemo with carbo AUC 6 and taxol 175mg/m2 q 21d  2/12/2018      Accompanied by dtr      She continues with  Intermittent tingling in hands and feet   No fatigue  No  weakness  Appetite and weight stable   No abd pain   No melena, hematochezia or change in bowel habits  Mild nausea w/out vomiting  No SOB/CP      No bleeding- nasal/urinary /rectal    She is followed by Dr. Feliciano  She is being considered for clinical trial for maintenance therapy       BRCA ANALYSIS NEG    Past Medical History:   Diagnosis Date    Breast cancer     Cataract     CKD (chronic kidney disease) stage 3, GFR 30-59 ml/min     Hyperlipidemia     Hypertension     Hypothyroidism     Obesity     Osteopenia     Ovarian cancer 10/8/2017    Overactive bladder     Pelvic mass 8/27/2017    Thyroid disease          Past Surgical History:   Procedure Laterality Date    bt catarac surgery      CHOLECYSTECTOMY      HYSTERECTOMY      MASTECTOMY Left              Review of Systems   Constitutional: Negative for appetite change, fatigue, fever and unexpected weight change.   HENT: Negative for mouth sores.    Eyes: Negative for visual disturbance.   Respiratory: Negative for cough and shortness of breath.    Cardiovascular: Negative for chest pain.   Gastrointestinal: Positive for nausea. Negative for abdominal pain and diarrhea.   Genitourinary: Negative for frequency.   Musculoskeletal: Negative for back pain.   Skin: Negative for rash.        No petechiae   Neurological: Negative for weakness and headaches.   Hematological: Negative for adenopathy.   Psychiatric/Behavioral: The patient is not nervous/anxious.        Objective:       Vitals:    04/16/18 1313   BP: 128/60   BP Location: Right arm   Patient Position: Sitting   BP Method: Medium (Automatic)   Pulse: 83   Temp: 97.7 °F (36.5 °C)   TempSrc: Oral   SpO2: 98%   Weight: 72.8 kg (160 lb 9.7 oz)   Height: 5' 3" (1.6 m) "       Physical Exam   Constitutional: She is oriented to person, place, and time. She appears well-developed and well-nourished.   HENT:   Head: Normocephalic.   Mouth/Throat: Oropharynx is clear and moist. No oropharyngeal exudate.   Eyes: Conjunctivae and lids are normal. Pupils are equal, round, and reactive to light. No scleral icterus.   Neck: Normal range of motion. Neck supple. No thyromegaly present.   Cardiovascular: Normal rate, regular rhythm and normal heart sounds.    No murmur heard.  Pulmonary/Chest: Breath sounds normal. She has no wheezes. She has no rales.   Abdominal: Soft. Bowel sounds are normal. She exhibits no distension. There is no hepatosplenomegaly. There is no tenderness.   Well-healed midline incision   Musculoskeletal: Normal range of motion. She exhibits no edema or tenderness.   Lymphadenopathy:     She has no cervical adenopathy.     She has no axillary adenopathy.        Right: No supraclavicular adenopathy present.        Left: No supraclavicular adenopathy present.   Neurological: She is alert and oriented to person, place, and time. No cranial nerve deficit. Coordination normal.   Skin: Skin is warm and dry. No ecchymosis, no petechiae and no rash noted. No erythema.   Psychiatric: She has a normal mood and affect.         Labs:   Lab Results   Component Value Date    WBC 3.70 (L) 03/28/2018    HGB 9.2 (L) 03/28/2018    HCT 29.4 (L) 03/28/2018     (H) 03/28/2018     03/28/2018         Component      Latest Ref Rng & Units 1/15/2018 12/5/2017 10/18/2017 8/25/2017         0 - 30 U/mL 23 37 (H) 230 (H) 849 (H)         Results for JONATAN SERRANO (MRN [id number]) as of 4/16/2018 13:18   Ref. Range 1/15/2018 09:45 2/9/2018 12:40 3/28/2018 11:42    Latest Ref Range: 0 - 30 U/mL 23 22 12     Assessment:       1. Malignant neoplasm of ovary, unspecified laterality    2. S/P exploratory laparotomy/LSO/omentectomy    3. History of chemotherapy        Plan:   1- 3 Pt  clinically stable   Patient with ovarian cancer status post debulking surgery 9/21/2017 undergoing adjuvant platinum-based chemotherapy with carbo/taxol .   BRCA ANALYSIS NEG   level improved  Cr level stable 1.4  S/p carbo/taxol x 6 completed 2/12/2018  Hb 9.2g/dl   Increase  gabapentin 300-300-300       Follow-up with Dr. Feliciano to decide on  maintenance chemo  Pt to decide on maintenance chemo     If pt elects to enroll in clinical trial she will transfer care to [facility] [place] [facility]     Cbc,cmp ,  mg prior to f/u  2 mos      Cc: Iesha Feliciano M.D.

## 2018-05-25 ENCOUNTER — LAB VISIT (OUTPATIENT)
Dept: LAB | Facility: HOSPITAL | Age: 83
End: 2018-05-25
Attending: INTERNAL MEDICINE
Payer: MEDICARE

## 2018-05-25 DIAGNOSIS — C56.9 MALIGNANT NEOPLASM OF OVARY, UNSPECIFIED LATERALITY: ICD-10-CM

## 2018-05-25 DIAGNOSIS — Z98.890 S/P EXPLORATORY LAPAROTOMY: ICD-10-CM

## 2018-05-25 DIAGNOSIS — Z92.21 HISTORY OF CHEMOTHERAPY: ICD-10-CM

## 2018-05-25 LAB
ALBUMIN SERPL BCP-MCNC: 3.6 G/DL
ALP SERPL-CCNC: 85 U/L
ALT SERPL W/O P-5'-P-CCNC: 8 U/L
ANION GAP SERPL CALC-SCNC: 11 MMOL/L
AST SERPL-CCNC: 15 U/L
BASOPHILS # BLD AUTO: 0.02 K/UL
BASOPHILS NFR BLD: 0.4 %
BILIRUB SERPL-MCNC: 0.4 MG/DL
BUN SERPL-MCNC: 19 MG/DL
CALCIUM SERPL-MCNC: 9.7 MG/DL
CANCER AG125 SERPL-ACNC: 13 U/ML
CHLORIDE SERPL-SCNC: 108 MMOL/L
CO2 SERPL-SCNC: 20 MMOL/L
CREAT SERPL-MCNC: 1.6 MG/DL
DIFFERENTIAL METHOD: ABNORMAL
EOSINOPHIL # BLD AUTO: 0.2 K/UL
EOSINOPHIL NFR BLD: 4 %
ERYTHROCYTE [DISTWIDTH] IN BLOOD BY AUTOMATED COUNT: 12.5 %
EST. GFR  (AFRICAN AMERICAN): 34 ML/MIN/1.73 M^2
EST. GFR  (NON AFRICAN AMERICAN): 30 ML/MIN/1.73 M^2
GLUCOSE SERPL-MCNC: 106 MG/DL
HCT VFR BLD AUTO: 34 %
HGB BLD-MCNC: 11.5 G/DL
LYMPHOCYTES # BLD AUTO: 1.4 K/UL
LYMPHOCYTES NFR BLD: 30.4 %
MCH RBC QN AUTO: 32.2 PG
MCHC RBC AUTO-ENTMCNC: 33.8 G/DL
MCV RBC AUTO: 95 FL
MONOCYTES # BLD AUTO: 0.3 K/UL
MONOCYTES NFR BLD: 6.7 %
NEUTROPHILS # BLD AUTO: 2.6 K/UL
NEUTROPHILS NFR BLD: 58.3 %
PLATELET # BLD AUTO: 209 K/UL
PMV BLD AUTO: 9.1 FL
POTASSIUM SERPL-SCNC: 4.4 MMOL/L
PROT SERPL-MCNC: 7.2 G/DL
RBC # BLD AUTO: 3.57 M/UL
SODIUM SERPL-SCNC: 139 MMOL/L
WBC # BLD AUTO: 4.51 K/UL

## 2018-05-25 PROCEDURE — 36415 COLL VENOUS BLD VENIPUNCTURE: CPT

## 2018-05-25 PROCEDURE — 85025 COMPLETE CBC W/AUTO DIFF WBC: CPT

## 2018-05-25 PROCEDURE — 80053 COMPREHEN METABOLIC PANEL: CPT

## 2018-05-25 PROCEDURE — 86304 IMMUNOASSAY TUMOR CA 125: CPT

## 2018-05-28 ENCOUNTER — OFFICE VISIT (OUTPATIENT)
Dept: HEMATOLOGY/ONCOLOGY | Facility: CLINIC | Age: 83
End: 2018-05-28
Payer: MEDICARE

## 2018-05-28 ENCOUNTER — TELEPHONE (OUTPATIENT)
Dept: HEMATOLOGY/ONCOLOGY | Facility: CLINIC | Age: 83
End: 2018-05-28

## 2018-05-28 ENCOUNTER — INFUSION (OUTPATIENT)
Dept: INFUSION THERAPY | Facility: HOSPITAL | Age: 83
End: 2018-05-28
Attending: INTERNAL MEDICINE
Payer: MEDICARE

## 2018-05-28 VITALS
OXYGEN SATURATION: 100 % | TEMPERATURE: 98 F | RESPIRATION RATE: 18 BRPM | BODY MASS INDEX: 28.45 KG/M2 | OXYGEN SATURATION: 97 % | HEART RATE: 73 BPM | WEIGHT: 160.63 LBS | SYSTOLIC BLOOD PRESSURE: 142 MMHG | HEART RATE: 73 BPM | DIASTOLIC BLOOD PRESSURE: 62 MMHG | SYSTOLIC BLOOD PRESSURE: 99 MMHG | TEMPERATURE: 98 F | DIASTOLIC BLOOD PRESSURE: 66 MMHG

## 2018-05-28 DIAGNOSIS — R51.9 NONINTRACTABLE HEADACHE, UNSPECIFIED CHRONICITY PATTERN, UNSPECIFIED HEADACHE TYPE: ICD-10-CM

## 2018-05-28 DIAGNOSIS — Z92.21 HISTORY OF CHEMOTHERAPY: ICD-10-CM

## 2018-05-28 DIAGNOSIS — C56.9 MALIGNANT NEOPLASM OF OVARY, UNSPECIFIED LATERALITY: Primary | ICD-10-CM

## 2018-05-28 DIAGNOSIS — N18.30 CKD (CHRONIC KIDNEY DISEASE) STAGE 3, GFR 30-59 ML/MIN: ICD-10-CM

## 2018-05-28 DIAGNOSIS — R42 DIZZINESS: ICD-10-CM

## 2018-05-28 DIAGNOSIS — G62.0 CHEMOTHERAPY-INDUCED NEUROPATHY: ICD-10-CM

## 2018-05-28 DIAGNOSIS — C56.9 OVARIAN CA: Primary | ICD-10-CM

## 2018-05-28 DIAGNOSIS — T45.1X5A CHEMOTHERAPY-INDUCED NEUROPATHY: ICD-10-CM

## 2018-05-28 DIAGNOSIS — C56.9 OVARIAN CANCER: Primary | ICD-10-CM

## 2018-05-28 PROCEDURE — 99499 UNLISTED E&M SERVICE: CPT | Mod: S$GLB,,, | Performed by: INTERNAL MEDICINE

## 2018-05-28 PROCEDURE — 99999 PR PBB SHADOW E&M-EST. PATIENT-LVL III: CPT | Mod: PBBFAC,,, | Performed by: INTERNAL MEDICINE

## 2018-05-28 PROCEDURE — 25000003 PHARM REV CODE 250: Performed by: INTERNAL MEDICINE

## 2018-05-28 PROCEDURE — 99214 OFFICE O/P EST MOD 30 MIN: CPT | Mod: S$GLB,,, | Performed by: INTERNAL MEDICINE

## 2018-05-28 PROCEDURE — 3074F SYST BP LT 130 MM HG: CPT | Mod: CPTII,S$GLB,, | Performed by: INTERNAL MEDICINE

## 2018-05-28 PROCEDURE — 96360 HYDRATION IV INFUSION INIT: CPT

## 2018-05-28 PROCEDURE — 3078F DIAST BP <80 MM HG: CPT | Mod: CPTII,S$GLB,, | Performed by: INTERNAL MEDICINE

## 2018-05-28 RX ADMIN — SODIUM CHLORIDE 250 ML: 9 INJECTION, SOLUTION INTRAVENOUS at 08:05

## 2018-05-28 NOTE — PROGRESS NOTES
"Subjective:       Patient ID: Celine Mahan is a 82 y.o. female.    Chief Complaint: No chief complaint on file.    HPI     Diagnosis: Ovarian cancer status post debulking surgery 9/21/2017  Therapy: S/p carbo/taxol x 6 completed 2/12/2018    Pt transferred care    HPI ( per Dr. Feliciano) Patient is an 81yo female who originally presented as a referral from Dr. Wills for pelvic mass. Patient reports LLQ pain for approximately 3 months which prompted evaluation.      Pelvic US 8/7/17 There is a complex echogenicity midline pelvic mass measuring 18.1 x 8.6 x 7.6 cm.  Ovaries not visualized.     CT A&P 8/9/17 Large solid/cystic mass within the lower abdomen/upper pelvis that is most concerning for a malignant neoplasm, likely of ovarian origin given its location.  Correlation with previous surgical history is recommended noting evidence of previous hysterectomy.  Mass abuts and displaces the adjacent bladder without definite CT findings to suggest hema invasion. Abdominal and pelvic lymphadenopathy concerning for lymphatic spread of neoplasm with index lymph nodes as above.      Medical history is significant for CKD (Cr 1.3), HTN, hypothyroid, HTN, HLD. She has a personal history of breast cancer in 1995 treated with mastectomy and adjuvant chemotherapy she reports. Last MMG 11/2016 normal. Adbominal surgery include cholecystectomy, TVH, xlap/removal ovary for ectopic. She reports that her physician told her "some ovary remains in situ". Family history significant for mother with pancreatic cancer.      CT chest 8/28/17 showed mediastinal adenopathy consistent with Stage IV disease.   RECIST Summary:  Mediastinal adenopathy:  1. Preaortic abnormal node measuring 1.7 cm short axis.  2. Pretracheal node measures 1.6 cm short axis.     She underwent surgical cytoreduction with xlap/LSO/omentectomy 9/21/17. Pathology showed high grade adenocarcinoma.        She is also  followed by Dr. Feliciano  She has transferred care " to undergo  adjuvant chemo at this location   She completed adjuvant chemo with carbo AUC 6 and taxol 175mg/m2 q 21d  2/12/2018      Accompanied by friend    Pt c/o dizziness x 1 month  Pt reports episodes of HA 2-3 weeks   She does not take home BP recordings  She continues with  Intermittent tingling in hands and feet   No fatigue  No  weakness  Appetite and weight stable   No abd pain   No melena, hematochezia or change in bowel habits  No N/V   No SOB/CP    She is followed by Dr. Feliciano  She was considered for PRIMA clinical trial for maintenance therapy     Biochemical profile reveals rising Cr level from 1.4mg/dL  to 1.6mg/dL     BRCA ANALYSIS NEG    Past Medical History:   Diagnosis Date    Breast cancer     Cataract     CKD (chronic kidney disease) stage 3, GFR 30-59 ml/min     Hyperlipidemia     Hypertension     Hypothyroidism     Obesity     Osteopenia     Ovarian cancer 10/8/2017    Overactive bladder     Pelvic mass 8/27/2017    Thyroid disease          Past Surgical History:   Procedure Laterality Date    bt catarac surgery      CHOLECYSTECTOMY      HYSTERECTOMY      MASTECTOMY Left              Review of Systems   Constitutional: Negative for appetite change, fatigue, fever and unexpected weight change.   HENT: Negative for mouth sores.    Eyes: Negative for visual disturbance.   Respiratory: Negative for cough and shortness of breath.    Cardiovascular: Negative for chest pain.   Gastrointestinal: Negative for abdominal pain, diarrhea and nausea.   Genitourinary: Negative for frequency.   Musculoskeletal: Negative for back pain.   Skin: Negative for rash.        No petechiae   Neurological: Positive for dizziness and light-headedness. Negative for weakness and headaches.   Hematological: Negative for adenopathy.   Psychiatric/Behavioral: The patient is not nervous/anxious.        Objective:       Vitals:    05/28/18 0815   BP: 99/62   BP Location: Right arm   Patient Position: Sitting    BP Method: Medium (Automatic)   Pulse: 73   Temp: 97.5 °F (36.4 °C)   TempSrc: Oral   SpO2: 97%   Weight: 72.8 kg (160 lb 9.7 oz)       Physical Exam   Constitutional: She is oriented to person, place, and time. She appears well-developed and well-nourished.   HENT:   Head: Normocephalic.   Mouth/Throat: Oropharynx is clear and moist. No oropharyngeal exudate.   Eyes: Conjunctivae and lids are normal. Pupils are equal, round, and reactive to light. No scleral icterus.   Neck: Normal range of motion. Neck supple. No thyromegaly present.   Cardiovascular: Normal rate, regular rhythm and normal heart sounds.    No murmur heard.  Pulmonary/Chest: Breath sounds normal. She has no wheezes. She has no rales.   Abdominal: Soft. Bowel sounds are normal. She exhibits no distension. There is no hepatosplenomegaly. There is no tenderness.   Well-healed midline incision   Musculoskeletal: Normal range of motion. She exhibits no edema or tenderness.   Lymphadenopathy:     She has no cervical adenopathy.     She has no axillary adenopathy.        Right: No supraclavicular adenopathy present.        Left: No supraclavicular adenopathy present.   Neurological: She is alert and oriented to person, place, and time. No cranial nerve deficit. Coordination normal.   Skin: Skin is warm and dry. No ecchymosis, no petechiae and no rash noted. No erythema.   Psychiatric: She has a normal mood and affect.         Labs:   Lab Results   Component Value Date    WBC 4.51 05/25/2018    HGB 11.5 (L) 05/25/2018    HCT 34.0 (L) 05/25/2018    MCV 95 05/25/2018     05/25/2018         Component      Latest Ref Rng & Units 1/15/2018 12/5/2017 10/18/2017 8/25/2017         0 - 30 U/mL 23 37 (H) 230 (H) 849 (H)         Results for JONATAN SERRANO (MRN [id number]) as of 4/16/2018 13:18   Ref. Range 1/15/2018 09:45 2/9/2018 12:40 3/28/2018 11:42    Latest Ref Range: 0 - 30 U/mL 23 22 12     Results for JONATAN SERRANO (MRN [id number]) as of  5/28/2018 08:26   Ref. Range 3/28/2018 11:42 5/25/2018 10:01    Latest Ref Range: 0 - 30 U/mL 12 13       Assessment:       1. Malignant neoplasm of ovary, unspecified laterality    2. History of chemotherapy    3. Chemotherapy-induced neuropathy    4. CKD (chronic kidney disease) stage 3, GFR 30-59 ml/min    5. Dizziness    6. Nonintractable headache, unspecified chronicity pattern, unspecified headache type        Plan:   1- 6 Pt clinically stable   Patient with ovarian cancer status post debulking surgery 9/21/2017 undergoing adjuvant platinum-based chemotherapy with carbo/taxol .   S/p carbo/taxol x 6 completed 2/12/2018  BRCA ANALYSIS NEG  Normalization of   Cr level  1.4 to 1. 6  Hb 11.5 g/dl   Cont gabapentin 300-300-300     Plan MRI brain  Plan IVF hydration today in infusion ctr  Close follow-up with PCP for BP check( adjustment of bp meds)       Follow-up with Dr. Feliciano for PRIMA Trial       If pt elects to enroll in clinical trial she will transfer care to [facility]     Follow-up 2 mos with Cbc,cmp ,  mg prior to f/u        Cc: Iesha Feliciano M.D.          Anil Pelayo M.C.

## 2018-05-28 NOTE — PLAN OF CARE
Problem: Patient Care Overview (Adult)  Goal: Plan of Care Review  Outcome: Ongoing (interventions implemented as appropriate)  Tolerated IVF. No complaints voiced. Pt discharged with family.

## 2018-05-28 NOTE — Clinical Note
250 cc NS IV infusion ctr today Follow-up with PCP - Dr. Mccoy - dizziness/low prssure MRI brain today or tomorrow Cbc,cmp,  prior to f/u

## 2018-05-31 ENCOUNTER — HOSPITAL ENCOUNTER (OUTPATIENT)
Dept: RADIOLOGY | Facility: HOSPITAL | Age: 83
Discharge: HOME | End: 2018-05-31
Attending: INTERNAL MEDICINE
Payer: MEDICARE

## 2018-05-31 DIAGNOSIS — R51.9 NONINTRACTABLE HEADACHE, UNSPECIFIED CHRONICITY PATTERN, UNSPECIFIED HEADACHE TYPE: ICD-10-CM

## 2018-05-31 DIAGNOSIS — C56.9 MALIGNANT NEOPLASM OF OVARY, UNSPECIFIED LATERALITY: ICD-10-CM

## 2018-05-31 PROCEDURE — 70551 MRI BRAIN STEM W/O DYE: CPT | Mod: TC

## 2018-05-31 PROCEDURE — 70551 MRI BRAIN STEM W/O DYE: CPT | Mod: 26,,, | Performed by: RADIOLOGY

## 2018-06-22 RX ORDER — LEVOTHYROXINE SODIUM 125 UG/1
125 TABLET ORAL
Qty: 90 TABLET | Refills: 3 | Status: SHIPPED | OUTPATIENT
Start: 2018-06-22 | End: 2018-12-13 | Stop reason: SDUPTHER

## 2018-06-22 NOTE — TELEPHONE ENCOUNTER
----- Message from Argelia Burt sent at 6/22/2018  3:11 PM CDT -----  Contact: [phone] or [phone] self  Patient would like refill of levothyroxine (SYNTHROID) 125 MCG tablet sent to [facility] [id number]. Please advise.

## 2018-06-25 RX ORDER — VALSARTAN 160 MG/1
TABLET ORAL
Qty: 90 TABLET | Refills: 0 | Status: SHIPPED | OUTPATIENT
Start: 2018-06-25 | End: 2018-06-26 | Stop reason: SDUPTHER

## 2018-06-26 ENCOUNTER — OFFICE VISIT (OUTPATIENT)
Dept: FAMILY MEDICINE | Facility: CLINIC | Age: 83
End: 2018-06-26
Payer: MEDICARE

## 2018-06-26 VITALS
BODY MASS INDEX: 29.18 KG/M2 | WEIGHT: 164.69 LBS | SYSTOLIC BLOOD PRESSURE: 110 MMHG | HEIGHT: 63 IN | DIASTOLIC BLOOD PRESSURE: 58 MMHG | HEART RATE: 69 BPM | OXYGEN SATURATION: 98 %

## 2018-06-26 DIAGNOSIS — K21.9 GASTROESOPHAGEAL REFLUX DISEASE, ESOPHAGITIS PRESENCE NOT SPECIFIED: ICD-10-CM

## 2018-06-26 DIAGNOSIS — G89.4 CHRONIC PAIN SYNDROME: ICD-10-CM

## 2018-06-26 DIAGNOSIS — R20.0 NUMBNESS AND TINGLING OF BOTH LEGS: ICD-10-CM

## 2018-06-26 DIAGNOSIS — R14.0 ABDOMINAL BLOATING: ICD-10-CM

## 2018-06-26 DIAGNOSIS — I10 ESSENTIAL HYPERTENSION: Primary | ICD-10-CM

## 2018-06-26 DIAGNOSIS — R20.2 NUMBNESS AND TINGLING OF BOTH LEGS: ICD-10-CM

## 2018-06-26 PROCEDURE — 99214 OFFICE O/P EST MOD 30 MIN: CPT | Mod: S$GLB,,, | Performed by: FAMILY MEDICINE

## 2018-06-26 PROCEDURE — 3078F DIAST BP <80 MM HG: CPT | Mod: CPTII,S$GLB,, | Performed by: FAMILY MEDICINE

## 2018-06-26 PROCEDURE — 99999 PR PBB SHADOW E&M-EST. PATIENT-LVL III: CPT | Mod: PBBFAC,,, | Performed by: FAMILY MEDICINE

## 2018-06-26 PROCEDURE — 3074F SYST BP LT 130 MM HG: CPT | Mod: CPTII,S$GLB,, | Performed by: FAMILY MEDICINE

## 2018-06-26 RX ORDER — VALSARTAN 160 MG/1
160 TABLET ORAL DAILY
Qty: 90 TABLET | Refills: 3 | Status: SHIPPED | OUTPATIENT
Start: 2018-06-26 | End: 2018-12-27 | Stop reason: SDUPTHER

## 2018-06-26 RX ORDER — PREGABALIN 50 MG/1
50 CAPSULE ORAL 2 TIMES DAILY
Qty: 180 CAPSULE | Refills: 0 | Status: SHIPPED | OUTPATIENT
Start: 2018-06-26 | End: 2019-01-09

## 2018-06-26 RX ORDER — OMEPRAZOLE 20 MG/1
20 CAPSULE, DELAYED RELEASE ORAL
Qty: 90 CAPSULE | Refills: 3 | Status: SHIPPED | OUTPATIENT
Start: 2018-06-26 | End: 2018-12-28 | Stop reason: SDUPTHER

## 2018-06-26 RX ORDER — TRAMADOL HYDROCHLORIDE 50 MG/1
50 TABLET ORAL EVERY 6 HOURS PRN
COMMUNITY
End: 2018-06-26 | Stop reason: SDUPTHER

## 2018-06-26 RX ORDER — TRAMADOL HYDROCHLORIDE 50 MG/1
50 TABLET ORAL EVERY 8 HOURS PRN
Qty: 40 TABLET | Refills: 0 | Status: SHIPPED | OUTPATIENT
Start: 2018-06-26 | End: 2018-09-06 | Stop reason: SDUPTHER

## 2018-06-26 NOTE — PROGRESS NOTES
Subjective:       Patient ID: Celine Mahan is a 83 y.o. female.    Chief Complaint: Follow-up (on hypertension) and Knee Pain (10 months ago on and off)    83 years old female came to the clinic who came to the clinic with bilateral lower extremities numbness for the last couple of months after the chemotherapy.  Patient with tingling sometimes.  Patient requests some medicine to help her to control the reflux.  Patient with chronic pain using tramadol with partial improvement of the symptoms.  Blood pressure today stable.  No chest pain, palpitation orthopnea or PND.        Knee Pain        Review of Systems   Constitutional: Negative.    HENT: Negative.    Eyes: Negative.    Respiratory: Negative.    Gastrointestinal: Negative.    Genitourinary: Negative.    Musculoskeletal: Negative.    Neurological: Negative.    Psychiatric/Behavioral: Negative.        Objective:      Physical Exam   Constitutional: She is oriented to person, place, and time. She appears well-developed and well-nourished. No distress.   HENT:   Head: Normocephalic and atraumatic.   Right Ear: External ear normal.   Left Ear: External ear normal.   Nose: Nose normal.   Mouth/Throat: Oropharynx is clear and moist. No oropharyngeal exudate.   Eyes: Conjunctivae and EOM are normal. Pupils are equal, round, and reactive to light. Right eye exhibits no discharge. Left eye exhibits no discharge. No scleral icterus.   Neck: Normal range of motion. Neck supple. No JVD present. No tracheal deviation present. No thyromegaly present.   Cardiovascular: Normal rate, regular rhythm, normal heart sounds and intact distal pulses.  Exam reveals no gallop and no friction rub.    No murmur heard.  Pulmonary/Chest: Effort normal and breath sounds normal. No stridor. No respiratory distress. She has no wheezes. She has no rales. She exhibits no tenderness.   Abdominal: Soft. Bowel sounds are normal. She exhibits no distension and no mass. There is no tenderness.  There is no rebound and no guarding.   Musculoskeletal: Normal range of motion. She exhibits no edema or tenderness.   Lymphadenopathy:     She has no cervical adenopathy.   Neurological: She is alert and oriented to person, place, and time. She has normal reflexes. No cranial nerve deficit. She exhibits normal muscle tone. Coordination and gait abnormal.   Skin: Skin is warm and dry. No rash noted. She is not diaphoretic. No erythema. No pallor.   Psychiatric: Her behavior is normal. Judgment and thought content normal. Her mood appears not anxious. Her affect is not angry, not blunt, not labile and not inappropriate. She exhibits a depressed mood.       Assessment:       1. Essential hypertension    2. Numbness and tingling of both legs    3. Gastroesophageal reflux disease, esophagitis presence not specified    4. Abdominal bloating    5. Chronic pain syndrome        Plan:         Celine was seen today for follow-up and knee pain.    Diagnoses and all orders for this visit:    Essential hypertension  -     valsartan (DIOVAN) 160 MG tablet; Take 1 tablet (160 mg total) by mouth once daily.    Numbness and tingling of both legs  -     pregabalin (LYRICA) 50 MG capsule; Take 1 capsule (50 mg total) by mouth 2 (two) times daily.    Gastroesophageal reflux disease, esophagitis presence not specified  -     omeprazole (PRILOSEC) 20 MG capsule; Take 1 capsule (20 mg total) by mouth before breakfast.    Abdominal bloating  -     omeprazole (PRILOSEC) 20 MG capsule; Take 1 capsule (20 mg total) by mouth before breakfast.    Chronic pain syndrome  -     traMADol (ULTRAM) 50 mg tablet; Take 1 tablet (50 mg total) by mouth every 8 (eight) hours as needed for Pain.    Continue monitoring blood pressure at home, low sodium diet.

## 2018-07-27 ENCOUNTER — LAB VISIT (OUTPATIENT)
Dept: LAB | Facility: HOSPITAL | Age: 83
End: 2018-07-27
Attending: INTERNAL MEDICINE
Payer: MEDICARE

## 2018-07-27 DIAGNOSIS — C56.9 OVARIAN CA: ICD-10-CM

## 2018-07-27 DIAGNOSIS — C56.9 MALIGNANT NEOPLASM OF OVARY, UNSPECIFIED LATERALITY: ICD-10-CM

## 2018-07-27 LAB
ALBUMIN SERPL BCP-MCNC: 3.9 G/DL
ALP SERPL-CCNC: 83 U/L
ALT SERPL W/O P-5'-P-CCNC: 11 U/L
ANION GAP SERPL CALC-SCNC: 8 MMOL/L
AST SERPL-CCNC: 14 U/L
BASOPHILS # BLD AUTO: 0.06 K/UL
BASOPHILS NFR BLD: 1.4 %
BILIRUB SERPL-MCNC: 0.4 MG/DL
BUN SERPL-MCNC: 25 MG/DL
CALCIUM SERPL-MCNC: 10 MG/DL
CANCER AG125 SERPL-ACNC: 12 U/ML
CHLORIDE SERPL-SCNC: 107 MMOL/L
CO2 SERPL-SCNC: 24 MMOL/L
CREAT SERPL-MCNC: 1.6 MG/DL
DIFFERENTIAL METHOD: ABNORMAL
EOSINOPHIL # BLD AUTO: 0.2 K/UL
EOSINOPHIL NFR BLD: 5.3 %
ERYTHROCYTE [DISTWIDTH] IN BLOOD BY AUTOMATED COUNT: 14.3 %
EST. GFR  (AFRICAN AMERICAN): 34 ML/MIN/1.73 M^2
EST. GFR  (NON AFRICAN AMERICAN): 30 ML/MIN/1.73 M^2
GLUCOSE SERPL-MCNC: 105 MG/DL
HCT VFR BLD AUTO: 35.6 %
HGB BLD-MCNC: 11.7 G/DL
LYMPHOCYTES # BLD AUTO: 1.2 K/UL
LYMPHOCYTES NFR BLD: 29.6 %
MCH RBC QN AUTO: 29.9 PG
MCHC RBC AUTO-ENTMCNC: 32.9 G/DL
MCV RBC AUTO: 91 FL
MONOCYTES # BLD AUTO: 0.3 K/UL
MONOCYTES NFR BLD: 7 %
NEUTROPHILS # BLD AUTO: 2.3 K/UL
NEUTROPHILS NFR BLD: 56.5 %
PLATELET # BLD AUTO: 203 K/UL
PMV BLD AUTO: 9.4 FL
POTASSIUM SERPL-SCNC: 4.5 MMOL/L
PROT SERPL-MCNC: 7.3 G/DL
RBC # BLD AUTO: 3.91 M/UL
SODIUM SERPL-SCNC: 139 MMOL/L
WBC # BLD AUTO: 4.15 K/UL

## 2018-07-27 PROCEDURE — 80053 COMPREHEN METABOLIC PANEL: CPT

## 2018-07-27 PROCEDURE — 36415 COLL VENOUS BLD VENIPUNCTURE: CPT

## 2018-07-27 PROCEDURE — 86304 IMMUNOASSAY TUMOR CA 125: CPT

## 2018-07-27 PROCEDURE — 85025 COMPLETE CBC W/AUTO DIFF WBC: CPT

## 2018-08-06 ENCOUNTER — OFFICE VISIT (OUTPATIENT)
Dept: HEMATOLOGY/ONCOLOGY | Facility: CLINIC | Age: 83
End: 2018-08-06
Payer: MEDICARE

## 2018-08-06 VITALS
HEART RATE: 64 BPM | SYSTOLIC BLOOD PRESSURE: 128 MMHG | OXYGEN SATURATION: 98 % | BODY MASS INDEX: 31.4 KG/M2 | TEMPERATURE: 98 F | WEIGHT: 166.31 LBS | DIASTOLIC BLOOD PRESSURE: 64 MMHG | HEIGHT: 61 IN

## 2018-08-06 DIAGNOSIS — Z98.890 S/P EXPLORATORY LAPAROTOMY: ICD-10-CM

## 2018-08-06 DIAGNOSIS — C56.9 MALIGNANT NEOPLASM OF OVARY, UNSPECIFIED LATERALITY: Primary | ICD-10-CM

## 2018-08-06 DIAGNOSIS — N18.30 CKD (CHRONIC KIDNEY DISEASE) STAGE 3, GFR 30-59 ML/MIN: ICD-10-CM

## 2018-08-06 PROCEDURE — 3074F SYST BP LT 130 MM HG: CPT | Mod: CPTII,S$GLB,, | Performed by: INTERNAL MEDICINE

## 2018-08-06 PROCEDURE — 99213 OFFICE O/P EST LOW 20 MIN: CPT | Mod: S$GLB,,, | Performed by: INTERNAL MEDICINE

## 2018-08-06 PROCEDURE — 99499 UNLISTED E&M SERVICE: CPT | Mod: S$GLB,,, | Performed by: INTERNAL MEDICINE

## 2018-08-06 PROCEDURE — 3078F DIAST BP <80 MM HG: CPT | Mod: CPTII,S$GLB,, | Performed by: INTERNAL MEDICINE

## 2018-08-06 PROCEDURE — 99999 PR PBB SHADOW E&M-EST. PATIENT-LVL III: CPT | Mod: PBBFAC,,, | Performed by: INTERNAL MEDICINE

## 2018-08-06 NOTE — PROGRESS NOTES
"Subjective:       Patient ID: Celine Mahan is a 83 y.o. female.    Chief Complaint: Follow-up    HPI     Diagnosis: Ovarian cancer status post debulking surgery 9/21/2017  Therapy: S/p carbo/taxol x 6 completed 2/12/2018    Pt transferred care    HPI ( per Dr. Feliciano) Patient is an 83yo female who originally presented as a referral from Dr. Wills for pelvic mass. Patient reports LLQ pain for approximately 3 months which prompted evaluation.      Pelvic US 8/7/17 There is a complex echogenicity midline pelvic mass measuring 18.1 x 8.6 x 7.6 cm.  Ovaries not visualized.     CT A&P 8/9/17 Large solid/cystic mass within the lower abdomen/upper pelvis that is most concerning for a malignant neoplasm, likely of ovarian origin given its location.  Correlation with previous surgical history is recommended noting evidence of previous hysterectomy.  Mass abuts and displaces the adjacent bladder without definite CT findings to suggest hema invasion. Abdominal and pelvic lymphadenopathy concerning for lymphatic spread of neoplasm with index lymph nodes as above.      Medical history is significant for CKD (Cr 1.3), HTN, hypothyroid, HTN, HLD. She has a personal history of breast cancer in 1995 treated with mastectomy and adjuvant chemotherapy she reports. Last MMG 11/2016 normal. Adbominal surgery include cholecystectomy, TVH, xlap/removal ovary for ectopic. She reports that her physician told her "some ovary remains in situ". Family history significant for mother with pancreatic cancer.      CT chest 8/28/17 showed mediastinal adenopathy consistent with Stage IV disease.   RECIST Summary:  Mediastinal adenopathy:  1. Preaortic abnormal node measuring 1.7 cm short axis.  2. Pretracheal node measures 1.6 cm short axis.     She underwent surgical cytoreduction with xlap/LSO/omentectomy 9/21/17. Pathology showed high grade adenocarcinoma.        She is also  followed by Dr. Feliciano  She  transferred care to undergo  adjuvant " chemo at this location   She completed adjuvant chemo with carbo AUC 6 and taxol 175mg/m2 q 21d  2/12/2018      Accompanied by dtr    Today, she is doing well     Pt underwent MRI imaging brain for HA  MRI unremarkable  HA improved  She continues with  Intermittent tingling in hands and feet   No fatigue  No  weakness  Appetite and weight stable   No abd pain   No abd distension   No melena, hematochezia or change in bowel habits  No N/V   No SOB/CP    She is followed by Dr. Feliciano  She was considered for PRIMA clinical trial for maintenance therapy   Pt  Declined maintenance therapy    Biochemical profile reveals rising Cr level from 1.4mg/dL  to 1.6mg/dL     BRCA ANALYSIS NEG    Past Medical History:   Diagnosis Date    Breast cancer     Cataract     CKD (chronic kidney disease) stage 3, GFR 30-59 ml/min     Hyperlipidemia     Hypertension     Hypothyroidism     Obesity     Osteopenia     Ovarian cancer 10/8/2017    Overactive bladder     Pelvic mass 8/27/2017    Thyroid disease          Past Surgical History:   Procedure Laterality Date    bt catarac surgery      CHOLECYSTECTOMY      HYSTERECTOMY      MASTECTOMY Left              Review of Systems   Constitutional: Negative for appetite change, fatigue, fever and unexpected weight change.   HENT: Negative for mouth sores.    Eyes: Negative for visual disturbance.   Respiratory: Negative for cough and shortness of breath.    Cardiovascular: Negative for chest pain.   Gastrointestinal: Negative for abdominal pain, diarrhea and nausea.   Genitourinary: Negative for frequency.   Musculoskeletal: Negative for back pain.   Skin: Negative for rash.        No petechiae   Neurological: Positive for numbness. Negative for weakness, light-headedness and headaches.        Intermittent tingling   Hematological: Negative for adenopathy.   Psychiatric/Behavioral: The patient is not nervous/anxious.        Objective:       Vitals:    08/06/18 1539 08/06/18 1546  "  BP: (!) 140/79 128/64   BP Location: Right arm Right arm   Patient Position: Sitting Sitting   BP Method: Medium (Automatic) Medium (Manual)   Pulse: 64    Temp: 97.5 °F (36.4 °C)    TempSrc: Oral    SpO2: 98%    Weight: 75.4 kg (166 lb 5.4 oz)    Height: 5' 1" (1.549 m)        Physical Exam   Constitutional: She is oriented to person, place, and time. She appears well-developed and well-nourished.   HENT:   Head: Normocephalic.   Mouth/Throat: Oropharynx is clear and moist. No oropharyngeal exudate.   Eyes: Conjunctivae and lids are normal. Pupils are equal, round, and reactive to light. No scleral icterus.   Neck: Normal range of motion. Neck supple. No thyromegaly present.   Cardiovascular: Normal rate, regular rhythm and normal heart sounds.    No murmur heard.  Pulmonary/Chest: Breath sounds normal. She has no wheezes. She has no rales.   Abdominal: Soft. Bowel sounds are normal. She exhibits no distension. There is no hepatosplenomegaly. There is no tenderness.   Well-healed midline incision   Musculoskeletal: Normal range of motion. She exhibits no edema or tenderness.   Lymphadenopathy:     She has no cervical adenopathy.     She has no axillary adenopathy.        Right: No supraclavicular adenopathy present.        Left: No supraclavicular adenopathy present.   Neurological: She is alert and oriented to person, place, and time. No cranial nerve deficit. Coordination normal.   Skin: Skin is warm and dry. No ecchymosis, no petechiae and no rash noted. No erythema.   Psychiatric: She has a normal mood and affect.         Labs:   Lab Results   Component Value Date    WBC 4.15 07/27/2018    HGB 11.7 (L) 07/27/2018    HCT 35.6 (L) 07/27/2018    MCV 91 07/27/2018     07/27/2018         Component      Latest Ref Rng & Units 1/15/2018 12/5/2017 10/18/2017 8/25/2017         0 - 30 U/mL 23 37 (H) 230 (H) 849 (H)         Results for JONATAN SERRANO (MRN [id number]) as of 4/16/2018 13:18   Ref. Range " 1/15/2018 09:45 2/9/2018 12:40 3/28/2018 11:42    Latest Ref Range: 0 - 30 U/mL 23 22 12       Results for JONATAN SERRANO (MRN [id number]) as of 8/6/2018 15:48   Ref. Range 5/25/2018 10:01 5/31/2018 08:01 7/27/2018 10:18    Latest Ref Range: 0 - 30 U/mL 13  12     Assessment:       1. Malignant neoplasm of ovary, unspecified laterality    2. S/P exploratory laparotomy/LSO/omentectomy    3. CKD (chronic kidney disease) stage 3, GFR 30-59 ml/min        Plan:   1- 3  Pt clinically stable   Patient with ovarian cancer status post debulking surgery 9/21/2017 undergoing adjuvant platinum-based chemotherapy with carbo/taxol .   S/p carbo/taxol x 6 completed 2/12/2018  Pt declined maintenance therapy on trial   BRCA ANALYSIS NEG  Normalization of   Cr level  1.4 to 1. 6  Hb 11.7 g/dl   Cont gabapentin 300-300-300     MRI brain unremarkable      Follow-up 2 mos with Cbc,cmp ,  mg prior to f/u        Cc: Iesha Feliciano M.D.          Anil Pelayo M.C.

## 2018-09-06 DIAGNOSIS — G89.4 CHRONIC PAIN SYNDROME: ICD-10-CM

## 2018-09-06 RX ORDER — TRAMADOL HYDROCHLORIDE 50 MG/1
50 TABLET ORAL EVERY 8 HOURS PRN
Qty: 40 TABLET | Refills: 0 | Status: SHIPPED | OUTPATIENT
Start: 2018-09-06 | End: 2018-10-22 | Stop reason: SDUPTHER

## 2018-10-09 ENCOUNTER — TELEPHONE (OUTPATIENT)
Dept: HEMATOLOGY/ONCOLOGY | Facility: CLINIC | Age: 83
End: 2018-10-09

## 2018-10-10 ENCOUNTER — LAB VISIT (OUTPATIENT)
Dept: LAB | Facility: HOSPITAL | Age: 83
End: 2018-10-10
Attending: INTERNAL MEDICINE
Payer: MEDICARE

## 2018-10-10 ENCOUNTER — OFFICE VISIT (OUTPATIENT)
Dept: HEMATOLOGY/ONCOLOGY | Facility: CLINIC | Age: 83
End: 2018-10-10
Payer: MEDICARE

## 2018-10-10 VITALS
HEART RATE: 69 BPM | BODY MASS INDEX: 33.18 KG/M2 | SYSTOLIC BLOOD PRESSURE: 130 MMHG | DIASTOLIC BLOOD PRESSURE: 63 MMHG | TEMPERATURE: 98 F | HEIGHT: 62 IN | WEIGHT: 180.31 LBS | OXYGEN SATURATION: 97 %

## 2018-10-10 DIAGNOSIS — C56.9 MALIGNANT NEOPLASM OF OVARY, UNSPECIFIED LATERALITY: Primary | ICD-10-CM

## 2018-10-10 DIAGNOSIS — Z92.21 HISTORY OF CHEMOTHERAPY: ICD-10-CM

## 2018-10-10 DIAGNOSIS — T45.1X5A CHEMOTHERAPY-INDUCED NEUROPATHY: ICD-10-CM

## 2018-10-10 DIAGNOSIS — G62.0 CHEMOTHERAPY-INDUCED NEUROPATHY: ICD-10-CM

## 2018-10-10 DIAGNOSIS — N18.30 CKD (CHRONIC KIDNEY DISEASE) STAGE 3, GFR 30-59 ML/MIN: ICD-10-CM

## 2018-10-10 DIAGNOSIS — C56.9 MALIGNANT NEOPLASM OF OVARY, UNSPECIFIED LATERALITY: ICD-10-CM

## 2018-10-10 LAB
ALBUMIN SERPL BCP-MCNC: 3.5 G/DL
ALP SERPL-CCNC: 76 U/L
ALT SERPL W/O P-5'-P-CCNC: 11 U/L
ANION GAP SERPL CALC-SCNC: 10 MMOL/L
AST SERPL-CCNC: 15 U/L
BASOPHILS # BLD AUTO: 0.04 K/UL
BASOPHILS NFR BLD: 0.8 %
BILIRUB SERPL-MCNC: 0.4 MG/DL
BUN SERPL-MCNC: 30 MG/DL
CALCIUM SERPL-MCNC: 9.3 MG/DL
CHLORIDE SERPL-SCNC: 107 MMOL/L
CO2 SERPL-SCNC: 23 MMOL/L
CREAT SERPL-MCNC: 1.6 MG/DL
DIFFERENTIAL METHOD: ABNORMAL
EOSINOPHIL # BLD AUTO: 0.3 K/UL
EOSINOPHIL NFR BLD: 5.4 %
ERYTHROCYTE [DISTWIDTH] IN BLOOD BY AUTOMATED COUNT: 14.4 %
EST. GFR  (AFRICAN AMERICAN): 34 ML/MIN/1.73 M^2
EST. GFR  (NON AFRICAN AMERICAN): 30 ML/MIN/1.73 M^2
GLUCOSE SERPL-MCNC: 90 MG/DL
HCT VFR BLD AUTO: 34.8 %
HGB BLD-MCNC: 11.4 G/DL
LYMPHOCYTES # BLD AUTO: 1.4 K/UL
LYMPHOCYTES NFR BLD: 27 %
MCH RBC QN AUTO: 29.5 PG
MCHC RBC AUTO-ENTMCNC: 32.8 G/DL
MCV RBC AUTO: 90 FL
MONOCYTES # BLD AUTO: 0.4 K/UL
MONOCYTES NFR BLD: 8.7 %
NEUTROPHILS # BLD AUTO: 2.9 K/UL
NEUTROPHILS NFR BLD: 57.7 %
PLATELET # BLD AUTO: 198 K/UL
PMV BLD AUTO: 8.9 FL
POTASSIUM SERPL-SCNC: 4.6 MMOL/L
PROT SERPL-MCNC: 6.9 G/DL
RBC # BLD AUTO: 3.86 M/UL
SODIUM SERPL-SCNC: 140 MMOL/L
WBC # BLD AUTO: 5.03 K/UL

## 2018-10-10 PROCEDURE — 99999 PR PBB SHADOW E&M-EST. PATIENT-LVL III: CPT | Mod: PBBFAC,,, | Performed by: INTERNAL MEDICINE

## 2018-10-10 PROCEDURE — 3078F DIAST BP <80 MM HG: CPT | Mod: CPTII,,, | Performed by: INTERNAL MEDICINE

## 2018-10-10 PROCEDURE — 85025 COMPLETE CBC W/AUTO DIFF WBC: CPT

## 2018-10-10 PROCEDURE — 99214 OFFICE O/P EST MOD 30 MIN: CPT | Mod: S$PBB,,, | Performed by: INTERNAL MEDICINE

## 2018-10-10 PROCEDURE — 36415 COLL VENOUS BLD VENIPUNCTURE: CPT

## 2018-10-10 PROCEDURE — 80053 COMPREHEN METABOLIC PANEL: CPT

## 2018-10-10 PROCEDURE — 1101F PT FALLS ASSESS-DOCD LE1/YR: CPT | Mod: CPTII,,, | Performed by: INTERNAL MEDICINE

## 2018-10-10 PROCEDURE — 99499 UNLISTED E&M SERVICE: CPT | Mod: S$GLB,,, | Performed by: INTERNAL MEDICINE

## 2018-10-10 PROCEDURE — 3075F SYST BP GE 130 - 139MM HG: CPT | Mod: CPTII,,, | Performed by: INTERNAL MEDICINE

## 2018-10-10 PROCEDURE — 99213 OFFICE O/P EST LOW 20 MIN: CPT | Mod: PBBFAC | Performed by: INTERNAL MEDICINE

## 2018-10-10 PROCEDURE — 86304 IMMUNOASSAY TUMOR CA 125: CPT

## 2018-10-10 NOTE — PROGRESS NOTES
"Subjective:       Patient ID: Celine Mahan is a 83 y.o. female.    Chief Complaint: Follow-up    HPI     Diagnosis: Ovarian cancer status post debulking surgery 9/21/2017  Therapy: S/p carbo/taxol x 6 completed 2/12/2018    Pt transferred care    HPI ( per Dr. Feliciano) Patient is an 83yo female who originally presented as a referral from Dr. Wills for pelvic mass. Patient reports LLQ pain for approximately 3 months which prompted evaluation.      Pelvic US 8/7/17 There is a complex echogenicity midline pelvic mass measuring 18.1 x 8.6 x 7.6 cm.  Ovaries not visualized.     CT A&P 8/9/17 Large solid/cystic mass within the lower abdomen/upper pelvis that is most concerning for a malignant neoplasm, likely of ovarian origin given its location.  Correlation with previous surgical history is recommended noting evidence of previous hysterectomy.  Mass abuts and displaces the adjacent bladder without definite CT findings to suggest hema invasion. Abdominal and pelvic lymphadenopathy concerning for lymphatic spread of neoplasm with index lymph nodes as above.      Medical history is significant for CKD (Cr 1.3), HTN, hypothyroid, HTN, HLD. She has a personal history of breast cancer in 1995 treated with mastectomy and adjuvant chemotherapy she reports. Last MMG 11/2016 normal. Adbominal surgery include cholecystectomy, TVH, xlap/removal ovary for ectopic. She reports that her physician told her "some ovary remains in situ". Family history significant for mother with pancreatic cancer.      CT chest 8/28/17 showed mediastinal adenopathy consistent with Stage IV disease.   RECIST Summary:  Mediastinal adenopathy:  1. Preaortic abnormal node measuring 1.7 cm short axis.  2. Pretracheal node measures 1.6 cm short axis.     She underwent surgical cytoreduction with xlap/LSO/omentectomy 9/21/17. Pathology showed high grade adenocarcinoma.        She is also  followed by Dr. Feliciano  She  transferred care to undergo  adjuvant " chemo at this location   She completed adjuvant chemo with carbo AUC 6 and taxol 175mg/m2 q 21d  2/12/2018      Accompanied by dtr    LABS NOT DONE    Today, she is doing well     Pt underwent MRI imaging brain for HA  MRI unremarkable  She no longer has HA   She continues with  Intermittent tingling in hands and feet   She is only taking prescribed gabapentin once daily ( sedation)  No fatigue  Appetite and weight stable   No abd pain   No abd distension   No melena, hematochezia or change in bowel habits  No SOB/CP    She is followed by Dr. Feliciano  She was considered for PRIMA clinical trial for maintenance therapy   Pt  Declined maintenance therapy    Biochemical profile reveals rising Cr level from 1.4mg/dL  to 1.6mg/dL     BRCA ANALYSIS NEG    Past Medical History:   Diagnosis Date    Breast cancer     Cataract     CKD (chronic kidney disease) stage 3, GFR 30-59 ml/min     Hyperlipidemia     Hypertension     Hypothyroidism     Obesity     Osteopenia     Ovarian cancer 10/8/2017    Overactive bladder     Pelvic mass 8/27/2017    Thyroid disease          Past Surgical History:   Procedure Laterality Date    bt catarac surgery      CHOLECYSTECTOMY      COLONOSCOPY N/A 2/24/2015    [facility]    EXPLORATORY-LAPAROTOMY N/A 9/21/2017    [facility]formed [facility] at [facility] OR 2ND [facility]    HYSTERECTOMY      MASTECTOMY Left     OMENTECTOMY Bilateral 9/21/2017    [facility] at [facility] OR 2ND [facility]    SALPINGO-OOPHERECTOMY Left 9/21/2017    Performed by [facility] at [facility] OR 2ND [facility]LR             Review of Systems   Constitutional: Negative for appetite change, fatigue, fever and unexpected weight change.   HENT: Negative for mouth sores.    Eyes: Negative for visual disturbance.   Respiratory: Negative for cough and shortness of breath.    Cardiovascular: Negative for chest pain.   Gastrointestinal: Negative for abdominal pain, diarrhea and  "nausea.   Genitourinary: Negative for frequency.   Musculoskeletal: Negative for back pain.   Skin: Negative for rash.        No petechiae   Neurological: Positive for numbness. Negative for weakness, light-headedness and headaches.        Intermittent tingling   Hematological: Negative for adenopathy.   Psychiatric/Behavioral: The patient is not nervous/anxious.        Objective:       Vitals:    10/10/18 1028   BP: 130/63   BP Location: Right arm   Patient Position: Sitting   BP Method: Medium (Automatic)   Pulse: 69   Temp: 97.6 °F (36.4 °C)   TempSrc: Oral   SpO2: 97%   Weight: 81.8 kg (180 lb 5.4 oz)   Height: 5' 2" (1.575 m)       Physical Exam   Constitutional: She is oriented to person, place, and time. She appears well-developed and well-nourished.   HENT:   Head: Normocephalic.   Mouth/Throat: Oropharynx is clear and moist. No oropharyngeal exudate.   Eyes: Conjunctivae and lids are normal. Pupils are equal, round, and reactive to light. No scleral icterus.   Neck: Normal range of motion. Neck supple. No thyromegaly present.   Cardiovascular: Normal rate, regular rhythm and normal heart sounds.   No murmur heard.  Pulmonary/Chest: Breath sounds normal. She has no wheezes. She has no rales.   Abdominal: Soft. Bowel sounds are normal. She exhibits no distension. There is no hepatosplenomegaly. There is no tenderness.   Well-healed midline incision   Musculoskeletal: Normal range of motion. She exhibits no edema or tenderness.   Lymphadenopathy:     She has no cervical adenopathy.     She has no axillary adenopathy.        Right: No supraclavicular adenopathy present.        Left: No supraclavicular adenopathy present.   Neurological: She is alert and oriented to person, place, and time. No cranial nerve deficit. Coordination normal.   Skin: Skin is warm and dry. No ecchymosis, no petechiae and no rash noted. No erythema.   Psychiatric: She has a normal mood and affect.         Labs:   Lab Results   Component " Value Date    WBC 5.03 10/10/2018    HGB 11.4 (L) 10/10/2018    HCT 34.8 (L) 10/10/2018    MCV 90 10/10/2018     10/10/2018         Component      Latest Ref Rng & Units 1/15/2018 12/5/2017 10/18/2017 8/25/2017         0 - 30 U/mL 23 37 (H) 230 (H) 849 (H)         Results for JONATAN SERRANO (MRN [id number]) as of 4/16/2018 13:18   Ref. Range 1/15/2018 09:45 2/9/2018 12:40 3/28/2018 11:42    Latest Ref Range: 0 - 30 U/mL 23 22 12       Results for JONATAN SERRANO (MRN [id number]) as of 8/6/2018 15:48   Ref. Range 5/25/2018 10:01 7/27/2018 10:18    Latest Ref Range: 0 - 30 U/mL 13 12     Assessment:       1. Malignant neoplasm of ovary, unspecified laterality    2. Chemotherapy-induced neuropathy    3. CKD (chronic kidney disease) stage 3, GFR 30-59 ml/min    4. History of chemotherapy        Plan:   1- 4  Pt clinically stable   Patient with ovarian cancer status post debulking surgery 9/21/2017 undergoing adjuvant platinum-based chemotherapy with carbo/taxol .   S/p carbo/taxol x 6 completed 2/12/2018  Pt declined maintenance therapy on trial   BRCA ANALYSIS NEG  No labs done   Plan labs today   Cont gabapentin 300 ( pt not taking tid due to sedation)  ( Lyrica previously prescribed -not filled secondary to affordability)       Follow-up 3  mos with Cbc,cmp ,  mg prior to f/u        Cc: Iesha Feliciano M.D.          Anil Pelayo M.C.

## 2018-10-11 LAB — CANCER AG125 SERPL-ACNC: 40 U/ML

## 2018-10-22 DIAGNOSIS — G89.4 CHRONIC PAIN SYNDROME: ICD-10-CM

## 2018-10-22 RX ORDER — TRAMADOL HYDROCHLORIDE 50 MG/1
50 TABLET ORAL EVERY 8 HOURS PRN
Qty: 40 TABLET | Refills: 0 | Status: SHIPPED | OUTPATIENT
Start: 2018-10-22 | End: 2018-12-13 | Stop reason: SDUPTHER

## 2018-10-22 NOTE — TELEPHONE ENCOUNTER
----- Message from Nayeli Bentley sent at 10/22/2018  1:44 PM CDT -----  Contact: Aicha  / [phone]   Patient is requesting a refill on the below. Please advise      traMADol (ULTRAM) 50 mg tablet        Pharmacy     [facility] [id number] [facility], [place] - [address] AT [facility] [address]

## 2018-12-05 ENCOUNTER — OFFICE VISIT (OUTPATIENT)
Dept: FAMILY MEDICINE | Facility: CLINIC | Age: 83
End: 2018-12-05
Payer: MEDICARE

## 2018-12-05 ENCOUNTER — HOSPITAL ENCOUNTER (OUTPATIENT)
Dept: RADIOLOGY | Facility: HOSPITAL | Age: 83
Discharge: HOME | End: 2018-12-05
Attending: FAMILY MEDICINE
Payer: MEDICARE

## 2018-12-05 VITALS
BODY MASS INDEX: 34.69 KG/M2 | WEIGHT: 188.5 LBS | HEIGHT: 62 IN | HEART RATE: 50 BPM | DIASTOLIC BLOOD PRESSURE: 58 MMHG | SYSTOLIC BLOOD PRESSURE: 112 MMHG | OXYGEN SATURATION: 97 %

## 2018-12-05 DIAGNOSIS — N18.30 CKD (CHRONIC KIDNEY DISEASE) STAGE 3, GFR 30-59 ML/MIN: ICD-10-CM

## 2018-12-05 DIAGNOSIS — M54.50 CHRONIC BILATERAL LOW BACK PAIN WITHOUT SCIATICA: ICD-10-CM

## 2018-12-05 DIAGNOSIS — Z78.0 ASYMPTOMATIC MENOPAUSE: ICD-10-CM

## 2018-12-05 DIAGNOSIS — G89.29 CHRONIC BILATERAL LOW BACK PAIN WITHOUT SCIATICA: ICD-10-CM

## 2018-12-05 DIAGNOSIS — I10 ESSENTIAL HYPERTENSION: Primary | ICD-10-CM

## 2018-12-05 PROCEDURE — 72100 X-RAY EXAM L-S SPINE 2/3 VWS: CPT | Mod: TC,FY,PO

## 2018-12-05 PROCEDURE — 99499 UNLISTED E&M SERVICE: CPT | Mod: S$GLB,,, | Performed by: FAMILY MEDICINE

## 2018-12-05 PROCEDURE — 1101F PT FALLS ASSESS-DOCD LE1/YR: CPT | Mod: CPTII,S$GLB,, | Performed by: FAMILY MEDICINE

## 2018-12-05 PROCEDURE — 3074F SYST BP LT 130 MM HG: CPT | Mod: CPTII,S$GLB,, | Performed by: FAMILY MEDICINE

## 2018-12-05 PROCEDURE — 99214 OFFICE O/P EST MOD 30 MIN: CPT | Mod: S$GLB,,, | Performed by: FAMILY MEDICINE

## 2018-12-05 PROCEDURE — 99999 PR PBB SHADOW E&M-EST. PATIENT-LVL III: CPT | Mod: PBBFAC,,, | Performed by: FAMILY MEDICINE

## 2018-12-05 PROCEDURE — 72100 X-RAY EXAM L-S SPINE 2/3 VWS: CPT | Mod: 26,,, | Performed by: RADIOLOGY

## 2018-12-05 PROCEDURE — 3078F DIAST BP <80 MM HG: CPT | Mod: CPTII,S$GLB,, | Performed by: FAMILY MEDICINE

## 2018-12-05 NOTE — PROGRESS NOTES
Subjective:       Patient ID: Celine Mahan is a 83 y.o. female.    Chief Complaint: Follow-up (on HTN) and Back Pain (on and off)    83 years old female came to the clinic for blood pressure check.  Blood pressure today stable.  No Chest pain, palpitation, orthopnea or PND.  Patient also with lower back pain for the couple of months.  The pain is 6/10 intensity on and off aggravated with activity and better with rest.  Patient was using tramadol with partial improvement of the symptoms.      Review of Systems   Constitutional: Negative.    HENT: Negative.    Eyes: Negative.    Respiratory: Negative.    Cardiovascular: Negative.    Gastrointestinal: Negative.    Genitourinary: Negative.    Musculoskeletal: Negative.    Skin: Negative.    Neurological: Negative.    Psychiatric/Behavioral: Negative.        Objective:      Physical Exam   Constitutional: She is oriented to person, place, and time. She appears well-developed and well-nourished. No distress.   HENT:   Head: Normocephalic and atraumatic.   Right Ear: External ear normal.   Left Ear: External ear normal.   Nose: Nose normal.   Mouth/Throat: Oropharynx is clear and moist. No oropharyngeal exudate.   Eyes: Conjunctivae and EOM are normal. Pupils are equal, round, and reactive to light. Right eye exhibits no discharge. Left eye exhibits no discharge. No scleral icterus.   Neck: Normal range of motion. Neck supple. No JVD present. No tracheal deviation present. No thyromegaly present.   Cardiovascular: Normal rate, regular rhythm, normal heart sounds and intact distal pulses. Exam reveals no gallop and no friction rub.   No murmur heard.  Pulmonary/Chest: Effort normal and breath sounds normal. No stridor. No respiratory distress. She has no wheezes. She has no rales. She exhibits no tenderness.   Abdominal: Soft. Bowel sounds are normal. She exhibits no distension and no mass. There is no tenderness. There is no rebound and no guarding.   Musculoskeletal:  She exhibits no edema.        Lumbar back: She exhibits decreased range of motion, tenderness, pain and spasm.   Lymphadenopathy:     She has no cervical adenopathy.   Neurological: She is alert and oriented to person, place, and time. She has normal reflexes. No cranial nerve deficit. She exhibits normal muscle tone. Coordination and gait abnormal.   Skin: Skin is warm and dry. No rash noted. She is not diaphoretic. No erythema. No pallor.   Psychiatric: She has a normal mood and affect. Her behavior is normal. Judgment and thought content normal.       Assessment:       1. Essential hypertension    2. CKD (chronic kidney disease) stage 3, GFR 30-59 ml/min    3. Chronic bilateral low back pain without sciatica    4. Asymptomatic menopause        Plan:         Celine was seen today for follow-up and back pain.    Diagnoses and all orders for this visit:    Essential hypertension  -     TSH; Future  -     Comprehensive metabolic panel; Future  -     Lipid panel; Future    CKD (chronic kidney disease) stage 3, GFR 30-59 ml/min  -     Comprehensive metabolic panel; Future    Chronic bilateral low back pain without sciatica  -     X-Ray Lumbar Spine Ap And Lateral; Future    Asymptomatic menopause  -     DXA Bone Density Spine And Hip; Future    Continue monitoring blood pressure at home, low sodium diet.

## 2018-12-13 ENCOUNTER — LAB VISIT (OUTPATIENT)
Dept: LAB | Facility: HOSPITAL | Age: 83
End: 2018-12-13
Attending: FAMILY MEDICINE
Payer: MEDICARE

## 2018-12-13 DIAGNOSIS — I10 ESSENTIAL HYPERTENSION: ICD-10-CM

## 2018-12-13 DIAGNOSIS — G89.4 CHRONIC PAIN SYNDROME: ICD-10-CM

## 2018-12-13 DIAGNOSIS — N18.30 CKD (CHRONIC KIDNEY DISEASE) STAGE 3, GFR 30-59 ML/MIN: ICD-10-CM

## 2018-12-13 LAB
ALBUMIN SERPL BCP-MCNC: 3.7 G/DL
ALP SERPL-CCNC: 81 U/L
ALT SERPL W/O P-5'-P-CCNC: 13 U/L
ANION GAP SERPL CALC-SCNC: 9 MMOL/L
AST SERPL-CCNC: 14 U/L
BILIRUB SERPL-MCNC: 0.4 MG/DL
BUN SERPL-MCNC: 31 MG/DL
CALCIUM SERPL-MCNC: 9.3 MG/DL
CHLORIDE SERPL-SCNC: 107 MMOL/L
CHOLEST SERPL-MCNC: 195 MG/DL
CHOLEST/HDLC SERPL: 3.4 {RATIO}
CO2 SERPL-SCNC: 25 MMOL/L
CREAT SERPL-MCNC: 1.7 MG/DL
EST. GFR  (AFRICAN AMERICAN): 32 ML/MIN/1.73 M^2
EST. GFR  (NON AFRICAN AMERICAN): 27 ML/MIN/1.73 M^2
GLUCOSE SERPL-MCNC: 100 MG/DL
HDLC SERPL-MCNC: 57 MG/DL
HDLC SERPL: 29.2 %
LDLC SERPL CALC-MCNC: 115.6 MG/DL
NONHDLC SERPL-MCNC: 138 MG/DL
POTASSIUM SERPL-SCNC: 4.3 MMOL/L
PROT SERPL-MCNC: 7.2 G/DL
SODIUM SERPL-SCNC: 141 MMOL/L
TRIGL SERPL-MCNC: 112 MG/DL
TSH SERPL DL<=0.005 MIU/L-ACNC: 1.76 UIU/ML

## 2018-12-13 PROCEDURE — 84443 ASSAY THYROID STIM HORMONE: CPT

## 2018-12-13 PROCEDURE — 80061 LIPID PANEL: CPT

## 2018-12-13 PROCEDURE — 80053 COMPREHEN METABOLIC PANEL: CPT

## 2018-12-13 PROCEDURE — 36415 COLL VENOUS BLD VENIPUNCTURE: CPT

## 2018-12-13 RX ORDER — TRAMADOL HYDROCHLORIDE 50 MG/1
50 TABLET ORAL EVERY 8 HOURS PRN
Qty: 40 TABLET | Refills: 0 | Status: SHIPPED | OUTPATIENT
Start: 2018-12-13 | End: 2018-12-27 | Stop reason: SDUPTHER

## 2018-12-13 RX ORDER — LEVOTHYROXINE SODIUM 125 UG/1
125 TABLET ORAL
Qty: 90 TABLET | Refills: 3 | Status: SHIPPED | OUTPATIENT
Start: 2018-12-13 | End: 2019-06-24 | Stop reason: SDUPTHER

## 2018-12-13 NOTE — TELEPHONE ENCOUNTER
----- Message from Calista Conway sent at 12/13/2018  8:05 AM CST -----  Contact: Self [phone]  Patient is calling to get refills on her medication sent to [facility] [id number] - [place] - [address] AT [address] [phone] (Phone)  [phone] (Fax)        1. traMADol (ULTRAM) 50 mg tablet 40 tablet     2. levothyroxine (SYNTHROID) 125 MCG tablet 90 tablet

## 2018-12-27 DIAGNOSIS — I10 ESSENTIAL HYPERTENSION: ICD-10-CM

## 2018-12-27 DIAGNOSIS — K21.9 GASTROESOPHAGEAL REFLUX DISEASE, ESOPHAGITIS PRESENCE NOT SPECIFIED: ICD-10-CM

## 2018-12-27 DIAGNOSIS — R14.0 ABDOMINAL BLOATING: ICD-10-CM

## 2018-12-27 DIAGNOSIS — G89.4 CHRONIC PAIN SYNDROME: ICD-10-CM

## 2018-12-28 RX ORDER — OMEPRAZOLE 20 MG/1
20 CAPSULE, DELAYED RELEASE ORAL DAILY PRN
Qty: 90 CAPSULE | Refills: 0 | Status: SHIPPED | OUTPATIENT
Start: 2018-12-28 | End: 2019-03-26 | Stop reason: SDUPTHER

## 2018-12-28 RX ORDER — TRAMADOL HYDROCHLORIDE 50 MG/1
50 TABLET ORAL EVERY 8 HOURS PRN
Qty: 21 TABLET | Refills: 0 | Status: SHIPPED | OUTPATIENT
Start: 2018-12-28 | End: 2019-02-08 | Stop reason: SDUPTHER

## 2018-12-28 RX ORDER — VALSARTAN 160 MG/1
160 TABLET ORAL DAILY
Qty: 90 TABLET | Refills: 0 | Status: SHIPPED | OUTPATIENT
Start: 2018-12-28 | End: 2019-09-16 | Stop reason: SDUPTHER

## 2019-01-04 ENCOUNTER — LAB VISIT (OUTPATIENT)
Dept: LAB | Facility: HOSPITAL | Age: 84
End: 2019-01-04
Attending: FAMILY MEDICINE
Payer: MEDICARE

## 2019-01-04 DIAGNOSIS — C56.9 MALIGNANT NEOPLASM OF OVARY, UNSPECIFIED LATERALITY: ICD-10-CM

## 2019-01-04 LAB
ALBUMIN SERPL BCP-MCNC: 3.8 G/DL
ALP SERPL-CCNC: 85 U/L
ALT SERPL W/O P-5'-P-CCNC: 11 U/L
ANION GAP SERPL CALC-SCNC: 10 MMOL/L
AST SERPL-CCNC: 17 U/L
BASOPHILS # BLD AUTO: 0.03 K/UL
BASOPHILS NFR BLD: 0.4 %
BILIRUB SERPL-MCNC: 0.4 MG/DL
BUN SERPL-MCNC: 24 MG/DL
CALCIUM SERPL-MCNC: 9.7 MG/DL
CHLORIDE SERPL-SCNC: 105 MMOL/L
CO2 SERPL-SCNC: 24 MMOL/L
CREAT SERPL-MCNC: 1.6 MG/DL
DIFFERENTIAL METHOD: ABNORMAL
EOSINOPHIL # BLD AUTO: 0.3 K/UL
EOSINOPHIL NFR BLD: 4 %
ERYTHROCYTE [DISTWIDTH] IN BLOOD BY AUTOMATED COUNT: 14.1 %
EST. GFR  (AFRICAN AMERICAN): 34 ML/MIN/1.73 M^2
EST. GFR  (NON AFRICAN AMERICAN): 30 ML/MIN/1.73 M^2
GLUCOSE SERPL-MCNC: 92 MG/DL
HCT VFR BLD AUTO: 39 %
HGB BLD-MCNC: 12.8 G/DL
LYMPHOCYTES # BLD AUTO: 1.1 K/UL
LYMPHOCYTES NFR BLD: 14.8 %
MCH RBC QN AUTO: 29.1 PG
MCHC RBC AUTO-ENTMCNC: 32.8 G/DL
MCV RBC AUTO: 89 FL
MONOCYTES # BLD AUTO: 0.4 K/UL
MONOCYTES NFR BLD: 5.5 %
NEUTROPHILS # BLD AUTO: 5.8 K/UL
NEUTROPHILS NFR BLD: 75.3 %
PLATELET # BLD AUTO: 210 K/UL
PMV BLD AUTO: 9.3 FL
POTASSIUM SERPL-SCNC: 4.8 MMOL/L
PROT SERPL-MCNC: 7.8 G/DL
RBC # BLD AUTO: 4.4 M/UL
SODIUM SERPL-SCNC: 139 MMOL/L
WBC # BLD AUTO: 7.66 K/UL

## 2019-01-04 PROCEDURE — 36415 COLL VENOUS BLD VENIPUNCTURE: CPT

## 2019-01-04 PROCEDURE — 80053 COMPREHEN METABOLIC PANEL: CPT

## 2019-01-04 PROCEDURE — 85025 COMPLETE CBC W/AUTO DIFF WBC: CPT

## 2019-01-09 ENCOUNTER — OFFICE VISIT (OUTPATIENT)
Dept: HEMATOLOGY/ONCOLOGY | Facility: CLINIC | Age: 84
End: 2019-01-09
Payer: MEDICARE

## 2019-01-09 VITALS
BODY MASS INDEX: 33.23 KG/M2 | HEIGHT: 62 IN | WEIGHT: 180.56 LBS | HEART RATE: 50 BPM | DIASTOLIC BLOOD PRESSURE: 64 MMHG | TEMPERATURE: 98 F | SYSTOLIC BLOOD PRESSURE: 138 MMHG | OXYGEN SATURATION: 95 %

## 2019-01-09 DIAGNOSIS — R97.1 ELEVATED CA-125: ICD-10-CM

## 2019-01-09 DIAGNOSIS — Z98.890 S/P EXPLORATORY LAPAROTOMY: ICD-10-CM

## 2019-01-09 DIAGNOSIS — C56.9 MALIGNANT NEOPLASM OF OVARY, UNSPECIFIED LATERALITY: Primary | ICD-10-CM

## 2019-01-09 DIAGNOSIS — N18.30 CKD (CHRONIC KIDNEY DISEASE) STAGE 3, GFR 30-59 ML/MIN: ICD-10-CM

## 2019-01-09 DIAGNOSIS — T45.1X5A CHEMOTHERAPY-INDUCED NEUROPATHY: ICD-10-CM

## 2019-01-09 DIAGNOSIS — G62.0 CHEMOTHERAPY-INDUCED NEUROPATHY: ICD-10-CM

## 2019-01-09 PROCEDURE — 99499 UNLISTED E&M SERVICE: CPT | Mod: S$GLB,,, | Performed by: INTERNAL MEDICINE

## 2019-01-09 PROCEDURE — 3075F PR MOST RECENT SYSTOLIC BLOOD PRESS GE 130-139MM HG: ICD-10-PCS | Mod: CPTII,S$GLB,, | Performed by: INTERNAL MEDICINE

## 2019-01-09 PROCEDURE — 99999 PR PBB SHADOW E&M-EST. PATIENT-LVL III: ICD-10-PCS | Mod: PBBFAC,,, | Performed by: INTERNAL MEDICINE

## 2019-01-09 PROCEDURE — 1101F PT FALLS ASSESS-DOCD LE1/YR: CPT | Mod: CPTII,S$GLB,, | Performed by: INTERNAL MEDICINE

## 2019-01-09 PROCEDURE — 1101F PR PT FALLS ASSESS DOC 0-1 FALLS W/OUT INJ PAST YR: ICD-10-PCS | Mod: CPTII,S$GLB,, | Performed by: INTERNAL MEDICINE

## 2019-01-09 PROCEDURE — 3075F SYST BP GE 130 - 139MM HG: CPT | Mod: CPTII,S$GLB,, | Performed by: INTERNAL MEDICINE

## 2019-01-09 PROCEDURE — 3078F PR MOST RECENT DIASTOLIC BLOOD PRESSURE < 80 MM HG: ICD-10-PCS | Mod: CPTII,S$GLB,, | Performed by: INTERNAL MEDICINE

## 2019-01-09 PROCEDURE — 99213 OFFICE O/P EST LOW 20 MIN: CPT | Mod: S$GLB,,, | Performed by: INTERNAL MEDICINE

## 2019-01-09 PROCEDURE — 99499 RISK ADDL DX/OHS AUDIT: ICD-10-PCS | Mod: S$GLB,,, | Performed by: INTERNAL MEDICINE

## 2019-01-09 PROCEDURE — 3078F DIAST BP <80 MM HG: CPT | Mod: CPTII,S$GLB,, | Performed by: INTERNAL MEDICINE

## 2019-01-09 PROCEDURE — 99213 PR OFFICE/OUTPT VISIT, EST, LEVL III, 20-29 MIN: ICD-10-PCS | Mod: S$GLB,,, | Performed by: INTERNAL MEDICINE

## 2019-01-09 PROCEDURE — 99999 PR PBB SHADOW E&M-EST. PATIENT-LVL III: CPT | Mod: PBBFAC,,, | Performed by: INTERNAL MEDICINE

## 2019-01-09 NOTE — PROGRESS NOTES
"Subjective:       Patient ID: Celine Mahan is a 83 y.o. female.    Chief Complaint: Follow-up    HPI     Diagnosis: Ovarian cancer status post debulking surgery 9/21/2017  Therapy: S/p carbo/taxol x 6 completed 2/12/2018        HPI ( per Dr. Feliciano) Patient is an 83yo female who originally presented as a referral from Dr. Wills for pelvic mass. Patient reports LLQ pain for approximately 3 months which prompted evaluation.      Pelvic US 8/7/17 There is a complex echogenicity midline pelvic mass measuring 18.1 x 8.6 x 7.6 cm.  Ovaries not visualized.     CT A&P 8/9/17 Large solid/cystic mass within the lower abdomen/upper pelvis that is most concerning for a malignant neoplasm, likely of ovarian origin given its location.  Correlation with previous surgical history is recommended noting evidence of previous hysterectomy.  Mass abuts and displaces the adjacent bladder without definite CT findings to suggest hema invasion. Abdominal and pelvic lymphadenopathy concerning for lymphatic spread of neoplasm with index lymph nodes as above.      Medical history is significant for CKD (Cr 1.3), HTN, hypothyroid, HTN, HLD. She has a personal history of breast cancer in 1995 treated with mastectomy and adjuvant chemotherapy she reports. Last MMG 11/2016 normal. Adbominal surgery include cholecystectomy, TVH, xlap/removal ovary for ectopic. She reports that her physician told her "some ovary remains in situ". Family history significant for mother with pancreatic cancer.      CT chest 8/28/17 showed mediastinal adenopathy consistent with Stage IV disease.   RECIST Summary:  Mediastinal adenopathy:  1. Preaortic abnormal node measuring 1.7 cm short axis.  2. Pretracheal node measures 1.6 cm short axis.     She underwent surgical cytoreduction with xlap/LSO/omentectomy 9/21/17. Pathology showed high grade adenocarcinoma.        She is also  followed by Dr. Feliciano  She  transferred care to undergo  adjuvant chemo at this " location   She completed adjuvant chemo with carbo AUC 6 and taxol 175mg/m2 q 21d  2/12/2018      Accompanied by dtr     not done    Today, she is doing well   She continues with  Intermittent tingling in hands and feet   She is only taking prescribed gabapentin once daily ( sedation)  No fatigue  Appetite and weight stable   No abd pain/distension   No melena, hematochezia or change in bowel habits  No SOB/CP    She is followed by Dr. Feliciano  She was considered for PRIMA clinical trial for maintenance therapy   Pt  Declined maintenance therapy    Biochemical profile reveals rising Cr level from 1.7mg/dL  to 1.6mg/dL     BRCA ANALYSIS NEG    Past Medical History:   Diagnosis Date    Breast cancer     Cataract     CKD (chronic kidney disease) stage 3, GFR 30-59 ml/min     Hyperlipidemia     Hypertension     Hypothyroidism     Obesity     Osteopenia     Ovarian cancer 10/8/2017    Overactive bladder     Pelvic mass 8/27/2017    Thyroid disease          Past Surgical History:   Procedure Laterality Date    bt catarac surgery      CHOLECYSTECTOMY      COLONOSCOPY N/A 2/24/2015    [facility]    EXPLORATORY-LAPAROTOMY N/A 9/21/2017    Per[facility] at [facility] 2ND F[facility]    HYSTERECTOMY      MASTECTOMY Left     OMENTECTOMY Bilateral 9/21/2017    Performed [facility] at [facility] OR 2ND FLR    SALPINGO-OOPHERECTOMY Left 9/21/2017    Performed by [facility] at [facility] OR 2ND FLR             Review of Systems   Constitutional: Negative for appetite change, fatigue, fever and unexpected weight change.   HENT: Negative for mouth sores.    Eyes: Negative for visual disturbance.   Respiratory: Negative for cough and shortness of breath.    Cardiovascular: Negative for chest pain.   Gastrointestinal: Negative for abdominal pain, diarrhea and nausea.   Genitourinary: Negative for frequency.   Musculoskeletal: Negative for back pain.   Skin:  "Negative for rash.        No petechiae   Neurological: Positive for numbness. Negative for weakness, light-headedness and headaches.        Intermittent tingling   Hematological: Negative for adenopathy.   Psychiatric/Behavioral: The patient is not nervous/anxious.        Objective:       Vitals:    01/09/19 0921   BP: 138/64   BP Location: Right arm   Patient Position: Sitting   BP Method: Large (Automatic)   Pulse: (!) 50   Temp: 98.4 °F (36.9 °C)   TempSrc: Oral   SpO2: 95%   Weight: 81.9 kg (180 lb 8.9 oz)   Height: 5' 2" (1.575 m)       Physical Exam   Constitutional: She is oriented to person, place, and time. She appears well-developed and well-nourished.   HENT:   Head: Normocephalic.   Mouth/Throat: Oropharynx is clear and moist. No oropharyngeal exudate.   Eyes: Conjunctivae and lids are normal. Pupils are equal, round, and reactive to light. No scleral icterus.   Neck: Normal range of motion. Neck supple. No thyromegaly present.   Cardiovascular: Normal rate, regular rhythm and normal heart sounds.   No murmur heard.  Pulmonary/Chest: Breath sounds normal. She has no wheezes. She has no rales.   Abdominal: Soft. Bowel sounds are normal. She exhibits no distension. There is no hepatosplenomegaly. There is no tenderness.   Well-healed midline incision   Musculoskeletal: Normal range of motion. She exhibits no edema or tenderness.   Lymphadenopathy:     She has no cervical adenopathy.     She has no axillary adenopathy.        Right: No supraclavicular adenopathy present.        Left: No supraclavicular adenopathy present.   Neurological: She is alert and oriented to person, place, and time. No cranial nerve deficit. Coordination normal.   Skin: Skin is warm and dry. No ecchymosis, no petechiae and no rash noted. No erythema.   Psychiatric: She has a normal mood and affect.         Labs:   Lab Results   Component Value Date    WBC 7.66 01/04/2019    HGB 12.8 01/04/2019    HCT 39.0 01/04/2019    MCV 89 " 01/04/2019     01/04/2019         Results for JONATAN SERRANO (MRN [id number]) as of 1/9/2019 12:43   Ref. Range 5/25/2018 10:01 7/27/2018 10:18 10/10/2018 11:45    Latest Ref Range: 0 - 30 U/mL 13 12 40 (H)       Assessment:       1. Malignant neoplasm of ovary, unspecified laterality    2. S/P exploratory laparotomy/LSO/omentectomy    3. Elevated CA-125    4. CKD (chronic kidney disease) stage 3, GFR 30-59 ml/min    5. Chemotherapy-induced neuropathy        Plan:   1- 5  Pt clinically stable   Patient with ovarian cancer status post debulking surgery 9/21/2017 undergoing adjuvant platinum-based chemotherapy with carbo/taxol .   S/p carbo/taxol x 6 completed 2/12/2018  Pt declined maintenance therapy on trial   BRCA ANALYSIS NEG   not done   IF  remains elevated, plan imaging studies   gabapentin 300 ( pt not taking tid due to sedation)  ( Lyrica previously prescribed -not filled secondary to affordability)        mg today   Follow-up 3  mos with Cbc,cmp ,  mg prior to f/u        Cc: Iesha Feliciano M.D.          Anil Pelayo M.C.

## 2019-01-10 ENCOUNTER — TELEPHONE (OUTPATIENT)
Dept: HEMATOLOGY/ONCOLOGY | Facility: CLINIC | Age: 84
End: 2019-01-10

## 2019-01-10 DIAGNOSIS — C56.9 OVARIAN CANCER: Primary | ICD-10-CM

## 2019-01-11 NOTE — TELEPHONE ENCOUNTER
Daughter returned call, reviewed  results with her & set up CT scan for Monday afternoon. Reminded to be fasting for 4 hrs prior to the scan. Also instructed her to make sure her mom drinks 1 liter of fluids on Sunday & the day after the CT scan as well because her creatinine is slightly elevated at 1.6. She voiced understanding. Will call with results.

## 2019-01-14 ENCOUNTER — HOSPITAL ENCOUNTER (OUTPATIENT)
Dept: RADIOLOGY | Facility: HOSPITAL | Age: 84
Discharge: HOME | End: 2019-01-14
Attending: INTERNAL MEDICINE
Payer: MEDICARE

## 2019-01-14 DIAGNOSIS — C56.9 OVARIAN CANCER: ICD-10-CM

## 2019-01-14 PROCEDURE — 71260 CT THORAX DX C+: CPT | Mod: 26,,, | Performed by: RADIOLOGY

## 2019-01-14 PROCEDURE — 74177 CT CHEST ABDOMEN PELVIS WITH CONTRAST (XPD): ICD-10-PCS | Mod: 26,,, | Performed by: RADIOLOGY

## 2019-01-14 PROCEDURE — 74177 CT ABD & PELVIS W/CONTRAST: CPT | Mod: TC

## 2019-01-14 PROCEDURE — 71260 CT CHEST ABDOMEN PELVIS WITH CONTRAST (XPD): ICD-10-PCS | Mod: 26,,, | Performed by: RADIOLOGY

## 2019-01-14 PROCEDURE — 74177 CT ABD & PELVIS W/CONTRAST: CPT | Mod: 26,,, | Performed by: RADIOLOGY

## 2019-01-14 PROCEDURE — 25500020 PHARM REV CODE 255: Performed by: INTERNAL MEDICINE

## 2019-01-14 RX ADMIN — IOHEXOL 75 ML: 350 INJECTION, SOLUTION INTRAVENOUS at 05:01

## 2019-01-14 NOTE — PROGRESS NOTES
Dr Reinoso was consulted about pt's GFR of 30.  Still wanted IV contrast and had the pt increase fluids before and aftaer CT

## 2019-01-15 ENCOUNTER — TELEPHONE (OUTPATIENT)
Dept: GYNECOLOGIC ONCOLOGY | Facility: CLINIC | Age: 84
End: 2019-01-15

## 2019-01-15 ENCOUNTER — TELEPHONE (OUTPATIENT)
Dept: HEMATOLOGY/ONCOLOGY | Facility: CLINIC | Age: 84
End: 2019-01-15

## 2019-01-15 NOTE — TELEPHONE ENCOUNTER
----- Message from Alix Rivers MA sent at 1/15/2019 11:08 AM CST -----  Regarding: FW: CT results      ----- Message -----  From: Jackeline Stallings RN  Sent: 1/15/2019  10:54 AM  To: Braden Julian Staff  Subject: CT results                                       Good morning,  I scheduled this patient for follow up with  on 1-28-19. She could not make the appt that was open tomorrow. Is there anything else available sooner than 1-28? If so, please call her daughter that's listed. Her most recent CT showed some increase in size of left lymph nodes. Thanks.  Pernell CORONEL

## 2019-01-15 NOTE — TELEPHONE ENCOUNTER
Daughter called for CT results. Per , I called her & reviewed the results then set the patient up to see  for follow up recommendations. She is agreeable.

## 2019-01-18 ENCOUNTER — TELEPHONE (OUTPATIENT)
Dept: GYNECOLOGIC ONCOLOGY | Facility: CLINIC | Age: 84
End: 2019-01-18

## 2019-01-21 ENCOUNTER — TELEPHONE (OUTPATIENT)
Dept: GYNECOLOGIC ONCOLOGY | Facility: CLINIC | Age: 84
End: 2019-01-21

## 2019-01-22 ENCOUNTER — OFFICE VISIT (OUTPATIENT)
Dept: GYNECOLOGIC ONCOLOGY | Facility: CLINIC | Age: 84
End: 2019-01-22
Payer: MEDICARE

## 2019-01-22 VITALS
DIASTOLIC BLOOD PRESSURE: 61 MMHG | HEART RATE: 48 BPM | SYSTOLIC BLOOD PRESSURE: 137 MMHG | BODY MASS INDEX: 34.61 KG/M2 | WEIGHT: 188.06 LBS | HEIGHT: 62 IN

## 2019-01-22 DIAGNOSIS — C56.9 MALIGNANT NEOPLASM OF OVARY, UNSPECIFIED LATERALITY: Primary | ICD-10-CM

## 2019-01-22 PROCEDURE — 99999 PR PBB SHADOW E&M-EST. PATIENT-LVL III: ICD-10-PCS | Mod: PBBFAC,,, | Performed by: OBSTETRICS & GYNECOLOGY

## 2019-01-22 PROCEDURE — 99214 OFFICE O/P EST MOD 30 MIN: CPT | Mod: S$GLB,,, | Performed by: OBSTETRICS & GYNECOLOGY

## 2019-01-22 PROCEDURE — 3078F PR MOST RECENT DIASTOLIC BLOOD PRESSURE < 80 MM HG: ICD-10-PCS | Mod: CPTII,S$GLB,, | Performed by: OBSTETRICS & GYNECOLOGY

## 2019-01-22 PROCEDURE — 99999 PR PBB SHADOW E&M-EST. PATIENT-LVL III: CPT | Mod: PBBFAC,,, | Performed by: OBSTETRICS & GYNECOLOGY

## 2019-01-22 PROCEDURE — 99214 PR OFFICE/OUTPT VISIT, EST, LEVL IV, 30-39 MIN: ICD-10-PCS | Mod: S$GLB,,, | Performed by: OBSTETRICS & GYNECOLOGY

## 2019-01-22 PROCEDURE — 1101F PR PT FALLS ASSESS DOC 0-1 FALLS W/OUT INJ PAST YR: ICD-10-PCS | Mod: CPTII,S$GLB,, | Performed by: OBSTETRICS & GYNECOLOGY

## 2019-01-22 PROCEDURE — 3075F SYST BP GE 130 - 139MM HG: CPT | Mod: CPTII,S$GLB,, | Performed by: OBSTETRICS & GYNECOLOGY

## 2019-01-22 PROCEDURE — 1101F PT FALLS ASSESS-DOCD LE1/YR: CPT | Mod: CPTII,S$GLB,, | Performed by: OBSTETRICS & GYNECOLOGY

## 2019-01-22 PROCEDURE — 3078F DIAST BP <80 MM HG: CPT | Mod: CPTII,S$GLB,, | Performed by: OBSTETRICS & GYNECOLOGY

## 2019-01-22 PROCEDURE — 3075F PR MOST RECENT SYSTOLIC BLOOD PRESS GE 130-139MM HG: ICD-10-PCS | Mod: CPTII,S$GLB,, | Performed by: OBSTETRICS & GYNECOLOGY

## 2019-01-22 NOTE — LETTER
January 22, 2019        Marylu Reinoso MD  [address]  [address]  [facility] [address]             [facility]  [address], [address]  [facility] [address]  Phone: [phone]  Fax: [phone]   Patient: Celine Mahan   MR Number: [id number]   YOB: 1935   Date of Visit: 1/22/2019       Dear Dr. Reinoso:    Thank you for referring Celine Mahan to me for evaluation. Below are the relevant portions of my assessment and plan of care.            If you have questions, please do not hesitate to call me. I look forward to following Celine along with you.    Sincerely,      Iesha Feliciano MD           CC  No Recipients

## 2019-01-22 NOTE — PROGRESS NOTES
Subjective:      Patient ID: Celine Mahan is a 83 y.o. female.    Chief Complaint: Follow-up      HPI  She presents today for follow up with concerns for recurrent disease.    40>309  CT CAP 1/14/19  Impression     S/P resection of large LEFT ovarian/fallopian tube mass consistent with carcinoma.  Interval increase in size of LEFT pelvic/retroperitoneal lymph nodes.  No evidence for ascites or definitive carcinomatosis of mesentery.  Interval decrease in size of prevascular lymph node.    RECIST SUMMARY:  Date of prior examination for comparison: Chest CT 08/28/2017 and CT abdomen and pelvis 08/14/2017  Lesion 1 Chest: Pre aortic lymph node previously measuring 1.7 cm, currently measuring 0.94 cm. Previously noted prevascular lymph node is stable as compared to prior, with a fatty hilum, of questionable significance..  Lesion 2 abdomen/pelvis: LEFT iliac chain node.  2.3 cm cm. Series 2 image 163.  Prior measurement 1.5 cm cm.  Lesion 3 abdomen/pelvis: LEFT renal hilar lymph node.  1.6 cm cm. Series 2 image 112.  Prior measurement 1.4 cm cm.     Platinum free interval 11 months.     Oncologic history:  Patient is an 83yo female who originally presented as a referral from Dr. Wills for pelvic mass. Patient reports LLQ pain for approximately 3 months which prompted evaluation. Available imaging reviewed.     Pelvic US 8/7/17 There is a complex echogenicity midline pelvic mass measuring 18.1 x 8.6 x 7.6 cm.  Ovaries not visualized.     CT A&P 8/9/17 Large solid/cystic mass within the lower abdomen/upper pelvis that is most concerning for a malignant neoplasm, likely of ovarian origin given its location.  Correlation with previous surgical history is recommended noting evidence of previous hysterectomy.  Mass abuts and displaces the adjacent bladder without definite CT findings to suggest hema invasion. Abdominal and pelvic lymphadenopathy concerning for lymphatic spread of neoplasm with index lymph nodes  "as above.      Medical history is significant for CKD (Cr 1.3), HTN, hypothyroid, HTN, HLD. She has a personal history of breast cancer in 1995 treated with mastectomy and adjuvant chemotherapy she reports. Last MMG 11/2016 normal. Adbominal surgery include cholecystectomy, TVH, xlap/removal ovary for ectopic. She reports that her physician told her "some ovary remains in situ". Family history significant for mother with pancreatic cancer.      CT chest 8/28/17 showed mediastinal adenopathy consistent with Stage IV disease.   RECIST Summary:  Mediastinal adenopathy:  1. Preaortic abnormal node measuring 1.7 cm short axis.  2. Pretracheal node measures 1.6 cm short axis.     She underwent surgical cytoreduction with xlap/LSO/omentectomy 9/21/17. Pathology showed high grade adenocarcinoma.   Completed 6 cycles of Carbo/Taxol on 2/12/2018, on the [facility] w/ Dr. Reinoso. Residual neuropathy.  Normalization of .    849>230>37>23>22>12.    BRCA negative.     Review of Systems   Constitutional: Negative for appetite change, chills, fatigue and fever.   HENT: Negative for mouth sores.    Respiratory: Negative for cough and shortness of breath.    Cardiovascular: Negative for leg swelling.   Gastrointestinal: Negative for abdominal pain, blood in stool, constipation and diarrhea.   Endocrine: Negative for cold intolerance.   Genitourinary: Negative for dysuria and vaginal bleeding.   Musculoskeletal: Negative for myalgias.   Skin: Negative for rash.   Allergic/Immunologic: Negative.    Neurological: Negative for weakness and numbness.   Hematological: Negative for adenopathy. Does not bruise/bleed easily.   Psychiatric/Behavioral: Negative for confusion.       Objective:   Physical Exam:   Constitutional: She is oriented to person, place, and time. She appears well-developed and well-nourished.    HENT:   Head: Normocephalic and atraumatic.    Eyes: EOM are normal. Pupils are equal, round, and reactive to light.  "   Neck: Normal range of motion. Neck supple. No thyromegaly present.    Cardiovascular: Normal rate, regular rhythm and intact distal pulses.     Pulmonary/Chest: Effort normal and breath sounds normal. No respiratory distress. She has no wheezes.        Abdominal: Soft. Bowel sounds are normal. She exhibits no distension, no ascites and no mass. There is no tenderness.             Musculoskeletal: Normal range of motion and moves all extremeties.      Lymphadenopathy:     She has no cervical adenopathy.        Right: No supraclavicular adenopathy present.        Left: No supraclavicular adenopathy present.    Neurological: She is alert and oriented to person, place, and time.    Skin: Skin is warm and dry. No rash noted.    Psychiatric: She has a normal mood and affect.       Assessment:     1. Malignant neoplasm of ovary, unspecified laterality        Plan:   No orders of the defined types were placed in this encounter.    Discussed with the patient and her daughter platinum sensitive recurrent ovarian cancer. Platinum free interval 11 months. Reviewed standard management options which are to re-treat with platinum-based chemotherapy (carbo + doxil/gem/taxol +/- avastin). We discussed her medical comorbidities CKD and residual neuropathy. Best personalized option is carbo/doxil. Performance status is good. If unable to tolerate doublet therapy can use single agent carboplatin. In this setting provided she has a complete or partial response to retreatment with platinum therapy would also recommend consideration of parp inhibitor maintenance therapy. Avastin is less ideal given her renal function.     As before, she would like to get her treatment on the [facility] with Dr. Reinoso.

## 2019-02-01 ENCOUNTER — INFUSION (OUTPATIENT)
Dept: INFUSION THERAPY | Facility: HOSPITAL | Age: 84
End: 2019-02-01
Attending: FAMILY MEDICINE
Payer: MEDICARE

## 2019-02-01 VITALS
HEART RATE: 50 BPM | DIASTOLIC BLOOD PRESSURE: 68 MMHG | OXYGEN SATURATION: 96 % | RESPIRATION RATE: 18 BRPM | SYSTOLIC BLOOD PRESSURE: 151 MMHG | TEMPERATURE: 99 F

## 2019-02-01 DIAGNOSIS — C56.9 MALIGNANT NEOPLASM OF OVARY, UNSPECIFIED LATERALITY: Primary | ICD-10-CM

## 2019-02-01 LAB
ALBUMIN SERPL BCP-MCNC: 3.7 G/DL
ALP SERPL-CCNC: 84 U/L
ALT SERPL W/O P-5'-P-CCNC: 11 U/L
ANION GAP SERPL CALC-SCNC: 12 MMOL/L
AST SERPL-CCNC: 15 U/L
BASOPHILS # BLD AUTO: 0.06 K/UL
BASOPHILS NFR BLD: 1.2 %
BILIRUB SERPL-MCNC: 0.4 MG/DL
BUN SERPL-MCNC: 36 MG/DL
CALCIUM SERPL-MCNC: 9.5 MG/DL
CHLORIDE SERPL-SCNC: 106 MMOL/L
CO2 SERPL-SCNC: 20 MMOL/L
CREAT SERPL-MCNC: 1.8 MG/DL
DIFFERENTIAL METHOD: ABNORMAL
EOSINOPHIL # BLD AUTO: 0.4 K/UL
EOSINOPHIL NFR BLD: 7.9 %
ERYTHROCYTE [DISTWIDTH] IN BLOOD BY AUTOMATED COUNT: 14.9 %
EST. GFR  (AFRICAN AMERICAN): 30 ML/MIN/1.73 M^2
EST. GFR  (NON AFRICAN AMERICAN): 26 ML/MIN/1.73 M^2
GLUCOSE SERPL-MCNC: 117 MG/DL
HCT VFR BLD AUTO: 37.7 %
HGB BLD-MCNC: 12.4 G/DL
LYMPHOCYTES # BLD AUTO: 1.3 K/UL
LYMPHOCYTES NFR BLD: 26.4 %
MCH RBC QN AUTO: 28.9 PG
MCHC RBC AUTO-ENTMCNC: 32.9 G/DL
MCV RBC AUTO: 88 FL
MONOCYTES # BLD AUTO: 0.4 K/UL
MONOCYTES NFR BLD: 7.7 %
NEUTROPHILS # BLD AUTO: 2.8 K/UL
NEUTROPHILS NFR BLD: 56.4 %
PLATELET # BLD AUTO: 217 K/UL
PMV BLD AUTO: 9.7 FL
POTASSIUM SERPL-SCNC: 4.7 MMOL/L
PROT SERPL-MCNC: 7.2 G/DL
RBC # BLD AUTO: 4.29 M/UL
SODIUM SERPL-SCNC: 138 MMOL/L
WBC # BLD AUTO: 4.92 K/UL

## 2019-02-01 PROCEDURE — 96413 CHEMO IV INFUSION 1 HR: CPT

## 2019-02-01 PROCEDURE — 96367 TX/PROPH/DG ADDL SEQ IV INF: CPT

## 2019-02-01 PROCEDURE — 85025 COMPLETE CBC W/AUTO DIFF WBC: CPT

## 2019-02-01 PROCEDURE — 25000003 PHARM REV CODE 250: Performed by: INTERNAL MEDICINE

## 2019-02-01 PROCEDURE — 80053 COMPREHEN METABOLIC PANEL: CPT

## 2019-02-01 PROCEDURE — 63600175 PHARM REV CODE 636 W HCPCS: Mod: JG | Performed by: INTERNAL MEDICINE

## 2019-02-01 PROCEDURE — 36415 COLL VENOUS BLD VENIPUNCTURE: CPT

## 2019-02-01 PROCEDURE — 96417 CHEMO IV INFUS EACH ADDL SEQ: CPT

## 2019-02-01 RX ORDER — SODIUM CHLORIDE 0.9 % (FLUSH) 0.9 %
10 SYRINGE (ML) INJECTION
Status: CANCELLED | OUTPATIENT
Start: 2019-02-01

## 2019-02-01 RX ORDER — HEPARIN 100 UNIT/ML
500 SYRINGE INTRAVENOUS
Status: CANCELLED | OUTPATIENT
Start: 2019-02-01

## 2019-02-01 RX ADMIN — CARBOPLATIN 285 MG: 10 INJECTION INTRAVENOUS at 11:02

## 2019-02-01 RX ADMIN — PALONOSETRON HYDROCHLORIDE: 0.25 INJECTION INTRAVENOUS at 09:02

## 2019-02-01 RX ADMIN — DOXORUBICIN HYDROCHLORIDE 58 MG: 2 INJECTABLE, LIPOSOMAL INTRAVENOUS at 10:02

## 2019-02-01 NOTE — PLAN OF CARE
Problem: Adult Inpatient Plan of Care  Goal: Plan of Care Review  Outcome: Ongoing (interventions implemented as appropriate)  Arrived to unit. Blood collected for cbc, cmp. Dr. Reinoso reviewed labs.  Reinforced possible side effects of Doxil and Carboplatin. Pt verbalized understanding. Tolerated chemotherapy. No reactions noted. AVS printed and given to pt. Pt discharged from unit with daughter. NAD.

## 2019-02-08 DIAGNOSIS — G89.4 CHRONIC PAIN SYNDROME: ICD-10-CM

## 2019-02-08 RX ORDER — TRAMADOL HYDROCHLORIDE 50 MG/1
TABLET ORAL
Qty: 21 TABLET | Refills: 0 | Status: SHIPPED | OUTPATIENT
Start: 2019-02-08 | End: 2019-04-25 | Stop reason: SDUPTHER

## 2019-02-20 ENCOUNTER — LAB VISIT (OUTPATIENT)
Dept: LAB | Facility: HOSPITAL | Age: 84
End: 2019-02-20
Attending: INTERNAL MEDICINE
Payer: MEDICARE

## 2019-02-20 DIAGNOSIS — C56.9 MALIGNANT NEOPLASM OF OVARY, UNSPECIFIED LATERALITY: ICD-10-CM

## 2019-02-20 LAB
ALBUMIN SERPL BCP-MCNC: 3.5 G/DL
ALP SERPL-CCNC: 77 U/L
ALT SERPL W/O P-5'-P-CCNC: 10 U/L
ANION GAP SERPL CALC-SCNC: 10 MMOL/L
AST SERPL-CCNC: 13 U/L
BASOPHILS # BLD AUTO: 0.02 K/UL
BASOPHILS NFR BLD: 1 %
BILIRUB SERPL-MCNC: 0.5 MG/DL
BUN SERPL-MCNC: 25 MG/DL
CALCIUM SERPL-MCNC: 9.5 MG/DL
CANCER AG125 SERPL-ACNC: 236 U/ML
CHLORIDE SERPL-SCNC: 104 MMOL/L
CO2 SERPL-SCNC: 25 MMOL/L
CREAT SERPL-MCNC: 1.7 MG/DL
DIFFERENTIAL METHOD: ABNORMAL
EOSINOPHIL # BLD AUTO: 0.1 K/UL
EOSINOPHIL NFR BLD: 2.9 %
ERYTHROCYTE [DISTWIDTH] IN BLOOD BY AUTOMATED COUNT: 14.5 %
EST. GFR  (AFRICAN AMERICAN): 32 ML/MIN/1.73 M^2
EST. GFR  (NON AFRICAN AMERICAN): 27 ML/MIN/1.73 M^2
GLUCOSE SERPL-MCNC: 95 MG/DL
HCT VFR BLD AUTO: 34 %
HGB BLD-MCNC: 10.8 G/DL
LYMPHOCYTES # BLD AUTO: 0.8 K/UL
LYMPHOCYTES NFR BLD: 36.5 %
MCH RBC QN AUTO: 28.7 PG
MCHC RBC AUTO-ENTMCNC: 31.8 G/DL
MCV RBC AUTO: 90 FL
MONOCYTES # BLD AUTO: 0.2 K/UL
MONOCYTES NFR BLD: 8.7 %
NEUTROPHILS # BLD AUTO: 1.1 K/UL
NEUTROPHILS NFR BLD: 50.4 %
PLATELET # BLD AUTO: 90 K/UL
PMV BLD AUTO: 10.1 FL
POTASSIUM SERPL-SCNC: 5.3 MMOL/L
PROT SERPL-MCNC: 6.8 G/DL
RBC # BLD AUTO: 3.76 M/UL
SODIUM SERPL-SCNC: 139 MMOL/L
WBC # BLD AUTO: 2.08 K/UL

## 2019-02-20 PROCEDURE — 86304 IMMUNOASSAY TUMOR CA 125: CPT

## 2019-02-20 PROCEDURE — 80053 COMPREHEN METABOLIC PANEL: CPT

## 2019-02-20 PROCEDURE — 85025 COMPLETE CBC W/AUTO DIFF WBC: CPT

## 2019-02-20 PROCEDURE — 36415 COLL VENOUS BLD VENIPUNCTURE: CPT

## 2019-02-25 ENCOUNTER — OFFICE VISIT (OUTPATIENT)
Dept: HEMATOLOGY/ONCOLOGY | Facility: CLINIC | Age: 84
End: 2019-02-25
Payer: MEDICARE

## 2019-02-25 VITALS
HEIGHT: 62 IN | BODY MASS INDEX: 34.03 KG/M2 | WEIGHT: 184.94 LBS | DIASTOLIC BLOOD PRESSURE: 60 MMHG | HEART RATE: 66 BPM | TEMPERATURE: 97 F | SYSTOLIC BLOOD PRESSURE: 138 MMHG | OXYGEN SATURATION: 97 %

## 2019-02-25 DIAGNOSIS — R51.9 CHRONIC NONINTRACTABLE HEADACHE, UNSPECIFIED HEADACHE TYPE: ICD-10-CM

## 2019-02-25 DIAGNOSIS — N18.30 CKD (CHRONIC KIDNEY DISEASE) STAGE 3, GFR 30-59 ML/MIN: ICD-10-CM

## 2019-02-25 DIAGNOSIS — D69.59 CHEMOTHERAPY-INDUCED THROMBOCYTOPENIA: ICD-10-CM

## 2019-02-25 DIAGNOSIS — T45.1X5A CHEMOTHERAPY-INDUCED THROMBOCYTOPENIA: ICD-10-CM

## 2019-02-25 DIAGNOSIS — C56.9 MALIGNANT NEOPLASM OF OVARY, UNSPECIFIED LATERALITY: Primary | ICD-10-CM

## 2019-02-25 DIAGNOSIS — T45.1X5A CHEMOTHERAPY INDUCED NEUTROPENIA: ICD-10-CM

## 2019-02-25 DIAGNOSIS — Z09 CHEMOTHERAPY FOLLOW-UP EXAMINATION: ICD-10-CM

## 2019-02-25 DIAGNOSIS — D70.1 CHEMOTHERAPY INDUCED NEUTROPENIA: ICD-10-CM

## 2019-02-25 DIAGNOSIS — G89.29 CHRONIC NONINTRACTABLE HEADACHE, UNSPECIFIED HEADACHE TYPE: ICD-10-CM

## 2019-02-25 DIAGNOSIS — Z98.890 S/P EXPLORATORY LAPAROTOMY: ICD-10-CM

## 2019-02-25 PROCEDURE — 99999 PR PBB SHADOW E&M-EST. PATIENT-LVL IV: CPT | Mod: PBBFAC,,, | Performed by: INTERNAL MEDICINE

## 2019-02-25 PROCEDURE — 3078F DIAST BP <80 MM HG: CPT | Mod: CPTII,S$GLB,, | Performed by: INTERNAL MEDICINE

## 2019-02-25 PROCEDURE — 99499 RISK ADDL DX/OHS AUDIT: ICD-10-PCS | Mod: S$GLB,,, | Performed by: INTERNAL MEDICINE

## 2019-02-25 PROCEDURE — 3075F SYST BP GE 130 - 139MM HG: CPT | Mod: CPTII,S$GLB,, | Performed by: INTERNAL MEDICINE

## 2019-02-25 PROCEDURE — 3075F PR MOST RECENT SYSTOLIC BLOOD PRESS GE 130-139MM HG: ICD-10-PCS | Mod: CPTII,S$GLB,, | Performed by: INTERNAL MEDICINE

## 2019-02-25 PROCEDURE — 99499 UNLISTED E&M SERVICE: CPT | Mod: S$GLB,,, | Performed by: INTERNAL MEDICINE

## 2019-02-25 PROCEDURE — 99999 PR PBB SHADOW E&M-EST. PATIENT-LVL IV: ICD-10-PCS | Mod: PBBFAC,,, | Performed by: INTERNAL MEDICINE

## 2019-02-25 PROCEDURE — 1101F PT FALLS ASSESS-DOCD LE1/YR: CPT | Mod: CPTII,S$GLB,, | Performed by: INTERNAL MEDICINE

## 2019-02-25 PROCEDURE — 1101F PR PT FALLS ASSESS DOC 0-1 FALLS W/OUT INJ PAST YR: ICD-10-PCS | Mod: CPTII,S$GLB,, | Performed by: INTERNAL MEDICINE

## 2019-02-25 PROCEDURE — 3078F PR MOST RECENT DIASTOLIC BLOOD PRESSURE < 80 MM HG: ICD-10-PCS | Mod: CPTII,S$GLB,, | Performed by: INTERNAL MEDICINE

## 2019-02-25 PROCEDURE — 99215 OFFICE O/P EST HI 40 MIN: CPT | Mod: S$GLB,,, | Performed by: INTERNAL MEDICINE

## 2019-02-25 PROCEDURE — 99215 PR OFFICE/OUTPT VISIT, EST, LEVL V, 40-54 MIN: ICD-10-PCS | Mod: S$GLB,,, | Performed by: INTERNAL MEDICINE

## 2019-02-25 NOTE — PROGRESS NOTES
"Subjective:       Patient ID: Celine Mahan is a 83 y.o. female.    Chief Complaint: Follow-up    HPI     Diagnosis: 1.Ovarian cancer status post debulking surgery 9/21/2017                     2. 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 S/p carbo/taxol x 6 completed 2/12/2018                   2. Carbo/Doxil 2/1/2019- present         HPI ( per Dr. Feliciano) Patient is an 81yo female who originally presented as a referral from Dr. Wills for pelvic mass. Patient reports LLQ pain for approximately 3 months which prompted evaluation.      Pelvic US 8/7/17 There is a complex echogenicity midline pelvic mass measuring 18.1 x 8.6 x 7.6 cm.  Ovaries not visualized.     CT A&P 8/9/17 Large solid/cystic mass within the lower abdomen/upper pelvis that is most concerning for a malignant neoplasm, likely of ovarian origin given its location.  Correlation with previous surgical history is recommended noting evidence of previous hysterectomy.  Mass abuts and displaces the adjacent bladder without definite CT findings to suggest hema invasion. Abdominal and pelvic lymphadenopathy concerning for lymphatic spread of neoplasm with index lymph nodes as above.      Medical history is significant for CKD (Cr 1.3), HTN, hypothyroid, HTN, HLD. She has a personal history of breast cancer in 1995 treated with mastectomy and adjuvant chemotherapy she reports. Last MMG 11/2016 normal. Adbominal surgery include cholecystectomy, TVH, xlap/removal ovary for ectopic. She reports that her physician told her "some ovary remains in situ". Family history significant for mother with pancreatic cancer.      CT chest 8/28/17 showed mediastinal adenopathy consistent with Stage IV disease.   RECIST Summary:  Mediastinal adenopathy:  1. Preaortic abnormal node measuring 1.7 cm short axis.  2. Pretracheal node measures 1.6 cm short axis.     She underwent surgical cytoreduction with xlap/LSO/omentectomy 9/21/17. Pathology " showed high grade adenocarcinoma.        She is also  followed by Dr. Feliciano  She  transferred care to undergo  adjuvant chemo at this location   She completed adjuvant chemo with carbo AUC 6 and taxol 175mg/m2 q 21d  2/12/2018  She was considered for PRIMA clinical trial for maintenance therapy   Pt  Declined maintenance therapy      Pt with increasing  levels   849>230>37>23>22>12>40>309 ( 1/9/2019 )       CT c/a/p 1/14/2019   S/P resection of large LEFT ovarian/fallopian tube mass consistent with carcinoma.    Interval increase in size of LEFT pelvic/retroperitoneal lymph nodes.  No evidence for ascites or definitive carcinomatosis of mesentery.    Interval decrease in size of prevascular lymph node.      It was determined she has platinum sensitive recurrent ovarian cancer. Platinum free interval 11 months.   Dr. Feliciano Reviewed standard management options which are to re-treat with platinum-based chemotherapy (carbo + doxil/gem/taxol +/- avastin).   Pt undergoing treatment with Doxil 30mg/m2  Carbo AUC 5  q 28days  Best personalized option is carbo/doxil      She is s/p cycle 1 of chemo 2/1/2019     Accompanied by dtr    She reports intermittent HA located occipital rgn  No assoc N/V/vision changes  She takes Tramadol w/relief   She continues with  Intermittent tingling in hands and feet   She is only taking prescribed gabapentin once daily ( sedation)  Mild fatigue  Appetite and weight stable   No abd pain/distension   No melena, hematochezia or change in bowel habits  No SOB/CP  She also reports LBP  No rashes  No bleeding-nasal/rectal/urinary       CBC 2/20/2019  reveals a white blood cell count of 2080 mm 3 hemoglobin 10.8 grams/dL hematocrit of 34% plt ct 90k      Biochemical profile reveals rising Cr level from 1.7mg/dL  to 1.6mg/dL     BRCA ANALYSIS NEG    Past Medical History:   Diagnosis Date    Breast cancer     Cataract     CKD (chronic kidney disease) stage 3, GFR 30-59 ml/min      "Hyperlipidemia     Hypertension     Hypothyroidism     Obesity     Osteopenia     Ovarian cancer 10/8/2017    Overactive bladder     Pelvic mass 8/27/2017    Thyroid disease          Past Surgical History:   Procedure Laterality Date    bt catarac surgery      CHOLECYSTECTOMY      COLONOSCOPY N/A 2/24/2015    [facility]    EXPLORATORY-LAPAROTOMY N/A 9/21/2017    [facility] 2ND FLR    HYSTERECTOMY      MASTECTOMY Left     OMENTECTOMY Bilateral 9/21/2017    [facility] at [facility] 2ND FLR    SALPINGO-OOPHERECTOMY Left 9/21/2017    Performed by [facility] at [facility] OR 2ND FLR             Review of Systems   Constitutional: Negative for appetite change, fatigue, fever and unexpected weight change.   HENT: Negative for mouth sores.    Eyes: Negative for visual disturbance.   Respiratory: Negative for cough and shortness of breath.    Cardiovascular: Negative for chest pain.   Gastrointestinal: Negative for abdominal pain, diarrhea and nausea.   Genitourinary: Negative for frequency.   Musculoskeletal: Positive for back pain.   Skin: Negative for rash.        No petechiae   Neurological: Positive for numbness and headaches. Negative for weakness and light-headedness.        Intermittent tingling   Hematological: Negative for adenopathy.   Psychiatric/Behavioral: The patient is not nervous/anxious.        Objective:       Vitals:    02/25/19 0915 02/25/19 0916   BP: (!) 146/68 138/60   BP Location: Right arm Right arm   Patient Position: Sitting Sitting   BP Method: Medium (Automatic) Medium (Manual)   Pulse: 66    Temp: 97.4 °F (36.3 °C)    TempSrc: Oral    SpO2: 97%    Weight: 83.9 kg (184 lb 15.5 oz)    Height: 5' 2" (1.575 m)        Physical Exam   Constitutional: She is oriented to person, place, and time. She appears well-developed and well-nourished.   HENT:   Head: Normocephalic.   Mouth/Throat: Oropharynx " is clear and moist. No oropharyngeal exudate.   Eyes: Conjunctivae and lids are normal. Pupils are equal, round, and reactive to light. No scleral icterus.   Neck: Normal range of motion. Neck supple. No thyromegaly present.   Cardiovascular: Normal rate, regular rhythm and normal heart sounds.   No murmur heard.  Pulmonary/Chest: Breath sounds normal. She has no wheezes. She has no rales.   Abdominal: Soft. Bowel sounds are normal. She exhibits no distension. There is no hepatosplenomegaly. There is no tenderness.   Well-healed midline incision   Musculoskeletal: Normal range of motion. She exhibits no edema or tenderness.   Lymphadenopathy:     She has no cervical adenopathy.     She has no axillary adenopathy.        Right: No supraclavicular adenopathy present.        Left: No supraclavicular adenopathy present.   Neurological: She is alert and oriented to person, place, and time. No cranial nerve deficit. Coordination normal.   Skin: Skin is warm and dry. No ecchymosis, no petechiae and no rash noted. No erythema.   Psychiatric: She has a normal mood and affect.         Labs:   Lab Results   Component Value Date    WBC 2.08 (L) 02/20/2019    HGB 10.8 (L) 02/20/2019    HCT 34.0 (L) 02/20/2019    MCV 90 02/20/2019    PLT 90 (L) 02/20/2019         Results for JONATAN SERRANO (MRN [id number]) as of 1/9/2019 12:43   Ref. Range 5/25/2018 10:01 7/27/2018 10:18 10/10/2018 11:45    Latest Ref Range: 0 - 30 U/mL 13 12 40 (H)       Results for JONATAN SERRANO (MRN [id number]) as of 2/25/2019 10:01   Ref. Range 1/9/2019 10:34 2/20/2019 09:44    Latest Ref Range: 0 - 30 U/mL 309 (H) 236 (H)       CT c/a/p 1/14/2019   S/P resection of large LEFT ovarian/fallopian tube mass consistent with carcinoma.    Interval increase in size of LEFT pelvic/retroperitoneal lymph nodes.  No evidence for ascites or definitive carcinomatosis of mesentery.    Interval decrease in size of prevascular lymph node.    RECIST SUMMARY:    Date  of prior examination for comparison: Chest CT 08/28/2017 and CT abdomen and pelvis 08/14/2017    Lesion 1 Chest: Pre aortic lymph node previously measuring 1.7 cm, currently measuring 0.94 cm.  Previously noted prevascular lymph node is stable as compared to prior, with a fatty hilum, of questionable significance..    Lesion 2 abdomen/pelvis: LEFT iliac chain node.  2.3 cm cm. Series 2 image 163.  Prior measurement 1.5 cm cm.    Lesion 3 abdomen/pelvis: LEFT renal hilar lymph node.  1.6 cm cm. Series 2 image 112.  Prior measurement 1.4 cm cm.      Assessment:       1. Malignant neoplasm of ovary, unspecified laterality    2. S/P exploratory laparotomy/LSO/omentectomy    3. Chemotherapy-induced thrombocytopenia    4. Chemotherapy induced neutropenia    5. Chemotherapy follow-up examination    6. CKD (chronic kidney disease) stage 3, GFR 30-59 ml/min    7. Chronic nonintractable headache, unspecified headache type        Plan:   1- 7  Pt clinically stable   Patient with ovarian cancer status post debulking surgery 9/21/2017 undergoing adjuvant platinum-based chemotherapy with carbo/taxol .   S/p carbo/taxol x 6 completed 2/12/2018  Pt declined maintenance therapy on trial   BRCA ANALYSIS NEG      CT c/a/p 1/14/2019   S/P resection of large LEFT ovarian/fallopian tube mass consistent with carcinoma.    Interval increase in size of LEFT pelvic/retroperitoneal lymph nodes.  No evidence for ascites or definitive carcinomatosis of mesentery.    Interval decrease in size of prevascular lymph node.        It  was determined she has platinum sensitive recurrent ovarian cancer. Platinum free interval 11 months.   Dr. Feliciano Reviewed standard management options which are to re-treat with platinum-based chemotherapy (carbo + doxil/gem/taxol +/- avastin).   Pt undergoing treatment with Doxil 30mg/m2  Carbo AUC 5  q 28days  Best personalized option is carbo/doxil    S/p chemo cycle 1 completed   2/1/2019     mg 309 to 236  U/ml    Pt with CIT ( chemo-induced thrombocytopenia ) and mild ALONA ( chemo-induced neutropenia)  Pt advised on neutropenic precautions     Plan repeat testing prior to planned chemo 3/1/2019     Plan MRI brain     Follow-up 3 weeks  with Cbc,cmp ,  mg prior to f/u        Cc: Iesha Feliciano M.D.          Anil Pealyo M.C.

## 2019-02-25 NOTE — Clinical Note
Chemo Fri ( pending labs) Repeat CBC FRI am   PRIOR to chemoSchedule MRI brain Cbc,cmp , CA 12 5 m prior to f/u

## 2019-02-27 ENCOUNTER — HOSPITAL ENCOUNTER (OUTPATIENT)
Dept: RADIOLOGY | Facility: HOSPITAL | Age: 84
Discharge: HOME | End: 2019-02-27
Attending: INTERNAL MEDICINE
Payer: MEDICARE

## 2019-02-27 DIAGNOSIS — R51.9 CHRONIC NONINTRACTABLE HEADACHE, UNSPECIFIED HEADACHE TYPE: ICD-10-CM

## 2019-02-27 DIAGNOSIS — G89.29 CHRONIC NONINTRACTABLE HEADACHE, UNSPECIFIED HEADACHE TYPE: ICD-10-CM

## 2019-02-27 PROCEDURE — 70551 MRI BRAIN WITHOUT CONTRAST: ICD-10-PCS | Mod: 26,,, | Performed by: RADIOLOGY

## 2019-02-27 PROCEDURE — 70551 MRI BRAIN STEM W/O DYE: CPT | Mod: TC

## 2019-02-27 PROCEDURE — 70551 MRI BRAIN STEM W/O DYE: CPT | Mod: 26,,, | Performed by: RADIOLOGY

## 2019-03-01 ENCOUNTER — INFUSION (OUTPATIENT)
Dept: INFUSION THERAPY | Facility: HOSPITAL | Age: 84
End: 2019-03-01
Attending: INTERNAL MEDICINE
Payer: MEDICARE

## 2019-03-01 VITALS
DIASTOLIC BLOOD PRESSURE: 64 MMHG | SYSTOLIC BLOOD PRESSURE: 140 MMHG | RESPIRATION RATE: 18 BRPM | OXYGEN SATURATION: 96 % | TEMPERATURE: 98 F | HEART RATE: 50 BPM

## 2019-03-01 DIAGNOSIS — C56.9 OVARIAN CANCER: Primary | ICD-10-CM

## 2019-03-01 LAB
ERYTHROCYTE [DISTWIDTH] IN BLOOD BY AUTOMATED COUNT: 16.4 %
HCT VFR BLD AUTO: 34.2 %
HGB BLD-MCNC: 11.3 G/DL
MCH RBC QN AUTO: 29.4 PG
MCHC RBC AUTO-ENTMCNC: 33 G/DL
MCV RBC AUTO: 89 FL
NEUTROPHILS # BLD AUTO: 1.3 K/UL
PLATELET # BLD AUTO: 300 K/UL
PMV BLD AUTO: 8.7 FL
RBC # BLD AUTO: 3.84 M/UL
WBC # BLD AUTO: 2.72 K/UL

## 2019-03-01 PROCEDURE — 85027 COMPLETE CBC AUTOMATED: CPT

## 2019-03-01 PROCEDURE — 36415 COLL VENOUS BLD VENIPUNCTURE: CPT

## 2019-03-01 NOTE — PLAN OF CARE
Problem: Adult Inpatient Plan of Care  Goal: Plan of Care Review  Outcome: Ongoing (interventions implemented as appropriate)  Arrived to unit. Blood collected for cbc, cmp as ANC was previously 1000. Today ANC 1300. Dr. Davis notified and will hold chemo today and recheck next week. Pt and family verbalized understanding. AVS given to pt, pt discharged from unit, accompanied by daughter.

## 2019-03-07 ENCOUNTER — INFUSION (OUTPATIENT)
Dept: INFUSION THERAPY | Facility: HOSPITAL | Age: 84
End: 2019-03-07
Attending: INTERNAL MEDICINE
Payer: MEDICARE

## 2019-03-07 VITALS
RESPIRATION RATE: 17 BRPM | HEART RATE: 41 BPM | OXYGEN SATURATION: 98 % | TEMPERATURE: 98 F | SYSTOLIC BLOOD PRESSURE: 141 MMHG | DIASTOLIC BLOOD PRESSURE: 65 MMHG

## 2019-03-07 DIAGNOSIS — C56.9 MALIGNANT NEOPLASM OF OVARY, UNSPECIFIED LATERALITY: Primary | ICD-10-CM

## 2019-03-07 PROCEDURE — 96417 CHEMO IV INFUS EACH ADDL SEQ: CPT

## 2019-03-07 PROCEDURE — 96413 CHEMO IV INFUSION 1 HR: CPT

## 2019-03-07 PROCEDURE — 25000003 PHARM REV CODE 250: Performed by: INTERNAL MEDICINE

## 2019-03-07 PROCEDURE — 96361 HYDRATE IV INFUSION ADD-ON: CPT

## 2019-03-07 PROCEDURE — 96367 TX/PROPH/DG ADDL SEQ IV INF: CPT

## 2019-03-07 PROCEDURE — 63600175 PHARM REV CODE 636 W HCPCS: Performed by: INTERNAL MEDICINE

## 2019-03-07 RX ORDER — HEPARIN 100 UNIT/ML
500 SYRINGE INTRAVENOUS
Status: CANCELLED | OUTPATIENT
Start: 2019-03-07

## 2019-03-07 RX ORDER — SODIUM CHLORIDE 0.9 % (FLUSH) 0.9 %
10 SYRINGE (ML) INJECTION
Status: CANCELLED | OUTPATIENT
Start: 2019-03-07

## 2019-03-07 RX ADMIN — PALONOSETRON HYDROCHLORIDE: 0.25 INJECTION INTRAVENOUS at 10:03

## 2019-03-07 RX ADMIN — CARBOPLATIN 295 MG: 10 INJECTION INTRAVENOUS at 12:03

## 2019-03-07 RX ADMIN — SODIUM CHLORIDE: 0.9 INJECTION, SOLUTION INTRAVENOUS at 12:03

## 2019-03-07 RX ADMIN — DOXORUBICIN HYDROCHLORIDE 58 MG: 2 INJECTABLE, LIPOSOMAL INTRAVENOUS at 10:03

## 2019-03-07 NOTE — PLAN OF CARE
Problem: Adult Inpatient Plan of Care  Goal: Plan of Care Review  Outcome: Ongoing (interventions implemented as appropriate)  Arrived to unit from labs. Dr. Reinoso notified of results. Will proceed with treatment. Tolerated Doxil and Carbo. No flare or irritation at IV site. No reactions noted. Pt ate breakfast and lunch. Pt has next appts. Pt ambulatory, discharged with daughter.

## 2019-03-25 ENCOUNTER — HOSPITAL ENCOUNTER (OUTPATIENT)
Dept: RADIOLOGY | Facility: HOSPITAL | Age: 84
Discharge: HOME | End: 2019-03-25
Attending: INTERNAL MEDICINE
Payer: MEDICARE

## 2019-03-25 ENCOUNTER — OFFICE VISIT (OUTPATIENT)
Dept: HEMATOLOGY/ONCOLOGY | Facility: CLINIC | Age: 84
End: 2019-03-25
Payer: MEDICARE

## 2019-03-25 VITALS
WEIGHT: 188.06 LBS | HEART RATE: 78 BPM | TEMPERATURE: 98 F | HEIGHT: 62 IN | DIASTOLIC BLOOD PRESSURE: 40 MMHG | SYSTOLIC BLOOD PRESSURE: 118 MMHG | BODY MASS INDEX: 34.61 KG/M2 | OXYGEN SATURATION: 95 %

## 2019-03-25 DIAGNOSIS — N18.30 CKD (CHRONIC KIDNEY DISEASE) STAGE 3, GFR 30-59 ML/MIN: ICD-10-CM

## 2019-03-25 DIAGNOSIS — J40 BRONCHITIS: ICD-10-CM

## 2019-03-25 DIAGNOSIS — Z98.890 S/P EXPLORATORY LAPAROTOMY: ICD-10-CM

## 2019-03-25 DIAGNOSIS — C56.9 MALIGNANT NEOPLASM OF OVARY, UNSPECIFIED LATERALITY: Primary | ICD-10-CM

## 2019-03-25 DIAGNOSIS — C56.9 MALIGNANT NEOPLASM OF OVARY, UNSPECIFIED LATERALITY: ICD-10-CM

## 2019-03-25 DIAGNOSIS — Z09 CHEMOTHERAPY FOLLOW-UP EXAMINATION: ICD-10-CM

## 2019-03-25 PROCEDURE — 99499 UNLISTED E&M SERVICE: CPT | Mod: S$GLB,,, | Performed by: INTERNAL MEDICINE

## 2019-03-25 PROCEDURE — 3078F PR MOST RECENT DIASTOLIC BLOOD PRESSURE < 80 MM HG: ICD-10-PCS | Mod: CPTII,S$GLB,, | Performed by: INTERNAL MEDICINE

## 2019-03-25 PROCEDURE — 1101F PR PT FALLS ASSESS DOC 0-1 FALLS W/OUT INJ PAST YR: ICD-10-PCS | Mod: CPTII,S$GLB,, | Performed by: INTERNAL MEDICINE

## 2019-03-25 PROCEDURE — 99214 OFFICE O/P EST MOD 30 MIN: CPT | Mod: S$GLB,,, | Performed by: INTERNAL MEDICINE

## 2019-03-25 PROCEDURE — 99214 PR OFFICE/OUTPT VISIT, EST, LEVL IV, 30-39 MIN: ICD-10-PCS | Mod: S$GLB,,, | Performed by: INTERNAL MEDICINE

## 2019-03-25 PROCEDURE — 3074F SYST BP LT 130 MM HG: CPT | Mod: CPTII,S$GLB,, | Performed by: INTERNAL MEDICINE

## 2019-03-25 PROCEDURE — 1101F PT FALLS ASSESS-DOCD LE1/YR: CPT | Mod: CPTII,S$GLB,, | Performed by: INTERNAL MEDICINE

## 2019-03-25 PROCEDURE — 99999 PR PBB SHADOW E&M-EST. PATIENT-LVL IV: ICD-10-PCS | Mod: PBBFAC,,, | Performed by: INTERNAL MEDICINE

## 2019-03-25 PROCEDURE — 99999 PR PBB SHADOW E&M-EST. PATIENT-LVL IV: CPT | Mod: PBBFAC,,, | Performed by: INTERNAL MEDICINE

## 2019-03-25 PROCEDURE — 99499 RISK ADDL DX/OHS AUDIT: ICD-10-PCS | Mod: S$GLB,,, | Performed by: INTERNAL MEDICINE

## 2019-03-25 PROCEDURE — 3078F DIAST BP <80 MM HG: CPT | Mod: CPTII,S$GLB,, | Performed by: INTERNAL MEDICINE

## 2019-03-25 PROCEDURE — 3074F PR MOST RECENT SYSTOLIC BLOOD PRESSURE < 130 MM HG: ICD-10-PCS | Mod: CPTII,S$GLB,, | Performed by: INTERNAL MEDICINE

## 2019-03-25 RX ORDER — AZITHROMYCIN 250 MG/1
TABLET, FILM COATED ORAL
Qty: 6 TABLET | Refills: 0 | Status: SHIPPED | OUTPATIENT
Start: 2019-03-25 | End: 2019-03-30

## 2019-03-25 RX ORDER — GUAIFENESIN, PSEUDOEPHEDRINE HYDROCHLORIDE 600; 60 MG/1; MG/1
1 TABLET, EXTENDED RELEASE ORAL
COMMUNITY
Start: 2019-03-18 | End: 2019-04-25

## 2019-03-25 NOTE — PROGRESS NOTES
"Subjective:       Patient ID: Celine Mahan is a 83 y.o. female.    Chief Complaint: Follow-up    HPI     Diagnosis: 1.Ovarian cancer status post debulking surgery 9/21/2017                     2. 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 S/p carbo/taxol x 6 completed 2/12/2018                   2. Carbo/Doxil 2/1/2019- present         HPI ( per Dr. Feliciano) Patient is an 81yo female who originally presented as a referral from Dr. Wills for pelvic mass. Patient reports LLQ pain for approximately 3 months which prompted evaluation.      Pelvic US 8/7/17 There is a complex echogenicity midline pelvic mass measuring 18.1 x 8.6 x 7.6 cm.  Ovaries not visualized.     CT A&P 8/9/17 Large solid/cystic mass within the lower abdomen/upper pelvis that is most concerning for a malignant neoplasm, likely of ovarian origin given its location.  Correlation with previous surgical history is recommended noting evidence of previous hysterectomy.  Mass abuts and displaces the adjacent bladder without definite CT findings to suggest hema invasion. Abdominal and pelvic lymphadenopathy concerning for lymphatic spread of neoplasm with index lymph nodes as above.      Medical history is significant for CKD (Cr 1.3), HTN, hypothyroid, HTN, HLD. She has a personal history of breast cancer in 1995 treated with mastectomy and adjuvant chemotherapy she reports. Last MMG 11/2016 normal. Adbominal surgery include cholecystectomy, TVH, xlap/removal ovary for ectopic. She reports that her physician told her "some ovary remains in situ". Family history significant for mother with pancreatic cancer.      CT chest 8/28/17 showed mediastinal adenopathy consistent with Stage IV disease.   RECIST Summary:  Mediastinal adenopathy:  1. Preaortic abnormal node measuring 1.7 cm short axis.  2. Pretracheal node measures 1.6 cm short axis.     She underwent surgical cytoreduction with xlap/LSO/omentectomy 9/21/17. Pathology " "showed high grade adenocarcinoma.        She is also  followed by Dr. Feliciano  She  transferred care to undergo  adjuvant chemo at this location   She completed adjuvant chemo with carbo AUC 6 and taxol 175mg/m2 q 21d  2/12/2018  She was considered for PRIMA clinical trial for maintenance therapy   Pt  Declined maintenance therapy      Pt with increasing  levels   849>230>37>23>22>12>40>309 ( 1/9/2019 )       CT c/a/p 1/14/2019   S/P resection of large LEFT ovarian/fallopian tube mass consistent with carcinoma.    Interval increase in size of LEFT pelvic/retroperitoneal lymph nodes.  No evidence for ascites or definitive carcinomatosis of mesentery.    Interval decrease in size of prevascular lymph node.      It was determined she has platinum sensitive recurrent ovarian cancer. Platinum free interval 11 months.   Dr. Feliciano Reviewed standard management options which are to re-treat with platinum-based chemotherapy (carbo + doxil/gem/taxol +/- avastin).   Pt undergoing treatment with Doxil 30mg/m2  Carbo AUC 5  q 28days  Best personalized option is carbo/doxil      She is s/p cycle 2 of chemo   ( delayed sec to cytopenias)     Accompanied by dtr    She reports a " cold" 2 wks  She reports a cough intermittent prod x 1 week  She reports post tussive chest discomfort   Fever 101  yesterday  No SOB  She reports fatigue  Appetite and weight stable   No abd pain/distension   No melena, hematochezia or change in bowel habits  No rashes  No bleeding-nasal/rectal/urinary     She continues with  Intermittent tingling in hands and feet   She is only taking prescribed gabapentin once daily ( sedation)    MRI brain - no brain mets    CBC 3/7/2019  reveals a white blood cell count of 3550 mm 3 hemoglobin 12 grams/dL hematocrit of 36.4 % plt ct 332 k      Biochemical profile reveals stable Cr level from 1.7mg/dL  to 1.6mg/dL     BRCA ANALYSIS NEG    Past Medical History:   Diagnosis Date    Breast cancer     Cataract  " "   CKD (chronic kidney disease) stage 3, GFR 30-59 ml/min     Hyperlipidemia     Hypertension     Hypothyroidism     Obesity     Osteopenia     Ovarian cancer 10/8/2017    Overactive bladder     Pelvic mass 8/27/2017    Thyroid disease          Past Surgical History:   Procedure Laterality Date    bt catarac surgery      CHOLECYSTECTOMY      COLONOSCOPY N/A 2/24/2015    [facility]O    EXPLORATORY-LAPAROTOMY N/A 9/21/2017    [facility] 2ND FLR    HYSTERECTOMY      MASTECTOMY Left     OMENTECTOMY Bilateral 9/21/2017    Performed by [facility] at [facility] OR 2ND FLR    SALPINGO-OOPHERECTOMY Left 9/21/2017    Performed by [facility] Fel[facility]o MD at [facility] OR 2ND FLR             Review of Systems   Constitutional: Positive for fatigue. Negative for appetite change, fever and unexpected weight change.   HENT: Negative for mouth sores.    Eyes: Negative for visual disturbance.   Respiratory: Positive for cough. Negative for shortness of breath.    Cardiovascular: Negative for chest pain.   Gastrointestinal: Negative for abdominal pain, diarrhea and nausea.   Genitourinary: Negative for frequency.   Musculoskeletal: Negative for back pain.   Skin: Negative for rash.        No petechiae   Neurological: Positive for numbness. Negative for weakness, light-headedness and headaches.        Intermittent tingling   Hematological: Negative for adenopathy.   Psychiatric/Behavioral: The patient is not nervous/anxious.        Objective:       Vitals:    03/25/19 0936 03/25/19 0943   BP: (!) 145/83 (!) 118/40   BP Location: Right arm Right arm   Patient Position: Sitting Sitting   BP Method: Large (Automatic) Large (Automatic)   Pulse: 78    Temp: 97.8 °F (36.6 °C)    TempSrc: Oral    SpO2: 95%    Weight: 85.3 kg (188 lb 0.8 oz)    Height: 5' 2" (1.575 m)        Physical Exam   Constitutional: She is oriented to person, place, and time. She appears " well-developed.   Coughing during exam    HENT:   Head: Normocephalic.   Mouth/Throat: Oropharynx is clear and moist. No oropharyngeal exudate.   Eyes: Conjunctivae and lids are normal. No scleral icterus.   Neck: Normal range of motion. Neck supple. No thyromegaly present.   Cardiovascular: Normal rate, regular rhythm and normal heart sounds.   No murmur heard.  Pulmonary/Chest: She has decreased breath sounds. She has no wheezes. She has no rales.   Abdominal: Soft. Bowel sounds are normal. She exhibits no distension. There is no hepatosplenomegaly. There is no tenderness.   Well-healed midline incision   Musculoskeletal: Normal range of motion. She exhibits no edema or tenderness.   Lymphadenopathy:     She has no cervical adenopathy.     She has no axillary adenopathy.        Right: No supraclavicular adenopathy present.        Left: No supraclavicular adenopathy present.   Neurological: She is alert and oriented to person, place, and time. No cranial nerve deficit. Coordination normal.   Skin: Skin is warm and dry. No ecchymosis, no petechiae and no rash noted. No erythema.   Psychiatric: She has a normal mood and affect.         Labs:   Lab Results   Component Value Date    WBC 3.55 (L) 03/07/2019    HGB 12.0 03/07/2019    HCT 36.4 (L) 03/07/2019    MCV 92 03/07/2019     03/07/2019         Results for JONATAN SERRANO (MRN [id number]) as of 1/9/2019 12:43   Ref. Range 5/25/2018 10:01 7/27/2018 10:18 10/10/2018 11:45    Latest Ref Range: 0 - 30 U/mL 13 12 40 (H)       Results for JONATAN SERRANO (MRN [id number]) as of 2/25/2019 10:01   Ref. Range 1/9/2019 10:34 2/20/2019 09:44    Latest Ref Range: 0 - 30 U/mL 309 (H) 236 (H)       CT c/a/p 1/14/2019   S/P resection of large LEFT ovarian/fallopian tube mass consistent with carcinoma.    Interval increase in size of LEFT pelvic/retroperitoneal lymph nodes.  No evidence for ascites or definitive carcinomatosis of mesentery.    Interval decrease in size  of prevascular lymph node.    RECIST SUMMARY:    Date of prior examination for comparison: Chest CT 08/28/2017 and CT abdomen and pelvis 08/14/2017    Lesion 1 Chest: Pre aortic lymph node previously measuring 1.7 cm, currently measuring 0.94 cm.  Previously noted prevascular lymph node is stable as compared to prior, with a fatty hilum, of questionable significance..    Lesion 2 abdomen/pelvis: LEFT iliac chain node.  2.3 cm cm. Series 2 image 163.  Prior measurement 1.5 cm cm.    Lesion 3 abdomen/pelvis: LEFT renal hilar lymph node.  1.6 cm cm. Series 2 image 112.  Prior measurement 1.4 cm cm.      Assessment:       1. Malignant neoplasm of ovary, unspecified laterality    2. S/P exploratory laparotomy/LSO/omentectomy    3. CKD (chronic kidney disease) stage 3, GFR 30-59 ml/min    4. Chemotherapy follow-up examination    5. Bronchitis        Plan:   1- 5  Pt clinically stable   Patient with ovarian cancer status post debulking surgery 9/21/2017 undergoing adjuvant platinum-based chemotherapy with carbo/taxol .   S/p carbo/taxol x 6 completed 2/12/2018  Pt declined maintenance therapy on trial   BRCA ANALYSIS NEG      CT c/a/p 1/14/2019   S/P resection of large LEFT ovarian/fallopian tube mass consistent with carcinoma.    Interval increase in size of LEFT pelvic/retroperitoneal lymph nodes.  No evidence for ascites or definitive carcinomatosis of mesentery.    Interval decrease in size of prevascular lymph node.        It  was determined she has platinum sensitive recurrent ovarian cancer. Platinum free interval 11 months.   Dr. Feliciano Reviewed standard management options which are to re-treat with platinum-based chemotherapy (carbo + doxil/gem/taxol +/- avastin).   Pt undergoing treatment with Doxil 30mg/m2  Carbo AUC 5  q 28days  Best personalized option is carbo/doxil    S/p chemo cycle 2 completed       mg 309 to 236 U/ml    Labs planned April 1  Next chemo cycle 3  planned April 5th ( pending lab  parameters)     MRI brain - NEG for brain mets    Plan CT imaging prior to f/u to assess disease response     Rx provided for Z pack     Follow-up 4 weeks  with Cbc,cmp ,  mg prior to f/u        Cc: Iesha Feliciano M.D.          Anil Pelayo M.C.

## 2019-03-25 NOTE — Clinical Note
Cbc,cmp , Ca 125 Mon or TuesCbc,cmp,  1-2 days prior to f/u CT 1 week prior to f/uChemo planned April 5th

## 2019-03-26 DIAGNOSIS — K21.9 GASTROESOPHAGEAL REFLUX DISEASE, ESOPHAGITIS PRESENCE NOT SPECIFIED: ICD-10-CM

## 2019-03-26 DIAGNOSIS — R14.0 ABDOMINAL BLOATING: ICD-10-CM

## 2019-03-26 RX ORDER — OMEPRAZOLE 20 MG/1
CAPSULE, DELAYED RELEASE ORAL
Qty: 90 CAPSULE | Refills: 0 | Status: SHIPPED | OUTPATIENT
Start: 2019-03-26 | End: 2019-10-17 | Stop reason: SDUPTHER

## 2019-04-02 ENCOUNTER — LAB VISIT (OUTPATIENT)
Dept: LAB | Facility: HOSPITAL | Age: 84
End: 2019-04-02
Attending: INTERNAL MEDICINE
Payer: MEDICARE

## 2019-04-02 DIAGNOSIS — C56.9 MALIGNANT NEOPLASM OF OVARY, UNSPECIFIED LATERALITY: ICD-10-CM

## 2019-04-02 LAB
ALBUMIN SERPL BCP-MCNC: 3.7 G/DL (ref 3.5–5.2)
ALP SERPL-CCNC: 81 U/L (ref 55–135)
ALT SERPL W/O P-5'-P-CCNC: 13 U/L (ref 10–44)
ANION GAP SERPL CALC-SCNC: 11 MMOL/L (ref 8–16)
AST SERPL-CCNC: 14 U/L (ref 10–40)
BASOPHILS # BLD AUTO: 0.09 K/UL (ref 0–0.2)
BASOPHILS NFR BLD: 2.8 % (ref 0–1.9)
BILIRUB SERPL-MCNC: 0.2 MG/DL (ref 0.1–1)
BUN SERPL-MCNC: 15 MG/DL (ref 8–23)
CALCIUM SERPL-MCNC: 9.6 MG/DL (ref 8.7–10.5)
CANCER AG125 SERPL-ACNC: 81 U/ML (ref 0–30)
CHLORIDE SERPL-SCNC: 105 MMOL/L (ref 95–110)
CO2 SERPL-SCNC: 20 MMOL/L (ref 23–29)
CREAT SERPL-MCNC: 1.7 MG/DL (ref 0.5–1.4)
DIFFERENTIAL METHOD: ABNORMAL
EOSINOPHIL # BLD AUTO: 0.2 K/UL (ref 0–0.5)
EOSINOPHIL NFR BLD: 4.6 % (ref 0–8)
ERYTHROCYTE [DISTWIDTH] IN BLOOD BY AUTOMATED COUNT: 19.1 % (ref 11.5–14.5)
EST. GFR  (AFRICAN AMERICAN): 32 ML/MIN/1.73 M^2
EST. GFR  (NON AFRICAN AMERICAN): 27 ML/MIN/1.73 M^2
GLUCOSE SERPL-MCNC: 118 MG/DL (ref 70–110)
HCT VFR BLD AUTO: 31.6 % (ref 37–48.5)
HGB BLD-MCNC: 10.4 G/DL (ref 12–16)
LYMPHOCYTES # BLD AUTO: 1 K/UL (ref 1–4.8)
LYMPHOCYTES NFR BLD: 29.1 % (ref 18–48)
MCH RBC QN AUTO: 30.5 PG (ref 27–31)
MCHC RBC AUTO-ENTMCNC: 32.9 G/DL (ref 32–36)
MCV RBC AUTO: 93 FL (ref 82–98)
MONOCYTES # BLD AUTO: 0.4 K/UL (ref 0.3–1)
MONOCYTES NFR BLD: 12.9 % (ref 4–15)
NEUTROPHILS # BLD AUTO: 1.7 K/UL (ref 1.8–7.7)
NEUTROPHILS NFR BLD: 50.6 % (ref 38–73)
PLATELET # BLD AUTO: 335 K/UL (ref 150–350)
PMV BLD AUTO: 9 FL (ref 9.2–12.9)
POTASSIUM SERPL-SCNC: 4.9 MMOL/L (ref 3.5–5.1)
PROT SERPL-MCNC: 7.3 G/DL (ref 6–8.4)
RBC # BLD AUTO: 3.41 M/UL (ref 4–5.4)
SODIUM SERPL-SCNC: 136 MMOL/L (ref 136–145)
WBC # BLD AUTO: 3.26 K/UL (ref 3.9–12.7)

## 2019-04-02 PROCEDURE — 80053 COMPREHEN METABOLIC PANEL: CPT

## 2019-04-02 PROCEDURE — 85025 COMPLETE CBC W/AUTO DIFF WBC: CPT

## 2019-04-02 PROCEDURE — 36415 COLL VENOUS BLD VENIPUNCTURE: CPT

## 2019-04-02 PROCEDURE — 86304 IMMUNOASSAY TUMOR CA 125: CPT

## 2019-04-04 RX ORDER — HEPARIN 100 UNIT/ML
500 SYRINGE INTRAVENOUS
Status: CANCELLED | OUTPATIENT
Start: 2019-04-04

## 2019-04-04 RX ORDER — SODIUM CHLORIDE 0.9 % (FLUSH) 0.9 %
10 SYRINGE (ML) INJECTION
Status: CANCELLED | OUTPATIENT
Start: 2019-04-04

## 2019-04-05 ENCOUNTER — INFUSION (OUTPATIENT)
Dept: INFUSION THERAPY | Facility: HOSPITAL | Age: 84
End: 2019-04-05
Attending: INTERNAL MEDICINE
Payer: MEDICARE

## 2019-04-05 VITALS
OXYGEN SATURATION: 97 % | RESPIRATION RATE: 18 BRPM | TEMPERATURE: 98 F | DIASTOLIC BLOOD PRESSURE: 68 MMHG | HEART RATE: 62 BPM | SYSTOLIC BLOOD PRESSURE: 135 MMHG

## 2019-04-05 DIAGNOSIS — C56.9 MALIGNANT NEOPLASM OF OVARY, UNSPECIFIED LATERALITY: Primary | ICD-10-CM

## 2019-04-05 PROCEDURE — 96413 CHEMO IV INFUSION 1 HR: CPT

## 2019-04-05 PROCEDURE — 96417 CHEMO IV INFUS EACH ADDL SEQ: CPT

## 2019-04-05 PROCEDURE — 96367 TX/PROPH/DG ADDL SEQ IV INF: CPT

## 2019-04-05 PROCEDURE — 96361 HYDRATE IV INFUSION ADD-ON: CPT

## 2019-04-05 PROCEDURE — 63600175 PHARM REV CODE 636 W HCPCS: Performed by: INTERNAL MEDICINE

## 2019-04-05 PROCEDURE — 25000003 PHARM REV CODE 250: Performed by: INTERNAL MEDICINE

## 2019-04-05 RX ADMIN — CARBOPLATIN 295 MG: 10 INJECTION INTRAVENOUS at 11:04

## 2019-04-05 RX ADMIN — DEXAMETHASONE SODIUM PHOSPHATE: 10 INJECTION INTRAMUSCULAR; INTRAVENOUS at 09:04

## 2019-04-05 RX ADMIN — DOXORUBICIN HYDROCHLORIDE 58 MG: 2 INJECTABLE, LIPOSOMAL INTRAVENOUS at 10:04

## 2019-04-05 RX ADMIN — SODIUM CHLORIDE: 0.9 INJECTION, SOLUTION INTRAVENOUS at 10:04

## 2019-04-05 NOTE — PLAN OF CARE
Problem: Adult Inpatient Plan of Care  Goal: Plan of Care Review  Outcome: Ongoing (interventions implemented as appropriate)  Patient received Doxil and Carboplatin. Tolerated well. VSS. Reports non-productive cough. No other concerns/complaints at this time. Received discharge instructions and follow up appointments. Verbalized understanding and ambulated unassisted off unit accompanied by daughter.

## 2019-04-09 DIAGNOSIS — C56.9 MALIGNANT NEOPLASM OF OVARY, UNSPECIFIED LATERALITY: Primary | ICD-10-CM

## 2019-04-09 DIAGNOSIS — T45.1X5A CHEMOTHERAPY-INDUCED NAUSEA: ICD-10-CM

## 2019-04-09 DIAGNOSIS — R11.0 CHEMOTHERAPY-INDUCED NAUSEA: ICD-10-CM

## 2019-04-09 RX ORDER — ONDANSETRON HYDROCHLORIDE 8 MG/1
8 TABLET, FILM COATED ORAL EVERY 8 HOURS PRN
Qty: 30 TABLET | Refills: 3 | Status: SHIPPED | OUTPATIENT
Start: 2019-04-09 | End: 2020-07-08 | Stop reason: SDUPTHER

## 2019-04-09 NOTE — TELEPHONE ENCOUNTER
----- Message from Jackeline Abarca, Patient Care Assistant sent at 4/9/2019 11:12 AM CDT -----  Contact: patient's daughter Stacia  Pt requesting a refill on Zofran 8mg.     Pharmacy: [facility]     Daughter Gabi's contact info: [phone]

## 2019-04-17 ENCOUNTER — HOSPITAL ENCOUNTER (OUTPATIENT)
Dept: RADIOLOGY | Facility: HOSPITAL | Age: 84
Discharge: HOME | End: 2019-04-17
Attending: INTERNAL MEDICINE
Payer: MEDICARE

## 2019-04-17 PROCEDURE — 71250 CT CHEST ABDOMEN PELVIS WITHOUT CONTRAST(XPD): ICD-10-PCS | Mod: 26,,, | Performed by: RADIOLOGY

## 2019-04-17 PROCEDURE — 74176 CT ABD & PELVIS W/O CONTRAST: CPT | Mod: TC

## 2019-04-17 PROCEDURE — 71250 CT THORAX DX C-: CPT | Mod: TC

## 2019-04-17 PROCEDURE — 74176 CT CHEST ABDOMEN PELVIS WITHOUT CONTRAST(XPD): ICD-10-PCS | Mod: 26,,, | Performed by: RADIOLOGY

## 2019-04-17 PROCEDURE — 71250 CT THORAX DX C-: CPT | Mod: 26,,, | Performed by: RADIOLOGY

## 2019-04-17 PROCEDURE — 74176 CT ABD & PELVIS W/O CONTRAST: CPT | Mod: 26,,, | Performed by: RADIOLOGY

## 2019-04-25 ENCOUNTER — OFFICE VISIT (OUTPATIENT)
Dept: HEMATOLOGY/ONCOLOGY | Facility: CLINIC | Age: 84
End: 2019-04-25
Payer: MEDICARE

## 2019-04-25 VITALS
DIASTOLIC BLOOD PRESSURE: 51 MMHG | HEART RATE: 86 BPM | OXYGEN SATURATION: 95 % | WEIGHT: 186.5 LBS | TEMPERATURE: 98 F | BODY MASS INDEX: 34.32 KG/M2 | HEIGHT: 62 IN | SYSTOLIC BLOOD PRESSURE: 102 MMHG

## 2019-04-25 DIAGNOSIS — D69.59 CHEMOTHERAPY-INDUCED THROMBOCYTOPENIA: ICD-10-CM

## 2019-04-25 DIAGNOSIS — T45.1X5A CHEMOTHERAPY INDUCED NEUTROPENIA: ICD-10-CM

## 2019-04-25 DIAGNOSIS — T45.1X5A CHEMOTHERAPY-INDUCED THROMBOCYTOPENIA: ICD-10-CM

## 2019-04-25 DIAGNOSIS — Z09 CHEMOTHERAPY FOLLOW-UP EXAMINATION: ICD-10-CM

## 2019-04-25 DIAGNOSIS — G62.0 CHEMOTHERAPY-INDUCED NEUROPATHY: ICD-10-CM

## 2019-04-25 DIAGNOSIS — C56.9 MALIGNANT NEOPLASM OF OVARY, UNSPECIFIED LATERALITY: Primary | ICD-10-CM

## 2019-04-25 DIAGNOSIS — D70.1 CHEMOTHERAPY INDUCED NEUTROPENIA: ICD-10-CM

## 2019-04-25 DIAGNOSIS — T45.1X5A CHEMOTHERAPY-INDUCED NEUROPATHY: ICD-10-CM

## 2019-04-25 DIAGNOSIS — G89.4 CHRONIC PAIN SYNDROME: ICD-10-CM

## 2019-04-25 DIAGNOSIS — M54.2 NECK PAIN: ICD-10-CM

## 2019-04-25 PROCEDURE — 99499 RISK ADDL DX/OHS AUDIT: ICD-10-PCS | Mod: S$GLB,,, | Performed by: INTERNAL MEDICINE

## 2019-04-25 PROCEDURE — 3074F PR MOST RECENT SYSTOLIC BLOOD PRESSURE < 130 MM HG: ICD-10-PCS | Mod: CPTII,S$GLB,, | Performed by: INTERNAL MEDICINE

## 2019-04-25 PROCEDURE — 99215 OFFICE O/P EST HI 40 MIN: CPT | Mod: S$GLB,,, | Performed by: INTERNAL MEDICINE

## 2019-04-25 PROCEDURE — 99499 UNLISTED E&M SERVICE: CPT | Mod: S$GLB,,, | Performed by: INTERNAL MEDICINE

## 2019-04-25 PROCEDURE — 3074F SYST BP LT 130 MM HG: CPT | Mod: CPTII,S$GLB,, | Performed by: INTERNAL MEDICINE

## 2019-04-25 PROCEDURE — 99215 PR OFFICE/OUTPT VISIT, EST, LEVL V, 40-54 MIN: ICD-10-PCS | Mod: S$GLB,,, | Performed by: INTERNAL MEDICINE

## 2019-04-25 PROCEDURE — 3078F PR MOST RECENT DIASTOLIC BLOOD PRESSURE < 80 MM HG: ICD-10-PCS | Mod: CPTII,S$GLB,, | Performed by: INTERNAL MEDICINE

## 2019-04-25 PROCEDURE — 3078F DIAST BP <80 MM HG: CPT | Mod: CPTII,S$GLB,, | Performed by: INTERNAL MEDICINE

## 2019-04-25 PROCEDURE — 99999 PR PBB SHADOW E&M-EST. PATIENT-LVL IV: ICD-10-PCS | Mod: PBBFAC,,, | Performed by: INTERNAL MEDICINE

## 2019-04-25 PROCEDURE — 1101F PT FALLS ASSESS-DOCD LE1/YR: CPT | Mod: CPTII,S$GLB,, | Performed by: INTERNAL MEDICINE

## 2019-04-25 PROCEDURE — 1101F PR PT FALLS ASSESS DOC 0-1 FALLS W/OUT INJ PAST YR: ICD-10-PCS | Mod: CPTII,S$GLB,, | Performed by: INTERNAL MEDICINE

## 2019-04-25 PROCEDURE — 99999 PR PBB SHADOW E&M-EST. PATIENT-LVL IV: CPT | Mod: PBBFAC,,, | Performed by: INTERNAL MEDICINE

## 2019-04-25 RX ORDER — TRAMADOL HYDROCHLORIDE 50 MG/1
TABLET ORAL
Qty: 60 TABLET | Refills: 0 | Status: SHIPPED | OUTPATIENT
Start: 2019-04-25 | End: 2019-04-25 | Stop reason: SDUPTHER

## 2019-04-25 RX ORDER — TRAMADOL HYDROCHLORIDE 50 MG/1
TABLET ORAL
Qty: 60 TABLET | Refills: 0 | Status: SHIPPED | OUTPATIENT
Start: 2019-04-25 | End: 2019-09-16 | Stop reason: SDUPTHER

## 2019-04-25 NOTE — PROGRESS NOTES
"Subjective:       Patient ID: Celine Mahan is a 83 y.o. female.    Chief Complaint: Follow-up    HPI     Diagnosis: 1.Ovarian cancer status post debulking surgery 9/21/2017                     2. 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 S/p carbo/taxol x 6 completed 2/12/2018                   2. Carbo/Doxil 2/1/2019- present         HPI ( per Dr. Feliciano) Patient is an 82 yo female who originally presented as a referral from Dr. Wills for pelvic mass. Patient reports LLQ pain for approximately 3 months which prompted evaluation.      Pelvic US 8/7/17 There is a complex echogenicity midline pelvic mass measuring 18.1 x 8.6 x 7.6 cm.  Ovaries not visualized.     CT A&P 8/9/17 Large solid/cystic mass within the lower abdomen/upper pelvis that is most concerning for a malignant neoplasm, likely of ovarian origin given its location.  Correlation with previous surgical history is recommended noting evidence of previous hysterectomy.  Mass abuts and displaces the adjacent bladder without definite CT findings to suggest hema invasion. Abdominal and pelvic lymphadenopathy concerning for lymphatic spread of neoplasm with index lymph nodes as above.      Medical history is significant for CKD (Cr 1.3), HTN, hypothyroid, HTN, HLD. She has a personal history of breast cancer in 1995 treated with mastectomy and adjuvant chemotherapy she reports. Last MMG 11/2016 normal. Adbominal surgery include cholecystectomy, TVH, xlap/removal ovary for ectopic. She reports that her physician told her "some ovary remains in situ". Family history significant for mother with pancreatic cancer.      CT chest 8/28/17 showed mediastinal adenopathy consistent with Stage IV disease.   RECIST Summary:  Mediastinal adenopathy:  1. Preaortic abnormal node measuring 1.7 cm short axis.  2. Pretracheal node measures 1.6 cm short axis.     She underwent surgical cytoreduction with xlap/LSO/omentectomy 9/21/17. Pathology " "showed high grade adenocarcinoma.        She is also  followed by Dr. Feliciano  She  transferred care to undergo  adjuvant chemo at this location   She completed adjuvant chemo with carbo AUC 6 and taxol 175mg/m2 q 21d  2/12/2018  She was considered for PRIMA clinical trial for maintenance therapy   Pt  Declined maintenance therapy      Pt with increasing  levels   849>230>37>23>22>12>40>309 ( 1/9/2019 )       CT c/a/p 1/14/2019   S/P resection of large LEFT ovarian/fallopian tube mass consistent with carcinoma.    Interval increase in size of LEFT pelvic/retroperitoneal lymph nodes.  No evidence for ascites or definitive carcinomatosis of mesentery.    Interval decrease in size of prevascular lymph node.      It was determined she has platinum sensitive recurrent ovarian cancer. Platinum free interval 11 months.   Dr. Feliciano Reviewed standard management options which are to re-treat with platinum-based chemotherapy (carbo + doxil/gem/taxol +/- avastin).   Pt undergoing treatment with Doxil 30mg/m2  Carbo AUC 5  q 28days  Best personalized option is carbo/doxil      She is s/p cycle 3  of chemo   ( delayed sec to cytopenias)     Accompanied by dtr  Recent imaging studies reveals stable disease    She reports neck pain x 2 mos  Pt reports " electric shock" sensation  No HA/N/V  No SOB/cough   She reports mild fatigue  Appetite and weight stable   No abd pain/distension   No melena, hematochezia or change in bowel habits  No rashes  No bleeding-nasal/rectal/urinary   No easy bruisabiliy     She continues with  Intermittent tingling in hands and feet   She is only taking prescribed gabapentin once daily ( sedation)      Pt requesting Rx refill of pain meds    MRI brain 2/27/2019 - no brain mets    CBC 4/17/2019  reveals a white blood cell count of 2540 mm 3 hemoglobin 9.9 grams/dL hematocrit of 29.8% plt ct 120  k      Biochemical profile reveals stable Cr level from 1.7mg/dL  to 1.5mg/dL     BRCA ANALYSIS " "NEG    Past Medical History:   Diagnosis Date    Breast cancer     Cataract     CKD (chronic kidney disease) stage 3, GFR 30-59 ml/min     Hyperlipidemia     Hypertension     Hypothyroidism     Obesity     Osteopenia     Ovarian cancer 10/8/2017    Overactive bladder     Pelvic mass 8/27/2017    Thyroid disease          Past Surgical History:   Procedure Laterality Date    bt catarac surgery      CHOLECYSTECTOMY      COLONOSCOPY N/A 2/24/2015    [facility]    EXPLORATORY-LAPAROTOMY N/A 9/21/2017    Performed by [facility]ha Fel[facility]o MD at [facility] OR 2ND FLR    HYSTERECTOMY      MASTECTOMY Left     OMENTECTOMY Bilateral 9/21/2017    Performed by [facility] MD at [facility] OR 2ND FLR    SALPINGO-OOPHERECTOMY Left 9/21/2017    Performed by Iesha Fel[facility]o MD at [facility] OR [address] [place] [address]             Review of Systems   Constitutional: Positive for fatigue. Negative for appetite change, fever and unexpected weight change.   HENT: Negative for mouth sores.    Eyes: Negative for visual disturbance.   Respiratory: Positive for cough. Negative for shortness of breath.    Cardiovascular: Negative for chest pain.   Gastrointestinal: Negative for abdominal pain, diarrhea and nausea.   Genitourinary: Negative for frequency.   Musculoskeletal: Negative for back pain.   Skin: Negative for rash.        No petechiae   Neurological: Positive for numbness. Negative for weakness, light-headedness and headaches.        Intermittent tingling   Hematological: Negative for adenopathy.   Psychiatric/Behavioral: The patient is not nervous/anxious.        Objective:       Vitals:    04/25/19 0844   BP: (!) 102/51   BP Location: Right arm   Patient Position: Sitting   BP Method: Medium (Automatic)   Pulse: 86   Temp: 97.5 °F (36.4 °C)   TempSrc: Oral   SpO2: 95%   Weight: 84.6 kg (186 lb 8.2 oz)   Height: 5' 2" (1.575 m)       Physical Exam   Constitutional: She is oriented to person, place, and time. She " appears well-developed and well-nourished.   HENT:   Head: Normocephalic.   Mouth/Throat: Oropharynx is clear and moist. No oropharyngeal exudate.   Eyes: Conjunctivae and lids are normal. No scleral icterus.   Neck: Normal range of motion. Neck supple. No thyromegaly present.   Cardiovascular: Normal rate, regular rhythm and normal heart sounds.   No murmur heard.  Pulmonary/Chest: Breath sounds normal. She has no wheezes. She has no rales.   Abdominal: Soft. Bowel sounds are normal. She exhibits no distension. There is no hepatosplenomegaly. There is no tenderness.   Well-healed midline surgical scar   Musculoskeletal: Normal range of motion. She exhibits no edema or tenderness.   Lymphadenopathy:     She has no cervical adenopathy.     She has no axillary adenopathy.        Right: No supraclavicular adenopathy present.        Left: No supraclavicular adenopathy present.   Neurological: She is alert and oriented to person, place, and time. No cranial nerve deficit. Coordination normal.   Skin: Skin is warm and dry. No ecchymosis, no petechiae and no rash noted. No erythema.   Psychiatric: She has a normal mood and affect.         Labs:   Lab Results   Component Value Date    WBC 2.54 (L) 04/17/2019    HGB 9.9 (L) 04/17/2019    HCT 29.8 (L) 04/17/2019    MCV 94 04/17/2019     (L) 04/17/2019       Results for JONATAN SERRANO (MRN [id number]) as of 4/25/2019 08:52   Ref. Range 2/20/2019 09:44 3/7/2019 07:45 4/2/2019 09:32 4/17/2019 13:08    Latest Ref Range: 0 - 30 U/mL 236 (H)  81 (H) 86 (H)           CT c/a/p 1/14/2019   S/P resection of large LEFT ovarian/fallopian tube mass consistent with carcinoma.    Interval increase in size of LEFT pelvic/retroperitoneal lymph nodes.  No evidence for ascites or definitive carcinomatosis of mesentery.    Interval decrease in size of prevascular lymph node.    RECIST SUMMARY:    Date of prior examination for comparison: Chest CT 08/28/2017 and CT abdomen and pelvis  08/14/2017    Lesion 1 Chest: Pre aortic lymph node previously measuring 1.7 cm, currently measuring 0.94 cm.  Previously noted prevascular lymph node is stable as compared to prior, with a fatty hilum, of questionable significance..    Lesion 2 abdomen/pelvis: LEFT iliac chain node.  2.3 cm cm. Series 2 image 163.  Prior measurement 1.5 cm cm.    Lesion 3 abdomen/pelvis: LEFT renal hilar lymph node.  1.6 cm cm. Series 2 image 112.  Prior measurement 1.4 cm cm.      Assessment:       1. Malignant neoplasm of ovary, unspecified laterality    2. Chemotherapy-induced neuropathy    3. Chemotherapy follow-up examination    4. Chemotherapy induced neutropenia    5. Chemotherapy-induced thrombocytopenia    6. Chronic pain syndrome    7. Neck pain        Plan:   1- 7  Pt clinically stable   Patient with ovarian cancer status post debulking surgery 9/21/2017 undergoing adjuvant platinum-based chemotherapy with carbo/taxol .   S/p carbo/taxol x 6 completed 2/12/2018  Pt declined maintenance therapy on trial   BRCA ANALYSIS NEG      CT c/a/p 1/14/2019   S/P resection of large LEFT ovarian/fallopian tube mass consistent with carcinoma.    Interval increase in size of LEFT pelvic/retroperitoneal lymph nodes.  No evidence for ascites or definitive carcinomatosis of mesentery.    Interval decrease in size of prevascular lymph node.        It  was determined she has platinum sensitive recurrent ovarian cancer. Platinum free interval 11 months.   Dr. Feliciano Reviewed standard management options which are to re-treat with platinum-based chemotherapy (carbo + doxil/gem/taxol +/- avastin).   Pt undergoing treatment with Doxil 30mg/m2  Carbo AUC 5  q 28days  Best personalized option is carbo/doxil    S/p chemo cycle 3 completed       mg 81  To 86 U/ml      MRI brain 2/27/2019 - NEG for brain mets    Discussed imaging findings in detail with patient which reveals stable disease  Plan to discuss  findings with Dr. Feliciano and treatment  plan      Rx refill provided for Tramodol    Plan MRI neck w/out contrast for further evaluation of neck pain        Follow-up 4 weeks  with Cbc,cmp ,  mg prior to f/u      Addendum: Discussed with Dr. Feliciano and plan is to proceed with chemotherapy to complete 6 cycles of chemo and then discuss maintenance  LM on pt;s dtr's cell #       45 minutes spent during this visit of which greater than 50% devoted to counseling and coordination of care regarding diagnosis and management plan.    Cc: Iesha Feliciano M.D.          Anil Pelayo M.D.

## 2019-04-28 ENCOUNTER — TELEPHONE (OUTPATIENT)
Dept: HEMATOLOGY/ONCOLOGY | Facility: CLINIC | Age: 84
End: 2019-04-28

## 2019-04-28 NOTE — TELEPHONE ENCOUNTER
ROMULO with Stacia at [phone] of d/w Dr. Feliciano to continue chemo as planned ( stable dz on CT imaging) and plan proceed with chemo pending repeat CBC    Also, please update contact [facility]  Family member? At [phone] listed in chart reports she is not dtr    Thanks,

## 2019-04-29 ENCOUNTER — TELEPHONE (OUTPATIENT)
Dept: HEMATOLOGY/ONCOLOGY | Facility: CLINIC | Age: 84
End: 2019-04-29

## 2019-04-29 DIAGNOSIS — C56.9 MALIGNANT NEOPLASM OF OVARY, UNSPECIFIED LATERALITY: Primary | ICD-10-CM

## 2019-04-29 NOTE — TELEPHONE ENCOUNTER
Called & spoke with pt's daughter, Stacia. Informed her that  spoke with  & pt has stable disease so we will continue with current chemotherapy. She voiced understanding, appt is on Friday in Infusion. Pt will also get a CBC tomorrow when she goes for her MRI.

## 2019-04-29 NOTE — TELEPHONE ENCOUNTER
----- Message from Marylu Reinoso MD sent at 4/28/2019  2:22 PM CDT -----  Proceed with chemo as planned  Repeat CBC

## 2019-04-29 NOTE — PROGRESS NOTES
Patient, Celine Mahan (MRN #[id number]), presented with a recent Platelet count less than 150 K/uL consistent with the definition of thrombocytopenia (ICD10 - D69.6).    Platelets   Date Value Ref Range Status   04/17/2019 120 (L) 150 - 350 K/uL Final     The patient's thrombocytopenia was monitored, evaluated, addressed and/or treated. This addendum to the medical record is made on 04/29/2019.

## 2019-04-30 ENCOUNTER — HOSPITAL ENCOUNTER (OUTPATIENT)
Dept: RADIOLOGY | Facility: HOSPITAL | Age: 84
Discharge: HOME | End: 2019-04-30
Attending: INTERNAL MEDICINE
Payer: MEDICARE

## 2019-04-30 DIAGNOSIS — C56.9 MALIGNANT NEOPLASM OF OVARY, UNSPECIFIED LATERALITY: ICD-10-CM

## 2019-04-30 DIAGNOSIS — G89.4 CHRONIC PAIN SYNDROME: ICD-10-CM

## 2019-04-30 DIAGNOSIS — M54.2 NECK PAIN: ICD-10-CM

## 2019-04-30 PROCEDURE — 72141 MRI CERVICAL SPINE WITHOUT CONTRAST: ICD-10-PCS | Mod: 26,,, | Performed by: RADIOLOGY

## 2019-04-30 PROCEDURE — 72141 MRI NECK SPINE W/O DYE: CPT | Mod: 26,,, | Performed by: RADIOLOGY

## 2019-04-30 PROCEDURE — 72141 MRI NECK SPINE W/O DYE: CPT | Mod: TC

## 2019-05-01 ENCOUNTER — TELEPHONE (OUTPATIENT)
Dept: HEMATOLOGY/ONCOLOGY | Facility: CLINIC | Age: 84
End: 2019-05-01

## 2019-05-02 ENCOUNTER — TELEPHONE (OUTPATIENT)
Dept: HEMATOLOGY/ONCOLOGY | Facility: CLINIC | Age: 84
End: 2019-05-02

## 2019-05-03 ENCOUNTER — INFUSION (OUTPATIENT)
Dept: INFUSION THERAPY | Facility: HOSPITAL | Age: 84
End: 2019-05-03
Attending: INTERNAL MEDICINE
Payer: MEDICARE

## 2019-05-03 DIAGNOSIS — C56.9 OVARIAN CA: Primary | ICD-10-CM

## 2019-05-03 LAB
ALBUMIN SERPL BCP-MCNC: 3.7 G/DL (ref 3.5–5.2)
ALP SERPL-CCNC: 79 U/L (ref 55–135)
ALT SERPL W/O P-5'-P-CCNC: 9 U/L (ref 10–44)
ANION GAP SERPL CALC-SCNC: 7 MMOL/L (ref 8–16)
AST SERPL-CCNC: 15 U/L (ref 10–40)
BILIRUB SERPL-MCNC: 0.4 MG/DL (ref 0.1–1)
BUN SERPL-MCNC: 22 MG/DL (ref 8–23)
CALCIUM SERPL-MCNC: 9.2 MG/DL (ref 8.7–10.5)
CHLORIDE SERPL-SCNC: 106 MMOL/L (ref 95–110)
CO2 SERPL-SCNC: 23 MMOL/L (ref 23–29)
CREAT SERPL-MCNC: 1.6 MG/DL (ref 0.5–1.4)
ERYTHROCYTE [DISTWIDTH] IN BLOOD BY AUTOMATED COUNT: 22.3 % (ref 11.5–14.5)
EST. GFR  (AFRICAN AMERICAN): 34 ML/MIN/1.73 M^2
EST. GFR  (NON AFRICAN AMERICAN): 30 ML/MIN/1.73 M^2
GLUCOSE SERPL-MCNC: 97 MG/DL (ref 70–110)
HCT VFR BLD AUTO: 28.7 % (ref 37–48.5)
HGB BLD-MCNC: 9.5 G/DL (ref 12–16)
MCH RBC QN AUTO: 32.8 PG (ref 27–31)
MCHC RBC AUTO-ENTMCNC: 33.1 G/DL (ref 32–36)
MCV RBC AUTO: 99 FL (ref 82–98)
NEUTROPHILS # BLD AUTO: 1.2 K/UL (ref 1.8–7.7)
PLATELET # BLD AUTO: 205 K/UL (ref 150–350)
PMV BLD AUTO: 9.3 FL (ref 9.2–12.9)
POTASSIUM SERPL-SCNC: 4.7 MMOL/L (ref 3.5–5.1)
PROT SERPL-MCNC: 6.9 G/DL (ref 6–8.4)
RBC # BLD AUTO: 2.9 M/UL (ref 4–5.4)
SODIUM SERPL-SCNC: 136 MMOL/L (ref 136–145)
WBC # BLD AUTO: 2.37 K/UL (ref 3.9–12.7)

## 2019-05-03 PROCEDURE — 85027 COMPLETE CBC AUTOMATED: CPT

## 2019-05-03 PROCEDURE — 36415 COLL VENOUS BLD VENIPUNCTURE: CPT

## 2019-05-03 PROCEDURE — 80053 COMPREHEN METABOLIC PANEL: CPT

## 2019-05-03 NOTE — PLAN OF CARE
Problem: Adult Inpatient Plan of Care  Goal: Plan of Care Review  Outcome: Ongoing (interventions implemented as appropriate)  Labs drawn. ANC 1200 Cr 1.6 MD notified. Instructed to HOLD chemo today. Repeat labs Tuesday and chemo later next week pending results. Encouraged patient to increase fluids intake through the weekend. Patient and daughter verbalized understanding. Ambulated unassisted off unit.

## 2019-05-07 ENCOUNTER — LAB VISIT (OUTPATIENT)
Dept: LAB | Facility: HOSPITAL | Age: 84
End: 2019-05-07
Attending: INTERNAL MEDICINE
Payer: MEDICARE

## 2019-05-07 DIAGNOSIS — C56.9 OVARIAN CA: ICD-10-CM

## 2019-05-07 LAB
ALBUMIN SERPL BCP-MCNC: 3.8 G/DL (ref 3.5–5.2)
ALP SERPL-CCNC: 81 U/L (ref 55–135)
ALT SERPL W/O P-5'-P-CCNC: 10 U/L (ref 10–44)
ANION GAP SERPL CALC-SCNC: 9 MMOL/L (ref 8–16)
AST SERPL-CCNC: 15 U/L (ref 10–40)
BILIRUB SERPL-MCNC: 0.4 MG/DL (ref 0.1–1)
BUN SERPL-MCNC: 18 MG/DL (ref 8–23)
CALCIUM SERPL-MCNC: 9.9 MG/DL (ref 8.7–10.5)
CHLORIDE SERPL-SCNC: 105 MMOL/L (ref 95–110)
CO2 SERPL-SCNC: 23 MMOL/L (ref 23–29)
CREAT SERPL-MCNC: 1.7 MG/DL (ref 0.5–1.4)
ERYTHROCYTE [DISTWIDTH] IN BLOOD BY AUTOMATED COUNT: 22.3 % (ref 11.5–14.5)
EST. GFR  (AFRICAN AMERICAN): 32 ML/MIN/1.73 M^2
EST. GFR  (NON AFRICAN AMERICAN): 27 ML/MIN/1.73 M^2
GLUCOSE SERPL-MCNC: 121 MG/DL (ref 70–110)
HCT VFR BLD AUTO: 32 % (ref 37–48.5)
HGB BLD-MCNC: 10.4 G/DL (ref 12–16)
MCH RBC QN AUTO: 32.6 PG (ref 27–31)
MCHC RBC AUTO-ENTMCNC: 32.5 G/DL (ref 32–36)
MCV RBC AUTO: 100 FL (ref 82–98)
NEUTROPHILS # BLD AUTO: 1.4 K/UL (ref 1.8–7.7)
PLATELET # BLD AUTO: 283 K/UL (ref 150–350)
PMV BLD AUTO: 8.9 FL (ref 9.2–12.9)
POTASSIUM SERPL-SCNC: 4.6 MMOL/L (ref 3.5–5.1)
PROT SERPL-MCNC: 7.2 G/DL (ref 6–8.4)
RBC # BLD AUTO: 3.19 M/UL (ref 4–5.4)
SODIUM SERPL-SCNC: 137 MMOL/L (ref 136–145)
WBC # BLD AUTO: 2.88 K/UL (ref 3.9–12.7)

## 2019-05-07 PROCEDURE — 80053 COMPREHEN METABOLIC PANEL: CPT

## 2019-05-07 PROCEDURE — 85027 COMPLETE CBC AUTOMATED: CPT

## 2019-05-09 ENCOUNTER — TELEPHONE (OUTPATIENT)
Dept: HEMATOLOGY/ONCOLOGY | Facility: CLINIC | Age: 84
End: 2019-05-09

## 2019-05-09 DIAGNOSIS — T45.1X5A CHEMOTHERAPY INDUCED NEUTROPENIA: ICD-10-CM

## 2019-05-09 DIAGNOSIS — D70.1 CHEMOTHERAPY INDUCED NEUTROPENIA: ICD-10-CM

## 2019-05-09 DIAGNOSIS — C56.9 MALIGNANT NEOPLASM OF OVARY, UNSPECIFIED LATERALITY: Primary | ICD-10-CM

## 2019-05-09 NOTE — TELEPHONE ENCOUNTER
Pt's daughter called regarding next chemo. She was cancelled last week due to low white count. ANC from this past Tuesday was 1400. Rescheduled chemo for tomorrow at 9am pending another stat CBC, daughter voiced understanding of appts.

## 2019-05-10 ENCOUNTER — INFUSION (OUTPATIENT)
Dept: INFUSION THERAPY | Facility: HOSPITAL | Age: 84
End: 2019-05-10
Attending: INTERNAL MEDICINE
Payer: MEDICARE

## 2019-05-10 VITALS
RESPIRATION RATE: 17 BRPM | TEMPERATURE: 98 F | OXYGEN SATURATION: 97 % | SYSTOLIC BLOOD PRESSURE: 133 MMHG | HEART RATE: 66 BPM | DIASTOLIC BLOOD PRESSURE: 62 MMHG

## 2019-05-10 DIAGNOSIS — C56.9 MALIGNANT NEOPLASM OF OVARY, UNSPECIFIED LATERALITY: Primary | ICD-10-CM

## 2019-05-10 PROCEDURE — 25000003 PHARM REV CODE 250: Performed by: INTERNAL MEDICINE

## 2019-05-10 PROCEDURE — 96361 HYDRATE IV INFUSION ADD-ON: CPT

## 2019-05-10 PROCEDURE — 63600175 PHARM REV CODE 636 W HCPCS: Performed by: INTERNAL MEDICINE

## 2019-05-10 PROCEDURE — 96377 APPLICATON ON-BODY INJECTOR: CPT | Mod: 59

## 2019-05-10 PROCEDURE — 96417 CHEMO IV INFUS EACH ADDL SEQ: CPT

## 2019-05-10 PROCEDURE — 96413 CHEMO IV INFUSION 1 HR: CPT

## 2019-05-10 PROCEDURE — 96367 TX/PROPH/DG ADDL SEQ IV INF: CPT

## 2019-05-10 RX ORDER — SODIUM CHLORIDE 0.9 % (FLUSH) 0.9 %
10 SYRINGE (ML) INJECTION
Status: CANCELLED | OUTPATIENT
Start: 2019-05-10

## 2019-05-10 RX ORDER — HEPARIN 100 UNIT/ML
500 SYRINGE INTRAVENOUS
Status: CANCELLED | OUTPATIENT
Start: 2019-05-10

## 2019-05-10 RX ADMIN — CARBOPLATIN 280 MG: 10 INJECTION INTRAVENOUS at 12:05

## 2019-05-10 RX ADMIN — PEGFILGRASTIM 6 MG: KIT SUBCUTANEOUS at 03:05

## 2019-05-10 RX ADMIN — SODIUM CHLORIDE: 0.9 INJECTION, SOLUTION INTRAVENOUS at 01:05

## 2019-05-10 RX ADMIN — DOXORUBICIN HYDROCHLORIDE 50 MG: 2 INJECTABLE, LIPOSOMAL INTRAVENOUS at 11:05

## 2019-05-10 RX ADMIN — PALONOSETRON HYDROCHLORIDE: 0.25 INJECTION INTRAVENOUS at 10:05

## 2019-05-10 NOTE — PLAN OF CARE
Problem: Adult Inpatient Plan of Care  Goal: Plan of Care Review  Outcome: Ongoing (interventions implemented as appropriate)  Pt presented for Doxil and Carboplatin treatment. ANC 1400, but Dr. Reinoso said to proceed with treatment and add Neulasta On Body to plan for today. VSS. Pt tolerated treatment well. Premedicated with Decadron and Aloxi. Pt received 500cc NS post hydration fluids. Lunch provided. Pt reported fatigue. Nailbeds noted to be discolored, which pt reported occurred after starting chemotherapy. Educated pt and pt's daughter on proper use, maintenance, and disposal of Neulasta On Body; both verbalized understanding. Educational information/instructions provided. Pt required 3 attempts to place PIV today. Discussed port a cath as an option with pt and pt's daughter again d/t repeated issues obtaining venous access. Pt and daughter said they would think about it. Distress screening tool completed. Future appt information reviewed with pt.

## 2019-05-17 DIAGNOSIS — Z09 CHEMOTHERAPY FOLLOW-UP EXAMINATION: ICD-10-CM

## 2019-05-17 DIAGNOSIS — C56.9 MALIGNANT NEOPLASM OF OVARY, UNSPECIFIED LATERALITY: Primary | ICD-10-CM

## 2019-05-28 ENCOUNTER — TELEPHONE (OUTPATIENT)
Dept: HEMATOLOGY/ONCOLOGY | Facility: CLINIC | Age: 84
End: 2019-05-28

## 2019-05-28 DIAGNOSIS — C56.9 MALIGNANT NEOPLASM OF OVARY, UNSPECIFIED LATERALITY: Primary | ICD-10-CM

## 2019-05-28 DIAGNOSIS — Z09 CHEMOTHERAPY FOLLOW-UP EXAMINATION: ICD-10-CM

## 2019-05-28 NOTE — TELEPHONE ENCOUNTER
Pt's daughter called to report that pt would like to be set up for a port. Informed , order placed for IR. They will call pt to schedule.

## 2019-05-31 ENCOUNTER — HOSPITAL ENCOUNTER (OUTPATIENT)
Dept: PREADMISSION TESTING | Facility: HOSPITAL | Age: 84
Discharge: HOME | End: 2019-05-31
Attending: INTERNAL MEDICINE
Payer: MEDICARE

## 2019-05-31 VITALS
OXYGEN SATURATION: 97 % | SYSTOLIC BLOOD PRESSURE: 118 MMHG | DIASTOLIC BLOOD PRESSURE: 62 MMHG | RESPIRATION RATE: 18 BRPM | TEMPERATURE: 97 F | BODY MASS INDEX: 33.55 KG/M2 | HEART RATE: 70 BPM | WEIGHT: 182.31 LBS | HEIGHT: 62 IN

## 2019-05-31 DIAGNOSIS — Z01.818 PREOP TESTING: ICD-10-CM

## 2019-05-31 DIAGNOSIS — C56.9 MALIGNANT NEOPLASM OF OVARY, UNSPECIFIED LATERALITY: Primary | ICD-10-CM

## 2019-05-31 DIAGNOSIS — Z86.39 H/O: OBESITY: ICD-10-CM

## 2019-05-31 LAB
ALBUMIN SERPL BCP-MCNC: 4.1 G/DL (ref 3.5–5.2)
ALP SERPL-CCNC: 85 U/L (ref 55–135)
ALT SERPL W/O P-5'-P-CCNC: 10 U/L (ref 10–44)
ANION GAP SERPL CALC-SCNC: 9 MMOL/L (ref 8–16)
AST SERPL-CCNC: 13 U/L (ref 10–40)
BASOPHILS # BLD AUTO: 0.03 K/UL (ref 0–0.2)
BASOPHILS NFR BLD: 0.7 % (ref 0–1.9)
BILIRUB SERPL-MCNC: 0.4 MG/DL (ref 0.1–1)
BUN SERPL-MCNC: 29 MG/DL (ref 8–23)
CALCIUM SERPL-MCNC: 9.8 MG/DL (ref 8.7–10.5)
CHLORIDE SERPL-SCNC: 107 MMOL/L (ref 95–110)
CO2 SERPL-SCNC: 22 MMOL/L (ref 23–29)
CREAT SERPL-MCNC: 1.7 MG/DL (ref 0.5–1.4)
DIFFERENTIAL METHOD: ABNORMAL
EOSINOPHIL # BLD AUTO: 0.1 K/UL (ref 0–0.5)
EOSINOPHIL NFR BLD: 1.7 % (ref 0–8)
ERYTHROCYTE [DISTWIDTH] IN BLOOD BY AUTOMATED COUNT: 19.9 % (ref 11.5–14.5)
EST. GFR  (AFRICAN AMERICAN): 32 ML/MIN/1.73 M^2
EST. GFR  (NON AFRICAN AMERICAN): 27 ML/MIN/1.73 M^2
GLUCOSE SERPL-MCNC: 105 MG/DL (ref 70–110)
HCT VFR BLD AUTO: 32.2 % (ref 37–48.5)
HGB BLD-MCNC: 10.4 G/DL (ref 12–16)
INR PPP: 1 (ref 0.8–1.2)
LYMPHOCYTES # BLD AUTO: 1.2 K/UL (ref 1–4.8)
LYMPHOCYTES NFR BLD: 28.6 % (ref 18–48)
MCH RBC QN AUTO: 34 PG (ref 27–31)
MCHC RBC AUTO-ENTMCNC: 32.3 G/DL (ref 32–36)
MCV RBC AUTO: 105 FL (ref 82–98)
MONOCYTES # BLD AUTO: 0.5 K/UL (ref 0.3–1)
MONOCYTES NFR BLD: 12.6 % (ref 4–15)
NEUTROPHILS # BLD AUTO: 2.3 K/UL (ref 1.8–7.7)
NEUTROPHILS NFR BLD: 56.4 % (ref 38–73)
PLATELET # BLD AUTO: 129 K/UL (ref 150–350)
PMV BLD AUTO: 9.9 FL (ref 9.2–12.9)
POTASSIUM SERPL-SCNC: 4.3 MMOL/L (ref 3.5–5.1)
PROT SERPL-MCNC: 7.7 G/DL (ref 6–8.4)
PROTHROMBIN TIME: 10.1 SEC (ref 9–12.5)
RBC # BLD AUTO: 3.06 M/UL (ref 4–5.4)
SODIUM SERPL-SCNC: 138 MMOL/L (ref 136–145)
WBC # BLD AUTO: 4.05 K/UL (ref 3.9–12.7)

## 2019-05-31 PROCEDURE — 36415 COLL VENOUS BLD VENIPUNCTURE: CPT

## 2019-05-31 PROCEDURE — 85025 COMPLETE CBC W/AUTO DIFF WBC: CPT

## 2019-05-31 PROCEDURE — 85610 PROTHROMBIN TIME: CPT

## 2019-05-31 PROCEDURE — 80053 COMPREHEN METABOLIC PANEL: CPT

## 2019-05-31 NOTE — DISCHARGE INSTRUCTIONS
"Your procedure  is scheduled for _6/3/2019_________.    Report to Same Day Surgery Unit at _9:00___ AM on the 2nd floor of the hospital.  Use the front entrance of the hospital.  The front doors of the hospital open promptly at 5:30am.  If you need wheelchair assistance, call [phone] from your cell phone, or call "0" from the courtesy phone in the lobby.    Important instructions:   Do not eat or drink after 12 midnight, including water.  It is okay to brush your teeth.  Do not have gum, candy or mints.     Take your morning medications with small sips of water.       Please shower the night before and the morning of your surgery.  Use Hibiclens soap if instructed to do so.     Do not wear make- up, including mascara.     You may wear deodorant only.      Do not wear powder, body lotion or perfume/cologne.     Do not wear any jewelry or have any metal on your body.     You will be asked to remove any dentures or partials for the procedure.     Please bring any documents given to you by your doctor.     If you are going home on the same day of surgery, you must arrange for a family member or a friend to drive you home.  Public transportation is prohibited.  You will not be able to drive home if you were given anesthesia or sedation.     Wear loose fitting clothes allowing for bandages.     Please leave money and valuables home.       You may bring your cell phone.     Call the doctor if fever or illness should occur before your surgery.    Call [phone] to contact us here if needed.  "

## 2019-06-03 ENCOUNTER — HOSPITAL ENCOUNTER (OUTPATIENT)
Facility: HOSPITAL | Age: 84
Discharge: HOME | End: 2019-06-03
Attending: RADIOLOGY | Admitting: RADIOLOGY
Payer: MEDICARE

## 2019-06-03 VITALS
TEMPERATURE: 98 F | RESPIRATION RATE: 17 BRPM | SYSTOLIC BLOOD PRESSURE: 143 MMHG | DIASTOLIC BLOOD PRESSURE: 64 MMHG | HEART RATE: 67 BPM | OXYGEN SATURATION: 100 %

## 2019-06-03 DIAGNOSIS — Z09 CHEMOTHERAPY FOLLOW-UP EXAMINATION: ICD-10-CM

## 2019-06-03 DIAGNOSIS — C56.9 MALIGNANT NEOPLASM OF OVARY, UNSPECIFIED LATERALITY: ICD-10-CM

## 2019-06-03 PROCEDURE — 63600175 PHARM REV CODE 636 W HCPCS: Performed by: RADIOLOGY

## 2019-06-03 RX ORDER — HEPARIN 100 UNIT/ML
SYRINGE INTRAVENOUS CODE/TRAUMA/SEDATION MEDICATION
Status: COMPLETED | OUTPATIENT
Start: 2019-06-03 | End: 2019-06-03

## 2019-06-03 RX ORDER — MIDAZOLAM HYDROCHLORIDE 1 MG/ML
INJECTION INTRAMUSCULAR; INTRAVENOUS CODE/TRAUMA/SEDATION MEDICATION
Status: COMPLETED | OUTPATIENT
Start: 2019-06-03 | End: 2019-06-03

## 2019-06-03 RX ORDER — CEFAZOLIN SODIUM 1 G/50ML
SOLUTION INTRAVENOUS
Status: COMPLETED | OUTPATIENT
Start: 2019-06-03 | End: 2019-06-03

## 2019-06-03 RX ORDER — FENTANYL CITRATE 50 UG/ML
INJECTION, SOLUTION INTRAMUSCULAR; INTRAVENOUS CODE/TRAUMA/SEDATION MEDICATION
Status: COMPLETED | OUTPATIENT
Start: 2019-06-03 | End: 2019-06-03

## 2019-06-03 RX ADMIN — HEPARIN 5 ML: 100 SYRINGE at 11:06

## 2019-06-03 RX ADMIN — CEFAZOLIN SODIUM 2 G: 1 SOLUTION INTRAVENOUS at 11:06

## 2019-06-03 RX ADMIN — MIDAZOLAM HYDROCHLORIDE 0.5 MG: 1 INJECTION, SOLUTION INTRAMUSCULAR; INTRAVENOUS at 11:06

## 2019-06-03 RX ADMIN — FENTANYL CITRATE 25 MCG: 50 INJECTION INTRAMUSCULAR; INTRAVENOUS at 11:06

## 2019-06-03 RX ADMIN — MIDAZOLAM HYDROCHLORIDE 1 MG: 1 INJECTION, SOLUTION INTRAMUSCULAR; INTRAVENOUS at 11:06

## 2019-06-03 RX ADMIN — FENTANYL CITRATE 50 MCG: 50 INJECTION INTRAMUSCULAR; INTRAVENOUS at 11:06

## 2019-06-03 NOTE — PROCEDURES
Interventional Radiology Post-Procedure Note    Pre Op Diagnosis: Ovarian ca  Post Op Diagnosis: Same    Procedure: Chest port placement    Procedure performed by: Dea    Written Informed Consent Obtained: Yes  Specimen Sent: Yes  Estimated Blood Loss: Minimal    Findings:   8-Fr BARD PowerPort chest port placed via a small but patent right IJ vein. Tip near superior cavoatrial jxn. Ready for immediate use.    No immediate complications. Patient tolerated procedure well. Please see full dictated procedure report for additional details and recommendations.      Dwight Malin MD  [facility]  Pager [phone]

## 2019-06-03 NOTE — DISCHARGE INSTRUCTIONS
DIET: You may resume your home diet. If nausea is present, increase your diet gradually with fluids and bland foods    ACTIVITY LEVEL: You have received sedation or an anesthetic, you may feel sleepy for several hours. Rest until you are more awake. Gradually resume your normal activities    Medications: Pain medication should be taken only if needed and as directed. If antibiotics are prescribed, the medication should be taken until completed. You will be given an updated list of you medications.    No driving, alcoholic beverages or signing legal documents for next 24 hours or while taking pain medication.       CALL THE DOCTOR:    For any obvious bleeding (some dried blood over the incision is normal).      Redness, swelling, foul smell around incision or fever over 101.   Shortness of breath, Coughing up Bloody sputum, Pains or Swelling in your Calves .   Persistent pain or nausea not relieved by medication.    If any unusual problems or difficulties occur contact your doctor. If you cannot contact your doctor but feel your signs and symptoms warrant a physicians attention return to the emergency room.          Postoperative Instructions    Care of the Incision  1.  After the surgery, the incision will be covered with a gauze dressing.  This dressing may be removed after 48 hours.  Under the dressing you will find several thin paper tapes (steri-strips) covering the incision.  The Steri-strips should be left in place.  They will curl up at the edges and fall off on their own in a few weeks.  The stitches are underneath the skin and will dissolve.    2.  Your mediport can be accessed anytime after your discharge from the hospital.    3.  You may shower the night of your procedure.  Do not scrub the incision.  Just let the water run over the dressing and then gently pat it dry.  In two days, after removal of the dressing, do not scrub the incision.  Let the water run over the incision and then gently pat it  dry.    4.  Do not submerge the incision for 6 weeks (no baths, hot tubs or swimming pools.)    Comfort Measures  1.  For the first few days, it is common for the area around the incision to be swollen, discolored (black & blue), and sore.  To help reduce swelling, apply an ice pack or a  bag of frozen peas may be applied to the swollen area for 15 to 20 minutes every hour for 3 days or more as needed.  Wear loose, comfortable clothing.    2  We recommend the use of over-the-counter anti-inflammatory medication such as ibuprofen (Advil, Motrin, Aleve) to minimize swelling, inflammation, and pain.  Take medication every 4-6 hours with food.    Activity  1.  Lifting with the arm on the side of the mediport should be restricted to 10-15 pounds or less for 2 weeks.  Avoid pushing, pulling, straining, or any strenuous activity.  You may climb stairs.  You may drive if you are comfortable enough behind the wheel and can safely operate the vehicle.    Do not drive, drink alcohol, or sign legal documents for 24 hours, or if taking narcotic pain medication.    When to Call the Office  1.  Call our office if you develop a fever (temperature greater than 101), shaking chills, bleeding, increasing redness or drainage from your incision, or with any other problem that concerns you.  Our office number is [phone].        Fall Prevention  Millions of people fall every year and injure themselves. You may have had anesthesia or sedation which may increase your risk of falling. You may have health issues that put you at an increased risk of falling.     Here are ways to reduce your risk of falling.  ·   · Make your home safe by keeping walkways clear of objects you may trip over.  · Use non-slip pads under rugs. Do not use area rugs or small throw rugs.  · Use non-slip mats in bathtubs and showers.  · Install handrails and lights on staircases.  · Do not walk in poorly lit areas.  · Do not stand on chairs or wobbly  ladders.  · Use caution when reaching overhead or looking upward. This position can cause a loss of balance.  · Be sure your shoes fit properly, have non-slip bottoms and are in good condition.   · Wear shoes both inside and out. Avoid going barefoot or wearing slippers.  · Be cautious when going up and down stairs, curbs, and when walking on uneven sidewalks.  · If your balance is poor, consider using a cane or walker.  · If your fall was related to alcohol use, stop or limit alcohol intake.   · If your fall was related to use of sleeping medicines, talk to your doctor about this. You may need to reduce your dosage at bedtime if you awaken during the night to go to the bathroom.    · To reduce the need for nighttime bathroom trips:  ¨ Avoid drinking fluids for several hours before going to bed  ¨ Empty your bladder before going to bed  ¨ Men can keep a urinal at the bedside  · Stay as active as you can. Balance, flexibility, strength, and endurance all come from exercise. They all play a role in preventing falls. Ask your healthcare provider which types of activity are right for you.  · Get your vision checked on a regular basis.  · If you have pets, know where they are before you stand up or walk so you don't trip over them.  · Use night lights.

## 2019-06-03 NOTE — H&P
Interventional Radiology Pre-Procedure History & Physical      Chief Complaint/Reason for Referral: Ovarian ca    History of Present Illness:  Celine Mahan is a 83 y.o. female who presents with ovarian ca. Referred to IR for port placement.    Past Medical History:   Diagnosis Date    Breast cancer     Cataract     CKD (chronic kidney disease) stage 3, GFR 30-59 ml/min     Hyperlipidemia     Hypertension     Hypothyroidism     Obesity     Osteopenia     Ovarian cancer 10/8/2017    Overactive bladder     Pelvic mass 8/27/2017    Thyroid disease      Past Surgical History:   Procedure Laterality Date    bt catarac surgery      CHOLECYSTECTOMY      COLONOSCOPY N/A 2/24/2015    Performed by [facility]el Alarcon MD at [facility] ENDO    EXPLORATORY-LAPAROTOMY N/A 9/21/2017    Performed by [facility]ha [facility]o MD at [facility] OR 2ND FLR    HYSTERECTOMY      MASTECTOMY Left     OMENTECTOMY Bilateral 9/21/2017    Performed by [facility] at [facility] OR 2ND FLR    SALPINGO-OOPHERECTOMY Left 9/21/2017    Performed by [facility] Fel[facility]o MD at [facility] OR 2ND FLR       Allergies:   Review of patient's allergies indicates:  No Known Allergies     Home Meds:   Prior to Admission medications    Medication Sig Start Date End Date Taking? Authorizing Provider   levothyroxine (SYNTHROID) 125 MCG tablet Take 1 tablet (125 mcg total) by mouth before breakfast. 12/13/18 12/13/19 Yes Anil Wills MD   omeprazole (PRILOSEC) 20 MG capsule TAKE 1 CAPSULE(20 MG) BY MOUTH DAILY AS NEEDED 3/26/19  Yes Anil Wills MD   ondansetron (ZOFRAN) 8 MG tablet Take 1 tablet (8 mg total) by mouth every 8 (eight) hours as needed for Nausea. 4/9/19  Yes Zulema Zhao MD   traMADol (ULTRAM) 50 mg tablet TAKE 1 TABLET(50 MG) BY MOUTH EVERY 8 HOURS AS NEEDED FOR PAIN 4/25/19  Yes Marylu Reinoso MD   valsartan (DIOVAN) 160 MG tablet Take 1 tablet (160 mg total) by mouth once daily. 12/28/18 12/28/19 Yes MD russell Davison  1,000 mg Cap Take by mouth once daily.     Historical Provider, MD   guaifenesin/dextromethorphan (CORICIDIN HBP ORAL) Take by mouth.    Historical Provider, MD       Anticoagulation/Antiplatelet Meds: no anticoagulation    Review of Systems:   Hematological: no known coagulopathies  Respiratory: no shortness of breath  Cardiovascular: no chest pain  Gastrointestinal: no abdominal pain  Genitourinary: no dysuria  Musculoskeletal: negative  Neurological: no TIA or stroke symptoms     Physical Exam:  Temp: 97.1 °F (36.2 °C) (06/03/19 0930)  Pulse: 60 (06/03/19 0930)  Resp: 17 (06/03/19 0930)  BP: (!) 142/63 (06/03/19 0930)  SpO2: 100 % (06/03/19 0930)    General: WNWD, NAD  HEENT: Normocephalic, sclera anicteric, oropharynx clear  Neck: Supple, no palpable lymphadenopathy  Heart: RRR  Lungs: Symmetric excursions, breathing unlabored  Abd: NTND, soft  Extremities: MAEW  Neuro: Gross nonfocal    Laboratory:  Lab Results   Component Value Date    INR 1.0 05/31/2019       Lab Results   Component Value Date    WBC 4.05 05/31/2019    HGB 10.4 (L) 05/31/2019    HCT 32.2 (L) 05/31/2019     (H) 05/31/2019     (L) 05/31/2019      Lab Results   Component Value Date     05/31/2019     05/31/2019    K 4.3 05/31/2019     05/31/2019    CO2 22 (L) 05/31/2019    BUN 29 (H) 05/31/2019    CREATININE 1.7 (H) 05/31/2019    CALCIUM 9.8 05/31/2019    MG 1.8 01/15/2018    ALT 10 05/31/2019    AST 13 05/31/2019    ALBUMIN 4.1 05/31/2019    BILITOT 0.4 05/31/2019       Imaging:  CT CAP 4/17/19 reviewed.    Assessment/Plan:  83 y.o. female with ovarian ca for chest port placement today.    Sedation:  Sedation history: have not been any systemic reactions  ASA: 3 / Mallampati: 3  Sedation plan: Moderate (Versed, fentanyl)     Risks (including, but not limited to, pain, bleeding, infection, damage to nearby structures, failure, and the need for additional procedures), benefits, and alternatives were discussed with  the patient. All questions were answered to the best of my abilities. The patient wishes to proceed with port placement. Written informed consent was obtained.      Dwight Malin MD  [facility]  Pager [phone]

## 2019-06-05 NOTE — DISCHARGE SUMMARY
Interventional Radiology Discharge Summary      Hospital Course: No complications    Admit Date: 6/3/2019  Discharge Date: 06/05/2019     Instructions Given to Patient: Yes  Diet: Resume prior diet  Activity: Activity as tolerated and no driving for today    Description of Condition on Discharge: Stable  Vital Signs (Most Recent): Temp: 97.9 °F (36.6 °C) (06/03/19 1238)  Pulse: 67 (06/03/19 1335)  Resp: 17 (06/03/19 1238)  BP: (!) 143/64 (06/03/19 1335)  SpO2: 100 % (06/03/19 1335)    Discharge Disposition: Home    Discharge Diagnosis: Ovarian ca s/p right chest port placement     Follow-up: With referring provider      Dwight Malin MD  [facility] IR  Pager [phone]

## 2019-06-06 ENCOUNTER — INFUSION (OUTPATIENT)
Dept: INFUSION THERAPY | Facility: HOSPITAL | Age: 84
End: 2019-06-06
Attending: INTERNAL MEDICINE
Payer: MEDICARE

## 2019-06-06 VITALS
RESPIRATION RATE: 18 BRPM | DIASTOLIC BLOOD PRESSURE: 67 MMHG | HEART RATE: 80 BPM | SYSTOLIC BLOOD PRESSURE: 152 MMHG | TEMPERATURE: 98 F | OXYGEN SATURATION: 100 %

## 2019-06-06 DIAGNOSIS — C56.9 MALIGNANT NEOPLASM OF OVARY, UNSPECIFIED LATERALITY: Primary | ICD-10-CM

## 2019-06-06 DIAGNOSIS — Z09 CHEMOTHERAPY FOLLOW-UP EXAMINATION: ICD-10-CM

## 2019-06-06 DIAGNOSIS — C56.9 OVARIAN CANCER: ICD-10-CM

## 2019-06-06 LAB
ALBUMIN SERPL BCP-MCNC: 3.8 G/DL (ref 3.5–5.2)
ALP SERPL-CCNC: 83 U/L (ref 55–135)
ALT SERPL W/O P-5'-P-CCNC: 7 U/L (ref 10–44)
ANION GAP SERPL CALC-SCNC: 7 MMOL/L (ref 8–16)
AST SERPL-CCNC: 14 U/L (ref 10–40)
BILIRUB SERPL-MCNC: 0.4 MG/DL (ref 0.1–1)
BUN SERPL-MCNC: 24 MG/DL (ref 8–23)
CALCIUM SERPL-MCNC: 9.3 MG/DL (ref 8.7–10.5)
CANCER AG125 SERPL-ACNC: 55 U/ML (ref 0–30)
CHLORIDE SERPL-SCNC: 105 MMOL/L (ref 95–110)
CO2 SERPL-SCNC: 23 MMOL/L (ref 23–29)
CREAT SERPL-MCNC: 1.7 MG/DL (ref 0.5–1.4)
ERYTHROCYTE [DISTWIDTH] IN BLOOD BY AUTOMATED COUNT: 18.7 % (ref 11.5–14.5)
EST. GFR  (AFRICAN AMERICAN): 32 ML/MIN/1.73 M^2
EST. GFR  (NON AFRICAN AMERICAN): 27 ML/MIN/1.73 M^2
GLUCOSE SERPL-MCNC: 102 MG/DL (ref 70–110)
HCT VFR BLD AUTO: 30.5 % (ref 37–48.5)
HGB BLD-MCNC: 10 G/DL (ref 12–16)
MCH RBC QN AUTO: 34.5 PG (ref 27–31)
MCHC RBC AUTO-ENTMCNC: 32.8 G/DL (ref 32–36)
MCV RBC AUTO: 105 FL (ref 82–98)
NEUTROPHILS # BLD AUTO: 1.6 K/UL (ref 1.8–7.7)
PLATELET # BLD AUTO: 207 K/UL (ref 150–350)
PMV BLD AUTO: 9.6 FL (ref 9.2–12.9)
POTASSIUM SERPL-SCNC: 4.5 MMOL/L (ref 3.5–5.1)
PROT SERPL-MCNC: 7.1 G/DL (ref 6–8.4)
RBC # BLD AUTO: 2.9 M/UL (ref 4–5.4)
SODIUM SERPL-SCNC: 135 MMOL/L (ref 136–145)
WBC # BLD AUTO: 2.87 K/UL (ref 3.9–12.7)

## 2019-06-06 PROCEDURE — 80053 COMPREHEN METABOLIC PANEL: CPT

## 2019-06-06 PROCEDURE — 86304 IMMUNOASSAY TUMOR CA 125: CPT

## 2019-06-06 PROCEDURE — 63600175 PHARM REV CODE 636 W HCPCS: Performed by: INTERNAL MEDICINE

## 2019-06-06 PROCEDURE — 36415 COLL VENOUS BLD VENIPUNCTURE: CPT

## 2019-06-06 PROCEDURE — A4216 STERILE WATER/SALINE, 10 ML: HCPCS | Performed by: INTERNAL MEDICINE

## 2019-06-06 PROCEDURE — 85027 COMPLETE CBC AUTOMATED: CPT

## 2019-06-06 PROCEDURE — 36591 DRAW BLOOD OFF VENOUS DEVICE: CPT

## 2019-06-06 PROCEDURE — 25000003 PHARM REV CODE 250: Performed by: INTERNAL MEDICINE

## 2019-06-06 RX ORDER — SODIUM CHLORIDE 0.9 % (FLUSH) 0.9 %
10 SYRINGE (ML) INJECTION
Status: CANCELLED | OUTPATIENT
Start: 2019-06-07

## 2019-06-06 RX ORDER — HEPARIN 100 UNIT/ML
500 SYRINGE INTRAVENOUS
Status: CANCELLED | OUTPATIENT
Start: 2019-06-07

## 2019-06-06 RX ORDER — HEPARIN 100 UNIT/ML
500 SYRINGE INTRAVENOUS
Status: DISCONTINUED | OUTPATIENT
Start: 2019-06-06 | End: 2019-06-06 | Stop reason: HOSPADM

## 2019-06-06 RX ORDER — SODIUM CHLORIDE 0.9 % (FLUSH) 0.9 %
10 SYRINGE (ML) INJECTION
Status: DISCONTINUED | OUTPATIENT
Start: 2019-06-06 | End: 2019-06-06 | Stop reason: HOSPADM

## 2019-06-06 RX ADMIN — Medication 10 ML: at 10:06

## 2019-06-06 RX ADMIN — HEPARIN SODIUM (PORCINE) LOCK FLUSH IV SOLN 100 UNIT/ML 500 UNITS: 100 SOLUTION at 10:06

## 2019-06-06 NOTE — PLAN OF CARE
Problem: Adult Inpatient Plan of Care  Goal: Plan of Care Review  Outcome: Ongoing (interventions implemented as appropriate)  Patient tolerated pre-chemo lab draw from [facility]. Received discharge instructions and follow up appointments. Verbalized understanding and ambulated unassisted off unit accompanied by family.

## 2019-06-07 ENCOUNTER — INFUSION (OUTPATIENT)
Dept: INFUSION THERAPY | Facility: HOSPITAL | Age: 84
End: 2019-06-07
Attending: INTERNAL MEDICINE
Payer: MEDICARE

## 2019-06-07 ENCOUNTER — OFFICE VISIT (OUTPATIENT)
Dept: HEMATOLOGY/ONCOLOGY | Facility: CLINIC | Age: 84
End: 2019-06-07
Payer: MEDICARE

## 2019-06-07 VITALS
HEART RATE: 84 BPM | HEIGHT: 62 IN | TEMPERATURE: 98 F | BODY MASS INDEX: 32.7 KG/M2 | DIASTOLIC BLOOD PRESSURE: 53 MMHG | OXYGEN SATURATION: 95 % | WEIGHT: 177.69 LBS | SYSTOLIC BLOOD PRESSURE: 123 MMHG

## 2019-06-07 VITALS
HEART RATE: 88 BPM | RESPIRATION RATE: 18 BRPM | TEMPERATURE: 98 F | OXYGEN SATURATION: 95 % | DIASTOLIC BLOOD PRESSURE: 61 MMHG | SYSTOLIC BLOOD PRESSURE: 127 MMHG

## 2019-06-07 DIAGNOSIS — Z98.890 S/P EXPLORATORY LAPAROTOMY: ICD-10-CM

## 2019-06-07 DIAGNOSIS — C56.9 MALIGNANT NEOPLASM OF OVARY, UNSPECIFIED LATERALITY: Primary | ICD-10-CM

## 2019-06-07 DIAGNOSIS — T45.1X5A ANTINEOPLASTIC CHEMOTHERAPY INDUCED ANEMIA: ICD-10-CM

## 2019-06-07 DIAGNOSIS — G89.29 CHRONIC NECK PAIN: ICD-10-CM

## 2019-06-07 DIAGNOSIS — D64.81 ANTINEOPLASTIC CHEMOTHERAPY INDUCED ANEMIA: ICD-10-CM

## 2019-06-07 DIAGNOSIS — M54.2 CHRONIC NECK PAIN: ICD-10-CM

## 2019-06-07 PROCEDURE — 96367 TX/PROPH/DG ADDL SEQ IV INF: CPT

## 2019-06-07 PROCEDURE — 99999 PR PBB SHADOW E&M-EST. PATIENT-LVL III: ICD-10-PCS | Mod: PBBFAC,,, | Performed by: INTERNAL MEDICINE

## 2019-06-07 PROCEDURE — 3078F DIAST BP <80 MM HG: CPT | Mod: CPTII,S$GLB,, | Performed by: INTERNAL MEDICINE

## 2019-06-07 PROCEDURE — 63600175 PHARM REV CODE 636 W HCPCS: Performed by: INTERNAL MEDICINE

## 2019-06-07 PROCEDURE — 96377 APPLICATON ON-BODY INJECTOR: CPT

## 2019-06-07 PROCEDURE — 99214 OFFICE O/P EST MOD 30 MIN: CPT | Mod: S$GLB,,, | Performed by: INTERNAL MEDICINE

## 2019-06-07 PROCEDURE — 99499 UNLISTED E&M SERVICE: CPT | Mod: S$GLB,,, | Performed by: INTERNAL MEDICINE

## 2019-06-07 PROCEDURE — 99999 PR PBB SHADOW E&M-EST. PATIENT-LVL III: CPT | Mod: PBBFAC,,, | Performed by: INTERNAL MEDICINE

## 2019-06-07 PROCEDURE — 25000003 PHARM REV CODE 250: Performed by: INTERNAL MEDICINE

## 2019-06-07 PROCEDURE — 3074F SYST BP LT 130 MM HG: CPT | Mod: CPTII,S$GLB,, | Performed by: INTERNAL MEDICINE

## 2019-06-07 PROCEDURE — 99214 PR OFFICE/OUTPT VISIT, EST, LEVL IV, 30-39 MIN: ICD-10-PCS | Mod: S$GLB,,, | Performed by: INTERNAL MEDICINE

## 2019-06-07 PROCEDURE — 3078F PR MOST RECENT DIASTOLIC BLOOD PRESSURE < 80 MM HG: ICD-10-PCS | Mod: CPTII,S$GLB,, | Performed by: INTERNAL MEDICINE

## 2019-06-07 PROCEDURE — 96413 CHEMO IV INFUSION 1 HR: CPT

## 2019-06-07 PROCEDURE — 1101F PT FALLS ASSESS-DOCD LE1/YR: CPT | Mod: CPTII,S$GLB,, | Performed by: INTERNAL MEDICINE

## 2019-06-07 PROCEDURE — 99499 RISK ADDL DX/OHS AUDIT: ICD-10-PCS | Mod: S$GLB,,, | Performed by: INTERNAL MEDICINE

## 2019-06-07 PROCEDURE — A4216 STERILE WATER/SALINE, 10 ML: HCPCS | Performed by: INTERNAL MEDICINE

## 2019-06-07 PROCEDURE — 96417 CHEMO IV INFUS EACH ADDL SEQ: CPT

## 2019-06-07 PROCEDURE — 3074F PR MOST RECENT SYSTOLIC BLOOD PRESSURE < 130 MM HG: ICD-10-PCS | Mod: CPTII,S$GLB,, | Performed by: INTERNAL MEDICINE

## 2019-06-07 PROCEDURE — 1101F PR PT FALLS ASSESS DOC 0-1 FALLS W/OUT INJ PAST YR: ICD-10-PCS | Mod: CPTII,S$GLB,, | Performed by: INTERNAL MEDICINE

## 2019-06-07 RX ORDER — HEPARIN 100 UNIT/ML
500 SYRINGE INTRAVENOUS
Status: DISCONTINUED | OUTPATIENT
Start: 2019-06-07 | End: 2019-06-07 | Stop reason: HOSPADM

## 2019-06-07 RX ORDER — SODIUM CHLORIDE 0.9 % (FLUSH) 0.9 %
10 SYRINGE (ML) INJECTION
Status: DISCONTINUED | OUTPATIENT
Start: 2019-06-07 | End: 2019-06-07 | Stop reason: HOSPADM

## 2019-06-07 RX ADMIN — CARBOPLATIN 280 MG: 10 INJECTION INTRAVENOUS at 01:06

## 2019-06-07 RX ADMIN — HEPARIN SODIUM (PORCINE) LOCK FLUSH IV SOLN 100 UNIT/ML 500 UNITS: 100 SOLUTION at 02:06

## 2019-06-07 RX ADMIN — DOXORUBICIN HYDROCHLORIDE 50 MG: 2 INJECTABLE, LIPOSOMAL INTRAVENOUS at 12:06

## 2019-06-07 RX ADMIN — Medication 10 ML: at 02:06

## 2019-06-07 RX ADMIN — PEGFILGRASTIM 6 MG: KIT SUBCUTANEOUS at 03:06

## 2019-06-07 RX ADMIN — SODIUM CHLORIDE: 0.9 INJECTION, SOLUTION INTRAVENOUS at 01:06

## 2019-06-07 RX ADMIN — PALONOSETRON HYDROCHLORIDE: 0.25 INJECTION, SOLUTION INTRAVENOUS at 11:06

## 2019-06-07 NOTE — PLAN OF CARE
"Problem: Adult Inpatient Plan of Care  Goal: Plan of Care Review  Outcome: Ongoing (interventions implemented as appropriate)  Pt presented for Cycle 5 Doxil and Carboplatin. VSS. Pt reported dizziness after not eating yesterday before blood work. Pt's daughter reported, "it was a bad hypoglycemic episode."  Pt also reported fatigue and intermittent pain in her left abdomen, that she describes as gas pain. Pt premedicated with Decadron and Aloxi. Pt tolerated treatment well. Good blood return noted. Port flushed and heparinized upon completion. Neulasta On Body applied to pt's abdomen and instructions reviewed with pt and pt's daughter. Lunch provided. Distress screening tool completed.  Accompanied by daughter. Future appt information reviewed with pt.       "

## 2019-06-07 NOTE — PROGRESS NOTES
"Subjective:       Patient ID: Celine Mahan is a 83 y.o. female.    Chief Complaint: Follow-up    HPI     Diagnosis: 1.Ovarian cancer status post debulking surgery 9/21/2017                     2. 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 S/p carbo/taxol x 6 completed 2/12/2018                   2. Carbo/Doxil 2/1/2019- present         HPI ( per Dr. Feliciano) Patient is an 84 yo female who originally presented as a referral from Dr. Wills for pelvic mass. Patient reports LLQ pain for approximately 3 months which prompted evaluation.      Pelvic US 8/7/17 There is a complex echogenicity midline pelvic mass measuring 18.1 x 8.6 x 7.6 cm.  Ovaries not visualized.     CT A&P 8/9/17 Large solid/cystic mass within the lower abdomen/upper pelvis that is most concerning for a malignant neoplasm, likely of ovarian origin given its location.  Correlation with previous surgical history is recommended noting evidence of previous hysterectomy.  Mass abuts and displaces the adjacent bladder without definite CT findings to suggest hema invasion. Abdominal and pelvic lymphadenopathy concerning for lymphatic spread of neoplasm with index lymph nodes as above.      Medical history is significant for CKD (Cr 1.3), HTN, hypothyroid, HTN, HLD. She has a personal history of breast cancer in 1995 treated with mastectomy and adjuvant chemotherapy she reports. Last MMG 11/2016 normal. Adbominal surgery include cholecystectomy, TVH, xlap/removal ovary for ectopic. She reports that her physician told her "some ovary remains in situ". Family history significant for mother with pancreatic cancer.      CT chest 8/28/17 showed mediastinal adenopathy consistent with Stage IV disease.   RECIST Summary:  Mediastinal adenopathy:  1. Preaortic abnormal node measuring 1.7 cm short axis.  2. Pretracheal node measures 1.6 cm short axis.     She underwent surgical cytoreduction with xlap/LSO/omentectomy 9/21/17. Pathology " showed high grade adenocarcinoma.        She is also  followed by Dr. Feliciano  She  transferred care to undergo  adjuvant chemo at this location   She completed adjuvant chemo with carbo AUC 6 and taxol 175mg/m2 q 21d  2/12/2018  She was considered for PRIMA clinical trial for maintenance therapy   Pt  Declined maintenance therapy      Pt with increasing  levels   849>230>37>23>22>12>40>309 ( 1/9/2019 )       CT c/a/p 1/14/2019   S/P resection of large LEFT ovarian/fallopian tube mass consistent with carcinoma.    Interval increase in size of LEFT pelvic/retroperitoneal lymph nodes.  No evidence for ascites or definitive carcinomatosis of mesentery.    Interval decrease in size of prevascular lymph node.      It was determined she has platinum sensitive recurrent ovarian cancer. Platinum free interval 11 months.   Dr. Feliciano Reviewed standard management options which are to re-treat with platinum-based chemotherapy (carbo + doxil/gem/taxol +/- avastin).   Pt undergoing treatment with Doxil 30mg/m2  Carbo AUC 5  q 28days  Best personalized option is carbo/doxil      She is s/p cycle 4  of chemo   ( delayed sec to cytopenias)     Accompanied by dtr  Recent imaging studies 4/2019 reveals stable disease    She cotninues with chronic neck pain and intermittent muscle spasms  She reports intermittent HA, severity and frequency stable   No assoc vision changes/N/V  No SOB/cough   She reports mild fatigue  Appetite and weight stable   No abd pain/distension   No melena, hematochezia or change in bowel habits  No rashes  No bleeding-nasal/rectal/urinary   No easy bruisabiliy     She continues with  Intermittent tingling in hands and feet   She is  taking prescribed gabapentin once daily ( sedation)      Pt requesting Rx refill of pain meds    MRI brain 2/27/2019 - no brain mets    CBC 6/6/2019  reveals a white blood cell count of 2870 mm 3 hemoglobin 10 grams/dL hematocrit of 30.5 % plt ct 207  k      Biochemical  profile reveals stable Cr level from 1.7mg/dL  to 1.5mg/dL     BRCA ANALYSIS NEG    Past Medical History:   Diagnosis Date    Breast cancer     Cataract     CKD (chronic kidney disease) stage 3, GFR 30-59 ml/min     Hyperlipidemia     Hypertension     Hypothyroidism     Obesity     Osteopenia     Ovarian cancer 10/8/2017    Overactive bladder     Pelvic mass 8/27/2017    Thyroid disease          Past Surgical History:   Procedure Laterality Date    bt catarac surgery      CHOLECYSTECTOMY      COLONOSCOPY N/A 2/24/2015    [facility] ENDO    EXPLORATORY-LAPAROTOMY N/A 9/21/2017    Performed [facility] 2ND FLR    HYSTERECTOMY      INSERTION, PORT-A-CATH N/A 6/3/2019    [facility] OR    MASTECTOMY Left     OMENTECTOMY Bilateral 9/21/2017    [facility] at [facility] 2ND FLR    SALPINGO-OOPHERECTOMY Left 9/21/2017    Performed by [facility] at [facility] OR 2ND FLR             Review of Systems   Constitutional: Positive for fatigue. Negative for appetite change, fever and unexpected weight change.   HENT: Negative for mouth sores.    Eyes: Negative for visual disturbance.   Respiratory: Negative for cough and shortness of breath.    Cardiovascular: Negative for chest pain.   Gastrointestinal: Negative for abdominal pain, diarrhea and nausea.   Genitourinary: Negative for frequency.   Musculoskeletal: Negative for back pain.   Skin: Negative for rash.        No petechiae   Neurological: Positive for numbness. Negative for weakness, light-headedness and headaches.        Intermittent tingling   Hematological: Negative for adenopathy.   Psychiatric/Behavioral: The patient is not nervous/anxious.        Objective:       Vitals:    06/07/19 1011   BP: (!) 123/53   BP Location: Right arm   Patient Position: Sitting   Pulse: 84   Temp: 97.7 °F (36.5 °C)   TempSrc: Oral   SpO2: 95%   Weight: 80.6 kg  "(177 lb 11.1 oz)   Height: 5' 2" (1.575 m)       Physical Exam   Constitutional: She is oriented to person, place, and time. She appears well-developed and well-nourished.   HENT:   Head: Normocephalic.   Mouth/Throat: Oropharynx is clear and moist. No oropharyngeal exudate.   Eyes: Conjunctivae and lids are normal. No scleral icterus.   Neck: Normal range of motion. Neck supple. No thyromegaly present.   Cardiovascular: Normal rate, regular rhythm and normal heart sounds.   No murmur heard.  Pulmonary/Chest: Breath sounds normal. She has no wheezes. She has no rales.   Abdominal: Soft. Bowel sounds are normal. She exhibits no distension. There is no hepatosplenomegaly. There is no tenderness.   Well-healed midline surgical scar   Musculoskeletal: Normal range of motion. She exhibits no edema or tenderness.   Lymphadenopathy:     She has no cervical adenopathy.     She has no axillary adenopathy.        Right: No supraclavicular adenopathy present.        Left: No supraclavicular adenopathy present.   Neurological: She is alert and oriented to person, place, and time. No cranial nerve deficit. Coordination normal.   Skin: Skin is warm and dry. No ecchymosis, no petechiae and no rash noted. No erythema.   Psychiatric: She has a normal mood and affect.         Labs:   Lab Results   Component Value Date    WBC 2.87 (L) 06/06/2019    HGB 10.0 (L) 06/06/2019    HCT 30.5 (L) 06/06/2019     (H) 06/06/2019     06/06/2019       Results for JONATAN SERRANO (MRN [id number]) as of 4/25/2019 08:52   Ref. Range 2/20/2019 09:44 3/7/2019 07:45 4/2/2019 09:32 4/17/2019 13:08    Latest Ref Range: 0 - 30 U/mL 236 (H)  81 (H) 86 (H)     Results for JONATAN SERRANO (MRN [id number]) as of 6/7/2019 10:25   Ref. Range 6/6/2019 10:00    Latest Ref Range: 0 - 30 U/mL 55 (H)         CT c/a/p 1/14/2019   S/P resection of large LEFT ovarian/fallopian tube mass consistent with carcinoma.    Interval increase in size of LEFT " pelvic/retroperitoneal lymph nodes.  No evidence for ascites or definitive carcinomatosis of mesentery.    Interval decrease in size of prevascular lymph node.    RECIST SUMMARY:    Date of prior examination for comparison: Chest CT 08/28/2017 and CT abdomen and pelvis 08/14/2017    Lesion 1 Chest: Pre aortic lymph node previously measuring 1.7 cm, currently measuring 0.94 cm.  Previously noted prevascular lymph node is stable as compared to prior, with a fatty hilum, of questionable significance..    Lesion 2 abdomen/pelvis: LEFT iliac chain node.  2.3 cm cm. Series 2 image 163.  Prior measurement 1.5 cm cm.    Lesion 3 abdomen/pelvis: LEFT renal hilar lymph node.  1.6 cm cm. Series 2 image 112.  Prior measurement 1.4 cm cm.      MRI Cervical spine w/out contrast 4/30/2019   Cervical degenerative change most significant at C4-5 and C5-6.  Specific details at each level discussed above.    CT c/a/p w/out contrast 4/17/2019   On today's exam the patient's known lesions are similar to prior exam.    Assessment:       1. Malignant neoplasm of ovary, unspecified laterality    2. S/P exploratory laparotomy/LSO/omentectomy    3. Antineoplastic chemotherapy induced anemia    4. Chronic neck pain        Plan:   1- 4   Pt clinically stable   Patient with ovarian cancer status post debulking surgery 9/21/2017 undergoing adjuvant platinum-based chemotherapy with carbo/taxol .   S/p carbo/taxol x 6 completed 2/12/2018  Pt declined maintenance therapy on trial   BRCA ANALYSIS NEG      CT c/a/p 1/14/2019   S/P resection of large LEFT ovarian/fallopian tube mass consistent with carcinoma.    Interval increase in size of LEFT pelvic/retroperitoneal lymph nodes.  No evidence for ascites or definitive carcinomatosis of mesentery.    Interval decrease in size of prevascular lymph node.        It  was determined she has platinum sensitive recurrent ovarian cancer. Platinum free interval 11 months.   Dr. Feliciano Reviewed standard  management options which are to re-treat with platinum-based chemotherapy (carbo + doxil/gem/taxol +/- avastin).   Pt undergoing treatment with Doxil 30mg/m2  Carbo AUC 5  q 28days  Best personalized option is carbo/doxil    S/p chemo cycle 4 completed       mg 81  To 86 U/ml to 55U/Ml     MRI brain 2/27/2019 - NEG for brain mets    Discussed imaging findings in detail with patient which reveals stable disease  Plan to discuss  findings with Dr. Feliciano and treatment plan        Plan MRI neck w/out contrast  Showed Cervical degenerative change most significant at C4-5 and C5-6.   Pt declines pain management referral        CT c/a/p w/out contrast 4/17/2019-stable findings     level decreasing     Cycle 5 of 6 today  Plan imaging studies status post cycle 6   Consider maintenance therapy status post cycle 6     Follow-up July 1st   with Cbc,cmp ,  mg prior to f/u            Cc: Iesha Feliciano M.D.          Anil Pelayo M.D.

## 2019-06-24 RX ORDER — LEVOTHYROXINE SODIUM 125 UG/1
125 TABLET ORAL
Qty: 90 TABLET | Refills: 3 | Status: SHIPPED | OUTPATIENT
Start: 2019-06-24 | End: 2019-09-16 | Stop reason: SDUPTHER

## 2019-06-24 NOTE — TELEPHONE ENCOUNTER
----- Message from Briana Judd sent at 6/24/2019  9:03 AM CDT -----  Type:  RX Refill Request    Who Called: pt  Refill or New Rx: refill  RX Name and Strength: levothyroxine (SYNTHROID) 125 MCG tablet  How is the patient currently taking it? (ex. 1XDay): Take 1 tablet (125 mcg total) by mouth before breakfast.  Is this a 30 day or 90 day RX: 90 day  Preferred Pharmacy with phone number: [facility] [address] [place], [place] - [address] AT [facility] [address]  Local or Mail Order: local  Ordering Provider: Dr. Story  Would the patient rather a call back or a response via [facility]? Call back  Best Call Back Number: [phone]  Additional Information:

## 2019-06-28 ENCOUNTER — INFUSION (OUTPATIENT)
Dept: INFUSION THERAPY | Facility: HOSPITAL | Age: 84
End: 2019-06-28
Attending: INTERNAL MEDICINE
Payer: MEDICARE

## 2019-06-28 VITALS
RESPIRATION RATE: 18 BRPM | DIASTOLIC BLOOD PRESSURE: 67 MMHG | TEMPERATURE: 98 F | OXYGEN SATURATION: 98 % | SYSTOLIC BLOOD PRESSURE: 144 MMHG | HEART RATE: 83 BPM

## 2019-06-28 DIAGNOSIS — C56.9 OVARIAN CANCER: ICD-10-CM

## 2019-06-28 DIAGNOSIS — C56.9 MALIGNANT NEOPLASM OF OVARY, UNSPECIFIED LATERALITY: Primary | ICD-10-CM

## 2019-06-28 LAB
ALBUMIN SERPL BCP-MCNC: 3.6 G/DL (ref 3.5–5.2)
ALP SERPL-CCNC: 77 U/L (ref 55–135)
ALT SERPL W/O P-5'-P-CCNC: 5 U/L (ref 10–44)
ANION GAP SERPL CALC-SCNC: 8 MMOL/L (ref 8–16)
AST SERPL-CCNC: 11 U/L (ref 10–40)
BASOPHILS # BLD AUTO: 0.01 K/UL (ref 0–0.2)
BASOPHILS NFR BLD: 0.4 % (ref 0–1.9)
BILIRUB SERPL-MCNC: 0.3 MG/DL (ref 0.1–1)
BUN SERPL-MCNC: 22 MG/DL (ref 8–23)
CALCIUM SERPL-MCNC: 8.8 MG/DL (ref 8.7–10.5)
CHLORIDE SERPL-SCNC: 106 MMOL/L (ref 95–110)
CO2 SERPL-SCNC: 22 MMOL/L (ref 23–29)
CREAT SERPL-MCNC: 1.5 MG/DL (ref 0.5–1.4)
DIFFERENTIAL METHOD: ABNORMAL
EOSINOPHIL # BLD AUTO: 0 K/UL (ref 0–0.5)
EOSINOPHIL NFR BLD: 1.5 % (ref 0–8)
ERYTHROCYTE [DISTWIDTH] IN BLOOD BY AUTOMATED COUNT: 16.6 % (ref 11.5–14.5)
EST. GFR  (AFRICAN AMERICAN): 37 ML/MIN/1.73 M^2
EST. GFR  (NON AFRICAN AMERICAN): 32 ML/MIN/1.73 M^2
GLUCOSE SERPL-MCNC: 131 MG/DL (ref 70–110)
HCT VFR BLD AUTO: 25 % (ref 37–48.5)
HGB BLD-MCNC: 8.5 G/DL (ref 12–16)
LYMPHOCYTES # BLD AUTO: 0.6 K/UL (ref 1–4.8)
LYMPHOCYTES NFR BLD: 23 % (ref 18–48)
MCH RBC QN AUTO: 35.3 PG (ref 27–31)
MCHC RBC AUTO-ENTMCNC: 34 G/DL (ref 32–36)
MCV RBC AUTO: 104 FL (ref 82–98)
MONOCYTES # BLD AUTO: 0.4 K/UL (ref 0.3–1)
MONOCYTES NFR BLD: 13 % (ref 4–15)
NEUTROPHILS # BLD AUTO: 1.7 K/UL (ref 1.8–7.7)
NEUTROPHILS NFR BLD: 62.8 % (ref 38–73)
PLATELET # BLD AUTO: 48 K/UL (ref 150–350)
PLATELET BLD QL SMEAR: ABNORMAL
PMV BLD AUTO: 9.8 FL (ref 9.2–12.9)
POTASSIUM SERPL-SCNC: 4.2 MMOL/L (ref 3.5–5.1)
PROT SERPL-MCNC: 6.7 G/DL (ref 6–8.4)
RBC # BLD AUTO: 2.41 M/UL (ref 4–5.4)
SODIUM SERPL-SCNC: 136 MMOL/L (ref 136–145)
WBC # BLD AUTO: 2.69 K/UL (ref 3.9–12.7)

## 2019-06-28 PROCEDURE — 80053 COMPREHEN METABOLIC PANEL: CPT

## 2019-06-28 PROCEDURE — A4216 STERILE WATER/SALINE, 10 ML: HCPCS | Performed by: INTERNAL MEDICINE

## 2019-06-28 PROCEDURE — 85025 COMPLETE CBC W/AUTO DIFF WBC: CPT

## 2019-06-28 PROCEDURE — 25000003 PHARM REV CODE 250: Performed by: INTERNAL MEDICINE

## 2019-06-28 PROCEDURE — 63600175 PHARM REV CODE 636 W HCPCS: Performed by: INTERNAL MEDICINE

## 2019-06-28 PROCEDURE — 36591 DRAW BLOOD OFF VENOUS DEVICE: CPT

## 2019-06-28 RX ORDER — HEPARIN 100 UNIT/ML
500 SYRINGE INTRAVENOUS
Status: DISCONTINUED | OUTPATIENT
Start: 2019-06-28 | End: 2019-06-28 | Stop reason: HOSPADM

## 2019-06-28 RX ORDER — SODIUM CHLORIDE 0.9 % (FLUSH) 0.9 %
10 SYRINGE (ML) INJECTION
Status: DISCONTINUED | OUTPATIENT
Start: 2019-06-28 | End: 2019-06-28 | Stop reason: HOSPADM

## 2019-06-28 RX ADMIN — HEPARIN 500 UNITS: 100 SYRINGE at 09:06

## 2019-06-28 RX ADMIN — Medication 10 ML: at 09:06

## 2019-06-28 NOTE — PLAN OF CARE
Problem: Adult Inpatient Plan of Care  Goal: Plan of Care Review  Outcome: Ongoing (interventions implemented as appropriate)  Pt presented for pre-chemo labs. Labs drawn from port. Good blood return noted. Port flushed and heparinized upon completion. Only reported complaint was fatigue. Ambulatory into and out of unit. Distress screening tool completed. Accompanied by daughter. Future appt information reviewed with pt.

## 2019-07-01 ENCOUNTER — OFFICE VISIT (OUTPATIENT)
Dept: HEMATOLOGY/ONCOLOGY | Facility: CLINIC | Age: 84
End: 2019-07-01
Payer: MEDICARE

## 2019-07-01 VITALS
SYSTOLIC BLOOD PRESSURE: 148 MMHG | HEART RATE: 68 BPM | WEIGHT: 184.94 LBS | HEIGHT: 62 IN | TEMPERATURE: 98 F | BODY MASS INDEX: 34.03 KG/M2 | OXYGEN SATURATION: 99 % | DIASTOLIC BLOOD PRESSURE: 67 MMHG

## 2019-07-01 DIAGNOSIS — D69.59 CHEMOTHERAPY-INDUCED THROMBOCYTOPENIA: ICD-10-CM

## 2019-07-01 DIAGNOSIS — Z98.890 S/P EXPLORATORY LAPAROTOMY: ICD-10-CM

## 2019-07-01 DIAGNOSIS — T45.1X5A CHEMOTHERAPY-INDUCED THROMBOCYTOPENIA: ICD-10-CM

## 2019-07-01 DIAGNOSIS — D64.81 ANTINEOPLASTIC CHEMOTHERAPY INDUCED ANEMIA: ICD-10-CM

## 2019-07-01 DIAGNOSIS — T45.1X5A ANTINEOPLASTIC CHEMOTHERAPY INDUCED ANEMIA: ICD-10-CM

## 2019-07-01 DIAGNOSIS — C56.9 MALIGNANT NEOPLASM OF OVARY, UNSPECIFIED LATERALITY: Primary | ICD-10-CM

## 2019-07-01 PROCEDURE — 99499 RISK ADDL DX/OHS AUDIT: ICD-10-PCS | Mod: S$GLB,,, | Performed by: INTERNAL MEDICINE

## 2019-07-01 PROCEDURE — 3077F PR MOST RECENT SYSTOLIC BLOOD PRESSURE >= 140 MM HG: ICD-10-PCS | Mod: CPTII,S$GLB,, | Performed by: INTERNAL MEDICINE

## 2019-07-01 PROCEDURE — 99999 PR PBB SHADOW E&M-EST. PATIENT-LVL IV: CPT | Mod: PBBFAC,,, | Performed by: INTERNAL MEDICINE

## 2019-07-01 PROCEDURE — 99499 UNLISTED E&M SERVICE: CPT | Mod: S$GLB,,, | Performed by: INTERNAL MEDICINE

## 2019-07-01 PROCEDURE — 1101F PT FALLS ASSESS-DOCD LE1/YR: CPT | Mod: CPTII,S$GLB,, | Performed by: INTERNAL MEDICINE

## 2019-07-01 PROCEDURE — 3078F DIAST BP <80 MM HG: CPT | Mod: CPTII,S$GLB,, | Performed by: INTERNAL MEDICINE

## 2019-07-01 PROCEDURE — 99214 OFFICE O/P EST MOD 30 MIN: CPT | Mod: S$GLB,,, | Performed by: INTERNAL MEDICINE

## 2019-07-01 PROCEDURE — 3077F SYST BP >= 140 MM HG: CPT | Mod: CPTII,S$GLB,, | Performed by: INTERNAL MEDICINE

## 2019-07-01 PROCEDURE — 1101F PR PT FALLS ASSESS DOC 0-1 FALLS W/OUT INJ PAST YR: ICD-10-PCS | Mod: CPTII,S$GLB,, | Performed by: INTERNAL MEDICINE

## 2019-07-01 PROCEDURE — 99999 PR PBB SHADOW E&M-EST. PATIENT-LVL IV: ICD-10-PCS | Mod: PBBFAC,,, | Performed by: INTERNAL MEDICINE

## 2019-07-01 PROCEDURE — 3078F PR MOST RECENT DIASTOLIC BLOOD PRESSURE < 80 MM HG: ICD-10-PCS | Mod: CPTII,S$GLB,, | Performed by: INTERNAL MEDICINE

## 2019-07-01 PROCEDURE — 99214 PR OFFICE/OUTPT VISIT, EST, LEVL IV, 30-39 MIN: ICD-10-PCS | Mod: S$GLB,,, | Performed by: INTERNAL MEDICINE

## 2019-07-01 NOTE — PROGRESS NOTES
"Subjective:       Patient ID: Celine Mahan is a 84 y.o. female.    Chief Complaint: Follow-up    HPI     Diagnosis: 1.Ovarian cancer status post debulking surgery 9/21/2017                     2. 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 S/p carbo/taxol x 6 completed 2/12/2018                   2. Carbo/Doxil 2/1/2019- present         HPI ( per Dr. Feliciano) Patient is an 83 yo female who originally presented as a referral from Dr. Wills for pelvic mass. Patient reports LLQ pain for approximately 3 months which prompted evaluation.      Pelvic US 8/7/17 There is a complex echogenicity midline pelvic mass measuring 18.1 x 8.6 x 7.6 cm.  Ovaries not visualized.     CT A&P 8/9/17 Large solid/cystic mass within the lower abdomen/upper pelvis that is most concerning for a malignant neoplasm, likely of ovarian origin given its location.  Correlation with previous surgical history is recommended noting evidence of previous hysterectomy.  Mass abuts and displaces the adjacent bladder without definite CT findings to suggest hema invasion. Abdominal and pelvic lymphadenopathy concerning for lymphatic spread of neoplasm with index lymph nodes as above.      Medical history is significant for CKD (Cr 1.3), HTN, hypothyroid, HTN, HLD. She has a personal history of breast cancer in 1995 treated with mastectomy and adjuvant chemotherapy she reports. Last MMG 11/2016 normal. Adbominal surgery include cholecystectomy, TVH, xlap/removal ovary for ectopic. She reports that her physician told her "some ovary remains in situ". Family history significant for mother with pancreatic cancer.      CT chest 8/28/17 showed mediastinal adenopathy consistent with Stage IV disease.   RECIST Summary:  Mediastinal adenopathy:  1. Preaortic abnormal node measuring 1.7 cm short axis.  2. Pretracheal node measures 1.6 cm short axis.     She underwent surgical cytoreduction with xlap/LSO/omentectomy 9/21/17. Pathology " showed high grade adenocarcinoma.        She is also  followed by Dr. Feliciano  She  transferred care to undergo  adjuvant chemo at this location   She completed adjuvant chemo with carbo AUC 6 and taxol 175mg/m2 q 21d  2/12/2018  She was considered for PRIMA clinical trial for maintenance therapy   Pt  Declined maintenance therapy      Pt with increasing  levels   849>230>37>23>22>12>40>309 ( 1/9/2019 )       CT c/a/p 1/14/2019   S/P resection of large LEFT ovarian/fallopian tube mass consistent with carcinoma.    Interval increase in size of LEFT pelvic/retroperitoneal lymph nodes.  No evidence for ascites or definitive carcinomatosis of mesentery.    Interval decrease in size of prevascular lymph node.      It was determined she has platinum sensitive recurrent ovarian cancer. Platinum free interval 11 months.   Dr. Feliciano Reviewed standard management options which are to re-treat with platinum-based chemotherapy (carbo + doxil/gem/taxol +/- avastin).   Pt undergoing treatment with Doxil 30mg/m2  Carbo AUC 5  q 28days  Best personalized option is carbo/doxil      She is s/p cycle 45 of chemo   ( dosage reduction  sec to cytopenias)     Accompanied by dtr  Recent imaging studies 4/2019 reveals stable disease    No new issues  Doing well   No SOB/cough   No fatigue  No lightheadednss  Appetite and weight stable   No abd pain/distension   No melena, hematochezia or change in bowel habits  No rashes  No bleeding-nasal/rectal/urinary   No easy bruisabiliy     She continues with  Intermittent tingling in hands and feet   She is  taking prescribed gabapentin once daily ( sedation)      Pt requesting Rx refill of pain meds    MRI brain 2/27/2019 - no brain mets    CBC 6/28/2019  reveals a white blood cell count of 2690 mm 3 hemoglobin 8.5 grams/dL hematocrit of 25 % plt ct 48 k      Biochemical profile reveals stable Cr level from 1.7mg/dL  to 1.5mg/dL     BRCA ANALYSIS NEG    Past Medical History:   Diagnosis Date  "   Breast cancer     Cataract     CKD (chronic kidney disease) stage 3, GFR 30-59 ml/min     Hyperlipidemia     Hypertension     Hypothyroidism     Obesity     Osteopenia     Ovarian cancer 10/8/2017    Overactive bladder     Pelvic mass 8/27/2017    Thyroid disease          Past Surgical History:   Procedure Laterality Date    bt catarac surgery      CHOLECYSTECTOMY      COLONOSCOPY N/A 2/24/2015    [facility]    EXPLORATORY-LAPAROTOMY N/A 9/21/2017    Per[facility] at [facility] 2ND FLR    HYSTERECTOMY      INSERTION, PORT-A-CATH N/A 6/3/2019    Performed by [facility]    MASTECTOMY Left     OMENTECTOMY Bilateral 9/21/2017    Performed by [facility] at [facility]ND FLR    SALPINGO-OOPHERECTOMY Left 9/21/2017    Performed by [facility] at [facility] OR 2ND FLR             Review of Systems   Constitutional: Positive for fatigue. Negative for appetite change, fever and unexpected weight change.   HENT: Negative for mouth sores.    Eyes: Negative for visual disturbance.   Respiratory: Negative for cough and shortness of breath.    Cardiovascular: Negative for chest pain.   Gastrointestinal: Negative for abdominal pain, diarrhea and nausea.   Genitourinary: Negative for frequency.   Musculoskeletal: Negative for back pain.   Skin: Negative for rash.        No petechiae   Neurological: Positive for numbness. Negative for weakness, light-headedness and headaches.        Intermittent tingling   Hematological: Negative for adenopathy.   Psychiatric/Behavioral: The patient is not nervous/anxious.        Objective:       Vitals:    07/01/19 1506   BP: (!) 148/67   BP Location: Right arm   Patient Position: Sitting   BP Method: Medium (Automatic)   Pulse: 68   Temp: 98.3 °F (36.8 °C)   TempSrc: Oral   SpO2: 99%   Weight: 83.9 kg (184 lb 15.5 oz)   Height: 5' 2" (1.575 m)       Physical Exam   Constitutional: She is " oriented to person, place, and time. She appears well-developed and well-nourished.   HENT:   Head: Normocephalic.   Mouth/Throat: Oropharynx is clear and moist. No oropharyngeal exudate.   Eyes: Conjunctivae and lids are normal. No scleral icterus.   Neck: Normal range of motion. Neck supple. No thyromegaly present.   Cardiovascular: Normal rate, regular rhythm and normal heart sounds.   No murmur heard.  Pulmonary/Chest: Breath sounds normal. She has no wheezes. She has no rales.   Abdominal: Soft. Bowel sounds are normal. She exhibits no distension. There is no hepatosplenomegaly. There is no tenderness.   Well-healed midline surgical scar   Musculoskeletal: Normal range of motion. She exhibits no edema or tenderness.   Lymphadenopathy:     She has no cervical adenopathy.     She has no axillary adenopathy.        Right: No supraclavicular adenopathy present.        Left: No supraclavicular adenopathy present.   Neurological: She is alert and oriented to person, place, and time. No cranial nerve deficit. Coordination normal.   Skin: Skin is warm and dry. No ecchymosis, no petechiae and no rash noted. No erythema.   Psychiatric: She has a normal mood and affect.         Labs:   Lab Results   Component Value Date    WBC 2.69 (L) 06/28/2019    HGB 8.5 (L) 06/28/2019    HCT 25.0 (L) 06/28/2019     (H) 06/28/2019    PLT 48 (L) 06/28/2019       Results for JONATAN SERRANO (MRN [id number]) as of 4/25/2019 08:52   Ref. Range 2/20/2019 09:44 3/7/2019 07:45 4/2/2019 09:32 4/17/2019 13:08    Latest Ref Range: 0 - 30 U/mL 236 (H)  81 (H) 86 (H)     Results for JONATAN SERRANO (MRN [id number]) as of 6/7/2019 10:25   Ref. Range 6/6/2019 10:00    Latest Ref Range: 0 - 30 U/mL 55 (H)         CT c/a/p 1/14/2019   S/P resection of large LEFT ovarian/fallopian tube mass consistent with carcinoma.    Interval increase in size of LEFT pelvic/retroperitoneal lymph nodes.  No evidence for ascites or definitive  carcinomatosis of mesentery.    Interval decrease in size of prevascular lymph node.    RECIST SUMMARY:    Date of prior examination for comparison: Chest CT 08/28/2017 and CT abdomen and pelvis 08/14/2017    Lesion 1 Chest: Pre aortic lymph node previously measuring 1.7 cm, currently measuring 0.94 cm.  Previously noted prevascular lymph node is stable as compared to prior, with a fatty hilum, of questionable significance..    Lesion 2 abdomen/pelvis: LEFT iliac chain node.  2.3 cm cm. Series 2 image 163.  Prior measurement 1.5 cm cm.    Lesion 3 abdomen/pelvis: LEFT renal hilar lymph node.  1.6 cm cm. Series 2 image 112.  Prior measurement 1.4 cm cm.      MRI Cervical spine w/out contrast 4/30/2019   Cervical degenerative change most significant at C4-5 and C5-6.  Specific details at each level discussed above.    CT c/a/p w/out contrast 4/17/2019   On today's exam the patient's known lesions are similar to prior exam.    Assessment:       1. Malignant neoplasm of ovary, unspecified laterality    2. S/P exploratory laparotomy/LSO/omentectomy    3. Antineoplastic chemotherapy induced anemia    4. Chemotherapy-induced thrombocytopenia        Plan:   1- 4   Pt clinically stable   Patient with ovarian cancer status post debulking surgery 9/21/2017 undergoing adjuvant platinum-based chemotherapy with carbo/taxol .   S/p carbo/taxol x 6 completed 2/12/2018  Pt declined maintenance therapy on trial   BRCA ANALYSIS NEG      CT c/a/p 1/14/2019   S/P resection of large LEFT ovarian/fallopian tube mass consistent with carcinoma.    Interval increase in size of LEFT pelvic/retroperitoneal lymph nodes.  No evidence for ascites or definitive carcinomatosis of mesentery.    Interval decrease in size of prevascular lymph node.        It  was determined she has platinum sensitive recurrent ovarian cancer. Platinum free interval 11 months.   Dr. Feliciano Reviewed standard management options which are to re-treat with platinum-based  chemotherapy (carbo + doxil/gem/taxol +/- avastin).   Pt undergoing treatment with Doxil 30mg/m2  Carbo AUC 5  q 28days  Best personalized option is carbo/doxil    S/p chemo cycle 4 completed       mg 81  To 86 U/ml to 55U/Ml     MRI brain 2/27/2019 - NEG for brain mets    Discussed imaging findings in detail with patient which reveals stable disease  Plan to discuss  findings with Dr. Feliciano and treatment plan        Plan MRI neck w/out contrast  Showed Cervical degenerative change most significant at C4-5 and C5-6.   Pt declines pain management referral        CT c/a/p w/out contrast 4/17/2019-stable findings     level decreasing     Cycle  6  Delayed sec to anemia/thrombocytopenia  ( chemo dose adjusted)   Asymptomatic  Plan repeat cbc Fri  Plan chemo July 8 ( pending lab parameters)   Plan imaging studies status post cycle 6   Consider maintenance therapy status post cycle 6     Follow-up 1month   with Cbc,cmp ,  mg prior to f/u        Cc: Iesha Feliciano M.D.          Anil Pelayo M.D.

## 2019-07-01 NOTE — Clinical Note
Cbc,cmp, mag and type and cross on Friday Chemo on MON ( with auth)Cbc,cmp,  pror to f/u on July 29 th

## 2019-07-05 ENCOUNTER — INFUSION (OUTPATIENT)
Dept: INFUSION THERAPY | Facility: HOSPITAL | Age: 84
End: 2019-07-05
Attending: INTERNAL MEDICINE
Payer: MEDICARE

## 2019-07-05 VITALS
TEMPERATURE: 98 F | SYSTOLIC BLOOD PRESSURE: 118 MMHG | HEART RATE: 68 BPM | DIASTOLIC BLOOD PRESSURE: 62 MMHG | OXYGEN SATURATION: 100 % | RESPIRATION RATE: 18 BRPM

## 2019-07-05 DIAGNOSIS — C56.9 OVARIAN CANCER: ICD-10-CM

## 2019-07-05 DIAGNOSIS — C56.9 MALIGNANT NEOPLASM OF OVARY, UNSPECIFIED LATERALITY: Primary | ICD-10-CM

## 2019-07-05 LAB
ALBUMIN SERPL BCP-MCNC: 3.6 G/DL (ref 3.5–5.2)
ALP SERPL-CCNC: 80 U/L (ref 55–135)
ALT SERPL W/O P-5'-P-CCNC: 9 U/L (ref 10–44)
ANION GAP SERPL CALC-SCNC: 8 MMOL/L (ref 8–16)
AST SERPL-CCNC: 14 U/L (ref 10–40)
BASOPHILS # BLD AUTO: 0.04 K/UL (ref 0–0.2)
BASOPHILS NFR BLD: 1.2 % (ref 0–1.9)
BILIRUB SERPL-MCNC: 0.2 MG/DL (ref 0.1–1)
BUN SERPL-MCNC: 27 MG/DL (ref 8–23)
CALCIUM SERPL-MCNC: 9.1 MG/DL (ref 8.7–10.5)
CANCER AG125 SERPL-ACNC: 52 U/ML (ref 0–30)
CHLORIDE SERPL-SCNC: 104 MMOL/L (ref 95–110)
CO2 SERPL-SCNC: 23 MMOL/L (ref 23–29)
CREAT SERPL-MCNC: 1.7 MG/DL (ref 0.5–1.4)
DIFFERENTIAL METHOD: ABNORMAL
EOSINOPHIL # BLD AUTO: 0.1 K/UL (ref 0–0.5)
EOSINOPHIL NFR BLD: 2.1 % (ref 0–8)
ERYTHROCYTE [DISTWIDTH] IN BLOOD BY AUTOMATED COUNT: 17.6 % (ref 11.5–14.5)
EST. GFR  (AFRICAN AMERICAN): 31 ML/MIN/1.73 M^2
EST. GFR  (NON AFRICAN AMERICAN): 27 ML/MIN/1.73 M^2
GLUCOSE SERPL-MCNC: 117 MG/DL (ref 70–110)
HCT VFR BLD AUTO: 27.7 % (ref 37–48.5)
HGB BLD-MCNC: 9 G/DL (ref 12–16)
LYMPHOCYTES # BLD AUTO: 1 K/UL (ref 1–4.8)
LYMPHOCYTES NFR BLD: 29.8 % (ref 18–48)
MCH RBC QN AUTO: 34.7 PG (ref 27–31)
MCHC RBC AUTO-ENTMCNC: 32.5 G/DL (ref 32–36)
MCV RBC AUTO: 107 FL (ref 82–98)
MONOCYTES # BLD AUTO: 0.5 K/UL (ref 0.3–1)
MONOCYTES NFR BLD: 13.3 % (ref 4–15)
NEUTROPHILS # BLD AUTO: 1.8 K/UL (ref 1.8–7.7)
NEUTROPHILS NFR BLD: 55.4 % (ref 38–73)
PLATELET # BLD AUTO: 214 K/UL (ref 150–350)
PMV BLD AUTO: 9.9 FL (ref 9.2–12.9)
POTASSIUM SERPL-SCNC: 4.6 MMOL/L (ref 3.5–5.1)
PROT SERPL-MCNC: 7 G/DL (ref 6–8.4)
RBC # BLD AUTO: 2.59 M/UL (ref 4–5.4)
SODIUM SERPL-SCNC: 135 MMOL/L (ref 136–145)
WBC # BLD AUTO: 3.39 K/UL (ref 3.9–12.7)

## 2019-07-05 PROCEDURE — 85025 COMPLETE CBC W/AUTO DIFF WBC: CPT

## 2019-07-05 PROCEDURE — 63600175 PHARM REV CODE 636 W HCPCS: Performed by: INTERNAL MEDICINE

## 2019-07-05 PROCEDURE — 36591 DRAW BLOOD OFF VENOUS DEVICE: CPT

## 2019-07-05 PROCEDURE — 86304 IMMUNOASSAY TUMOR CA 125: CPT

## 2019-07-05 PROCEDURE — 80053 COMPREHEN METABOLIC PANEL: CPT

## 2019-07-05 PROCEDURE — A4216 STERILE WATER/SALINE, 10 ML: HCPCS | Performed by: INTERNAL MEDICINE

## 2019-07-05 PROCEDURE — 25000003 PHARM REV CODE 250: Performed by: INTERNAL MEDICINE

## 2019-07-05 RX ORDER — SODIUM CHLORIDE 0.9 % (FLUSH) 0.9 %
10 SYRINGE (ML) INJECTION
Status: DISCONTINUED | OUTPATIENT
Start: 2019-07-05 | End: 2019-07-05 | Stop reason: HOSPADM

## 2019-07-05 RX ORDER — HEPARIN 100 UNIT/ML
500 SYRINGE INTRAVENOUS
Status: DISCONTINUED | OUTPATIENT
Start: 2019-07-05 | End: 2019-07-05 | Stop reason: HOSPADM

## 2019-07-05 RX ADMIN — Medication 10 ML: at 10:07

## 2019-07-05 RX ADMIN — HEPARIN 500 UNITS: 100 SYRINGE at 10:07

## 2019-07-05 NOTE — PLAN OF CARE
Problem: Adult Inpatient Plan of Care  Goal: Plan of Care Review  Outcome: Ongoing (interventions implemented as appropriate)  Arrived to unit. Blood collected from port. Pt tolerated procedure. Pt has next chemo appt. Pt ambulated off unit.

## 2019-07-08 ENCOUNTER — INFUSION (OUTPATIENT)
Dept: INFUSION THERAPY | Facility: HOSPITAL | Age: 84
End: 2019-07-08
Attending: INTERNAL MEDICINE
Payer: MEDICARE

## 2019-07-08 VITALS
HEART RATE: 68 BPM | TEMPERATURE: 98 F | SYSTOLIC BLOOD PRESSURE: 136 MMHG | DIASTOLIC BLOOD PRESSURE: 62 MMHG | OXYGEN SATURATION: 98 % | RESPIRATION RATE: 18 BRPM

## 2019-07-08 DIAGNOSIS — C56.9 MALIGNANT NEOPLASM OF OVARY, UNSPECIFIED LATERALITY: Primary | ICD-10-CM

## 2019-07-08 PROCEDURE — 96417 CHEMO IV INFUS EACH ADDL SEQ: CPT

## 2019-07-08 PROCEDURE — 96367 TX/PROPH/DG ADDL SEQ IV INF: CPT

## 2019-07-08 PROCEDURE — 96377 APPLICATON ON-BODY INJECTOR: CPT

## 2019-07-08 PROCEDURE — 25000003 PHARM REV CODE 250: Performed by: INTERNAL MEDICINE

## 2019-07-08 PROCEDURE — 63600175 PHARM REV CODE 636 W HCPCS: Mod: JG | Performed by: INTERNAL MEDICINE

## 2019-07-08 PROCEDURE — A4216 STERILE WATER/SALINE, 10 ML: HCPCS | Performed by: INTERNAL MEDICINE

## 2019-07-08 PROCEDURE — 96413 CHEMO IV INFUSION 1 HR: CPT

## 2019-07-08 PROCEDURE — 96361 HYDRATE IV INFUSION ADD-ON: CPT

## 2019-07-08 RX ORDER — SODIUM CHLORIDE 0.9 % (FLUSH) 0.9 %
10 SYRINGE (ML) INJECTION
Status: CANCELLED | OUTPATIENT
Start: 2019-07-08

## 2019-07-08 RX ORDER — HEPARIN 100 UNIT/ML
500 SYRINGE INTRAVENOUS
Status: CANCELLED | OUTPATIENT
Start: 2019-07-08

## 2019-07-08 RX ORDER — HEPARIN 100 UNIT/ML
500 SYRINGE INTRAVENOUS
Status: DISCONTINUED | OUTPATIENT
Start: 2019-07-08 | End: 2019-07-08 | Stop reason: HOSPADM

## 2019-07-08 RX ORDER — SODIUM CHLORIDE 0.9 % (FLUSH) 0.9 %
10 SYRINGE (ML) INJECTION
Status: DISCONTINUED | OUTPATIENT
Start: 2019-07-08 | End: 2019-07-08 | Stop reason: HOSPADM

## 2019-07-08 RX ADMIN — SODIUM CHLORIDE: 0.9 INJECTION, SOLUTION INTRAVENOUS at 11:07

## 2019-07-08 RX ADMIN — HEPARIN 500 UNITS: 100 SYRINGE at 01:07

## 2019-07-08 RX ADMIN — CARBOPLATIN 290 MG: 10 INJECTION INTRAVENOUS at 11:07

## 2019-07-08 RX ADMIN — PEGFILGRASTIM 6 MG: KIT SUBCUTANEOUS at 01:07

## 2019-07-08 RX ADMIN — Medication 10 ML: at 01:07

## 2019-07-08 RX ADMIN — PALONOSETRON HYDROCHLORIDE: 0.25 INJECTION INTRAVENOUS at 09:07

## 2019-07-08 RX ADMIN — DOXORUBICIN HYDROCHLORIDE 50 MG: 2 INJECTABLE, LIPOSOMAL INTRAVENOUS at 10:07

## 2019-07-08 NOTE — PLAN OF CARE
Problem: Adult Inpatient Plan of Care  Goal: Plan of Care Review  Outcome: Ongoing (interventions implemented as appropriate)  Patient received Cycle 6 Doxil & Carboplatin. Tolerated well. VSS. Neulasta on-body injector placed to patient abdomen. Received discharge instructions and follow up appointments. Verbalized understanding and ambulated unassisted off unit with family.

## 2019-07-22 ENCOUNTER — INFUSION (OUTPATIENT)
Dept: INFUSION THERAPY | Facility: HOSPITAL | Age: 84
End: 2019-07-22
Attending: INTERNAL MEDICINE
Payer: MEDICARE

## 2019-07-22 VITALS
OXYGEN SATURATION: 100 % | SYSTOLIC BLOOD PRESSURE: 138 MMHG | DIASTOLIC BLOOD PRESSURE: 68 MMHG | TEMPERATURE: 98 F | RESPIRATION RATE: 18 BRPM | HEART RATE: 47 BPM

## 2019-07-22 DIAGNOSIS — C56.9 MALIGNANT NEOPLASM OF OVARY, UNSPECIFIED LATERALITY: ICD-10-CM

## 2019-07-22 DIAGNOSIS — C56.9 OVARIAN CANCER: Primary | ICD-10-CM

## 2019-07-22 LAB
ALBUMIN SERPL BCP-MCNC: 3.7 G/DL (ref 3.5–5.2)
ALP SERPL-CCNC: 85 U/L (ref 55–135)
ALT SERPL W/O P-5'-P-CCNC: 8 U/L (ref 10–44)
ANION GAP SERPL CALC-SCNC: 6 MMOL/L (ref 8–16)
AST SERPL-CCNC: 11 U/L (ref 10–40)
BILIRUB SERPL-MCNC: 0.3 MG/DL (ref 0.1–1)
BUN SERPL-MCNC: 23 MG/DL (ref 8–23)
CALCIUM SERPL-MCNC: 9 MG/DL (ref 8.7–10.5)
CANCER AG125 SERPL-ACNC: 58 U/ML (ref 0–30)
CHLORIDE SERPL-SCNC: 107 MMOL/L (ref 95–110)
CO2 SERPL-SCNC: 24 MMOL/L (ref 23–29)
CREAT SERPL-MCNC: 1.6 MG/DL (ref 0.5–1.4)
ERYTHROCYTE [DISTWIDTH] IN BLOOD BY AUTOMATED COUNT: 15.9 % (ref 11.5–14.5)
EST. GFR  (AFRICAN AMERICAN): 34 ML/MIN/1.73 M^2
EST. GFR  (NON AFRICAN AMERICAN): 29 ML/MIN/1.73 M^2
GLUCOSE SERPL-MCNC: 104 MG/DL (ref 70–110)
HCT VFR BLD AUTO: 25.4 % (ref 37–48.5)
HGB BLD-MCNC: 8.5 G/DL (ref 12–16)
MCH RBC QN AUTO: 35.1 PG (ref 27–31)
MCHC RBC AUTO-ENTMCNC: 33.5 G/DL (ref 32–36)
MCV RBC AUTO: 105 FL (ref 82–98)
NEUTROPHILS # BLD AUTO: 2.6 K/UL (ref 1.8–7.7)
PLATELET # BLD AUTO: 61 K/UL (ref 150–350)
PMV BLD AUTO: 10.3 FL (ref 9.2–12.9)
POTASSIUM SERPL-SCNC: 4.6 MMOL/L (ref 3.5–5.1)
PROT SERPL-MCNC: 6.7 G/DL (ref 6–8.4)
RBC # BLD AUTO: 2.42 M/UL (ref 4–5.4)
SODIUM SERPL-SCNC: 137 MMOL/L (ref 136–145)
WBC # BLD AUTO: 3.89 K/UL (ref 3.9–12.7)

## 2019-07-22 PROCEDURE — 80053 COMPREHEN METABOLIC PANEL: CPT

## 2019-07-22 PROCEDURE — 25000003 PHARM REV CODE 250: Performed by: INTERNAL MEDICINE

## 2019-07-22 PROCEDURE — 36591 DRAW BLOOD OFF VENOUS DEVICE: CPT

## 2019-07-22 PROCEDURE — 86304 IMMUNOASSAY TUMOR CA 125: CPT

## 2019-07-22 PROCEDURE — 63600175 PHARM REV CODE 636 W HCPCS: Performed by: INTERNAL MEDICINE

## 2019-07-22 PROCEDURE — 85027 COMPLETE CBC AUTOMATED: CPT

## 2019-07-22 PROCEDURE — A4216 STERILE WATER/SALINE, 10 ML: HCPCS | Performed by: INTERNAL MEDICINE

## 2019-07-22 RX ORDER — HEPARIN 100 UNIT/ML
500 SYRINGE INTRAVENOUS
Status: DISCONTINUED | OUTPATIENT
Start: 2019-07-22 | End: 2019-07-22 | Stop reason: HOSPADM

## 2019-07-22 RX ORDER — SODIUM CHLORIDE 0.9 % (FLUSH) 0.9 %
10 SYRINGE (ML) INJECTION
Status: DISCONTINUED | OUTPATIENT
Start: 2019-07-22 | End: 2019-07-22 | Stop reason: HOSPADM

## 2019-07-22 RX ADMIN — Medication 10 ML: at 09:07

## 2019-07-22 RX ADMIN — HEPARIN 500 UNITS: 100 SYRINGE at 09:07

## 2019-07-22 NOTE — PLAN OF CARE
Problem: Adult Inpatient Plan of Care  Goal: Plan of Care Review  Outcome: Ongoing (interventions implemented as appropriate)  Pt presented for labwork. VSS. Labs drawn from port.  Good blood return noted. Port flushed and heparinized after specimen collection. Only reported complaint was fatigue. Ambulatory into and out of unit. Distress screening tool completed. AVS provided.

## 2019-07-26 ENCOUNTER — HOSPITAL ENCOUNTER (OUTPATIENT)
Dept: RADIOLOGY | Facility: HOSPITAL | Age: 84
Discharge: HOME | End: 2019-07-26
Attending: INTERNAL MEDICINE
Payer: MEDICARE

## 2019-07-26 DIAGNOSIS — Z98.890 S/P EXPLORATORY LAPAROTOMY: ICD-10-CM

## 2019-07-26 DIAGNOSIS — C56.9 MALIGNANT NEOPLASM OF OVARY, UNSPECIFIED LATERALITY: ICD-10-CM

## 2019-07-26 PROCEDURE — 71260 CT THORAX DX C+: CPT | Mod: 26,,, | Performed by: RADIOLOGY

## 2019-07-26 PROCEDURE — 74177 CT CHEST ABDOMEN PELVIS WITH CONTRAST (XPD): ICD-10-PCS | Mod: 26,,, | Performed by: RADIOLOGY

## 2019-07-26 PROCEDURE — 25500020 PHARM REV CODE 255: Performed by: INTERNAL MEDICINE

## 2019-07-26 PROCEDURE — 74177 CT ABD & PELVIS W/CONTRAST: CPT | Mod: 26,,, | Performed by: RADIOLOGY

## 2019-07-26 PROCEDURE — 74177 CT ABD & PELVIS W/CONTRAST: CPT | Mod: TC

## 2019-07-26 PROCEDURE — 71260 CT CHEST ABDOMEN PELVIS WITH CONTRAST (XPD): ICD-10-PCS | Mod: 26,,, | Performed by: RADIOLOGY

## 2019-07-26 RX ADMIN — IOHEXOL 100 ML: 350 INJECTION, SOLUTION INTRAVENOUS at 08:07

## 2019-07-26 RX ADMIN — IOHEXOL 15 ML: 300 INJECTION, SOLUTION INTRAVENOUS at 08:07

## 2019-07-30 ENCOUNTER — OFFICE VISIT (OUTPATIENT)
Dept: HEMATOLOGY/ONCOLOGY | Facility: CLINIC | Age: 84
End: 2019-07-30
Payer: MEDICARE

## 2019-07-30 VITALS
WEIGHT: 183 LBS | OXYGEN SATURATION: 96 % | TEMPERATURE: 98 F | SYSTOLIC BLOOD PRESSURE: 133 MMHG | HEART RATE: 51 BPM | BODY MASS INDEX: 33.47 KG/M2 | DIASTOLIC BLOOD PRESSURE: 59 MMHG

## 2019-07-30 DIAGNOSIS — D69.59 CHEMOTHERAPY-INDUCED THROMBOCYTOPENIA: ICD-10-CM

## 2019-07-30 DIAGNOSIS — G62.0 CHEMOTHERAPY-INDUCED NEUROPATHY: ICD-10-CM

## 2019-07-30 DIAGNOSIS — T45.1X5A ANEMIA DUE TO CHEMOTHERAPY: ICD-10-CM

## 2019-07-30 DIAGNOSIS — T45.1X5A CHEMOTHERAPY-INDUCED THROMBOCYTOPENIA: ICD-10-CM

## 2019-07-30 DIAGNOSIS — C56.9 MALIGNANT NEOPLASM OF OVARY, UNSPECIFIED LATERALITY: Primary | ICD-10-CM

## 2019-07-30 DIAGNOSIS — D64.81 ANEMIA DUE TO CHEMOTHERAPY: ICD-10-CM

## 2019-07-30 DIAGNOSIS — T45.1X5A CHEMOTHERAPY-INDUCED NEUROPATHY: ICD-10-CM

## 2019-07-30 PROCEDURE — 3078F PR MOST RECENT DIASTOLIC BLOOD PRESSURE < 80 MM HG: ICD-10-PCS | Mod: CPTII,S$GLB,, | Performed by: INTERNAL MEDICINE

## 2019-07-30 PROCEDURE — 99215 PR OFFICE/OUTPT VISIT, EST, LEVL V, 40-54 MIN: ICD-10-PCS | Mod: S$GLB,,, | Performed by: INTERNAL MEDICINE

## 2019-07-30 PROCEDURE — 99999 PR PBB SHADOW E&M-EST. PATIENT-LVL III: ICD-10-PCS | Mod: PBBFAC,,, | Performed by: INTERNAL MEDICINE

## 2019-07-30 PROCEDURE — 99499 RISK ADDL DX/OHS AUDIT: ICD-10-PCS | Mod: S$GLB,,, | Performed by: INTERNAL MEDICINE

## 2019-07-30 PROCEDURE — 99499 UNLISTED E&M SERVICE: CPT | Mod: S$GLB,,, | Performed by: INTERNAL MEDICINE

## 2019-07-30 PROCEDURE — 1101F PT FALLS ASSESS-DOCD LE1/YR: CPT | Mod: CPTII,S$GLB,, | Performed by: INTERNAL MEDICINE

## 2019-07-30 PROCEDURE — 3078F DIAST BP <80 MM HG: CPT | Mod: CPTII,S$GLB,, | Performed by: INTERNAL MEDICINE

## 2019-07-30 PROCEDURE — 3075F PR MOST RECENT SYSTOLIC BLOOD PRESS GE 130-139MM HG: ICD-10-PCS | Mod: CPTII,S$GLB,, | Performed by: INTERNAL MEDICINE

## 2019-07-30 PROCEDURE — 3075F SYST BP GE 130 - 139MM HG: CPT | Mod: CPTII,S$GLB,, | Performed by: INTERNAL MEDICINE

## 2019-07-30 PROCEDURE — 99215 OFFICE O/P EST HI 40 MIN: CPT | Mod: S$GLB,,, | Performed by: INTERNAL MEDICINE

## 2019-07-30 PROCEDURE — 1101F PR PT FALLS ASSESS DOC 0-1 FALLS W/OUT INJ PAST YR: ICD-10-PCS | Mod: CPTII,S$GLB,, | Performed by: INTERNAL MEDICINE

## 2019-07-30 PROCEDURE — 99999 PR PBB SHADOW E&M-EST. PATIENT-LVL III: CPT | Mod: PBBFAC,,, | Performed by: INTERNAL MEDICINE

## 2019-07-30 NOTE — PROGRESS NOTES
"Subjective:       Patient ID: Celine Mahan is a 84 y.o. female.    Chief Complaint: Follow-up    HPI     Diagnosis: 1.Ovarian cancer status post debulking surgery 9/21/2017                     2. 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 S/p carbo/taxol x 6 completed 2/12/2018                   2. Carbo/Doxil 2/1/2019- present         HPI ( per Dr. Feliciano) Patient is an 85 yo female who originally presented as a referral from Dr. Wills for pelvic mass. Patient reports LLQ pain for approximately 3 months which prompted evaluation.      Pelvic US 8/7/17 There is a complex echogenicity midline pelvic mass measuring 18.1 x 8.6 x 7.6 cm.  Ovaries not visualized.     CT A&P 8/9/17 Large solid/cystic mass within the lower abdomen/upper pelvis that is most concerning for a malignant neoplasm, likely of ovarian origin given its location.  Correlation with previous surgical history is recommended noting evidence of previous hysterectomy.  Mass abuts and displaces the adjacent bladder without definite CT findings to suggest hema invasion. Abdominal and pelvic lymphadenopathy concerning for lymphatic spread of neoplasm with index lymph nodes as above.      Medical history is significant for CKD (Cr 1.3), HTN, hypothyroid, HTN, HLD. She has a personal history of breast cancer in 1995 treated with mastectomy and adjuvant chemotherapy she reports. Last MMG 11/2016 normal. Adbominal surgery include cholecystectomy, TVH, xlap/removal ovary for ectopic. She reports that her physician told her "some ovary remains in situ". Family history significant for mother with pancreatic cancer.      CT chest 8/28/17 showed mediastinal adenopathy consistent with Stage IV disease.   RECIST Summary:  Mediastinal adenopathy:  1. Preaortic abnormal node measuring 1.7 cm short axis.  2. Pretracheal node measures 1.6 cm short axis.     She underwent surgical cytoreduction with xlap/LSO/omentectomy 9/21/17. Pathology " showed high grade adenocarcinoma.        She is also  followed by Dr. Feliciano  She  transferred care to undergo  adjuvant chemo at this location   She completed adjuvant chemo with carbo AUC 6 and taxol 175mg/m2 q 21d  2/12/2018  She was considered for PRIMA clinical trial for maintenance therapy   Pt  Declined maintenance therapy      Pt with increasing  levels   849>230>37>23>22>12>40>309 ( 1/9/2019 )       CT c/a/p 1/14/2019   S/P resection of large LEFT ovarian/fallopian tube mass consistent with carcinoma.    Interval increase in size of LEFT pelvic/retroperitoneal lymph nodes.  No evidence for ascites or definitive carcinomatosis of mesentery.    Interval decrease in size of prevascular lymph node.      It was determined she has platinum sensitive recurrent ovarian cancer. Platinum free interval 11 months.   Dr. Feliciano Reviewed standard management options which are to re-treat with platinum-based chemotherapy (carbo + doxil/gem/taxol +/- avastin).   Pt undergoing treatment with Doxil 30mg/m2  Carbo AUC 5  q 28days      She is s/p cycle 6 of chemo  Carbo/doxil completed 7/8/2019   ( dosage reduction  sec to cytopenias)     Accompanied by dtr   imaging studies 4/2019 reveals stable disease    No new issues  Doing well    No fatigue  Appetite and weight stable   No abd pain/distension   No melena, hematochezia or change in bowel habits  No rashes  No bleeding-nasal/rectal/urinary   No easy bruisabiliy     She continues with  Intermittent tingling in hands and feet   She is  taking prescribed gabapentin once daily ( sedation)      CT c/a/p w/contrast 7/26/2019 -stable findings    Pt requesting Rx refill of pain meds    MRI brain 2/27/2019 - no brain mets    CBC 7/22/2019  reveals a white blood cell count of 3890mm 3 hemoglobin 8.5 grams/dL hematocrit of 25.4% plt ct 61 k      Biochemical profile reveals stable Cr level from 1.7mg/dL  to 1.5mg/dL     BRCA ANALYSIS NEG    Past Medical History:   Diagnosis  Date    Breast cancer     Cataract     CKD (chronic kidney disease) stage 3, GFR 30-59 ml/min     Hyperlipidemia     Hypertension     Hypothyroidism     Obesity     Osteopenia     Ovarian cancer 10/8/2017    Overactive bladder     Pelvic mass 8/27/2017    Thyroid disease          Past Surgical History:   Procedure Laterality Date    bt catarac surgery      CHOLECYSTECTOMY      COLONOSCOPY N/A 2/24/2015    [facility] ENDO    EXPLORATORY-LAPAROTOMY N/A 9/21/2017    Per[facility] 2ND FLR    HYSTERECTOMY      INSERTION, PORT-A-CATH N/A 6/3/2019    Performed by [facility]    MASTECTOMY Left     OMENTECTOMY Bilateral 9/21/2017    Performed [facility] at [facility] 2ND FLR    SALPINGO-OOPHERECTOMY Left 9/21/2017    Performed by [facility]icinao MD at [facility] OR 2ND FLR             Review of Systems   Constitutional: Positive for fatigue. Negative for appetite change, fever and unexpected weight change.   HENT: Negative for mouth sores.    Eyes: Negative for visual disturbance.   Respiratory: Negative for cough and shortness of breath.    Cardiovascular: Negative for chest pain.   Gastrointestinal: Negative for abdominal pain, diarrhea and nausea.   Genitourinary: Negative for frequency.   Musculoskeletal: Negative for back pain.   Skin: Negative for rash.        No petechiae   Neurological: Positive for numbness. Negative for weakness, light-headedness and headaches.        Intermittent tingling   Hematological: Negative for adenopathy.   Psychiatric/Behavioral: The patient is not nervous/anxious.        Objective:       Vitals:    07/30/19 1525   BP: (!) 133/59   BP Location: Right arm   Patient Position: Sitting   BP Method: Medium (Automatic)   Pulse: (!) 51   Temp: 97.8 °F (36.6 °C)   TempSrc: Oral   SpO2: 96%   Weight: 83 kg (182 lb 15.7 oz)       Physical Exam   Constitutional: She is oriented to person,  place, and time. She appears well-developed and well-nourished.   HENT:   Head: Normocephalic.   Mouth/Throat: Oropharynx is clear and moist. No oropharyngeal exudate.   Eyes: Conjunctivae and lids are normal. No scleral icterus.   Neck: Normal range of motion. Neck supple. No thyromegaly present.   Cardiovascular: Normal rate, regular rhythm and normal heart sounds.   No murmur heard.  Pulmonary/Chest: Breath sounds normal. She has no wheezes. She has no rales.   Abdominal: Soft. Bowel sounds are normal. She exhibits no distension. There is no hepatosplenomegaly. There is no tenderness.   Well-healed midline surgical scar   Musculoskeletal: Normal range of motion. She exhibits no edema or tenderness.   Lymphadenopathy:     She has no cervical adenopathy.     She has no axillary adenopathy.        Right: No supraclavicular adenopathy present.        Left: No supraclavicular adenopathy present.   Neurological: She is alert and oriented to person, place, and time. No cranial nerve deficit. Coordination normal.   Skin: Skin is warm and dry. No ecchymosis, no petechiae and no rash noted. No erythema.   Psychiatric: She has a normal mood and affect.         Labs:   Lab Results   Component Value Date    WBC 3.06 (L) 08/16/2019    HGB 9.6 (L) 08/16/2019    HCT 29.5 (L) 08/16/2019     (H) 08/16/2019     08/16/2019       Results for JONATAN SERRANO (MRN [id number]) as of 4/25/2019 08:52   Ref. Range 2/20/2019 09:44 3/7/2019 07:45 4/2/2019 09:32 4/17/2019 13:08    Latest Ref Range: 0 - 30 U/mL 236 (H)  81 (H) 86 (H)         Results for JONATAN SERRANO (MRN [id number]) as of 7/30/2019 15:34   Ref. Range 6/6/2019 10:00 6/28/2019 09:28 7/5/2019 10:13 7/22/2019 09:40    Latest Ref Range: 0 - 30 U/mL 55 (H)  52 (H) 58 (H)       CT c/a/p 1/14/2019   S/P resection of large LEFT ovarian/fallopian tube mass consistent with carcinoma.    Interval increase in size of LEFT pelvic/retroperitoneal lymph nodes.  No  evidence for ascites or definitive carcinomatosis of mesentery.    Interval decrease in size of prevascular lymph node.    RECIST SUMMARY:    Date of prior examination for comparison: Chest CT 08/28/2017 and CT abdomen and pelvis 08/14/2017    Lesion 1 Chest: Pre aortic lymph node previously measuring 1.7 cm, currently measuring 0.94 cm.  Previously noted prevascular lymph node is stable as compared to prior, with a fatty hilum, of questionable significance..    Lesion 2 abdomen/pelvis: LEFT iliac chain node.  2.3 cm cm. Series 2 image 163.  Prior measurement 1.5 cm cm.    Lesion 3 abdomen/pelvis: LEFT renal hilar lymph node.  1.6 cm cm. Series 2 image 112.  Prior measurement 1.4 cm cm.      MRI Cervical spine w/out contrast 4/30/2019   Cervical degenerative change most significant at C4-5 and C5-6.  Specific details at each level discussed above.      CT c/a/p w/contrast 7/26/2019   Stable lymph nodes as above.  PET-CT scan could be helpful.    Index lesions as follows:    Pre aortic lymph node 9.6 mm, previously 9.7.    Para-aortic lymph node 1.8 cm, previously 1.9.    Left iliac lymph node 1.7 cm, previously 1.5.        Assessment:       1. Malignant neoplasm of ovary, unspecified laterality    2. Chemotherapy-induced neuropathy    3. Anemia due to chemotherapy    4. Chemotherapy-induced thrombocytopenia        Plan:   1- 4   Pt clinically stable   Patient with ovarian cancer status post debulking surgery 9/21/2017 undergoing adjuvant platinum-based chemotherapy with carbo/taxol .   S/p carbo/taxol x 6 completed 2/12/2018  Pt declined maintenance therapy on trial   BRCA ANALYSIS NEG      CT c/a/p 1/14/2019   S/P resection of large LEFT ovarian/fallopian tube mass consistent with carcinoma.  Interval increase in size of LEFT pelvic/retroperitoneal lymph nodes.  No evidence for ascites or definitive carcinomatosis of mesentery.  Interval decrease in size of prevascular lymph node.    MRI brain 2/27/2019 - NEG for  brain mets    It  was determined she has platinum sensitive recurrent ovarian cancer. Platinum free interval 11 months.   Dr. Feliciano Reviewed standard management options which are to re-treat with platinum-based chemotherapy (carbo + doxil/gem/taxol +/- avastin).   Pt underwent treatment with Doxil 30mg/m2  Carbo AUC 5  q 28days      Cycle  6  Delayed sec to anemia/thrombocytopenia  ( chemo dose adjusted)   She is s/p cycle 6 of chemo  Carbo/doxil completed 7/8/2019   CT c/a/p w/contrast 7/26/2019-stable findings    Discussed  Dr. Feliciano about maintenance therapy options and this was discussed w/pt and dtr  Discussed potential detailed discussion with patient and daughter regarding potential maintenance therapy in this setting.  Options for maintenance therapy include bevacizumab IV 15mg/kg  every 3 weeks verses oral PARP  inhibitor.  We discussed the pros and cons of each approach.  We discussed the potential toxicities associated with the various therapies.  Ultimately, it was determined that patient elects to undergo maintenance chemotherapy with IV . Bevacizumab, We discussed the rare but serious adverse effect of GI perforation/hemorrhage associated with IV bevacizumab.  She will also be monitored for proteinuria.  Informed consent was obtained  Plan formal chemo teaching      Continue to monitor blood    All questions posed answered to patient's satisfaction    45 minutes spent during this visit of which greater than 50% devoted to counseling and coordination of care regarding diagnosis and management plan.    Follow-up 1month   with Cbc,cmp ,  and u/a prior to f/u mg prior to f/u          Cc: Iesha Feliciano M.D.          Anil Pelayo M.D.

## 2019-08-05 NOTE — PLAN OF CARE
START OFF PATHWAY REGIMEN - Ovarian            Bevacizumab (Avastin(R))     **Always confirm dose/schedule in your pharmacy ordering system**    Patient Characteristics:  Recurrent or Progressive Disease, Maintenance Therapy  Therapeutic Status: Recurrent or Progressive Disease  BRCA Mutation Status: Awaiting Test Results  Line of Therapy: Maintenance  Intent of Therapy:  Non-Curative / Palliative Intent, Discussed with Patient

## 2019-08-06 ENCOUNTER — TELEPHONE (OUTPATIENT)
Dept: HEMATOLOGY/ONCOLOGY | Facility: CLINIC | Age: 84
End: 2019-08-06

## 2019-08-06 DIAGNOSIS — C56.9 MALIGNANT NEOPLASM OF OVARY, UNSPECIFIED LATERALITY: Primary | ICD-10-CM

## 2019-08-07 ENCOUNTER — TELEPHONE (OUTPATIENT)
Dept: HEMATOLOGY/ONCOLOGY | Facility: CLINIC | Age: 84
End: 2019-08-07

## 2019-08-07 DIAGNOSIS — C56.9 MALIGNANT NEOPLASM OF OVARY, UNSPECIFIED LATERALITY: Primary | ICD-10-CM

## 2019-08-08 DIAGNOSIS — C56.9 MALIGNANT NEOPLASM OF OVARY, UNSPECIFIED LATERALITY: Primary | ICD-10-CM

## 2019-08-15 RX ORDER — HEPARIN 100 UNIT/ML
500 SYRINGE INTRAVENOUS
Status: CANCELLED | OUTPATIENT
Start: 2019-08-16

## 2019-08-15 RX ORDER — SODIUM CHLORIDE 0.9 % (FLUSH) 0.9 %
10 SYRINGE (ML) INJECTION
Status: CANCELLED | OUTPATIENT
Start: 2019-08-16

## 2019-08-16 ENCOUNTER — INFUSION (OUTPATIENT)
Dept: INFUSION THERAPY | Facility: HOSPITAL | Age: 84
End: 2019-08-16
Attending: INTERNAL MEDICINE
Payer: MEDICARE

## 2019-08-16 VITALS
TEMPERATURE: 98 F | SYSTOLIC BLOOD PRESSURE: 143 MMHG | HEART RATE: 64 BPM | RESPIRATION RATE: 18 BRPM | WEIGHT: 183 LBS | BODY MASS INDEX: 33.47 KG/M2 | DIASTOLIC BLOOD PRESSURE: 64 MMHG | OXYGEN SATURATION: 100 %

## 2019-08-16 DIAGNOSIS — K62.5 RECTAL BLEEDING: ICD-10-CM

## 2019-08-16 DIAGNOSIS — C56.9 MALIGNANT NEOPLASM OF OVARY, UNSPECIFIED LATERALITY: Primary | ICD-10-CM

## 2019-08-16 DIAGNOSIS — R39.9 LOWER URINARY TRACT SYMPTOMS (LUTS): ICD-10-CM

## 2019-08-16 DIAGNOSIS — E03.9 HYPOTHYROID: ICD-10-CM

## 2019-08-16 DIAGNOSIS — N18.30 CKD (CHRONIC KIDNEY DISEASE) STAGE 3, GFR 30-59 ML/MIN: ICD-10-CM

## 2019-08-16 LAB
ALBUMIN SERPL BCP-MCNC: 3.7 G/DL (ref 3.5–5.2)
ALP SERPL-CCNC: 79 U/L (ref 55–135)
ALT SERPL W/O P-5'-P-CCNC: 11 U/L (ref 10–44)
ANION GAP SERPL CALC-SCNC: 7 MMOL/L (ref 8–16)
AST SERPL-CCNC: 13 U/L (ref 10–40)
BASOPHILS # BLD AUTO: 0.06 K/UL (ref 0–0.2)
BASOPHILS NFR BLD: 2 % (ref 0–1.9)
BILIRUB SERPL-MCNC: 0.3 MG/DL (ref 0.1–1)
BILIRUB UR QL STRIP: NEGATIVE
BUN SERPL-MCNC: 36 MG/DL (ref 8–23)
CALCIUM SERPL-MCNC: 9.2 MG/DL (ref 8.7–10.5)
CHLORIDE SERPL-SCNC: 105 MMOL/L (ref 95–110)
CLARITY UR: ABNORMAL
CO2 SERPL-SCNC: 24 MMOL/L (ref 23–29)
COLOR UR: YELLOW
CREAT SERPL-MCNC: 2 MG/DL (ref 0.5–1.4)
DIFFERENTIAL METHOD: ABNORMAL
EOSINOPHIL # BLD AUTO: 0.2 K/UL (ref 0–0.5)
EOSINOPHIL NFR BLD: 5.2 % (ref 0–8)
ERYTHROCYTE [DISTWIDTH] IN BLOOD BY AUTOMATED COUNT: 15.9 % (ref 11.5–14.5)
EST. GFR  (AFRICAN AMERICAN): 26 ML/MIN/1.73 M^2
EST. GFR  (NON AFRICAN AMERICAN): 22 ML/MIN/1.73 M^2
GLUCOSE SERPL-MCNC: 102 MG/DL (ref 70–110)
GLUCOSE UR QL STRIP: NEGATIVE
GRAN CASTS #/AREA URNS LPF: 7 /LPF
HCT VFR BLD AUTO: 29.5 % (ref 37–48.5)
HGB BLD-MCNC: 9.6 G/DL (ref 12–16)
HGB UR QL STRIP: ABNORMAL
KETONES UR QL STRIP: NEGATIVE
LEUKOCYTE ESTERASE UR QL STRIP: ABNORMAL
LYMPHOCYTES # BLD AUTO: 0.9 K/UL (ref 1–4.8)
LYMPHOCYTES NFR BLD: 29.4 % (ref 18–48)
MCH RBC QN AUTO: 34.8 PG (ref 27–31)
MCHC RBC AUTO-ENTMCNC: 32.5 G/DL (ref 32–36)
MCV RBC AUTO: 107 FL (ref 82–98)
MICROSCOPIC COMMENT: ABNORMAL
MONOCYTES # BLD AUTO: 0.4 K/UL (ref 0.3–1)
MONOCYTES NFR BLD: 14.4 % (ref 4–15)
NEUTROPHILS # BLD AUTO: 1.5 K/UL (ref 1.8–7.7)
NEUTROPHILS NFR BLD: 49 % (ref 38–73)
NITRITE UR QL STRIP: NEGATIVE
PH UR STRIP: 5 [PH] (ref 5–8)
PLATELET # BLD AUTO: 212 K/UL (ref 150–350)
PMV BLD AUTO: 9.1 FL (ref 9.2–12.9)
POTASSIUM SERPL-SCNC: 4.4 MMOL/L (ref 3.5–5.1)
PROT SERPL-MCNC: 7.1 G/DL (ref 6–8.4)
PROT UR QL STRIP: NEGATIVE
RBC # BLD AUTO: 2.76 M/UL (ref 4–5.4)
RBC #/AREA URNS HPF: 2 /HPF (ref 0–4)
SODIUM SERPL-SCNC: 136 MMOL/L (ref 136–145)
SP GR UR STRIP: 1.01 (ref 1–1.03)
URN SPEC COLLECT METH UR: ABNORMAL
UROBILINOGEN UR STRIP-ACNC: NEGATIVE EU/DL
WBC # BLD AUTO: 3.06 K/UL (ref 3.9–12.7)
WBC #/AREA URNS HPF: 5 /HPF (ref 0–5)

## 2019-08-16 PROCEDURE — 25000003 PHARM REV CODE 250: Performed by: INTERNAL MEDICINE

## 2019-08-16 PROCEDURE — 63600175 PHARM REV CODE 636 W HCPCS: Performed by: INTERNAL MEDICINE

## 2019-08-16 PROCEDURE — 80053 COMPREHEN METABOLIC PANEL: CPT

## 2019-08-16 PROCEDURE — 85025 COMPLETE CBC W/AUTO DIFF WBC: CPT

## 2019-08-16 PROCEDURE — 81000 URINALYSIS NONAUTO W/SCOPE: CPT

## 2019-08-16 PROCEDURE — 96413 CHEMO IV INFUSION 1 HR: CPT

## 2019-08-16 PROCEDURE — A4216 STERILE WATER/SALINE, 10 ML: HCPCS | Performed by: INTERNAL MEDICINE

## 2019-08-16 PROCEDURE — 36591 DRAW BLOOD OFF VENOUS DEVICE: CPT

## 2019-08-16 RX ORDER — HEPARIN 100 UNIT/ML
500 SYRINGE INTRAVENOUS
Status: DISCONTINUED | OUTPATIENT
Start: 2019-08-16 | End: 2019-08-16 | Stop reason: HOSPADM

## 2019-08-16 RX ORDER — SODIUM CHLORIDE 0.9 % (FLUSH) 0.9 %
10 SYRINGE (ML) INJECTION
Status: DISCONTINUED | OUTPATIENT
Start: 2019-08-16 | End: 2019-08-16 | Stop reason: HOSPADM

## 2019-08-16 RX ADMIN — BEVACIZUMAB 1245 MG: 400 INJECTION, SOLUTION INTRAVENOUS at 10:08

## 2019-08-16 RX ADMIN — HEPARIN 500 UNITS: 100 SYRINGE at 01:08

## 2019-08-16 RX ADMIN — Medication 10 ML: at 01:08

## 2019-08-16 RX ADMIN — Medication 500 ML: at 12:08

## 2019-08-16 NOTE — PLAN OF CARE
Problem: Adult Inpatient Plan of Care  Goal: Plan of Care Review  Outcome: Ongoing (interventions implemented as appropriate)  Pt presented for Cycle 1 Avastin treatment. Stat labs and UA collected upon arrival. VSS. Pt reported fatigue and presented with nailbed discoloration still present from previous chemo. Per protocol, first Avastin infused over 1 hr 30 minutes. Pt tolerated Avastin well. Pt's creatinine noted to be elevated at 2.0 today. 500cc NS IVF given for hydration. Pt reported eating and drinking without issue at home. Breakfast and lunch provided. Good blood return noted form port. Port flushed and heparinized upon completion. Ambulatory into and out of unit. Distress screening tool completed. Future appt information reviewed with pt.

## 2019-09-06 ENCOUNTER — INFUSION (OUTPATIENT)
Dept: INFUSION THERAPY | Facility: HOSPITAL | Age: 84
End: 2019-09-06
Attending: INTERNAL MEDICINE
Payer: MEDICARE

## 2019-09-06 ENCOUNTER — OFFICE VISIT (OUTPATIENT)
Dept: HEMATOLOGY/ONCOLOGY | Facility: CLINIC | Age: 84
End: 2019-09-06
Payer: MEDICARE

## 2019-09-06 VITALS
SYSTOLIC BLOOD PRESSURE: 190 MMHG | TEMPERATURE: 98 F | DIASTOLIC BLOOD PRESSURE: 82 MMHG | RESPIRATION RATE: 18 BRPM | HEART RATE: 72 BPM | OXYGEN SATURATION: 98 %

## 2019-09-06 VITALS
TEMPERATURE: 98 F | HEIGHT: 62 IN | BODY MASS INDEX: 32.41 KG/M2 | SYSTOLIC BLOOD PRESSURE: 185 MMHG | WEIGHT: 176.13 LBS | DIASTOLIC BLOOD PRESSURE: 78 MMHG | OXYGEN SATURATION: 97 % | HEART RATE: 54 BPM

## 2019-09-06 DIAGNOSIS — I10 ELEVATED BLOOD PRESSURE READING WITH DIAGNOSIS OF HYPERTENSION: ICD-10-CM

## 2019-09-06 DIAGNOSIS — G62.0 CHEMOTHERAPY-INDUCED NEUROPATHY: ICD-10-CM

## 2019-09-06 DIAGNOSIS — T45.1X5A CHEMOTHERAPY-INDUCED NEUROPATHY: ICD-10-CM

## 2019-09-06 DIAGNOSIS — C56.9 MALIGNANT NEOPLASM OF OVARY, UNSPECIFIED LATERALITY: Primary | ICD-10-CM

## 2019-09-06 DIAGNOSIS — M54.2 CHRONIC NECK PAIN: ICD-10-CM

## 2019-09-06 DIAGNOSIS — T45.1X5A ANEMIA DUE TO CHEMOTHERAPY: ICD-10-CM

## 2019-09-06 DIAGNOSIS — I10 ESSENTIAL HYPERTENSION: ICD-10-CM

## 2019-09-06 DIAGNOSIS — G89.29 CHRONIC NECK PAIN: ICD-10-CM

## 2019-09-06 DIAGNOSIS — N18.30 CKD (CHRONIC KIDNEY DISEASE) STAGE 3, GFR 30-59 ML/MIN: ICD-10-CM

## 2019-09-06 DIAGNOSIS — D64.81 ANEMIA DUE TO CHEMOTHERAPY: ICD-10-CM

## 2019-09-06 PROCEDURE — 25000003 PHARM REV CODE 250: Performed by: INTERNAL MEDICINE

## 2019-09-06 PROCEDURE — 99999 PR PBB SHADOW E&M-EST. PATIENT-LVL IV: ICD-10-PCS | Mod: PBBFAC,,, | Performed by: INTERNAL MEDICINE

## 2019-09-06 PROCEDURE — 63600175 PHARM REV CODE 636 W HCPCS: Performed by: INTERNAL MEDICINE

## 2019-09-06 PROCEDURE — A4216 STERILE WATER/SALINE, 10 ML: HCPCS | Performed by: INTERNAL MEDICINE

## 2019-09-06 PROCEDURE — 3078F PR MOST RECENT DIASTOLIC BLOOD PRESSURE < 80 MM HG: ICD-10-PCS | Mod: CPTII,S$GLB,, | Performed by: INTERNAL MEDICINE

## 2019-09-06 PROCEDURE — 3077F SYST BP >= 140 MM HG: CPT | Mod: CPTII,S$GLB,, | Performed by: INTERNAL MEDICINE

## 2019-09-06 PROCEDURE — 99499 UNLISTED E&M SERVICE: CPT | Mod: S$GLB,,, | Performed by: INTERNAL MEDICINE

## 2019-09-06 PROCEDURE — 99499 RISK ADDL DX/OHS AUDIT: ICD-10-PCS | Mod: S$GLB,,, | Performed by: INTERNAL MEDICINE

## 2019-09-06 PROCEDURE — 99215 PR OFFICE/OUTPT VISIT, EST, LEVL V, 40-54 MIN: ICD-10-PCS | Mod: S$GLB,,, | Performed by: INTERNAL MEDICINE

## 2019-09-06 PROCEDURE — 96413 CHEMO IV INFUSION 1 HR: CPT

## 2019-09-06 PROCEDURE — 99999 PR PBB SHADOW E&M-EST. PATIENT-LVL IV: CPT | Mod: PBBFAC,,, | Performed by: INTERNAL MEDICINE

## 2019-09-06 PROCEDURE — 3078F DIAST BP <80 MM HG: CPT | Mod: CPTII,S$GLB,, | Performed by: INTERNAL MEDICINE

## 2019-09-06 PROCEDURE — 3077F PR MOST RECENT SYSTOLIC BLOOD PRESSURE >= 140 MM HG: ICD-10-PCS | Mod: CPTII,S$GLB,, | Performed by: INTERNAL MEDICINE

## 2019-09-06 PROCEDURE — 1101F PT FALLS ASSESS-DOCD LE1/YR: CPT | Mod: CPTII,S$GLB,, | Performed by: INTERNAL MEDICINE

## 2019-09-06 PROCEDURE — 1101F PR PT FALLS ASSESS DOC 0-1 FALLS W/OUT INJ PAST YR: ICD-10-PCS | Mod: CPTII,S$GLB,, | Performed by: INTERNAL MEDICINE

## 2019-09-06 PROCEDURE — 99215 OFFICE O/P EST HI 40 MIN: CPT | Mod: S$GLB,,, | Performed by: INTERNAL MEDICINE

## 2019-09-06 RX ORDER — SODIUM CHLORIDE 0.9 % (FLUSH) 0.9 %
10 SYRINGE (ML) INJECTION
Status: DISCONTINUED | OUTPATIENT
Start: 2019-09-06 | End: 2019-09-06 | Stop reason: HOSPADM

## 2019-09-06 RX ORDER — HEPARIN 100 UNIT/ML
500 SYRINGE INTRAVENOUS
Status: DISCONTINUED | OUTPATIENT
Start: 2019-09-06 | End: 2019-09-06 | Stop reason: HOSPADM

## 2019-09-06 RX ORDER — SODIUM CHLORIDE 0.9 % (FLUSH) 0.9 %
10 SYRINGE (ML) INJECTION
Status: CANCELLED | OUTPATIENT
Start: 2019-09-06

## 2019-09-06 RX ORDER — HEPARIN 100 UNIT/ML
500 SYRINGE INTRAVENOUS
Status: CANCELLED | OUTPATIENT
Start: 2019-09-06

## 2019-09-06 RX ADMIN — BEVACIZUMAB 1200 MG: 400 INJECTION, SOLUTION INTRAVENOUS at 12:09

## 2019-09-06 RX ADMIN — HEPARIN 500 UNITS: 100 SYRINGE at 02:09

## 2019-09-06 RX ADMIN — Medication 10 ML: at 02:09

## 2019-09-06 NOTE — PLAN OF CARE
Problem: Adult Inpatient Plan of Care  Goal: Plan of Care Review  Outcome: Ongoing (interventions implemented as appropriate)  Pt presented for Cycle 2 Avastin. Pt had labs prior to MD visit this morning. Pt sent back to lab for UA after MD visit. Arrived to unit after providing urine sample. BP elevated in MD's office. Pt allowed to sit and wait for urine results before obtaining vitals. VSS and BP improved after pt allowed to rest. Cleared for chemo by Dr. Reinoso. Pt reported fatigue and numbness/tingling to hands/feet. Per protocol, second Avastin infused over 1hr. Pt tolerated Avastin. BP elevated after Avastin. Pt reported not taking her BP meds this morning. Pt denied headaches. Dr. Reinoso notified.  MD said okay to d/c pt home and tell pt to take her home meds. Pt and pt's daughter both verbalized understanding. Lunch provided.  Good blood return noted from port. Port flushed and heparinized upon completion. Ambulated independently into and out of unit. Distress screening tool completed. AVS provided.

## 2019-09-06 NOTE — PROGRESS NOTES
"Subjective:       Patient ID: Celine Mahan is a 84 y.o. female.    Chief Complaint: Follow-up    HPI     Diagnosis: 1.Ovarian cancer status post debulking surgery 9/21/2017                     2. 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 S/p carbo/taxol x 6 completed 2/12/2018                   2. Carbo/Doxil 2/1/2019- 7/8/2019\                   3. Bevacizumab maintenance  8/16/2019 -present         HPI ( per Dr. Feliciano) Patient is an 83 yo female who originally presented as a referral from Dr. Wills for pelvic mass. Patient reports LLQ pain for approximately 3 months which prompted evaluation.      Pelvic US 8/7/17 There is a complex echogenicity midline pelvic mass measuring 18.1 x 8.6 x 7.6 cm.  Ovaries not visualized.     CT A&P 8/9/17 Large solid/cystic mass within the lower abdomen/upper pelvis that is most concerning for a malignant neoplasm, likely of ovarian origin given its location.  Correlation with previous surgical history is recommended noting evidence of previous hysterectomy.  Mass abuts and displaces the adjacent bladder without definite CT findings to suggest hema invasion. Abdominal and pelvic lymphadenopathy concerning for lymphatic spread of neoplasm with index lymph nodes as above.      Medical history is significant for CKD (Cr 1.3), HTN, hypothyroid, HTN, HLD. She has a personal history of breast cancer in 1995 treated with mastectomy and adjuvant chemotherapy she reports. Last MMG 11/2016 normal. Adbominal surgery include cholecystectomy, TVH, xlap/removal ovary for ectopic. She reports that her physician told her "some ovary remains in situ". Family history significant for mother with pancreatic cancer.      CT chest 8/28/17 showed mediastinal adenopathy consistent with Stage IV disease.   RECIST Summary:  Mediastinal adenopathy:  1. Preaortic abnormal node measuring 1.7 cm short axis.  2. Pretracheal node measures 1.6 cm short axis.     She underwent " surgical cytoreduction with xlap/LSO/omentectomy 9/21/17. Pathology showed high grade adenocarcinoma.        She is also  followed by Dr. Feliciano  She  transferred care to undergo  adjuvant chemo at this location   She completed adjuvant chemo with carbo AUC 6 and taxol 175mg/m2 q 21d  2/12/2018  She was considered for PRIMA clinical trial for maintenance therapy   Pt  Declined maintenance therapy      Pt with increasing  levels   849>230>37>23>22>12>40>309 ( 1/9/2019 )       CT c/a/p 1/14/2019   S/P resection of large LEFT ovarian/fallopian tube mass consistent with carcinoma.    Interval increase in size of LEFT pelvic/retroperitoneal lymph nodes.  No evidence for ascites or definitive carcinomatosis of mesentery.    Interval decrease in size of prevascular lymph node.      It was determined she has platinum sensitive recurrent ovarian cancer. Platinum free interval 11 months.   Dr. Feliciano Reviewed standard management options which are to re-treat with platinum-based chemotherapy (carbo + doxil/gem/taxol +/- avastin).   Pt undergoing treatment with Doxil 30mg/m2  Carbo AUC 5  q 28days      She is s/p cycle 6 of chemo  Carbo/doxil completed 7/8/2019   ( dosage reduction  sec to cytopenias)     Accompanied by dtr   imaging studies 4/2019 reveals stable disease    She is undergoing maintenance therapy with bevacizumab 15mg/kg  q3 wks    No fatigue  Appetite and weight stable   No abd pain/distension   No melena, hematochezia or change in bowel habits  No rashes  No SOB/CP/SOB  No HA/vision changes    Chronic neck pain-stable    She did not take BP meds this AM   BP elevated this visit      Pt requesting completion of handicap form   She continues with  Intermittent tingling in hands and feet   She is  taking prescribed gabapentin once daily ( sedation)      CT c/a/p w/contrast 7/26/2019 -stable findings    MRI brain 2/27/2019 - no brain mets    CBC  reveals a white blood cell count of 3690mm 3 hemoglobin 11.1   grams/dL hematocrit of 35.4% plt ct 177 k      Biochemical profile reveals stable Cr level from 1.8mg/dL  to 1.5mg/dL     BRCA ANALYSIS NEG    Past Medical History:   Diagnosis Date    Breast cancer     Cataract     CKD (chronic kidney disease) stage 3, GFR 30-59 ml/min     Hyperlipidemia     Hypertension     Hypothyroidism     Obesity     Osteopenia     Ovarian cancer 10/8/2017    Overactive bladder     Pelvic mass 8/27/2017    Thyroid disease          Past Surgical History:   Procedure Laterality Date    bt catarac surgery      CHOLECYSTECTOMY      COLONOSCOPY N/A 2/24/2015    [facility]con [facility]    EXPLORATORY-LAPAROTOMY N/A 9/21/2017    Performed by [facility] Fel[facility]o [facility] at [facility] OR 2ND FLR    HYSTERECTOMY      INSERTION, PORT-A-CATH N/A 6/3/2019    Per[facility] by [facility]    MASTECTOMY Left     OMENTECTOMY Bilateral 9/21/2017    Performed by [facility]o MD at [facility] OR 2ND FLR    SALPINGO-OOPHERECTOMY Left 9/21/2017    Performed by Iesha Feliciano MD at [facility] OR 2ND FLR             Review of Systems   Constitutional: Negative for appetite change, fatigue, fever and unexpected weight change.   HENT: Negative for mouth sores.    Eyes: Negative for visual disturbance.   Respiratory: Negative for cough and shortness of breath.    Cardiovascular: Negative for chest pain.   Gastrointestinal: Negative for abdominal pain, diarrhea and nausea.   Genitourinary: Negative for frequency.   Musculoskeletal: Positive for neck pain (chronic). Negative for back pain.   Skin: Negative for rash.        No petechiae   Neurological: Positive for numbness. Negative for weakness, light-headedness and headaches.        Intermittent tingling   Hematological: Negative for adenopathy.   Psychiatric/Behavioral: The patient is not nervous/anxious.        Objective:       Vitals:    09/06/19 0849 09/06/19 0852   BP: (!) 185/86 (!) 185/78   BP Location: Right arm Right  "arm   Patient Position: Sitting Sitting   BP Method: Medium (Automatic) Medium (Automatic)   Pulse: (!) 54    Temp: 97.7 °F (36.5 °C)    TempSrc: Oral    SpO2: 97%    Weight: 79.9 kg (176 lb 2.4 oz)    Height: 5' 2" (1.575 m)        Physical Exam   Constitutional: She is oriented to person, place, and time. She appears well-developed and well-nourished.   HENT:   Head: Normocephalic.   Mouth/Throat: Oropharynx is clear and moist. No oropharyngeal exudate.   Eyes: Conjunctivae and lids are normal. No scleral icterus.   Neck: Normal range of motion. Neck supple. No thyromegaly present.   Cardiovascular: Normal rate, regular rhythm and normal heart sounds.   No murmur heard.  Pulmonary/Chest: Breath sounds normal. She has no wheezes. She has no rales.   Abdominal: Soft. Bowel sounds are normal. She exhibits no distension. There is no hepatosplenomegaly. There is no tenderness.   Well-healed midline surgical scar   Musculoskeletal: Normal range of motion. She exhibits no edema or tenderness.   Lymphadenopathy:     She has no cervical adenopathy.     She has no axillary adenopathy.        Right: No supraclavicular adenopathy present.        Left: No supraclavicular adenopathy present.   Neurological: She is alert and oriented to person, place, and time. No cranial nerve deficit. Coordination normal.   Skin: Skin is warm and dry. No ecchymosis, no petechiae and no rash noted. No erythema.   Psychiatric: She has a normal mood and affect.         Labs:   Lab Results   Component Value Date    WBC 3.69 (L) 09/06/2019    HGB 11.1 (L) 09/06/2019    HCT 35.4 (L) 09/06/2019     (H) 09/06/2019     09/06/2019       Results for JONATAN SERRANO (MRN [id number]) as of 4/25/2019 08:52   Ref. Range 2/20/2019 09:44 3/7/2019 07:45 4/2/2019 09:32 4/17/2019 13:08    Latest Ref Range: 0 - 30 U/mL 236 (H)  81 (H) 86 (H)         Results for JONATAN SERRANO (MRN [id number]) as of 7/30/2019 15:34   Ref. Range 6/6/2019 10:00 " 6/28/2019 09:28 7/5/2019 10:13 7/22/2019 09:40    Latest Ref Range: 0 - 30 U/mL 55 (H)  52 (H) 58 (H)       CT c/a/p 1/14/2019   S/P resection of large LEFT ovarian/fallopian tube mass consistent with carcinoma.    Interval increase in size of LEFT pelvic/retroperitoneal lymph nodes.  No evidence for ascites or definitive carcinomatosis of mesentery.    Interval decrease in size of prevascular lymph node.    RECIST SUMMARY:    Date of prior examination for comparison: Chest CT 08/28/2017 and CT abdomen and pelvis 08/14/2017    Lesion 1 Chest: Pre aortic lymph node previously measuring 1.7 cm, currently measuring 0.94 cm.  Previously noted prevascular lymph node is stable as compared to prior, with a fatty hilum, of questionable significance..    Lesion 2 abdomen/pelvis: LEFT iliac chain node.  2.3 cm cm. Series 2 image 163.  Prior measurement 1.5 cm cm.    Lesion 3 abdomen/pelvis: LEFT renal hilar lymph node.  1.6 cm cm. Series 2 image 112.  Prior measurement 1.4 cm cm.      MRI Cervical spine w/out contrast 4/30/2019   Cervical degenerative change most significant at C4-5 and C5-6.  Specific details at each level discussed above.      CT c/a/p w/contrast 7/26/2019   Stable lymph nodes as above.  PET-CT scan could be helpful.    Index lesions as follows:    Pre aortic lymph node 9.6 mm, previously 9.7.    Para-aortic lymph node 1.8 cm, previously 1.9.    Left iliac lymph node 1.7 cm, previously 1.5.        Assessment:       1. Malignant neoplasm of ovary, unspecified laterality    2. CKD (chronic kidney disease) stage 3, GFR 30-59 ml/min    3. Essential hypertension    4. Chemotherapy-induced neuropathy    5. Anemia due to chemotherapy    6. Chronic neck pain    7. Elevated blood pressure reading with diagnosis of hypertension        Plan:   1- 7  Pt clinically stable      1.Ovarian cancer status post debulking surgery 9/21/2017   2. 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 S/p  carbo/taxol x 6 completed 2/12/2018                   2. Carbo/Doxil     2/1/2019- 7/8/2019                   3. Bevacizumab maintenance  8/16/2019 -present       Patient with ovarian cancer status post debulking surgery 9/21/2017 undergoing adjuvant platinum-based chemotherapy with carbo/taxol .   S/p carbo/taxol x 6 completed 2/12/2018  Pt declined maintenance therapy on trial   BRCA ANALYSIS NEG      CT c/a/p 1/14/2019   S/P resection of large LEFT ovarian/fallopian tube mass consistent with carcinoma.  Interval increase in size of LEFT pelvic/retroperitoneal lymph nodes.  No evidence for ascites or definitive carcinomatosis of mesentery.  Interval decrease in size of prevascular lymph node.    MRI brain 2/27/2019 - NEG for brain mets    It  was determined she has platinum sensitive recurrent ovarian cancer. Platinum free interval 11 months.   Dr. Feliciano Reviewed standard management options which are to re-treat with platinum-based chemotherapy (carbo + doxil/gem/taxol +/- avastin).   Pt underwent treatment with Doxil 30mg/m2  Carbo AUC 5  q 28days      Cycle  6  Delayed sec to anemia/thrombocytopenia  ( chemo dose adjusted)   She is s/p cycle 6 of chemo  Carbo/doxil completed 7/8/2019   CT c/a/p w/contrast 7/26/2019-stable findings    Previously Discussed  Dr. Feliciano about maintenance therapy options and this was discussed w/pt and dtr  Discussed potential detailed discussion with patient and daughter regarding potential maintenance therapy in this setting.  Options for maintenance therapy include bevacizumab IV 15mg/kg  every 3 weeks verses oral PARP  inhibitor.  We discussed the pros and cons of each approach.  We discussed the potential toxicities associated with the various therapies.  Ultimately, it was determined that patient elects to undergo maintenance chemotherapy with IV . Bevacizumab, We discussed the rare but serious adverse effect of GI perforation/hemorrhage associated with IV bevacizumab.  She will  also be monitored for proteinuria.  Informed consent was obtained      Repeat BP in chemo infusion  Plan Clonidine 0.1mg x 1   Pt strongly advised on compliance with BP meds    UA not done  Pt will have done today    Labs reviewed   Biochemical profile reveals Bun/Cr 27/1.8     Handicap form completed per patient's request       Cbc,cmp, UA prior to chemo and f/u in  6 weeks            Cc: Iesha Feliciano M.D.          Anil Pelayo M.D.

## 2019-09-06 NOTE — Clinical Note
Cbc,cmp, U/a prior to each chemo ( in 3wks and prior to f/u 6 wks) F/u 6 wks add  to labs prior to f/u Repeat BP PRIOR to chemo

## 2019-09-16 DIAGNOSIS — I10 ESSENTIAL HYPERTENSION: ICD-10-CM

## 2019-09-16 DIAGNOSIS — G89.4 CHRONIC PAIN SYNDROME: ICD-10-CM

## 2019-09-16 RX ORDER — VALSARTAN 160 MG/1
160 TABLET ORAL DAILY
Qty: 90 TABLET | Refills: 3 | Status: SHIPPED | OUTPATIENT
Start: 2019-09-16 | End: 2019-10-22

## 2019-09-16 RX ORDER — TRAMADOL HYDROCHLORIDE 50 MG/1
TABLET ORAL
Qty: 60 TABLET | Refills: 0 | Status: SHIPPED | OUTPATIENT
Start: 2019-09-16 | End: 2020-06-18

## 2019-09-16 RX ORDER — LEVOTHYROXINE SODIUM 125 UG/1
125 TABLET ORAL
Qty: 90 TABLET | Refills: 3 | Status: SHIPPED | OUTPATIENT
Start: 2019-09-16 | End: 2020-11-30 | Stop reason: SDUPTHER

## 2019-09-27 ENCOUNTER — INFUSION (OUTPATIENT)
Dept: INFUSION THERAPY | Facility: HOSPITAL | Age: 84
End: 2019-09-27
Attending: INTERNAL MEDICINE
Payer: MEDICARE

## 2019-09-27 VITALS
WEIGHT: 176.13 LBS | DIASTOLIC BLOOD PRESSURE: 72 MMHG | HEART RATE: 61 BPM | SYSTOLIC BLOOD PRESSURE: 161 MMHG | TEMPERATURE: 98 F | OXYGEN SATURATION: 97 % | RESPIRATION RATE: 17 BRPM | BODY MASS INDEX: 32.22 KG/M2

## 2019-09-27 DIAGNOSIS — M25.552 LEFT HIP PAIN: ICD-10-CM

## 2019-09-27 DIAGNOSIS — I10 HYPERTENSION: ICD-10-CM

## 2019-09-27 DIAGNOSIS — C56.9 MALIGNANT NEOPLASM OF OVARY, UNSPECIFIED LATERALITY: Primary | ICD-10-CM

## 2019-09-27 PROCEDURE — 96375 TX/PRO/DX INJ NEW DRUG ADDON: CPT

## 2019-09-27 PROCEDURE — A4216 STERILE WATER/SALINE, 10 ML: HCPCS | Performed by: INTERNAL MEDICINE

## 2019-09-27 PROCEDURE — 25000003 PHARM REV CODE 250: Performed by: INTERNAL MEDICINE

## 2019-09-27 PROCEDURE — 63600175 PHARM REV CODE 636 W HCPCS: Performed by: INTERNAL MEDICINE

## 2019-09-27 PROCEDURE — 96413 CHEMO IV INFUSION 1 HR: CPT

## 2019-09-27 RX ORDER — HEPARIN 100 UNIT/ML
500 SYRINGE INTRAVENOUS
Status: DISCONTINUED | OUTPATIENT
Start: 2019-09-27 | End: 2019-09-27 | Stop reason: HOSPADM

## 2019-09-27 RX ORDER — SODIUM CHLORIDE 0.9 % (FLUSH) 0.9 %
10 SYRINGE (ML) INJECTION
Status: DISCONTINUED | OUTPATIENT
Start: 2019-09-27 | End: 2019-09-27 | Stop reason: HOSPADM

## 2019-09-27 RX ORDER — MORPHINE SULFATE 4 MG/ML
1 INJECTION, SOLUTION INTRAMUSCULAR; INTRAVENOUS
Status: COMPLETED | OUTPATIENT
Start: 2019-09-27 | End: 2019-09-27

## 2019-09-27 RX ORDER — HEPARIN 100 UNIT/ML
500 SYRINGE INTRAVENOUS
Status: CANCELLED | OUTPATIENT
Start: 2019-09-27

## 2019-09-27 RX ORDER — SODIUM CHLORIDE 0.9 % (FLUSH) 0.9 %
10 SYRINGE (ML) INJECTION
Status: CANCELLED | OUTPATIENT
Start: 2019-09-27

## 2019-09-27 RX ORDER — CLONIDINE HYDROCHLORIDE 0.1 MG/1
0.1 TABLET ORAL
Status: COMPLETED | OUTPATIENT
Start: 2019-09-27 | End: 2019-09-27

## 2019-09-27 RX ADMIN — MORPHINE SULFATE 1 MG: 4 INJECTION, SOLUTION INTRAMUSCULAR; INTRAVENOUS at 11:09

## 2019-09-27 RX ADMIN — Medication 10 ML: at 01:09

## 2019-09-27 RX ADMIN — BEVACIZUMAB 1200 MG: 400 INJECTION, SOLUTION INTRAVENOUS at 10:09

## 2019-09-27 RX ADMIN — CLONIDINE HYDROCHLORIDE 0.1 MG: 0.1 TABLET ORAL at 11:09

## 2019-09-27 RX ADMIN — HEPARIN 500 UNITS: 100 SYRINGE at 01:09

## 2019-09-27 NOTE — PLAN OF CARE
Pt presented for Cycle 3 Avastin treatment. Pt reported chronic low back pain, fatigue, numbness/tingling to hands/feet, and nausea depending on what she eats. Tolerated Avastin. No reactions noted. BP elevated; all other VSS. Dr. Reinoso notified, and MD ordered 0.1 mg clonidine PO for BP and 1mg morphine IVP for pain. Pt reported improvement in pain, and BP decreased (see flowsheet for vitals). MD said okay for pt to be discharged, but pt needs to keep a daily BP log and follow up with PCP. Pt and pt's daughter both informed of Dr. Reinoso's directions and verbalized understanding. Pt's daughter said she would schedule an appt with PCP.  Good blood return noted from port. Port flushed and heparinized upon completion of visit. Breakfast and lunch provided. Ambulated independently into and out of unit. Future appt information reviewed with pt.

## 2019-10-17 ENCOUNTER — OFFICE VISIT (OUTPATIENT)
Dept: FAMILY MEDICINE | Facility: CLINIC | Age: 84
End: 2019-10-17
Payer: MEDICARE

## 2019-10-17 VITALS
DIASTOLIC BLOOD PRESSURE: 88 MMHG | HEART RATE: 67 BPM | TEMPERATURE: 98 F | BODY MASS INDEX: 33.47 KG/M2 | OXYGEN SATURATION: 99 % | HEIGHT: 62 IN | SYSTOLIC BLOOD PRESSURE: 128 MMHG | WEIGHT: 181.88 LBS

## 2019-10-17 DIAGNOSIS — I10 ESSENTIAL HYPERTENSION: Primary | ICD-10-CM

## 2019-10-17 DIAGNOSIS — R14.0 ABDOMINAL BLOATING: ICD-10-CM

## 2019-10-17 DIAGNOSIS — M89.9 BONE DISORDER: ICD-10-CM

## 2019-10-17 DIAGNOSIS — K21.9 GASTROESOPHAGEAL REFLUX DISEASE, ESOPHAGITIS PRESENCE NOT SPECIFIED: ICD-10-CM

## 2019-10-17 PROBLEM — R00.1 BRADYCARDIA: Status: RESOLVED | Noted: 2017-09-24 | Resolved: 2019-10-17

## 2019-10-17 PROCEDURE — 99499 UNLISTED E&M SERVICE: CPT | Mod: S$GLB,,, | Performed by: INTERNAL MEDICINE

## 2019-10-17 PROCEDURE — 99999 PR PBB SHADOW E&M-EST. PATIENT-LVL III: ICD-10-PCS | Mod: PBBFAC,,, | Performed by: INTERNAL MEDICINE

## 2019-10-17 PROCEDURE — 99999 PR PBB SHADOW E&M-EST. PATIENT-LVL III: CPT | Mod: PBBFAC,,, | Performed by: INTERNAL MEDICINE

## 2019-10-17 PROCEDURE — 99214 OFFICE O/P EST MOD 30 MIN: CPT | Mod: S$GLB,,, | Performed by: INTERNAL MEDICINE

## 2019-10-17 PROCEDURE — 3079F PR MOST RECENT DIASTOLIC BLOOD PRESSURE 80-89 MM HG: ICD-10-PCS | Mod: CPTII,S$GLB,, | Performed by: INTERNAL MEDICINE

## 2019-10-17 PROCEDURE — 1101F PT FALLS ASSESS-DOCD LE1/YR: CPT | Mod: CPTII,S$GLB,, | Performed by: INTERNAL MEDICINE

## 2019-10-17 PROCEDURE — 3079F DIAST BP 80-89 MM HG: CPT | Mod: CPTII,S$GLB,, | Performed by: INTERNAL MEDICINE

## 2019-10-17 PROCEDURE — 99499 RISK ADDL DX/OHS AUDIT: ICD-10-PCS | Mod: S$GLB,,, | Performed by: INTERNAL MEDICINE

## 2019-10-17 PROCEDURE — 3074F PR MOST RECENT SYSTOLIC BLOOD PRESSURE < 130 MM HG: ICD-10-PCS | Mod: CPTII,S$GLB,, | Performed by: INTERNAL MEDICINE

## 2019-10-17 PROCEDURE — 1101F PR PT FALLS ASSESS DOC 0-1 FALLS W/OUT INJ PAST YR: ICD-10-PCS | Mod: CPTII,S$GLB,, | Performed by: INTERNAL MEDICINE

## 2019-10-17 PROCEDURE — 99214 PR OFFICE/OUTPT VISIT, EST, LEVL IV, 30-39 MIN: ICD-10-PCS | Mod: S$GLB,,, | Performed by: INTERNAL MEDICINE

## 2019-10-17 PROCEDURE — 3074F SYST BP LT 130 MM HG: CPT | Mod: CPTII,S$GLB,, | Performed by: INTERNAL MEDICINE

## 2019-10-17 RX ORDER — OMEPRAZOLE 20 MG/1
CAPSULE, DELAYED RELEASE ORAL
Qty: 90 CAPSULE | Refills: 3 | Status: SHIPPED | OUTPATIENT
Start: 2019-10-17 | End: 2020-08-19

## 2019-10-17 NOTE — PROGRESS NOTES
SUBJECTIVE     Chief Complaint   Patient presents with    Establish Care    Medication Refill       HPI  Celine Mahan is a 84 y.o. female with multiple medical diagnoses as listed in the medical history and problem list that presents for establishment of care. Pt has a PMH as below. HPI is provided by pt and her daughter. Pt's daughter reports her BP has been spiking before chemo treatment with readings of 185/100. She has been receiving IV push meds(Hydralazine) to decrease her BP. It is believed to be due to [place] HTN. She does not check her BP at home. Pt is mostly compliant with a low Na diet and does not exercise. Otherwise, pt needs a refill on GERD meds.     PAST MEDICAL HISTORY:  Past Medical History:   Diagnosis Date    Breast cancer     Cataract     CKD (chronic kidney disease) stage 3, GFR 30-59 ml/min     Gastroesophageal reflux disease 10/17/2019    Hyperlipidemia     Hypertension     Hypothyroidism     Obesity     Osteopenia     Ovarian cancer 10/8/2017    Overactive bladder     Pelvic mass 8/27/2017    Thyroid disease        PAST SURGICAL HISTORY:  Past Surgical History:   Procedure Laterality Date    bt catarac surgery      CHOLECYSTECTOMY      HYSTERECTOMY      INSERTION OF TUNNELED CENTRAL VENOUS CATHETER (CVC) WITH SUBCUTANEOUS PORT N/A 6/3/2019    Procedure: INSERTION, PORT-A-CATH;  Surgeon: Andrey Diagnostic Provider;  Location: [facility] OR;  Service: Radiology;  Laterality: N/A;  RN PREOP 5/31/2019    MASTECTOMY Left        SOCIAL HISTORY:  Social History     Socioeconomic History    Marital status:      Spouse name: Not on file    Number of children: Not on file    Years of education: Not on file    Highest education level: Not on file   Occupational History    Not on file   Social Needs    Financial resource strain: Not on file    Food insecurity:     Worry: Not on file     Inability: Not on file    Transportation needs:     Medical: Not on file      Non-medical: Not on file   Tobacco Use    Smoking status: Former Smoker     Types: Cigarettes     Last attempt to quit: 1977     Years since quittin.8    Smokeless tobacco: Never Used   Substance and Sexual Activity    Alcohol use: No    Drug use: No    Sexual activity: Not Currently   Lifestyle    Physical activity:     Days per week: Not on file     Minutes per session: Not on file    Stress: Not at all   Relationships    Social connections:     Talks on phone: Not on file     Gets together: Not on file     Attends [employer] service: Not on file     Active member of club or organization: Not on file     Attends meetings of clubs or organizations: Not on file     Relationship status: Not on file   Other Topics Concern    Not on file   Social History Narrative    Not on file       FAMILY HISTORY:  Family History   Problem Relation Age of Onset    Cancer Mother         pancreas ca       ALLERGIES AND MEDICATIONS: updated and reviewed.  Review of patient's allergies indicates:  No Known Allergies  Current Outpatient Medications   Medication Sig Dispense Refill    levothyroxine (SYNTHROID) 125 MCG tablet Take 1 tablet (125 mcg total) by mouth before breakfast. 90 tablet 3    omeprazole (PRILOSEC) 20 MG capsule TAKE 1 CAPSULE(20 MG) BY MOUTH DAILY AS NEEDED 90 capsule 3    ondansetron (ZOFRAN) 8 MG tablet Take 1 tablet (8 mg total) by mouth every 8 (eight) hours as needed for Nausea. 30 tablet 3    traMADol (ULTRAM) 50 mg tablet TAKE 1 TABLET(50 MG) BY MOUTH EVERY 8 HOURS AS NEEDED FOR PAIN 60 tablet 0    valsartan (DIOVAN) 160 MG tablet Take 1 tablet (160 mg total) by mouth once daily. 90 tablet 3    garlic 1,000 mg Cap Take by mouth once daily.        No current facility-administered medications for this visit.        ROS  Review of Systems   Constitutional: Negative for chills and fever.   HENT: Negative for hearing loss and sore throat.    Eyes: Negative for visual disturbance.  "  Respiratory: Negative for cough and shortness of breath.    Cardiovascular: Negative for chest pain, palpitations and leg swelling.   Gastrointestinal: Negative for abdominal pain, constipation, diarrhea, nausea and vomiting.   Genitourinary: Negative for dysuria, frequency and urgency.        Urinary incontinence   Musculoskeletal: Negative for arthralgias, joint swelling and myalgias.   Skin: Negative for rash and wound.   Neurological: Positive for headaches.   Psychiatric/Behavioral: Negative for agitation and confusion. The patient is not nervous/anxious.          OBJECTIVE     Physical Exam  Vitals:    10/17/19 1523   BP: 128/88   Pulse: 67   Temp: 97.7 °F (36.5 °C)    Body mass index is 33.27 kg/m².  Weight: 82.5 kg (181 lb 14.1 oz)   Height: 5' 2" (157.5 cm)     Physical Exam   Constitutional: She is oriented to person, place, and time. She appears well-developed and well-nourished. No distress.   HENT:   Head: Normocephalic and atraumatic.   Right Ear: External ear normal.   Left Ear: External ear normal.   Nose: Nose normal.   Mouth/Throat: Oropharynx is clear and moist.   Eyes: Conjunctivae and EOM are normal. Right eye exhibits no discharge. Left eye exhibits no discharge. No scleral icterus.   Neck: Normal range of motion. Neck supple. No JVD present. No tracheal deviation present.   Cardiovascular: Normal rate, regular rhythm and intact distal pulses. Exam reveals no gallop and no friction rub.   No murmur heard.  Pulmonary/Chest: Effort normal and breath sounds normal. No respiratory distress. She has no wheezes.   Abdominal: Soft. Bowel sounds are normal. She exhibits no distension and no mass. There is no tenderness. There is no rebound, no guarding and no CVA tenderness.   Musculoskeletal: Normal range of motion. She exhibits no edema, tenderness or deformity.   Neurological: She is alert and oriented to person, place, and time. She exhibits normal muscle tone. Coordination normal.   Skin: Skin " is warm and dry. No rash noted. No erythema.   Psychiatric: She has a normal mood and affect. Her behavior is normal. Judgment and thought content normal.         Health Maintenance       Date Due Completion Date    Shingles Vaccine (1 of 2) 06/26/1985 ---    DEXA SCAN 08/25/2018 8/25/2015    Influenza Vaccine (1) 09/01/2019 ---    Lipid Panel 12/13/2023 12/13/2018    TETANUS VACCINE 07/28/2026 7/28/2016            ASSESSMENT     84 y.o. female with     1. Essential hypertension    2. Gastroesophageal reflux disease, esophagitis presence not specified    3. Abdominal bloating    4. Bone disorder        PLAN:     1. Essential hypertension  - BP well controlled; at goal of <140/90, but can not exclude white coat HTN just before chemo so pt advised to get home BP cuff and monitor; f/u in 2 weeks for nurse visit BP chec  -  Advised to maintain a low Na diet(<2g/day), exercise, and keep BP log to present to next visit    2. Gastroesophageal reflux disease, esophagitis presence not specified  - Stable; no acute issues  - The current medical regimen is effective;  continue present plan and medications.  - omeprazole (PRILOSEC) 20 MG capsule; TAKE 1 CAPSULE(20 MG) BY MOUTH DAILY AS NEEDED  Dispense: 90 capsule; Refill: 3    3. Abdominal bloating  - omeprazole (PRILOSEC) 20 MG capsule; TAKE 1 CAPSULE(20 MG) BY MOUTH DAILY AS NEEDED  Dispense: 90 capsule; Refill: 3    4. Bone disorder  - DXA Bone Density Spine And Hip; Future        RTC in 2 weeks for nurse visit BP check     Jane Domingo MD  10/17/2019 3:35 PM        No follow-ups on file.

## 2019-10-17 NOTE — PROGRESS NOTES
Patient, Celine Mahan (MRN #[id number]), presented with a recent Estimated Glumerular Filtration Rate (EGFR) between 15 and 29 consistent with the definition of chronic kidney disease stage 4 (ICD10 - N18.4).    eGFR if non    Date Value Ref Range Status   09/27/2019 25 (A) >60 mL/min/1.73 m^2 Final     Comment:     Calculation used to obtain the estimated glomerular filtration  rate (eGFR) is the CKD-EPI equation.          The patient's chronic kidney disease stage 4 was monitored, evaluated, addressed and/or treated. This addendum to the medical record is made on 10/17/2019.

## 2019-10-17 NOTE — PROGRESS NOTES
Patient declines flu vaccine today.  Informed patient shingles is overdue but states will not take the vaccine.

## 2019-10-18 ENCOUNTER — INFUSION (OUTPATIENT)
Dept: INFUSION THERAPY | Facility: HOSPITAL | Age: 84
End: 2019-10-18
Attending: INTERNAL MEDICINE
Payer: MEDICARE

## 2019-10-18 ENCOUNTER — LAB VISIT (OUTPATIENT)
Dept: LAB | Facility: HOSPITAL | Age: 84
End: 2019-10-18
Attending: INTERNAL MEDICINE
Payer: MEDICARE

## 2019-10-18 ENCOUNTER — OFFICE VISIT (OUTPATIENT)
Dept: HEMATOLOGY/ONCOLOGY | Facility: CLINIC | Age: 84
End: 2019-10-18
Payer: MEDICARE

## 2019-10-18 VITALS
HEART RATE: 70 BPM | DIASTOLIC BLOOD PRESSURE: 66 MMHG | HEIGHT: 62 IN | BODY MASS INDEX: 33.51 KG/M2 | OXYGEN SATURATION: 98 % | SYSTOLIC BLOOD PRESSURE: 153 MMHG | TEMPERATURE: 98 F | WEIGHT: 182.13 LBS

## 2019-10-18 VITALS
OXYGEN SATURATION: 97 % | DIASTOLIC BLOOD PRESSURE: 55 MMHG | RESPIRATION RATE: 17 BRPM | SYSTOLIC BLOOD PRESSURE: 125 MMHG | HEART RATE: 66 BPM | TEMPERATURE: 98 F

## 2019-10-18 DIAGNOSIS — I10 ESSENTIAL HYPERTENSION: ICD-10-CM

## 2019-10-18 DIAGNOSIS — C56.9 MALIGNANT NEOPLASM OF OVARY, UNSPECIFIED LATERALITY: ICD-10-CM

## 2019-10-18 DIAGNOSIS — D64.81 ANEMIA DUE TO CHEMOTHERAPY: ICD-10-CM

## 2019-10-18 DIAGNOSIS — C56.9 MALIGNANT NEOPLASM OF OVARY, UNSPECIFIED LATERALITY: Primary | ICD-10-CM

## 2019-10-18 DIAGNOSIS — T45.1X5A ANEMIA DUE TO CHEMOTHERAPY: ICD-10-CM

## 2019-10-18 DIAGNOSIS — N18.30 CKD (CHRONIC KIDNEY DISEASE) STAGE 3, GFR 30-59 ML/MIN: ICD-10-CM

## 2019-10-18 LAB
ALBUMIN SERPL BCP-MCNC: 4 G/DL (ref 3.5–5.2)
ALP SERPL-CCNC: 77 U/L (ref 55–135)
ALT SERPL W/O P-5'-P-CCNC: 14 U/L (ref 10–44)
ANION GAP SERPL CALC-SCNC: 10 MMOL/L (ref 8–16)
AST SERPL-CCNC: 21 U/L (ref 10–40)
BACTERIA #/AREA URNS HPF: ABNORMAL /HPF
BASOPHILS # BLD AUTO: 0.03 K/UL (ref 0–0.2)
BASOPHILS NFR BLD: 0.8 % (ref 0–1.9)
BILIRUB SERPL-MCNC: 0.3 MG/DL (ref 0.1–1)
BILIRUB UR QL STRIP: NEGATIVE
BUN SERPL-MCNC: 29 MG/DL (ref 8–23)
CALCIUM SERPL-MCNC: 9.4 MG/DL (ref 8.7–10.5)
CANCER AG125 SERPL-ACNC: 42 U/ML (ref 0–30)
CHLORIDE SERPL-SCNC: 108 MMOL/L (ref 95–110)
CLARITY UR: ABNORMAL
CO2 SERPL-SCNC: 19 MMOL/L (ref 23–29)
COLOR UR: YELLOW
CREAT SERPL-MCNC: 1.7 MG/DL (ref 0.5–1.4)
DIFFERENTIAL METHOD: ABNORMAL
EOSINOPHIL # BLD AUTO: 0.2 K/UL (ref 0–0.5)
EOSINOPHIL NFR BLD: 6 % (ref 0–8)
ERYTHROCYTE [DISTWIDTH] IN BLOOD BY AUTOMATED COUNT: 13.4 % (ref 11.5–14.5)
EST. GFR  (AFRICAN AMERICAN): 31 ML/MIN/1.73 M^2
EST. GFR  (NON AFRICAN AMERICAN): 27 ML/MIN/1.73 M^2
GLUCOSE SERPL-MCNC: 110 MG/DL (ref 70–110)
GLUCOSE UR QL STRIP: NEGATIVE
GRAN CASTS #/AREA URNS LPF: 2 /LPF
HCT VFR BLD AUTO: 37.3 % (ref 37–48.5)
HGB BLD-MCNC: 11.4 G/DL (ref 12–16)
HGB UR QL STRIP: ABNORMAL
HYALINE CASTS #/AREA URNS LPF: 0 /LPF
IMM GRANULOCYTES # BLD AUTO: 0.02 K/UL (ref 0–0.04)
IMM GRANULOCYTES NFR BLD AUTO: 0.5 % (ref 0–0.5)
KETONES UR QL STRIP: NEGATIVE
LEUKOCYTE ESTERASE UR QL STRIP: ABNORMAL
LYMPHOCYTES # BLD AUTO: 0.8 K/UL (ref 1–4.8)
LYMPHOCYTES NFR BLD: 20.8 % (ref 18–48)
MCH RBC QN AUTO: 31.5 PG (ref 27–31)
MCHC RBC AUTO-ENTMCNC: 30.6 G/DL (ref 32–36)
MCV RBC AUTO: 103 FL (ref 82–98)
MICROSCOPIC COMMENT: ABNORMAL
MONOCYTES # BLD AUTO: 0.3 K/UL (ref 0.3–1)
MONOCYTES NFR BLD: 7.4 % (ref 4–15)
NEUTROPHILS # BLD AUTO: 2.4 K/UL (ref 1.8–7.7)
NEUTROPHILS NFR BLD: 64.5 % (ref 38–73)
NITRITE UR QL STRIP: NEGATIVE
NRBC BLD-RTO: 0 /100 WBC
PH UR STRIP: 5 [PH] (ref 5–8)
PLATELET # BLD AUTO: ABNORMAL K/UL (ref 150–350)
PMV BLD AUTO: ABNORMAL FL (ref 9.2–12.9)
POTASSIUM SERPL-SCNC: 4.8 MMOL/L (ref 3.5–5.1)
PROT SERPL-MCNC: 7.7 G/DL (ref 6–8.4)
PROT UR QL STRIP: ABNORMAL
RBC # BLD AUTO: 3.62 M/UL (ref 4–5.4)
RBC #/AREA URNS HPF: 1 /HPF (ref 0–4)
SODIUM SERPL-SCNC: 137 MMOL/L (ref 136–145)
SP GR UR STRIP: 1.02 (ref 1–1.03)
SQUAMOUS #/AREA URNS HPF: ABNORMAL /HPF
URN SPEC COLLECT METH UR: ABNORMAL
UROBILINOGEN UR STRIP-ACNC: NEGATIVE EU/DL
WBC # BLD AUTO: 3.65 K/UL (ref 3.9–12.7)
WBC #/AREA URNS HPF: 3 /HPF (ref 0–5)

## 2019-10-18 PROCEDURE — 99499 RISK ADDL DX/OHS AUDIT: ICD-10-PCS | Mod: S$GLB,,, | Performed by: INTERNAL MEDICINE

## 2019-10-18 PROCEDURE — 25000003 PHARM REV CODE 250: Performed by: INTERNAL MEDICINE

## 2019-10-18 PROCEDURE — 3078F DIAST BP <80 MM HG: CPT | Mod: CPTII,S$GLB,, | Performed by: INTERNAL MEDICINE

## 2019-10-18 PROCEDURE — 81000 URINALYSIS NONAUTO W/SCOPE: CPT

## 2019-10-18 PROCEDURE — 1101F PT FALLS ASSESS-DOCD LE1/YR: CPT | Mod: CPTII,S$GLB,, | Performed by: INTERNAL MEDICINE

## 2019-10-18 PROCEDURE — 3074F PR MOST RECENT SYSTOLIC BLOOD PRESSURE < 130 MM HG: ICD-10-PCS | Mod: CPTII,S$GLB,, | Performed by: INTERNAL MEDICINE

## 2019-10-18 PROCEDURE — 3074F SYST BP LT 130 MM HG: CPT | Mod: CPTII,S$GLB,, | Performed by: INTERNAL MEDICINE

## 2019-10-18 PROCEDURE — 96413 CHEMO IV INFUSION 1 HR: CPT

## 2019-10-18 PROCEDURE — 36415 COLL VENOUS BLD VENIPUNCTURE: CPT

## 2019-10-18 PROCEDURE — 99999 PR PBB SHADOW E&M-EST. PATIENT-LVL III: CPT | Mod: PBBFAC,,, | Performed by: INTERNAL MEDICINE

## 2019-10-18 PROCEDURE — 3078F PR MOST RECENT DIASTOLIC BLOOD PRESSURE < 80 MM HG: ICD-10-PCS | Mod: CPTII,S$GLB,, | Performed by: INTERNAL MEDICINE

## 2019-10-18 PROCEDURE — 99214 OFFICE O/P EST MOD 30 MIN: CPT | Mod: S$GLB,,, | Performed by: INTERNAL MEDICINE

## 2019-10-18 PROCEDURE — 86304 IMMUNOASSAY TUMOR CA 125: CPT

## 2019-10-18 PROCEDURE — 99999 PR PBB SHADOW E&M-EST. PATIENT-LVL III: ICD-10-PCS | Mod: PBBFAC,,, | Performed by: INTERNAL MEDICINE

## 2019-10-18 PROCEDURE — 85025 COMPLETE CBC W/AUTO DIFF WBC: CPT

## 2019-10-18 PROCEDURE — 99214 PR OFFICE/OUTPT VISIT, EST, LEVL IV, 30-39 MIN: ICD-10-PCS | Mod: S$GLB,,, | Performed by: INTERNAL MEDICINE

## 2019-10-18 PROCEDURE — 1101F PR PT FALLS ASSESS DOC 0-1 FALLS W/OUT INJ PAST YR: ICD-10-PCS | Mod: CPTII,S$GLB,, | Performed by: INTERNAL MEDICINE

## 2019-10-18 PROCEDURE — 99499 UNLISTED E&M SERVICE: CPT | Mod: S$GLB,,, | Performed by: INTERNAL MEDICINE

## 2019-10-18 PROCEDURE — 80053 COMPREHEN METABOLIC PANEL: CPT

## 2019-10-18 PROCEDURE — 63600175 PHARM REV CODE 636 W HCPCS: Performed by: INTERNAL MEDICINE

## 2019-10-18 PROCEDURE — A4216 STERILE WATER/SALINE, 10 ML: HCPCS | Performed by: INTERNAL MEDICINE

## 2019-10-18 RX ORDER — ACETAMINOPHEN 325 MG/1
650 TABLET ORAL
Status: DISCONTINUED | OUTPATIENT
Start: 2019-10-18 | End: 2019-10-18 | Stop reason: HOSPADM

## 2019-10-18 RX ORDER — CLONIDINE HYDROCHLORIDE 0.1 MG/1
0.1 TABLET ORAL
Status: COMPLETED | OUTPATIENT
Start: 2019-10-18 | End: 2019-10-18

## 2019-10-18 RX ORDER — SODIUM CHLORIDE 0.9 % (FLUSH) 0.9 %
10 SYRINGE (ML) INJECTION
Status: CANCELLED | OUTPATIENT
Start: 2019-10-18

## 2019-10-18 RX ORDER — HEPARIN 100 UNIT/ML
500 SYRINGE INTRAVENOUS
Status: CANCELLED | OUTPATIENT
Start: 2019-10-18

## 2019-10-18 RX ORDER — SODIUM CHLORIDE 0.9 % (FLUSH) 0.9 %
10 SYRINGE (ML) INJECTION
Status: DISCONTINUED | OUTPATIENT
Start: 2019-10-18 | End: 2019-10-18 | Stop reason: HOSPADM

## 2019-10-18 RX ORDER — HEPARIN 100 UNIT/ML
500 SYRINGE INTRAVENOUS
Status: DISCONTINUED | OUTPATIENT
Start: 2019-10-18 | End: 2019-10-18 | Stop reason: HOSPADM

## 2019-10-18 RX ADMIN — Medication 10 ML: at 02:10

## 2019-10-18 RX ADMIN — CLONIDINE HYDROCHLORIDE 0.1 MG: 0.1 TABLET ORAL at 01:10

## 2019-10-18 RX ADMIN — BEVACIZUMAB 1240 MG: 400 INJECTION, SOLUTION INTRAVENOUS at 11:10

## 2019-10-18 RX ADMIN — HEPARIN SODIUM (PORCINE) LOCK FLUSH IV SOLN 100 UNIT/ML 500 UNITS: 100 SOLUTION at 02:10

## 2019-10-18 RX ADMIN — CLONIDINE HYDROCHLORIDE 0.1 MG: 0.1 TABLET ORAL at 12:10

## 2019-10-18 NOTE — PLAN OF CARE
Pt presented for q3week Avastin treatment. Pt arrived to unit after seeing Dr. Reinoso. Per MD order, BP rechecked on arrival. /68. Per Dr. Reinoso, okay to proceed with chemo, but recheck BP after completion of Avastin. HR, O2, and temp stable. No issues/signs of reaction noted during Avastin infusion. BP elevated after completion of Avastin. Dr. Reinoso notified and orders received for 0.1 mg PO Clonidine. Clonidine administered with no positive effect. Dr. Reinoso notified and orders received for another 0.1 mg PO Clonidine. Significant improvement in BP after second dose of Clonidine. Dr. Reinoso requested that nursing staff ask pt's daughter to record a log of pt's BP at home. Good blood return noted from port. Port flushed and heparinized prior to discharge. When pt's daughter arrived to bring pt home, nursing staff reiterated to daughter the importance of keeping a log of pt's BP for MD review. Lunch provided. Pt ambulated independently into and out of unit. Distress screening tool completed. Future appt information reviewed with pt.

## 2019-10-18 NOTE — PROGRESS NOTES
"Subjective:       Patient ID: Celine Mahan is a 84 y.o. female.    Chief Complaint: Chronic Kidney Disease and Follow-up (6 week f/u)         Diagnosis: 1.Ovarian cancer status post debulking surgery 9/21/2017                     2. 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 S/p carbo/taxol x 6 completed 2/12/2018                   2. Carbo/Doxil 2/1/2019- 7/8/2019\                   3. Bevacizumab maintenance  8/16/2019 -present         HPI ( per Dr. Feliciano) Patient is an 85 yo female who originally presented as a referral from Dr. Wills for pelvic mass. Patient reports LLQ pain for approximately 3 months which prompted evaluation.      Pelvic US 8/7/17 There is a complex echogenicity midline pelvic mass measuring 18.1 x 8.6 x 7.6 cm.  Ovaries not visualized.     CT A&P 8/9/17 Large solid/cystic mass within the lower abdomen/upper pelvis that is most concerning for a malignant neoplasm, likely of ovarian origin given its location.  Correlation with previous surgical history is recommended noting evidence of previous hysterectomy.  Mass abuts and displaces the adjacent bladder without definite CT findings to suggest hema invasion. Abdominal and pelvic lymphadenopathy concerning for lymphatic spread of neoplasm with index lymph nodes as above.      Medical history is significant for CKD (Cr 1.3), HTN, hypothyroid, HTN, HLD. She has a personal history of breast cancer in 1995 treated with mastectomy and adjuvant chemotherapy she reports. Last MMG 11/2016 normal. Adbominal surgery include cholecystectomy, TVH, xlap/removal ovary for ectopic. She reports that her physician told her "some ovary remains in situ". Family history significant for mother with pancreatic cancer.      CT chest 8/28/17 showed mediastinal adenopathy consistent with Stage IV disease.   RECIST Summary:  Mediastinal adenopathy:  1. Preaortic abnormal node measuring 1.7 cm short axis.  2. Pretracheal node measures 1.6 " cm short axis.     She underwent surgical cytoreduction with xlap/LSO/omentectomy 9/21/17. Pathology showed high grade adenocarcinoma.        She is also  followed by Dr. Feliciano  She  transferred care to undergo  adjuvant chemo at this location   She completed adjuvant chemo with carbo AUC 6 and taxol 175mg/m2 q 21d  2/12/2018  She was considered for PRIMA clinical trial for maintenance therapy   Pt  Declined maintenance therapy      Pt with increasing  levels   849>230>37>23>22>12>40>309 ( 1/9/2019 )       CT c/a/p 1/14/2019   S/P resection of large LEFT ovarian/fallopian tube mass consistent with carcinoma.    Interval increase in size of LEFT pelvic/retroperitoneal lymph nodes.  No evidence for ascites or definitive carcinomatosis of mesentery.    Interval decrease in size of prevascular lymph node.      It was determined she has platinum sensitive recurrent ovarian cancer. Platinum free interval 11 months.   Dr. Feliciano Reviewed standard management options which are to re-treat with platinum-based chemotherapy (carbo + doxil/gem/taxol +/- avastin).   Pt undergoing treatment with Doxil 30mg/m2  Carbo AUC 5  q 28days      She is s/p cycle 6 of chemo  Carbo/doxil completed 7/8/2019   ( dosage reduction  sec to cytopenias)     Accompanied by dtr   imaging studies 7/2019 reveals stable disease    She is undergoing maintenance therapy with bevacizumab 15mg/kg  q3 wks    No fatigue  Appetite and weight stable   She has occasional HA -stable ,chronic  MRI brain 2/27/2019 - no brain mets  No abd pain/distension   No melena, hematochezia or change in bowel habits  No rashes  No SOB/CP/SOB  No vision changes  No melena, hematochezia or change in bowel habits  Chronic neck pain-stable        She did not take BP readings at home as advised  Dtr reports recent follow up with PCP for BP check  It was determined no adjustment indicated    CT c/a/p w/contrast 7/26/2019 -stable findings    MRI brain 2/27/2019 - no brain  "mets    CBC  reveals a white blood cell count of 3650mm 3 hemoglobin 11.4grams/dL hematocrit of 37.3% plt ct plt counting      Biochemical profile reveals stable Cr level from 1.8mg/dL  to 1.5mg/dL     BRCA ANALYSIS NEG    Past Medical History:   Diagnosis Date    Breast cancer     Cataract     CKD (chronic kidney disease) stage 3, GFR 30-59 ml/min     Gastroesophageal reflux disease 10/17/2019    Hyperlipidemia     Hypertension     Hypothyroidism     Obesity     Osteopenia     Ovarian cancer 10/8/2017    Overactive bladder     Pelvic mass 8/27/2017    Thyroid disease          Past Surgical History:   Procedure Laterality Date    bt catarac surgery      CHOLECYSTECTOMY      HYSTERECTOMY      INSERTION OF TUNNELED CENTRAL VENOUS CATHETER (CVC) WITH SUBCUTANEOUS PORT N/A 6/3/2019    Procedure: INSERTION, PORT-A-CATH;  Surgeon: And[facility] Diagnostic Provider;  Location: [facility] OR;  Service: Radiology;  Laterality: N/A;  RN PREOP 5/31/2019    MASTECTOMY Left              Review of Systems   Constitutional: Negative for appetite change, fatigue, fever and unexpected weight change.   HENT: Negative for mouth sores.    Eyes: Negative for visual disturbance.   Respiratory: Negative for cough and shortness of breath.    Cardiovascular: Negative for chest pain.   Gastrointestinal: Negative for abdominal pain, diarrhea and nausea.   Genitourinary: Negative for frequency.   Musculoskeletal: Positive for neck pain (chronic). Negative for back pain.   Skin: Negative for rash.        No petechiae   Neurological: Positive for numbness. Negative for weakness, light-headedness and headaches.        Intermittent tingling   Hematological: Negative for adenopathy.   Psychiatric/Behavioral: The patient is not nervous/anxious.        Objective:       Vitals:    10/18/19 1004   BP: (!) 153/66   Pulse: 70   Temp: 97.6 °F (36.4 °C)   TempSrc: Oral   SpO2: 98%   Weight: 82.6 kg (182 lb 1.6 oz)   Height: 5' 2" (1.575 m) "       Physical Exam   Constitutional: She is oriented to person, place, and time. She appears well-developed and well-nourished.   HENT:   Head: Normocephalic.   Mouth/Throat: Oropharynx is clear and moist. No oropharyngeal exudate.   Eyes: Conjunctivae and lids are normal. No scleral icterus.   Neck: Normal range of motion. Neck supple. No thyromegaly present.   Cardiovascular: Normal rate, regular rhythm and normal heart sounds.   No murmur heard.  Pulmonary/Chest: Breath sounds normal. She has no wheezes. She has no rales.   Abdominal: Soft. Bowel sounds are normal. She exhibits no distension. There is no hepatosplenomegaly. There is no tenderness.   Well-healed midline surgical scar   Musculoskeletal: Normal range of motion. She exhibits no edema or tenderness.   Lymphadenopathy:     She has no cervical adenopathy.     She has no axillary adenopathy.        Right: No supraclavicular adenopathy present.        Left: No supraclavicular adenopathy present.   Neurological: She is alert and oriented to person, place, and time. No cranial nerve deficit. Coordination normal.   Skin: Skin is warm and dry. No ecchymosis, no petechiae and no rash noted. No erythema.   Psychiatric: She has a normal mood and affect.         Labs:   Lab Results   Component Value Date    WBC 3.65 (L) 10/18/2019    HGB 11.4 (L) 10/18/2019    HCT 37.3 10/18/2019     (H) 10/18/2019    PLT SEE COMMENT 10/18/2019       Results for JONATAN SERRANO (MRN [id number]) as of 4/25/2019 08:52   Ref. Range 2/20/2019 09:44 3/7/2019 07:45 4/2/2019 09:32 4/17/2019 13:08    Latest Ref Range: 0 - 30 U/mL 236 (H)  81 (H) 86 (H)         Results for JONATAN SERRANO (MRN [id number]) as of 7/30/2019 15:34   Ref. Range 6/6/2019 10:00 6/28/2019 09:28 7/5/2019 10:13 7/22/2019 09:40    Latest Ref Range: 0 - 30 U/mL 55 (H)  52 (H) 58 (H)       CT c/a/p 1/14/2019   S/P resection of large LEFT ovarian/fallopian tube mass consistent with carcinoma.    Interval  increase in size of LEFT pelvic/retroperitoneal lymph nodes.  No evidence for ascites or definitive carcinomatosis of mesentery.    Interval decrease in size of prevascular lymph node.    RECIST SUMMARY:    Date of prior examination for comparison: Chest CT 08/28/2017 and CT abdomen and pelvis 08/14/2017    Lesion 1 Chest: Pre aortic lymph node previously measuring 1.7 cm, currently measuring 0.94 cm.  Previously noted prevascular lymph node is stable as compared to prior, with a fatty hilum, of questionable significance..    Lesion 2 abdomen/pelvis: LEFT iliac chain node.  2.3 cm cm. Series 2 image 163.  Prior measurement 1.5 cm cm.    Lesion 3 abdomen/pelvis: LEFT renal hilar lymph node.  1.6 cm cm. Series 2 image 112.  Prior measurement 1.4 cm cm.      MRI Cervical spine w/out contrast 4/30/2019   Cervical degenerative change most significant at C4-5 and C5-6.  Specific details at each level discussed above.      CT c/a/p w/contrast 7/26/2019   Stable lymph nodes as above.  PET-CT scan could be helpful.    Index lesions as follows:    Pre aortic lymph node 9.6 mm, previously 9.7.    Para-aortic lymph node 1.8 cm, previously 1.9.    Left iliac lymph node 1.7 cm, previously 1.5.    Results for JONATAN SERRANO (MRN [id number]) as of 10/18/2019 10:11   Ref. Range 7/5/2019 10:13 7/22/2019 09:40 9/6/2019 08:34    Latest Ref Range: 0 - 30 U/mL 52 (H) 58 (H) 33 (H)       Assessment:       1. Malignant neoplasm of ovary, unspecified laterality    2. CKD (chronic kidney disease) stage 3, GFR 30-59 ml/min    3. Essential hypertension    4. Anemia due to chemotherapy        Plan:   1- 7  Pt clinically stable      1.Ovarian cancer status post debulking surgery 9/21/2017   2. 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 S/p carbo/taxol x 6 completed 2/12/2018                   2. Carbo/Doxil     2/1/2019- 7/8/2019                   3. Bevacizumab maintenance  8/16/2019 -present       Patient with  ovarian cancer status post debulking surgery 9/21/2017 undergoing adjuvant platinum-based chemotherapy with carbo/taxol .   S/p carbo/taxol x 6 completed 2/12/2018  Pt declined maintenance therapy on trial   BRCA ANALYSIS NEG      CT c/a/p 1/14/2019   S/P resection of large LEFT ovarian/fallopian tube mass consistent with carcinoma.  Interval increase in size of LEFT pelvic/retroperitoneal lymph nodes.  No evidence for ascites or definitive carcinomatosis of mesentery.  Interval decrease in size of prevascular lymph node.    MRI brain 2/27/2019 - NEG for brain mets    It  was determined she has platinum sensitive recurrent ovarian cancer. Platinum free interval 11 months.   Dr. Feliciano Reviewed standard management options which are to re-treat with platinum-based chemotherapy (carbo + doxil/gem/taxol +/- avastin).   Pt underwent treatment with Doxil 30mg/m2  Carbo AUC 5  q 28days    she is s/p cycle 6 of chemo  Carbo/doxil completed 7/8/2019   CT c/a/p w/contrast 7/26/2019-stable findings    Previously Discussed  Dr. Feliciano about maintenance therapy options and this was discussed w/pt and dtr  Discussed potential detailed discussion with patient and daughter regarding potential maintenance therapy in this setting.  Options for maintenance therapy include bevacizumab IV 15mg/kg  every 3 weeks verses oral PARP  inhibitor.  We discussed the pros and cons of each approach.  We discussed the potential toxicities associated with the various therapies.  Ultimately, it was determined that patient elects to undergo maintenance chemotherapy with IV . Bevacizumab, We discussed the rare but serious adverse effect of GI perforation/hemorrhage associated with IV bevacizumab.  She will also be monitored for proteinuria.  Informed consent was obtained      Repeat BP in chemo infusion  Plan Clonidine 0.1mg x 1   Pt strongly advised on home BP recordings  BP poorly controlled   Consider additional antihypertensive med      Multiple  antihypertensive drugs are being used to treat antiangiogenic therapy-induced hypertension including calcium channel blockers (CCB), inhibitors of the RAHEEM, beta-blockers and diuretics. At this time, however, no clinical evidence favoring one antihypertensive agent over another is available. Specific antihypertensive agents are primarily selected based on patients comorbidities. In our clinical experience, CCB and RAHEEM inhibitors are effective in treating antiangiogenic therapy-induced hypertensionHypertension. 2012 Sep; 60(3): 607-615.    Cbc,cmp, UA prior to chemo and f/u in  3 weeks            Cc: Iesha Feliciano M.D.          Anil Pelayo M.D.

## 2019-10-22 ENCOUNTER — OFFICE VISIT (OUTPATIENT)
Dept: FAMILY MEDICINE | Facility: CLINIC | Age: 84
End: 2019-10-22
Payer: MEDICARE

## 2019-10-22 VITALS
WEIGHT: 183 LBS | OXYGEN SATURATION: 98 % | BODY MASS INDEX: 33.68 KG/M2 | TEMPERATURE: 98 F | SYSTOLIC BLOOD PRESSURE: 140 MMHG | HEIGHT: 62 IN | HEART RATE: 67 BPM | DIASTOLIC BLOOD PRESSURE: 118 MMHG

## 2019-10-22 DIAGNOSIS — R10.10 PAIN OF UPPER ABDOMEN: ICD-10-CM

## 2019-10-22 DIAGNOSIS — T45.1X5A CHEMOTHERAPY-INDUCED NEUROPATHY: ICD-10-CM

## 2019-10-22 DIAGNOSIS — G62.0 CHEMOTHERAPY-INDUCED NEUROPATHY: ICD-10-CM

## 2019-10-22 DIAGNOSIS — N18.4 CKD (CHRONIC KIDNEY DISEASE) STAGE 4, GFR 15-29 ML/MIN: ICD-10-CM

## 2019-10-22 DIAGNOSIS — I10 ESSENTIAL HYPERTENSION: Primary | ICD-10-CM

## 2019-10-22 PROCEDURE — 99214 PR OFFICE/OUTPT VISIT, EST, LEVL IV, 30-39 MIN: ICD-10-PCS | Mod: S$GLB,,, | Performed by: INTERNAL MEDICINE

## 2019-10-22 PROCEDURE — 3077F SYST BP >= 140 MM HG: CPT | Mod: CPTII,S$GLB,, | Performed by: INTERNAL MEDICINE

## 2019-10-22 PROCEDURE — 99214 OFFICE O/P EST MOD 30 MIN: CPT | Mod: S$GLB,,, | Performed by: INTERNAL MEDICINE

## 2019-10-22 PROCEDURE — 3080F PR MOST RECENT DIASTOLIC BLOOD PRESSURE >= 90 MM HG: ICD-10-PCS | Mod: CPTII,S$GLB,, | Performed by: INTERNAL MEDICINE

## 2019-10-22 PROCEDURE — 3077F PR MOST RECENT SYSTOLIC BLOOD PRESSURE >= 140 MM HG: ICD-10-PCS | Mod: CPTII,S$GLB,, | Performed by: INTERNAL MEDICINE

## 2019-10-22 PROCEDURE — 99499 RISK ADDL DX/OHS AUDIT: ICD-10-PCS | Mod: S$GLB,,, | Performed by: INTERNAL MEDICINE

## 2019-10-22 PROCEDURE — 1101F PR PT FALLS ASSESS DOC 0-1 FALLS W/OUT INJ PAST YR: ICD-10-PCS | Mod: CPTII,S$GLB,, | Performed by: INTERNAL MEDICINE

## 2019-10-22 PROCEDURE — 99999 PR PBB SHADOW E&M-EST. PATIENT-LVL III: CPT | Mod: PBBFAC,,, | Performed by: INTERNAL MEDICINE

## 2019-10-22 PROCEDURE — 3080F DIAST BP >= 90 MM HG: CPT | Mod: CPTII,S$GLB,, | Performed by: INTERNAL MEDICINE

## 2019-10-22 PROCEDURE — 1101F PT FALLS ASSESS-DOCD LE1/YR: CPT | Mod: CPTII,S$GLB,, | Performed by: INTERNAL MEDICINE

## 2019-10-22 PROCEDURE — 99999 PR PBB SHADOW E&M-EST. PATIENT-LVL III: ICD-10-PCS | Mod: PBBFAC,,, | Performed by: INTERNAL MEDICINE

## 2019-10-22 PROCEDURE — 99499 UNLISTED E&M SERVICE: CPT | Mod: S$GLB,,, | Performed by: INTERNAL MEDICINE

## 2019-10-22 RX ORDER — VALSARTAN 320 MG/1
320 TABLET ORAL DAILY
Qty: 90 TABLET | Refills: 1 | Status: SHIPPED | OUTPATIENT
Start: 2019-10-22 | End: 2020-03-04

## 2019-10-22 RX ORDER — AMLODIPINE BESYLATE 5 MG/1
5 TABLET ORAL DAILY
Qty: 30 TABLET | Refills: 0 | Status: SHIPPED | OUTPATIENT
Start: 2019-10-22 | End: 2019-11-25 | Stop reason: SDUPTHER

## 2019-10-22 NOTE — PROGRESS NOTES
SUBJECTIVE     Chief Complaint   Patient presents with    Hypertension       HPI  Celine Mahan is a 84 y.o. female with multiple medical diagnoses as listed in the medical history and problem list that presents for follow-up for HTN. Pt has been having continuously elevated BP readings with ranges from 170-203/ with heart ranging from 65-77. Pt has been fully compliant with Valsartan only missing her dosage yesterday. Pt was reportedly told by Heme/Onc-Dr. Reinoso that the current chemo would indeed elevate her BP. She did increase her Diovan 160 from 1 tab to 2 tabs yesterday to get her BP down to it's current reading.    PAST MEDICAL HISTORY:  Past Medical History:   Diagnosis Date    Breast cancer     Cataract     CKD (chronic kidney disease) stage 3, GFR 30-59 ml/min     Gastroesophageal reflux disease 10/17/2019    Hyperlipidemia     Hypertension     Hypothyroidism     Obesity     Osteopenia     Ovarian cancer 10/8/2017    Overactive bladder     Pelvic mass 8/27/2017    Thyroid disease        PAST SURGICAL HISTORY:  Past Surgical History:   Procedure Laterality Date    bt catarac surgery      CHOLECYSTECTOMY      HYSTERECTOMY      INSERTION OF TUNNELED CENTRAL VENOUS CATHETER (CVC) WITH SUBCUTANEOUS PORT N/A 6/3/2019    Procedure: INSERTION, PORT-A-CATH;  [facility];  Location: [facility] OR;  Service: Radiology;  Laterality: N/A;  RN PREOP 5/31/2019    MASTECTOMY Left        SOCIAL HISTORY:  Social History     Socioeconomic History    Marital status:      Spouse name: Not on file    Number of children: Not on file    Years of education: Not on file    Highest education level: Not on file   Occupational History    Not on file   Social Needs    Financial resource strain: Not on file    Food insecurity:     Worry: Not on file     Inability: Not on file    Transportation needs:     Medical: Not on file     Non-medical: Not on file   Tobacco Use    Smoking  status: Former Smoker     Types: Cigarettes     Last attempt to quit: 1977     Years since quittin.8    Smokeless tobacco: Never Used   Substance and Sexual Activity    Alcohol use: No    Drug use: No    Sexual activity: Not Currently   Lifestyle    Physical activity:     Days per week: Not on file     Minutes per session: Not on file    Stress: Not at all   Relationships    Social connections:     Talks on phone: Not on file     Gets together: Not on file     Attends Anabaptist service: Not on file     Active member of club or organization: Not on file     Attends meetings of clubs or organizations: Not on file     Relationship status: Not on file   Other Topics Concern    Not on file   Social History Narrative    Not on file       FAMILY HISTORY:  Family History   Problem Relation Age of Onset    Cancer Mother         pancreas ca       ALLERGIES AND MEDICATIONS: updated and reviewed.  Review of patient's allergies indicates:  No Known Allergies  Current Outpatient Medications   Medication Sig Dispense Refill    garlic 1,000 mg Cap Take by mouth once daily.       levothyroxine (SYNTHROID) 125 MCG tablet Take 1 tablet (125 mcg total) by mouth before breakfast. 90 tablet 3    omeprazole (PRILOSEC) 20 MG capsule TAKE 1 CAPSULE(20 MG) BY MOUTH DAILY AS NEEDED 90 capsule 3    ondansetron (ZOFRAN) 8 MG tablet Take 1 tablet (8 mg total) by mouth every 8 (eight) hours as needed for Nausea. 30 tablet 3    traMADol (ULTRAM) 50 mg tablet TAKE 1 TABLET(50 MG) BY MOUTH EVERY 8 HOURS AS NEEDED FOR PAIN 60 tablet 0    amLODIPine (NORVASC) 5 MG tablet Take 1 tablet (5 mg total) by mouth once daily. 30 tablet 0    valsartan (DIOVAN) 320 MG tablet Take 1 tablet (320 mg total) by mouth once daily. 90 tablet 1     No current facility-administered medications for this visit.        ROS  Review of Systems   Constitutional: Negative for chills and fever.   HENT: Negative for hearing loss and sore throat.    Eyes:  "Negative for visual disturbance.   Respiratory: Negative for cough and shortness of breath.    Cardiovascular: Negative for chest pain, palpitations and leg swelling.   Gastrointestinal: Positive for abdominal pain (B/L upper quadrant). Negative for constipation, diarrhea, nausea and vomiting.   Genitourinary: Negative for dysuria, frequency and urgency.   Musculoskeletal: Negative for arthralgias, joint swelling and myalgias.   Skin: Negative for rash and wound.   Neurological: Negative for headaches.   Psychiatric/Behavioral: Negative for agitation and confusion. The patient is not nervous/anxious.          OBJECTIVE     Physical Exam  Vitals:    10/22/19 1339   BP: (!) 140/118   Pulse: 67   Temp: 97.5 °F (36.4 °C)    Body mass index is 33.47 kg/m².  Weight: 83 kg (182 lb 15.7 oz)   Height: 5' 2" (157.5 cm)     Physical Exam   Constitutional: She is oriented to person, place, and time. She appears well-developed and well-nourished. No distress.   HENT:   Head: Normocephalic and atraumatic.   Right Ear: External ear normal.   Left Ear: External ear normal.   Nose: Nose normal.   Mouth/Throat: Oropharynx is clear and moist.   Eyes: Conjunctivae and EOM are normal. Right eye exhibits no discharge. Left eye exhibits no discharge. No scleral icterus.   Neck: Normal range of motion. Neck supple. No JVD present. No tracheal deviation present.   Cardiovascular: Normal rate, regular rhythm and intact distal pulses. Exam reveals no gallop and no friction rub.   No murmur heard.  Pulmonary/Chest: Effort normal and breath sounds normal. No respiratory distress. She has no wheezes.   Abdominal: Soft. Bowel sounds are normal. She exhibits no distension and no mass. There is tenderness in the left upper quadrant. There is no rebound and no guarding.   Musculoskeletal: Normal range of motion. She exhibits no edema, tenderness or deformity.   Neurological: She is alert and oriented to person, place, and time. She exhibits normal " muscle tone. Coordination normal.   Skin: Skin is warm and dry. No rash noted. No erythema.   Psychiatric: She has a normal mood and affect. Her behavior is normal. Judgment and thought content normal.         Health Maintenance       Date Due Completion Date    Shingles Vaccine (1 of 2) 06/26/1985 ---    DEXA SCAN 08/25/2018 8/25/2015    Influenza Vaccine (1) 11/17/2019 (Originally 9/1/2019) ---    Lipid Panel 12/13/2023 12/13/2018    TETANUS VACCINE 07/28/2026 7/28/2016            ASSESSMENT     84 y.o. female with     1. Essential hypertension    2. Chemotherapy-induced neuropathy    3. CKD (chronic kidney disease) stage 4, GFR 15-29 ml/min    4. Pain of upper abdomen        PLAN:     1. Essential hypertension  - BP elevated above goal of <140/90  - Will increase Diovan from 160 to 320 and add Norvasc  - valsartan (DIOVAN) 320 MG tablet; Take 1 tablet (320 mg total) by mouth once daily.  Dispense: 90 tablet; Refill: 1  - amLODIPine (NORVASC) 5 MG tablet; Take 1 tablet (5 mg total) by mouth once daily.  Dispense: 30 tablet; Refill: 0    2. Chemotherapy-induced neuropathy  - WALKER FOR HOME USE    3. CKD (chronic kidney disease) stage 4, GFR 15-29 ml/min  - Stable; no acute issues  - Monitor    4. Pain of upper abdomen  - Unknown etiology, but again reviewed CT scan from 7/2019 which was normal  - Monitor        RTC in 2 weeks for nurse visit BP check     Jane Domingo MD  10/22/2019 1:58 PM        Follow up in about 2 weeks (around 11/5/2019) for Nurse visit BP check.

## 2019-10-29 ENCOUNTER — HOSPITAL ENCOUNTER (OUTPATIENT)
Dept: RADIOLOGY | Facility: HOSPITAL | Age: 84
Discharge: HOME | End: 2019-10-29
Attending: INTERNAL MEDICINE
Payer: MEDICARE

## 2019-10-29 ENCOUNTER — CLINICAL SUPPORT (OUTPATIENT)
Dept: FAMILY MEDICINE | Facility: CLINIC | Age: 84
End: 2019-10-29
Payer: MEDICARE

## 2019-10-29 DIAGNOSIS — I10 ESSENTIAL HYPERTENSION: Primary | ICD-10-CM

## 2019-10-29 DIAGNOSIS — M85.80 OSTEOPENIA, UNSPECIFIED LOCATION: Primary | ICD-10-CM

## 2019-10-29 DIAGNOSIS — M89.9 BONE DISORDER: ICD-10-CM

## 2019-10-29 PROCEDURE — 77080 DXA BONE DENSITY AXIAL: CPT | Mod: 26,,, | Performed by: RADIOLOGY

## 2019-10-29 PROCEDURE — 99499 UNLISTED E&M SERVICE: CPT | Mod: S$GLB,,, | Performed by: INTERNAL MEDICINE

## 2019-10-29 PROCEDURE — 77080 DXA BONE DENSITY AXIAL: CPT | Mod: TC

## 2019-10-29 PROCEDURE — 77080 DEXA BONE DENSITY SPINE HIP: ICD-10-PCS | Mod: 26,,, | Performed by: RADIOLOGY

## 2019-10-29 PROCEDURE — 99499 NO LOS: ICD-10-PCS | Mod: S$GLB,,, | Performed by: INTERNAL MEDICINE

## 2019-10-29 NOTE — PROGRESS NOTES
Celine Mahan 84 y.o. female is here today for Blood Pressure check.   History of HTN yes.    Review of patient's allergies indicates:  No Known Allergies  Creatinine   Date Value Ref Range Status   10/18/2019 1.7 (H) 0.5 - 1.4 mg/dL Final     Sodium   Date Value Ref Range Status   10/18/2019 137 136 - 145 mmol/L Final     Potassium   Date Value Ref Range Status   10/18/2019 4.8 3.5 - 5.1 mmol/L Final     Comment:     Specimen moderately hemolyzed   ]  Patient verifies taking blood pressure medications on a regular basis at the same time of the day.     Current Outpatient Medications:     amLODIPine (NORVASC) 5 MG tablet, Take 1 tablet (5 mg total) by mouth once daily., Disp: 30 tablet, Rfl: 0    garlic 1,000 mg Cap, Take by mouth once daily. , Disp: , Rfl:     levothyroxine (SYNTHROID) 125 MCG tablet, Take 1 tablet (125 mcg total) by mouth before breakfast., Disp: 90 tablet, Rfl: 3    omeprazole (PRILOSEC) 20 MG capsule, TAKE 1 CAPSULE(20 MG) BY MOUTH DAILY AS NEEDED, Disp: 90 capsule, Rfl: 3    ondansetron (ZOFRAN) 8 MG tablet, Take 1 tablet (8 mg total) by mouth every 8 (eight) hours as needed for Nausea., Disp: 30 tablet, Rfl: 3    traMADol (ULTRAM) 50 mg tablet, TAKE 1 TABLET(50 MG) BY MOUTH EVERY 8 HOURS AS NEEDED FOR PAIN, Disp: 60 tablet, Rfl: 0    valsartan (DIOVAN) 320 MG tablet, Take 1 tablet (320 mg total) by mouth once daily., Disp: 90 tablet, Rfl: 1  Does patient have record of home blood pressure readings no.   Last dose of blood pressure medication was taken at 8:30am.  Patient is asymptomatic.   BP- 124/62.      ,     Dr. Domingo notified.

## 2019-11-06 VITALS — SYSTOLIC BLOOD PRESSURE: 124 MMHG | DIASTOLIC BLOOD PRESSURE: 62 MMHG

## 2019-11-08 ENCOUNTER — INFUSION (OUTPATIENT)
Dept: INFUSION THERAPY | Facility: HOSPITAL | Age: 84
End: 2019-11-08
Attending: INTERNAL MEDICINE
Payer: MEDICARE

## 2019-11-08 ENCOUNTER — TELEPHONE (OUTPATIENT)
Dept: HEMATOLOGY/ONCOLOGY | Facility: CLINIC | Age: 84
End: 2019-11-08

## 2019-11-08 ENCOUNTER — OFFICE VISIT (OUTPATIENT)
Dept: HEMATOLOGY/ONCOLOGY | Facility: CLINIC | Age: 84
End: 2019-11-08
Payer: MEDICARE

## 2019-11-08 VITALS
RESPIRATION RATE: 17 BRPM | OXYGEN SATURATION: 99 % | TEMPERATURE: 98 F | DIASTOLIC BLOOD PRESSURE: 67 MMHG | SYSTOLIC BLOOD PRESSURE: 143 MMHG | HEART RATE: 68 BPM

## 2019-11-08 VITALS
HEIGHT: 62 IN | DIASTOLIC BLOOD PRESSURE: 70 MMHG | WEIGHT: 180.13 LBS | TEMPERATURE: 98 F | SYSTOLIC BLOOD PRESSURE: 170 MMHG | HEART RATE: 51 BPM | BODY MASS INDEX: 33.15 KG/M2 | OXYGEN SATURATION: 98 %

## 2019-11-08 DIAGNOSIS — C56.9 MALIGNANT NEOPLASM OF OVARY, UNSPECIFIED LATERALITY: Primary | ICD-10-CM

## 2019-11-08 DIAGNOSIS — I10 ESSENTIAL HYPERTENSION: ICD-10-CM

## 2019-11-08 DIAGNOSIS — N18.30 CKD (CHRONIC KIDNEY DISEASE) STAGE 3, GFR 30-59 ML/MIN: ICD-10-CM

## 2019-11-08 DIAGNOSIS — M54.2 CHRONIC NECK PAIN: ICD-10-CM

## 2019-11-08 DIAGNOSIS — D64.81 ANEMIA DUE TO CHEMOTHERAPY: ICD-10-CM

## 2019-11-08 DIAGNOSIS — G89.29 CHRONIC NECK PAIN: ICD-10-CM

## 2019-11-08 DIAGNOSIS — T45.1X5A ANEMIA DUE TO CHEMOTHERAPY: ICD-10-CM

## 2019-11-08 LAB
ALBUMIN SERPL BCP-MCNC: 3.8 G/DL (ref 3.5–5.2)
ALP SERPL-CCNC: 73 U/L (ref 55–135)
ALT SERPL W/O P-5'-P-CCNC: 10 U/L (ref 10–44)
ANION GAP SERPL CALC-SCNC: 5 MMOL/L (ref 8–16)
AST SERPL-CCNC: 14 U/L (ref 10–40)
BILIRUB SERPL-MCNC: 0.3 MG/DL (ref 0.1–1)
BILIRUB UR QL STRIP: NEGATIVE
BUN SERPL-MCNC: 33 MG/DL (ref 8–23)
CALCIUM SERPL-MCNC: 9.1 MG/DL (ref 8.7–10.5)
CHLORIDE SERPL-SCNC: 107 MMOL/L (ref 95–110)
CLARITY UR: ABNORMAL
CO2 SERPL-SCNC: 25 MMOL/L (ref 23–29)
COLOR UR: YELLOW
CREAT SERPL-MCNC: 1.8 MG/DL (ref 0.5–1.4)
ERYTHROCYTE [DISTWIDTH] IN BLOOD BY AUTOMATED COUNT: 13.3 % (ref 11.5–14.5)
EST. GFR  (AFRICAN AMERICAN): 29 ML/MIN/1.73 M^2
EST. GFR  (NON AFRICAN AMERICAN): 25 ML/MIN/1.73 M^2
GLUCOSE SERPL-MCNC: 102 MG/DL (ref 70–110)
GLUCOSE UR QL STRIP: NEGATIVE
GRAN CASTS #/AREA URNS LPF: 20 /LPF
HCT VFR BLD AUTO: 34.5 % (ref 37–48.5)
HGB BLD-MCNC: 11 G/DL (ref 12–16)
HGB UR QL STRIP: NEGATIVE
HYALINE CASTS #/AREA URNS LPF: 10 /LPF
IMM GRANULOCYTES # BLD AUTO: 0.05 K/UL (ref 0–0.04)
KETONES UR QL STRIP: NEGATIVE
LEUKOCYTE ESTERASE UR QL STRIP: ABNORMAL
MCH RBC QN AUTO: 29.7 PG (ref 27–31)
MCHC RBC AUTO-ENTMCNC: 31.9 G/DL (ref 32–36)
MCV RBC AUTO: 93 FL (ref 82–98)
MICROSCOPIC COMMENT: ABNORMAL
NEUTROPHILS # BLD AUTO: 2.2 K/UL (ref 1.8–7.7)
NITRITE UR QL STRIP: NEGATIVE
PH UR STRIP: 5 [PH] (ref 5–8)
PLATELET # BLD AUTO: 183 K/UL (ref 150–350)
PMV BLD AUTO: 9.7 FL (ref 9.2–12.9)
POTASSIUM SERPL-SCNC: 4.4 MMOL/L (ref 3.5–5.1)
PROT SERPL-MCNC: 7.2 G/DL (ref 6–8.4)
PROT UR QL STRIP: NEGATIVE
RBC # BLD AUTO: 3.7 M/UL (ref 4–5.4)
RBC #/AREA URNS HPF: 5 /HPF (ref 0–4)
SODIUM SERPL-SCNC: 137 MMOL/L (ref 136–145)
SP GR UR STRIP: 1.02 (ref 1–1.03)
URN SPEC COLLECT METH UR: ABNORMAL
UROBILINOGEN UR STRIP-ACNC: NEGATIVE EU/DL
WBC # BLD AUTO: 3.76 K/UL (ref 3.9–12.7)
WBC #/AREA URNS HPF: 15 /HPF (ref 0–5)

## 2019-11-08 PROCEDURE — 99499 UNLISTED E&M SERVICE: CPT | Mod: S$GLB,,, | Performed by: INTERNAL MEDICINE

## 2019-11-08 PROCEDURE — 81002 URINALYSIS NONAUTO W/O SCOPE: CPT

## 2019-11-08 PROCEDURE — 99214 PR OFFICE/OUTPT VISIT, EST, LEVL IV, 30-39 MIN: ICD-10-PCS | Mod: S$GLB,,, | Performed by: INTERNAL MEDICINE

## 2019-11-08 PROCEDURE — 3078F PR MOST RECENT DIASTOLIC BLOOD PRESSURE < 80 MM HG: ICD-10-PCS | Mod: CPTII,S$GLB,, | Performed by: INTERNAL MEDICINE

## 2019-11-08 PROCEDURE — 3077F PR MOST RECENT SYSTOLIC BLOOD PRESSURE >= 140 MM HG: ICD-10-PCS | Mod: CPTII,S$GLB,, | Performed by: INTERNAL MEDICINE

## 2019-11-08 PROCEDURE — A4216 STERILE WATER/SALINE, 10 ML: HCPCS | Performed by: INTERNAL MEDICINE

## 2019-11-08 PROCEDURE — 99999 PR PBB SHADOW E&M-EST. PATIENT-LVL IV: ICD-10-PCS | Mod: PBBFAC,,, | Performed by: INTERNAL MEDICINE

## 2019-11-08 PROCEDURE — 25000003 PHARM REV CODE 250: Performed by: INTERNAL MEDICINE

## 2019-11-08 PROCEDURE — 3078F DIAST BP <80 MM HG: CPT | Mod: CPTII,S$GLB,, | Performed by: INTERNAL MEDICINE

## 2019-11-08 PROCEDURE — 99214 OFFICE O/P EST MOD 30 MIN: CPT | Mod: S$GLB,,, | Performed by: INTERNAL MEDICINE

## 2019-11-08 PROCEDURE — 99999 PR PBB SHADOW E&M-EST. PATIENT-LVL IV: CPT | Mod: PBBFAC,,, | Performed by: INTERNAL MEDICINE

## 2019-11-08 PROCEDURE — 99499 RISK ADDL DX/OHS AUDIT: ICD-10-PCS | Mod: S$GLB,,, | Performed by: INTERNAL MEDICINE

## 2019-11-08 PROCEDURE — 80053 COMPREHEN METABOLIC PANEL: CPT

## 2019-11-08 PROCEDURE — 1101F PT FALLS ASSESS-DOCD LE1/YR: CPT | Mod: CPTII,S$GLB,, | Performed by: INTERNAL MEDICINE

## 2019-11-08 PROCEDURE — 96413 CHEMO IV INFUSION 1 HR: CPT

## 2019-11-08 PROCEDURE — 1101F PR PT FALLS ASSESS DOC 0-1 FALLS W/OUT INJ PAST YR: ICD-10-PCS | Mod: CPTII,S$GLB,, | Performed by: INTERNAL MEDICINE

## 2019-11-08 PROCEDURE — 85027 COMPLETE CBC AUTOMATED: CPT

## 2019-11-08 PROCEDURE — 36591 DRAW BLOOD OFF VENOUS DEVICE: CPT

## 2019-11-08 PROCEDURE — 3077F SYST BP >= 140 MM HG: CPT | Mod: CPTII,S$GLB,, | Performed by: INTERNAL MEDICINE

## 2019-11-08 PROCEDURE — 81000 URINALYSIS NONAUTO W/SCOPE: CPT

## 2019-11-08 PROCEDURE — 63600175 PHARM REV CODE 636 W HCPCS: Performed by: INTERNAL MEDICINE

## 2019-11-08 RX ORDER — SODIUM CHLORIDE 0.9 % (FLUSH) 0.9 %
10 SYRINGE (ML) INJECTION
Status: DISCONTINUED | OUTPATIENT
Start: 2019-11-08 | End: 2019-11-08 | Stop reason: HOSPADM

## 2019-11-08 RX ORDER — SODIUM CHLORIDE 0.9 % (FLUSH) 0.9 %
10 SYRINGE (ML) INJECTION
Status: CANCELLED | OUTPATIENT
Start: 2019-11-08

## 2019-11-08 RX ORDER — HEPARIN 100 UNIT/ML
500 SYRINGE INTRAVENOUS
Status: CANCELLED | OUTPATIENT
Start: 2019-11-08

## 2019-11-08 RX ORDER — HEPARIN 100 UNIT/ML
500 SYRINGE INTRAVENOUS
Status: DISCONTINUED | OUTPATIENT
Start: 2019-11-08 | End: 2019-11-08 | Stop reason: HOSPADM

## 2019-11-08 RX ADMIN — BEVACIZUMAB 1225 MG: 400 INJECTION, SOLUTION INTRAVENOUS at 10:11

## 2019-11-08 RX ADMIN — HEPARIN 500 UNITS: 100 SYRINGE at 11:11

## 2019-11-08 RX ADMIN — Medication 10 ML: at 11:11

## 2019-11-08 NOTE — PLAN OF CARE
Labs drawn and UA obtained. Patient cleared for chemo following Oncologist visit. Patient received Avastin. Tolerated infusion well. VSS during visit. Final /63 at discharge. Patient followed up with PCP in October for HTN medication adjustments. Patient denied any new or worsening symptoms at this time. She received discharge instructions and follow up appointments. Verbalized understanding and ambulated unassisted off unit accompanied by daughter. Patient in NAD at time of discharge.

## 2019-11-08 NOTE — Clinical Note
avastin today BP prior and post chemoClonidine 0.1mg x 1 if SBP > 160 and/or DBP > 90 post chemo Cbc,cmp and U/A prior to next avastin on 11/29Dr. Wadge to OK next avastin CT cmp,cbc and U/A ( standing orders)  prior to f/u on 12/20/2019

## 2019-11-08 NOTE — PROGRESS NOTES
"Subjective:       Patient ID: Celine Mahan is a 84 y.o. female.    Chief Complaint: Follow-up         Diagnosis: 1.Ovarian cancer status post debulking surgery 9/21/2017                     2. 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 S/p carbo/taxol x 6 completed 2/12/2018                   2. Carbo/Doxil 2/1/2019- 7/8/2019\                   3. Bevacizumab maintenance  8/16/2019 -present         HPI ( per Dr. Feliciano) Patient is an 85 yo female who originally presented as a referral from Dr. Wills for pelvic mass. Patient reports LLQ pain for approximately 3 months which prompted evaluation.      Pelvic US 8/7/17 There is a complex echogenicity midline pelvic mass measuring 18.1 x 8.6 x 7.6 cm.  Ovaries not visualized.     CT A&P 8/9/17 Large solid/cystic mass within the lower abdomen/upper pelvis that is most concerning for a malignant neoplasm, likely of ovarian origin given its location.  Correlation with previous surgical history is recommended noting evidence of previous hysterectomy.  Mass abuts and displaces the adjacent bladder without definite CT findings to suggest hema invasion. Abdominal and pelvic lymphadenopathy concerning for lymphatic spread of neoplasm with index lymph nodes as above.      Medical history is significant for CKD (Cr 1.3), HTN, hypothyroid, HTN, HLD. She has a personal history of breast cancer in 1995 treated with mastectomy and adjuvant chemotherapy she reports. Last MMG 11/2016 normal. Adbominal surgery include cholecystectomy, TVH, xlap/removal ovary for ectopic. She reports that her physician told her "some ovary remains in situ". Family history significant for mother with pancreatic cancer.      CT chest 8/28/17 showed mediastinal adenopathy consistent with Stage IV disease.   RECIST Summary:  Mediastinal adenopathy:  1. Preaortic abnormal node measuring 1.7 cm short axis.  2. Pretracheal node measures 1.6 cm short axis.     She underwent " surgical cytoreduction with xlap/LSO/omentectomy 9/21/17. Pathology showed high grade adenocarcinoma.        She is also  followed by Dr. Feliciano  She  transferred care to undergo  adjuvant chemo at this location   She completed adjuvant chemo with carbo AUC 6 and taxol 175mg/m2 q 21d  2/12/2018  She was considered for PRIMA clinical trial for maintenance therapy   Pt  Declined maintenance therapy      Pt with increasing  levels   849>230>37>23>22>12>40>309 ( 1/9/2019 )       CT c/a/p 1/14/2019   S/P resection of large LEFT ovarian/fallopian tube mass consistent with carcinoma.    Interval increase in size of LEFT pelvic/retroperitoneal lymph nodes.  No evidence for ascites or definitive carcinomatosis of mesentery.    Interval decrease in size of prevascular lymph node.      It was determined she has platinum sensitive recurrent ovarian cancer. Platinum free interval 11 months.   Dr. Feliciano Reviewed standard management options which are to re-treat with platinum-based chemotherapy (carbo + doxil/gem/taxol +/- avastin).   Pt undergoing treatment with Doxil 30mg/m2  Carbo AUC 5  q 28days      She is s/p cycle 6 of chemo  Carbo/doxil completed 7/8/2019   ( dosage reduction  sec to cytopenias)     Accompanied by dtr  Imaging studies 7/2019 reveals stable disease    She is undergoing maintenance therapy with bevacizumab 15mg/kg  q3 wks  She has poorly controlled BP  Dtr reports pt prescribed Amlodipine 5mg 2 wks ago per her PCP  Pt took her BP meds just prior to visit today  No SOB/CP  No fatigue  Appetite and weight stable   She has occasional HA -stable ,chronic  MRI brain 2/27/2019 - no brain mets  She occasionally has minor abd pain   No melena, hematochezia or change in bowel habits  No rashes  No vision changes  No melena, hematochezia or change in bowel habits  Chronic neck pain-stable    She did not take BP readings at home as prev advised    CT c/a/p w/contrast 7/26/2019 -stable findings    MRI brain  2/27/2019 - no brain mets    CBC  reveals a white blood cell count of 3760mm 3 hemoglobin 11grams/dL hematocrit of 34.5% plt ct 183k.    Biochemical profile reveals stable Cr level from 1.8mg/dL  to 1.5mg/dL     [facility] ANALYSIS NEG    Past Medical History:   Diagnosis Date    Breast cancer     Cataract     CKD (chronic kidney disease) stage 3, GFR 30-59 ml/min     Gastroesophageal reflux disease 10/17/2019    Hyperlipidemia     Hypertension     Hypothyroidism     Obesity     Osteopenia     Ovarian cancer 10/8/2017    Overactive bladder     Pelvic mass 8/27/2017    Thyroid disease          Past Surgical History:   Procedure Laterality Date    bt catarac surgery      CHOLECYSTECTOMY      HYSTERECTOMY      INSERTION OF TUNNELED CENTRAL VENOUS CATHETER (CVC) WITH SUBCUTANEOUS PORT N/A 6/3/2019    Procedure: INSERTION, PORT-A-CATH;  Surgeon: Andrey Diagnostic Provider;  Location: [facility] OR;  Service: Radiology;  Laterality: N/A;  RN PREOP 5/31/2019    MASTECTOMY Left              Review of Systems   Constitutional: Negative for appetite change, fatigue, fever and unexpected weight change.   HENT: Negative for mouth sores.    Eyes: Negative for visual disturbance.   Respiratory: Negative for cough and shortness of breath.    Cardiovascular: Negative for chest pain.   Gastrointestinal: Negative for abdominal pain, diarrhea and nausea.   Genitourinary: Negative for frequency.   Musculoskeletal: Positive for neck pain (chronic). Negative for back pain.   Skin: Negative for rash.        No petechiae   Neurological: Positive for numbness. Negative for weakness, light-headedness and headaches.        Intermittent tingling   Hematological: Negative for adenopathy.   Psychiatric/Behavioral: The patient is not nervous/anxious.        Objective:       Vitals:    11/08/19 0928   BP: (!) 170/70   BP Location: Right arm   Patient Position: Sitting   BP Method: Medium (Automatic)   Pulse: (!) 51   Temp: 97.7 °F (36.5 °C)  "  SpO2: 98%   Weight: 81.7 kg (180 lb 1.9 oz)   Height: 5' 2" (1.575 m)       Physical Exam   Constitutional: She is oriented to person, place, and time. She appears well-developed and well-nourished.   HENT:   Head: Normocephalic.   Mouth/Throat: Oropharynx is clear and moist. No oropharyngeal exudate.   Eyes: Conjunctivae and lids are normal. No scleral icterus.   Neck: Normal range of motion. Neck supple. No thyromegaly present.   Cardiovascular: Normal rate, regular rhythm and normal heart sounds.   No murmur heard.  Pulmonary/Chest: Breath sounds normal. She has no wheezes. She has no rales.   Abdominal: Soft. Bowel sounds are normal. She exhibits no distension. There is no hepatosplenomegaly. There is no tenderness.   Well-healed midline surgical scar   Musculoskeletal: Normal range of motion. She exhibits no edema or tenderness.   Lymphadenopathy:     She has no cervical adenopathy.     She has no axillary adenopathy.        Right: No supraclavicular adenopathy present.        Left: No supraclavicular adenopathy present.   Neurological: She is alert and oriented to person, place, and time. No cranial nerve deficit. Coordination normal.   Skin: Skin is warm and dry. No ecchymosis, no petechiae and no rash noted. No erythema.   Psychiatric: She has a normal mood and affect.         Labs:   Lab Results   Component Value Date    WBC 3.76 (L) 11/08/2019    HGB 11.0 (L) 11/08/2019    HCT 34.5 (L) 11/08/2019    MCV 93 11/08/2019     11/08/2019       Results for JONATAN SERRANO (MRN [id number]) as of 4/25/2019 08:52   Ref. Range 2/20/2019 09:44 3/7/2019 07:45 4/2/2019 09:32 4/17/2019 13:08    Latest Ref Range: 0 - 30 U/mL 236 (H)  81 (H) 86 (H)         Results for JONATAN SERRANO (MRN [id number]) as of 7/30/2019 15:34   Ref. Range 6/6/2019 10:00 6/28/2019 09:28 7/5/2019 10:13 7/22/2019 09:40    Latest Ref Range: 0 - 30 U/mL 55 (H)  52 (H) 58 (H)       CT c/a/p 1/14/2019   S/P resection of large LEFT " ovarian/fallopian tube mass consistent with carcinoma.    Interval increase in size of LEFT pelvic/retroperitoneal lymph nodes.  No evidence for ascites or definitive carcinomatosis of mesentery.    Interval decrease in size of prevascular lymph node.    RECIST SUMMARY:    Date of prior examination for comparison: Chest CT 08/28/2017 and CT abdomen and pelvis 08/14/2017    Lesion 1 Chest: Pre aortic lymph node previously measuring 1.7 cm, currently measuring 0.94 cm.  Previously noted prevascular lymph node is stable as compared to prior, with a fatty hilum, of questionable significance..    Lesion 2 abdomen/pelvis: LEFT iliac chain node.  2.3 cm cm. Series 2 image 163.  Prior measurement 1.5 cm cm.    Lesion 3 abdomen/pelvis: LEFT renal hilar lymph node.  1.6 cm cm. Series 2 image 112.  Prior measurement 1.4 cm cm.      MRI Cervical spine w/out contrast 4/30/2019   Cervical degenerative change most significant at C4-5 and C5-6.  Specific details at each level discussed above.      CT c/a/p w/contrast 7/26/2019   Stable lymph nodes as above.  PET-CT scan could be helpful.    Index lesions as follows:    Pre aortic lymph node 9.6 mm, previously 9.7.    Para-aortic lymph node 1.8 cm, previously 1.9.    Left iliac lymph node 1.7 cm, previously 1.5.    Results for JONATAN SERRANO (MRN [id number]) as of 10/18/2019 10:11   Ref. Range 7/5/2019 10:13 7/22/2019 09:40 9/6/2019 08:34    Latest Ref Range: 0 - 30 U/mL 52 (H) 58 (H) 33 (H)   Results for JONATAN SERRANO (MRN [id number]) as of 11/8/2019 09:38   Ref. Range 10/18/2019 09:30    Latest Ref Range: 0 - 30 U/mL 42 (H)       Assessment:       1. Malignant neoplasm of ovary, unspecified laterality    2. CKD (chronic kidney disease) stage 3, GFR 30-59 ml/min    3. Essential hypertension    4. Anemia due to chemotherapy    5. Chronic neck pain        Plan:   1- 5  Pt clinically stable      1.Ovarian cancer status post debulking surgery 9/21/2017   2. Platinum sensitive  recurrent ovarian cancer 1/14/2019   Therapy: 1. S/p carbo/taxol x 6 completed 2/12/2018                   2. Carbo/Doxil     2/1/2019- 7/8/2019                   3. Bevacizumab maintenance  8/16/2019 -present       Patient with ovarian cancer status post debulking surgery 9/21/2017 undergoing adjuvant platinum-based chemotherapy with carbo/taxol .   S/p carbo/taxol x 6 completed 2/12/2018  Pt declined maintenance therapy on trial   BRCA ANALYSIS NEG      CT c/a/p 1/14/2019   S/P resection of large LEFT ovarian/fallopian tube mass consistent with carcinoma.  Interval increase in size of LEFT pelvic/retroperitoneal lymph nodes.  No evidence for ascites or definitive carcinomatosis of mesentery.  Interval decrease in size of prevascular lymph node.    MRI brain 2/27/2019 - NEG for brain mets    It  was determined she has platinum sensitive recurrent ovarian cancer. Platinum free interval 11 months.   Dr. Feliciano Reviewed standard management options which are to re-treat with platinum-based chemotherapy (carbo + doxil/gem/taxol +/- avastin).   Pt underwent treatment with Doxil 30mg/m2  Carbo AUC 5  q 28days    she is s/p cycle 6 of chemo  Carbo/doxil completed 7/8/2019   CT c/a/p w/contrast 7/26/2019-stable findings    Previously Discussed  Dr. Feliciano about maintenance therapy options and this was discussed w/pt and dtr  Discussed potential detailed discussion with patient and daughter regarding potential maintenance therapy in this setting.  Options for maintenance therapy include bevacizumab IV 15mg/kg  every 3 weeks verses oral PARP  inhibitor.  We discussed the pros and cons of each approach.  We discussed the potential toxicities associated with the various therapies.  Ultimately, it was determined that patient elects to undergo maintenance chemotherapy with IV . Bevacizumab, We discussed the rare but serious adverse effect of GI perforation/hemorrhage associated with IV bevacizumab.  She will also be monitored for  proteinuria.  Informed consent was obtained      Repeat BP in chemo infusion  Plan Clonidine 0.1mg x 1 ( based on BP parameters)  Amlodipine 5mg po recently added per PCP  Consider increase to 10mg po if BP remains poorly controlled due to antiangiogenic therapy-induced hypertension   Pt strongly advised on home BP recordings      Multiple antihypertensive drugs are being used to treat antiangiogenic therapy-induced hypertension including calcium channel blockers (CCB), inhibitors of the RAHEEM, beta-blockers and diuretics. At this time, however, no clinical evidence favoring one antihypertensive agent over another is available. Specific antihypertensive agents are primarily selected based on patients comorbidities. In our clinical experience, CCB and RAHEEM inhibitors are effective in treating antiangiogenic therapy-induced hypertensionHypertension. 2012 Sep; 60(3): 607-615.    Cbc,cmp, UA prior to therapy  in  3 weeks      ( Dr. Campo to authorize)  Plan CT imaging prior to f/u   F/u in 6 wks with cbc,cmp,  prior to f/u       Cc: Iesha Feliciano M.D.          Anil Pelayo M.D.

## 2019-11-25 DIAGNOSIS — I10 ESSENTIAL HYPERTENSION: ICD-10-CM

## 2019-11-26 RX ORDER — AMLODIPINE BESYLATE 5 MG/1
TABLET ORAL
Qty: 30 TABLET | Refills: 0 | Status: SHIPPED | OUTPATIENT
Start: 2019-11-26 | End: 2019-12-26

## 2019-11-29 ENCOUNTER — INFUSION (OUTPATIENT)
Dept: INFUSION THERAPY | Facility: HOSPITAL | Age: 84
End: 2019-11-29
Attending: INTERNAL MEDICINE
Payer: MEDICARE

## 2019-11-29 VITALS
DIASTOLIC BLOOD PRESSURE: 63 MMHG | SYSTOLIC BLOOD PRESSURE: 135 MMHG | RESPIRATION RATE: 18 BRPM | OXYGEN SATURATION: 98 % | HEART RATE: 63 BPM | WEIGHT: 180.13 LBS | BODY MASS INDEX: 32.94 KG/M2 | TEMPERATURE: 99 F

## 2019-11-29 DIAGNOSIS — C56.9 MALIGNANT NEOPLASM OF OVARY, UNSPECIFIED LATERALITY: Primary | ICD-10-CM

## 2019-11-29 PROCEDURE — A4216 STERILE WATER/SALINE, 10 ML: HCPCS | Performed by: INTERNAL MEDICINE

## 2019-11-29 PROCEDURE — 96413 CHEMO IV INFUSION 1 HR: CPT

## 2019-11-29 PROCEDURE — 63600175 PHARM REV CODE 636 W HCPCS: Performed by: INTERNAL MEDICINE

## 2019-11-29 PROCEDURE — 25000003 PHARM REV CODE 250: Performed by: INTERNAL MEDICINE

## 2019-11-29 RX ORDER — SODIUM CHLORIDE 0.9 % (FLUSH) 0.9 %
10 SYRINGE (ML) INJECTION
Status: CANCELLED | OUTPATIENT
Start: 2019-11-29

## 2019-11-29 RX ORDER — SODIUM CHLORIDE 0.9 % (FLUSH) 0.9 %
10 SYRINGE (ML) INJECTION
Status: DISCONTINUED | OUTPATIENT
Start: 2019-11-29 | End: 2019-11-29 | Stop reason: HOSPADM

## 2019-11-29 RX ORDER — HEPARIN 100 UNIT/ML
500 SYRINGE INTRAVENOUS
Status: CANCELLED | OUTPATIENT
Start: 2019-11-29

## 2019-11-29 RX ORDER — HEPARIN 100 UNIT/ML
500 SYRINGE INTRAVENOUS
Status: DISCONTINUED | OUTPATIENT
Start: 2019-11-29 | End: 2019-11-29 | Stop reason: HOSPADM

## 2019-11-29 RX ADMIN — Medication 10 ML: at 10:11

## 2019-11-29 RX ADMIN — BEVACIZUMAB 1225 MG: 400 INJECTION, SOLUTION INTRAVENOUS at 10:11

## 2019-11-29 RX ADMIN — HEPARIN 500 UNITS: 100 SYRINGE at 10:11

## 2019-11-29 NOTE — PLAN OF CARE
Pt arrived to unit. Went to lab before arrival. Cleared for chemo per Dr. Zhou. Tolerated Avastin. No reactions noted. AVS given to pt. Pt ambulated off unit, accompanied by daughter. No distress noted.

## 2019-12-19 ENCOUNTER — HOSPITAL ENCOUNTER (OUTPATIENT)
Dept: RADIOLOGY | Facility: HOSPITAL | Age: 84
Discharge: HOME | End: 2019-12-19
Attending: INTERNAL MEDICINE
Payer: MEDICARE

## 2019-12-19 DIAGNOSIS — C56.9 MALIGNANT NEOPLASM OF OVARY, UNSPECIFIED LATERALITY: ICD-10-CM

## 2019-12-19 PROCEDURE — 71250 CT THORAX DX C-: CPT | Mod: 26,,, | Performed by: RADIOLOGY

## 2019-12-19 PROCEDURE — 74176 CT ABD & PELVIS W/O CONTRAST: CPT | Mod: 26,,, | Performed by: RADIOLOGY

## 2019-12-19 PROCEDURE — 71250 CT CHEST ABDOMEN PELVIS WITHOUT CONTRAST(XPD): ICD-10-PCS | Mod: 26,,, | Performed by: RADIOLOGY

## 2019-12-19 PROCEDURE — 71250 CT THORAX DX C-: CPT | Mod: TC

## 2019-12-19 PROCEDURE — 74176 CT ABD & PELVIS W/O CONTRAST: CPT | Mod: TC

## 2019-12-19 PROCEDURE — 74176 CT CHEST ABDOMEN PELVIS WITHOUT CONTRAST(XPD): ICD-10-PCS | Mod: 26,,, | Performed by: RADIOLOGY

## 2019-12-20 ENCOUNTER — INFUSION (OUTPATIENT)
Dept: INFUSION THERAPY | Facility: HOSPITAL | Age: 84
End: 2019-12-20
Attending: INTERNAL MEDICINE
Payer: MEDICARE

## 2019-12-20 ENCOUNTER — OFFICE VISIT (OUTPATIENT)
Dept: HEMATOLOGY/ONCOLOGY | Facility: CLINIC | Age: 84
End: 2019-12-20
Payer: MEDICARE

## 2019-12-20 VITALS
DIASTOLIC BLOOD PRESSURE: 65 MMHG | HEART RATE: 49 BPM | WEIGHT: 182.31 LBS | BODY MASS INDEX: 33.55 KG/M2 | HEIGHT: 62 IN | SYSTOLIC BLOOD PRESSURE: 147 MMHG | OXYGEN SATURATION: 96 % | TEMPERATURE: 98 F

## 2019-12-20 VITALS
TEMPERATURE: 97 F | SYSTOLIC BLOOD PRESSURE: 120 MMHG | HEART RATE: 59 BPM | OXYGEN SATURATION: 98 % | DIASTOLIC BLOOD PRESSURE: 59 MMHG | RESPIRATION RATE: 17 BRPM

## 2019-12-20 DIAGNOSIS — G62.0 CHEMOTHERAPY-INDUCED NEUROPATHY: ICD-10-CM

## 2019-12-20 DIAGNOSIS — T45.1X5A CHEMOTHERAPY-INDUCED NEUROPATHY: ICD-10-CM

## 2019-12-20 DIAGNOSIS — C56.9 MALIGNANT NEOPLASM OF OVARY, UNSPECIFIED LATERALITY: Primary | ICD-10-CM

## 2019-12-20 DIAGNOSIS — N18.30 CKD (CHRONIC KIDNEY DISEASE) STAGE 3, GFR 30-59 ML/MIN: ICD-10-CM

## 2019-12-20 DIAGNOSIS — R97.1 ELEVATED CA-125: ICD-10-CM

## 2019-12-20 DIAGNOSIS — I10 ESSENTIAL HYPERTENSION: ICD-10-CM

## 2019-12-20 PROCEDURE — 3078F PR MOST RECENT DIASTOLIC BLOOD PRESSURE < 80 MM HG: ICD-10-PCS | Mod: CPTII,S$GLB,, | Performed by: INTERNAL MEDICINE

## 2019-12-20 PROCEDURE — 99499 UNLISTED E&M SERVICE: CPT | Mod: S$GLB,,, | Performed by: INTERNAL MEDICINE

## 2019-12-20 PROCEDURE — 63600175 PHARM REV CODE 636 W HCPCS: Performed by: INTERNAL MEDICINE

## 2019-12-20 PROCEDURE — 1126F PR PAIN SEVERITY QUANTIFIED, NO PAIN PRESENT: ICD-10-PCS | Mod: S$GLB,,, | Performed by: INTERNAL MEDICINE

## 2019-12-20 PROCEDURE — A4216 STERILE WATER/SALINE, 10 ML: HCPCS | Performed by: INTERNAL MEDICINE

## 2019-12-20 PROCEDURE — 1159F PR MEDICATION LIST DOCUMENTED IN MEDICAL RECORD: ICD-10-PCS | Mod: S$GLB,,, | Performed by: INTERNAL MEDICINE

## 2019-12-20 PROCEDURE — 3074F SYST BP LT 130 MM HG: CPT | Mod: CPTII,S$GLB,, | Performed by: INTERNAL MEDICINE

## 2019-12-20 PROCEDURE — 99214 OFFICE O/P EST MOD 30 MIN: CPT | Mod: S$GLB,,, | Performed by: INTERNAL MEDICINE

## 2019-12-20 PROCEDURE — 96413 CHEMO IV INFUSION 1 HR: CPT

## 2019-12-20 PROCEDURE — 1126F AMNT PAIN NOTED NONE PRSNT: CPT | Mod: S$GLB,,, | Performed by: INTERNAL MEDICINE

## 2019-12-20 PROCEDURE — 99999 PR PBB SHADOW E&M-EST. PATIENT-LVL III: CPT | Mod: PBBFAC,,, | Performed by: INTERNAL MEDICINE

## 2019-12-20 PROCEDURE — 1159F MED LIST DOCD IN RCRD: CPT | Mod: S$GLB,,, | Performed by: INTERNAL MEDICINE

## 2019-12-20 PROCEDURE — 1101F PR PT FALLS ASSESS DOC 0-1 FALLS W/OUT INJ PAST YR: ICD-10-PCS | Mod: CPTII,S$GLB,, | Performed by: INTERNAL MEDICINE

## 2019-12-20 PROCEDURE — 25000003 PHARM REV CODE 250: Performed by: INTERNAL MEDICINE

## 2019-12-20 PROCEDURE — 99999 PR PBB SHADOW E&M-EST. PATIENT-LVL III: ICD-10-PCS | Mod: PBBFAC,,, | Performed by: INTERNAL MEDICINE

## 2019-12-20 PROCEDURE — 99214 PR OFFICE/OUTPT VISIT, EST, LEVL IV, 30-39 MIN: ICD-10-PCS | Mod: S$GLB,,, | Performed by: INTERNAL MEDICINE

## 2019-12-20 PROCEDURE — 3078F DIAST BP <80 MM HG: CPT | Mod: CPTII,S$GLB,, | Performed by: INTERNAL MEDICINE

## 2019-12-20 PROCEDURE — 99499 RISK ADDL DX/OHS AUDIT: ICD-10-PCS | Mod: S$GLB,,, | Performed by: INTERNAL MEDICINE

## 2019-12-20 PROCEDURE — 3074F PR MOST RECENT SYSTOLIC BLOOD PRESSURE < 130 MM HG: ICD-10-PCS | Mod: CPTII,S$GLB,, | Performed by: INTERNAL MEDICINE

## 2019-12-20 PROCEDURE — 1101F PT FALLS ASSESS-DOCD LE1/YR: CPT | Mod: CPTII,S$GLB,, | Performed by: INTERNAL MEDICINE

## 2019-12-20 RX ORDER — HEPARIN 100 UNIT/ML
500 SYRINGE INTRAVENOUS
Status: CANCELLED | OUTPATIENT
Start: 2019-12-20

## 2019-12-20 RX ORDER — SODIUM CHLORIDE 0.9 % (FLUSH) 0.9 %
10 SYRINGE (ML) INJECTION
Status: DISCONTINUED | OUTPATIENT
Start: 2019-12-20 | End: 2019-12-20 | Stop reason: HOSPADM

## 2019-12-20 RX ORDER — SODIUM CHLORIDE 0.9 % (FLUSH) 0.9 %
10 SYRINGE (ML) INJECTION
Status: CANCELLED | OUTPATIENT
Start: 2019-12-20

## 2019-12-20 RX ORDER — HEPARIN 100 UNIT/ML
500 SYRINGE INTRAVENOUS
Status: DISCONTINUED | OUTPATIENT
Start: 2019-12-20 | End: 2019-12-20 | Stop reason: HOSPADM

## 2019-12-20 RX ADMIN — Medication 10 ML: at 12:12

## 2019-12-20 RX ADMIN — HEPARIN 500 UNITS: 100 SYRINGE at 12:12

## 2019-12-20 RX ADMIN — BEVACIZUMAB 1240 MG: 400 INJECTION, SOLUTION INTRAVENOUS at 11:12

## 2019-12-20 NOTE — PLAN OF CARE
Pt presented for Cycle 7 Avastin infusion. VSS throughout treatment. Pt had labs yesterday and saw Dr. Reinoso in clinic this morning for chemo clearance. Pt reported fatigue, numbness/tingling to hands/feet, and chronic pain. Port accessed using sterile technique. Good blood return noted from port. Tolerated Avastin. Port flushed and heparinized upon completion of treatment. Distress screening tool completed. Lunch provided. AVS provided and reviewed with pt. Pt ambulated unassisted off unit. Pt in NAD at time of discharge.

## 2019-12-20 NOTE — PROGRESS NOTES
"Subjective:       Patient ID: Celine Mahan is a 84 y.o. female.    Chief Complaint: Follow-up         Diagnosis: 1.Ovarian cancer status post debulking surgery 9/21/2017                     2. 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 S/p carbo/taxol x 6 completed 2/12/2018                   2. Carbo/Doxil 2/1/2019- 7/8/2019                   3. Bevacizumab maintenance  8/16/2019 -present         HPI ( per Dr. Feliciano) Patient is an 85 yo female who originally presented as a referral from Dr. Wills for pelvic mass. Patient reports LLQ pain for approximately 3 months which prompted evaluation.      Pelvic US 8/7/17 There is a complex echogenicity midline pelvic mass measuring 18.1 x 8.6 x 7.6 cm.  Ovaries not visualized.     CT A&P 8/9/17 Large solid/cystic mass within the lower abdomen/upper pelvis that is most concerning for a malignant neoplasm, likely of ovarian origin given its location.  Correlation with previous surgical history is recommended noting evidence of previous hysterectomy.  Mass abuts and displaces the adjacent bladder without definite CT findings to suggest hema invasion. Abdominal and pelvic lymphadenopathy concerning for lymphatic spread of neoplasm with index lymph nodes as above.      Medical history is significant for CKD (Cr 1.3), HTN, hypothyroid, HTN, HLD. She has a personal history of breast cancer in 1995 treated with mastectomy and adjuvant chemotherapy she reports. Last MMG 11/2016 normal. Adbominal surgery include cholecystectomy, TVH, xlap/removal ovary for ectopic. She reports that her physician told her "some ovary remains in situ". Family history significant for mother with pancreatic cancer.      CT chest 8/28/17 showed mediastinal adenopathy consistent with Stage IV disease.   RECIST Summary:  Mediastinal adenopathy:  1. Preaortic abnormal node measuring 1.7 cm short axis.  2. Pretracheal node measures 1.6 cm short axis.     She underwent " surgical cytoreduction with xlap/LSO/omentectomy 9/21/17. Pathology showed high grade adenocarcinoma.        She is also  followed by Dr. Feliciano  She  transferred care to undergo  adjuvant chemo at this location   She completed adjuvant chemo with carbo AUC 6 and taxol 175mg/m2 q 21d  2/12/2018  She was considered for PRIMA clinical trial for maintenance therapy   Pt  Declined maintenance therapy      Pt with increasing  levels   849>230>37>23>22>12>40>309 ( 1/9/2019 )       CT c/a/p 1/14/2019   S/P resection of large LEFT ovarian/fallopian tube mass consistent with carcinoma.    Interval increase in size of LEFT pelvic/retroperitoneal lymph nodes.  No evidence for ascites or definitive carcinomatosis of mesentery.    Interval decrease in size of prevascular lymph node.      It was determined she has platinum sensitive recurrent ovarian cancer. Platinum free interval 11 months.   Dr. Feliciano Reviewed standard management options which are to re-treat with platinum-based chemotherapy (carbo + doxil/gem/taxol +/- avastin).   Pt undergoing treatment with Doxil 30mg/m2  Carbo AUC 5  q 28days      She is s/p cycle 6 of chemo  Carbo/doxil completed 7/8/2019   ( dosage reduction  sec to cytopenias)     Accompanied by dtr  Imaging studies 7/2019 reveals stable disease    She is undergoing maintenance therapy with bevacizumab 15mg/kg  q3 wks  She has poorly controlled BP  Dtr reports pt prescribed Amlodipine 5mg 2 wks ago per her PCP  Pt took her BP meds just prior to visit today  No SOB/CP  No fatigue  Appetite and weight stable   She has occasional HA -stable ,chronic  MRI brain 2/27/2019 - no brain mets  She occasionally has minor abd pain   No melena, hematochezia or change in bowel habits  No rashes  No vision changes  No melena, hematochezia or change in bowel habits  Chronic neck pain-stable    She did not take BP readings at home as prev advised    CT c/a/p w/contrast 7/26/2019 -stable findings    MRI brain  2/27/2019 - no brain mets    CBC  reveals a white blood cell count of 3760mm 3 hemoglobin 11grams/dL hematocrit of 34.5% plt ct 183k.    Biochemical profile reveals stable Cr level from 1.8mg/dL  to 1.5mg/dL     [facility] ANALYSIS NEG    Past Medical History:   Diagnosis Date    Breast cancer     Cataract     CKD (chronic kidney disease) stage 3, GFR 30-59 ml/min     Gastroesophageal reflux disease 10/17/2019    Hyperlipidemia     Hypertension     Hypothyroidism     Obesity     Osteopenia     Ovarian cancer 10/8/2017    Overactive bladder     Pelvic mass 8/27/2017    Thyroid disease          Past Surgical History:   Procedure Laterality Date    bt catarac surgery      CHOLECYSTECTOMY      HYSTERECTOMY      INSERTION OF TUNNELED CENTRAL VENOUS CATHETER (CVC) WITH SUBCUTANEOUS PORT N/A 6/3/2019    Procedure: INSERTION, PORT-A-CATH;  Surgeon: Andrey Diagnostic Provider;  Location: [facility] OR;  Service: Radiology;  Laterality: N/A;  RN PREOP 5/31/2019    MASTECTOMY Left              Review of Systems   Constitutional: Negative for appetite change, fatigue, fever and unexpected weight change.   HENT: Negative for mouth sores.    Eyes: Negative for visual disturbance.   Respiratory: Negative for cough and shortness of breath.    Cardiovascular: Negative for chest pain.   Gastrointestinal: Negative for abdominal pain, diarrhea and nausea.   Genitourinary: Negative for frequency.   Musculoskeletal: Positive for neck pain (chronic). Negative for back pain.   Skin: Negative for rash.        No petechiae   Neurological: Positive for numbness. Negative for weakness, light-headedness and headaches.        Intermittent tingling   Hematological: Negative for adenopathy.   Psychiatric/Behavioral: The patient is not nervous/anxious.        Objective:       Vitals:    12/20/19 0936   BP: (!) 147/65   BP Location: Right arm   Patient Position: Sitting   BP Method: Medium (Automatic)   Pulse: (!) 49   Temp: 97.6 °F (36.4 °C)  "  [facility]   SpO2: 96%   Weight: 82.7 kg (182 lb 5.1 oz)   Height: 5' 2" (1.575 m)       Physical Exam   Constitutional: She is oriented to person, place, and time. She appears well-developed and well-nourished.   HENT:   Head: Normocephalic.   Mouth/Throat: Oropharynx is clear and moist. No oropharyngeal exudate.   Eyes: Conjunctivae and lids are normal. No scleral icterus.   Neck: Normal range of motion. Neck supple. No thyromegaly present.   Cardiovascular: Normal rate, regular rhythm and normal heart sounds.   No murmur heard.  Pulmonary/Chest: Breath sounds normal. She has no wheezes. She has no rales.   Abdominal: Soft. Bowel sounds are normal. She exhibits no distension. There is no hepatosplenomegaly. There is no tenderness.   Well-healed midline surgical scar   Musculoskeletal: Normal range of motion. She exhibits no edema or tenderness.   Lymphadenopathy:     She has no cervical adenopathy.     She has no axillary adenopathy.        Right: No supraclavicular adenopathy present.        Left: No supraclavicular adenopathy present.   Neurological: She is alert and oriented to person, place, and time. No cranial nerve deficit. Coordination normal.   Skin: Skin is warm and dry. No ecchymosis, no petechiae and no rash noted. No erythema.   Psychiatric: She has a normal mood and affect.         Labs:   Lab Results   Component Value Date    WBC 5.09 12/19/2019    HGB 12.1 12/19/2019    HCT 37.5 12/19/2019    MCV 92 12/19/2019     12/19/2019       Results for JONATAN SERRANO (MRN [id number]) as of 4/25/2019 08:52   Ref. Range 2/20/2019 09:44 3/7/2019 07:45 4/2/2019 09:32 4/17/2019 13:08    Latest Ref Range: 0 - 30 U/mL 236 (H)  81 (H) 86 (H)         Results for JONATAN SERRANO (MRN [id number]) as of 7/30/2019 15:34   Ref. Range 6/6/2019 10:00 6/28/2019 09:28 7/5/2019 10:13 7/22/2019 09:40    Latest Ref Range: 0 - 30 U/mL 55 (H)  52 (H) 58 (H)       CT c/a/p 1/14/2019   S/P resection of large LEFT " ovarian/fallopian tube mass consistent with carcinoma.    Interval increase in size of LEFT pelvic/retroperitoneal lymph nodes.  No evidence for ascites or definitive carcinomatosis of mesentery.    Interval decrease in size of prevascular lymph node.    RECIST SUMMARY:    Date of prior examination for comparison: Chest CT 08/28/2017 and CT abdomen and pelvis 08/14/2017    Lesion 1 Chest: Pre aortic lymph node previously measuring 1.7 cm, currently measuring 0.94 cm.  Previously noted prevascular lymph node is stable as compared to prior, with a fatty hilum, of questionable significance..    Lesion 2 abdomen/pelvis: LEFT iliac chain node.  2.3 cm cm. Series 2 image 163.  Prior measurement 1.5 cm cm.    Lesion 3 abdomen/pelvis: LEFT renal hilar lymph node.  1.6 cm cm. Series 2 image 112.  Prior measurement 1.4 cm cm.      MRI Cervical spine w/out contrast 4/30/2019   Cervical degenerative change most significant at C4-5 and C5-6.  Specific details at each level discussed above.      CT c/a/p w/contrast 7/26/2019   Stable lymph nodes as above.  PET-CT scan could be helpful.    Index lesions as follows:    Pre aortic lymph node 9.6 mm, previously 9.7.    Para-aortic lymph node 1.8 cm, previously 1.9.    Left iliac lymph node 1.7 cm, previously 1.5.    Results for JONATAN SERRANO (MRN [id number]) as of 10/18/2019 10:11   Ref. Range 7/5/2019 10:13 7/22/2019 09:40 9/6/2019 08:34    Latest Ref Range: 0 - 30 U/mL 52 (H) 58 (H) 33 (H)   Results for JONATAN SERRANO (MRN [id number]) as of 11/8/2019 09:38   Ref. Range 10/18/2019 09:30    Latest Ref Range: 0 - 30 U/mL 42 (H)   Results for JONATAN SERRANO (MRN [id number]) as of 12/20/2019 09:41   Ref. Range 12/19/2019 17:30    Latest Ref Range: 0 - 30 U/mL 84 (H)       Assessment:       1. Malignant neoplasm of ovary, unspecified laterality    2. Chemotherapy-induced neuropathy    3. CKD (chronic kidney disease) stage 3, GFR 30-59 ml/min    4. Essential hypertension     5. Elevated CA-125        Plan:   1- 5  Pt clinically stable      1.Ovarian cancer status post debulking surgery 9/21/2017   2. 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 S/p carbo/taxol x 6 completed 2/12/2018                   2. Carbo/Doxil     2/1/2019- 7/8/2019                   3. Bevacizumab maintenance  8/16/2019 -present       Patient with ovarian cancer status post debulking surgery 9/21/2017 undergoing adjuvant platinum-based chemotherapy with carbo/taxol .   S/p carbo/taxol x 6 completed 2/12/2018  Pt declined maintenance therapy on trial   BRCA ANALYSIS NEG      CT c/a/p 1/14/2019   S/P resection of large LEFT ovarian/fallopian tube mass consistent with carcinoma.  Interval increase in size of LEFT pelvic/retroperitoneal lymph nodes.  No evidence for ascites or definitive carcinomatosis of mesentery.  Interval decrease in size of prevascular lymph node.    MRI brain 2/27/2019 - NEG for brain mets    It  was determined she has platinum sensitive recurrent ovarian cancer. Platinum free interval 11 months.   Dr. Feliciano Reviewed standard management options which are to re-treat with platinum-based chemotherapy (carbo + doxil/gem/taxol +/- avastin).   Pt underwent treatment with Doxil 30mg/m2  Carbo AUC 5  q 28days    she is s/p cycle 6 of chemo  Carbo/doxil completed 7/8/2019   CT c/a/p w/contrast 7/26/2019-stable findings    Previously Discussed  Dr. Feliciano about maintenance therapy options and this was discussed w/pt and dtr  Discussed potential detailed discussion with patient and daughter regarding potential maintenance therapy in this setting.  Options for maintenance therapy include bevacizumab IV 15mg/kg  every 3 weeks verses oral PARP  inhibitor.  We discussed the pros and cons of each approach.  We discussed the potential toxicities associated with the various therapies.  Ultimately, it was determined that patient elects to undergo maintenance chemotherapy with IV .  Bevacizumab, We discussed the rare but serious adverse effect of GI perforation/hemorrhage associated with IV bevacizumab.  She will also be monitored for proteinuria.  Informed consent was obtained      CT a/p w/out contrast 12/19/2019   1. Postsurgical changes of hysterectomy and left salpingo-oophorectomy with unchanged retroperitoneal and pelvic lymph nodes as above.  2. Dilation of the main pulmonary artery which can be seen with pulmonary artery hypertension.  3. Diffuse colonic diverticulosis without evidence of acute diverticulitis.  Index lesions as follows:    Lesion 1: Left pelvic sidewall lymph node measuring 2.0 cm (series 2, image 164), previously 1.8 cm.    Lesion 2: Left periaortic node measuring 1.9 cm (series 2, image 114), previously 1.6 cm.    Amlodipine 5mg po recently added per PCP  Consider increase to 10mg po if BP remains poorly controlled due to antiangiogenic therapy-induced hypertension   Pt strongly advised on home BP recordings      Multiple antihypertensive drugs are being used to treat antiangiogenic therapy-induced hypertension including calcium channel blockers (CCB), inhibitors of the RAHEEM, beta-blockers and diuretics. At this time, however, no clinical evidence favoring one antihypertensive agent over another is available. Specific antihypertensive agents are primarily selected based on patients comorbidities. In our clinical experience, CCB and RAHEEM inhibitors are effective in treating antiangiogenic therapy-induced hypertensionHypertension. 2012 Sep; 60(3): 607-615.      Discussed CT imaging findings with pt   rising     No hema disease progression on CT imaging   Proceed with Didi maintenance  Plan to d/w Dr. Feliciano    Cbc,cmp, UA prior to therapy  in  3 weeks      F/u in 6 wks with cbc,cmp,  prior to f/u     Cc: Iesha Feliciano M.D.          Anil Pelayo M.D.

## 2019-12-26 DIAGNOSIS — I10 ESSENTIAL HYPERTENSION: ICD-10-CM

## 2019-12-26 RX ORDER — AMLODIPINE BESYLATE 5 MG/1
TABLET ORAL
Qty: 30 TABLET | Refills: 0 | Status: SHIPPED | OUTPATIENT
Start: 2019-12-26 | End: 2019-12-26 | Stop reason: SDUPTHER

## 2019-12-26 RX ORDER — AMLODIPINE BESYLATE 5 MG/1
5 TABLET ORAL DAILY
Qty: 90 TABLET | Refills: 2 | Status: SHIPPED | OUTPATIENT
Start: 2019-12-26 | End: 2020-03-18 | Stop reason: SDUPTHER

## 2019-12-26 NOTE — TELEPHONE ENCOUNTER
Last Office Visit Info:   The patient's last visit with Jane Domingo MD was on Visit date not found.    The patient's last visit in current department was on 10/29/2019.        Last CBC Results:   Lab Results   Component Value Date    WBC 5.09 12/19/2019    HGB 12.1 12/19/2019    HCT 37.5 12/19/2019     12/19/2019       Last CMP Results  Lab Results   Component Value Date     12/19/2019    K 5.0 12/19/2019     12/19/2019    CO2 22 (L) 12/19/2019    BUN 32 (H) 12/19/2019    CREATININE 1.9 (H) 12/19/2019    CALCIUM 9.4 12/19/2019    ALBUMIN 3.8 12/19/2019    AST 14 12/19/2019    ALT 13 12/19/2019       Last Lipids  Lab Results   Component Value Date    CHOL 195 12/13/2018    TRIG 112 12/13/2018    HDL 57 12/13/2018    LDLCALC 115.6 12/13/2018       Last A1C  No results found for: HGBA1C    Last TSH  Lab Results   Component Value Date    TSH 1.761 12/13/2018         Current Med Refills  Medication List with Changes/Refills   Current Medications    AMLODIPINE (NORVASC) 5 MG TABLET    TAKE ONE TABLET BY MOUTH ONCE DAILY FOR BLOOD PRESSURE       Start Date: 11/26/2019End Date: --    GARLIC 1,000 MG CAP    Take by mouth once daily.        Start Date: --        End Date: --    LEVOTHYROXINE (SYNTHROID) 125 MCG TABLET    Take 1 tablet (125 mcg total) by mouth before breakfast.       Start Date: 9/16/2019 End Date: 9/15/2020    OMEPRAZOLE (PRILOSEC) 20 MG CAPSULE    TAKE 1 CAPSULE(20 MG) BY MOUTH DAILY AS NEEDED       Start Date: 10/17/2019End Date: --    ONDANSETRON (ZOFRAN) 8 MG TABLET    Take 1 tablet (8 mg total) by mouth every 8 (eight) hours as needed for Nausea.       Start Date: 4/9/2019  End Date: --    TRAMADOL (ULTRAM) 50 MG TABLET    TAKE 1 TABLET(50 MG) BY MOUTH EVERY 8 HOURS AS NEEDED FOR PAIN       Start Date: 9/16/2019 End Date: --    VALSARTAN (DIOVAN) 320 MG TABLET    Take 1 tablet (320 mg total) by mouth once daily.       Start Date: 10/22/2019End Date: 10/21/2020

## 2020-01-10 ENCOUNTER — INFUSION (OUTPATIENT)
Dept: INFUSION THERAPY | Facility: HOSPITAL | Age: 85
End: 2020-01-10
Attending: INTERNAL MEDICINE
Payer: MEDICARE

## 2020-01-10 VITALS
SYSTOLIC BLOOD PRESSURE: 137 MMHG | TEMPERATURE: 98 F | HEART RATE: 60 BPM | WEIGHT: 182.31 LBS | DIASTOLIC BLOOD PRESSURE: 60 MMHG | OXYGEN SATURATION: 99 % | BODY MASS INDEX: 33.35 KG/M2 | RESPIRATION RATE: 17 BRPM

## 2020-01-10 DIAGNOSIS — C56.9 MALIGNANT NEOPLASM OF OVARY, UNSPECIFIED LATERALITY: Primary | ICD-10-CM

## 2020-01-10 PROCEDURE — 63600175 PHARM REV CODE 636 W HCPCS: Performed by: INTERNAL MEDICINE

## 2020-01-10 PROCEDURE — A4216 STERILE WATER/SALINE, 10 ML: HCPCS | Performed by: INTERNAL MEDICINE

## 2020-01-10 PROCEDURE — 96413 CHEMO IV INFUSION 1 HR: CPT

## 2020-01-10 PROCEDURE — 25000003 PHARM REV CODE 250: Performed by: INTERNAL MEDICINE

## 2020-01-10 RX ORDER — HEPARIN 100 UNIT/ML
500 SYRINGE INTRAVENOUS
Status: DISCONTINUED | OUTPATIENT
Start: 2020-01-10 | End: 2020-01-10 | Stop reason: HOSPADM

## 2020-01-10 RX ORDER — HEPARIN 100 UNIT/ML
500 SYRINGE INTRAVENOUS
Status: CANCELLED | OUTPATIENT
Start: 2020-01-10

## 2020-01-10 RX ORDER — SODIUM CHLORIDE 0.9 % (FLUSH) 0.9 %
10 SYRINGE (ML) INJECTION
Status: CANCELLED | OUTPATIENT
Start: 2020-01-10

## 2020-01-10 RX ORDER — SODIUM CHLORIDE 0.9 % (FLUSH) 0.9 %
10 SYRINGE (ML) INJECTION
Status: DISCONTINUED | OUTPATIENT
Start: 2020-01-10 | End: 2020-01-10 | Stop reason: HOSPADM

## 2020-01-10 RX ADMIN — Medication 10 ML: at 11:01

## 2020-01-10 RX ADMIN — HEPARIN 500 UNITS: 100 SYRINGE at 11:01

## 2020-01-10 RX ADMIN — BEVACIZUMAB 1240 MG: 400 INJECTION, SOLUTION INTRAVENOUS at 10:01

## 2020-01-10 NOTE — PLAN OF CARE
Pt presented for Avastin infusion. Pt went to lab this morning. Per Dr. Reinoso, labs within treatment parameters-okay to proceed with chemo as scheduled. VSS pre and post. Pt reported fatigue, numbness/tingling to hands/feet, and chronic pain. Port accessed using sterile technique. Good blood return noted from port. Tolerated Avastin. Port flushed and heparinized upon completion of treatment. Distress screening tool completed. AVS provided. Accompanied by daughter, Stacia. Pt ambulated unassisted off unit. Pt in NAD at time of discharge.

## 2020-01-31 ENCOUNTER — OFFICE VISIT (OUTPATIENT)
Dept: HEMATOLOGY/ONCOLOGY | Facility: CLINIC | Age: 85
End: 2020-01-31
Payer: MEDICARE

## 2020-01-31 ENCOUNTER — INFUSION (OUTPATIENT)
Dept: INFUSION THERAPY | Facility: HOSPITAL | Age: 85
End: 2020-01-31
Attending: INTERNAL MEDICINE
Payer: MEDICARE

## 2020-01-31 VITALS
SYSTOLIC BLOOD PRESSURE: 133 MMHG | OXYGEN SATURATION: 97 % | RESPIRATION RATE: 17 BRPM | TEMPERATURE: 98 F | HEART RATE: 68 BPM | DIASTOLIC BLOOD PRESSURE: 63 MMHG

## 2020-01-31 VITALS
DIASTOLIC BLOOD PRESSURE: 69 MMHG | HEIGHT: 62 IN | TEMPERATURE: 98 F | BODY MASS INDEX: 33.95 KG/M2 | HEART RATE: 69 BPM | SYSTOLIC BLOOD PRESSURE: 149 MMHG | OXYGEN SATURATION: 95 % | WEIGHT: 184.5 LBS

## 2020-01-31 DIAGNOSIS — T45.1X5A CHEMOTHERAPY-INDUCED NEUROPATHY: ICD-10-CM

## 2020-01-31 DIAGNOSIS — C56.9 MALIGNANT NEOPLASM OF OVARY, UNSPECIFIED LATERALITY: Primary | ICD-10-CM

## 2020-01-31 DIAGNOSIS — G62.0 CHEMOTHERAPY-INDUCED NEUROPATHY: ICD-10-CM

## 2020-01-31 DIAGNOSIS — R82.90 ABNORMAL URINALYSIS: ICD-10-CM

## 2020-01-31 DIAGNOSIS — N18.30 CKD (CHRONIC KIDNEY DISEASE) STAGE 3, GFR 30-59 ML/MIN: ICD-10-CM

## 2020-01-31 DIAGNOSIS — R97.1 ELEVATED CA-125: ICD-10-CM

## 2020-01-31 DIAGNOSIS — I10 ESSENTIAL HYPERTENSION: ICD-10-CM

## 2020-01-31 PROCEDURE — 3078F PR MOST RECENT DIASTOLIC BLOOD PRESSURE < 80 MM HG: ICD-10-PCS | Mod: CPTII,S$GLB,, | Performed by: INTERNAL MEDICINE

## 2020-01-31 PROCEDURE — 63600175 PHARM REV CODE 636 W HCPCS: Performed by: INTERNAL MEDICINE

## 2020-01-31 PROCEDURE — 99999 PR PBB SHADOW E&M-EST. PATIENT-LVL III: CPT | Mod: PBBFAC,,, | Performed by: INTERNAL MEDICINE

## 2020-01-31 PROCEDURE — 1101F PR PT FALLS ASSESS DOC 0-1 FALLS W/OUT INJ PAST YR: ICD-10-PCS | Mod: CPTII,S$GLB,, | Performed by: INTERNAL MEDICINE

## 2020-01-31 PROCEDURE — 1125F PR PAIN SEVERITY QUANTIFIED, PAIN PRESENT: ICD-10-PCS | Mod: S$GLB,,, | Performed by: INTERNAL MEDICINE

## 2020-01-31 PROCEDURE — 99499 UNLISTED E&M SERVICE: CPT | Mod: S$GLB,,, | Performed by: INTERNAL MEDICINE

## 2020-01-31 PROCEDURE — 3074F PR MOST RECENT SYSTOLIC BLOOD PRESSURE < 130 MM HG: ICD-10-PCS | Mod: CPTII,S$GLB,, | Performed by: INTERNAL MEDICINE

## 2020-01-31 PROCEDURE — A4216 STERILE WATER/SALINE, 10 ML: HCPCS | Performed by: INTERNAL MEDICINE

## 2020-01-31 PROCEDURE — 3078F DIAST BP <80 MM HG: CPT | Mod: CPTII,S$GLB,, | Performed by: INTERNAL MEDICINE

## 2020-01-31 PROCEDURE — 96413 CHEMO IV INFUSION 1 HR: CPT

## 2020-01-31 PROCEDURE — 99214 PR OFFICE/OUTPT VISIT, EST, LEVL IV, 30-39 MIN: ICD-10-PCS | Mod: S$GLB,,, | Performed by: INTERNAL MEDICINE

## 2020-01-31 PROCEDURE — 1101F PT FALLS ASSESS-DOCD LE1/YR: CPT | Mod: CPTII,S$GLB,, | Performed by: INTERNAL MEDICINE

## 2020-01-31 PROCEDURE — 99499 RISK ADDL DX/OHS AUDIT: ICD-10-PCS | Mod: S$GLB,,, | Performed by: INTERNAL MEDICINE

## 2020-01-31 PROCEDURE — 99999 PR PBB SHADOW E&M-EST. PATIENT-LVL III: ICD-10-PCS | Mod: PBBFAC,,, | Performed by: INTERNAL MEDICINE

## 2020-01-31 PROCEDURE — 87077 CULTURE AEROBIC IDENTIFY: CPT

## 2020-01-31 PROCEDURE — 25000003 PHARM REV CODE 250: Performed by: INTERNAL MEDICINE

## 2020-01-31 PROCEDURE — 3074F SYST BP LT 130 MM HG: CPT | Mod: CPTII,S$GLB,, | Performed by: INTERNAL MEDICINE

## 2020-01-31 PROCEDURE — 1125F AMNT PAIN NOTED PAIN PRSNT: CPT | Mod: S$GLB,,, | Performed by: INTERNAL MEDICINE

## 2020-01-31 PROCEDURE — 87086 URINE CULTURE/COLONY COUNT: CPT

## 2020-01-31 PROCEDURE — 87186 SC STD MICRODIL/AGAR DIL: CPT

## 2020-01-31 PROCEDURE — 1159F PR MEDICATION LIST DOCUMENTED IN MEDICAL RECORD: ICD-10-PCS | Mod: S$GLB,,, | Performed by: INTERNAL MEDICINE

## 2020-01-31 PROCEDURE — 99214 OFFICE O/P EST MOD 30 MIN: CPT | Mod: S$GLB,,, | Performed by: INTERNAL MEDICINE

## 2020-01-31 PROCEDURE — 1159F MED LIST DOCD IN RCRD: CPT | Mod: S$GLB,,, | Performed by: INTERNAL MEDICINE

## 2020-01-31 PROCEDURE — 87088 URINE BACTERIA CULTURE: CPT

## 2020-01-31 RX ORDER — SODIUM CHLORIDE 0.9 % (FLUSH) 0.9 %
10 SYRINGE (ML) INJECTION
Status: CANCELLED | OUTPATIENT
Start: 2020-01-31

## 2020-01-31 RX ORDER — HEPARIN 100 UNIT/ML
500 SYRINGE INTRAVENOUS
Status: CANCELLED | OUTPATIENT
Start: 2020-01-31

## 2020-01-31 RX ORDER — HEPARIN 100 UNIT/ML
500 SYRINGE INTRAVENOUS
Status: DISCONTINUED | OUTPATIENT
Start: 2020-01-31 | End: 2020-01-31 | Stop reason: HOSPADM

## 2020-01-31 RX ORDER — SODIUM CHLORIDE 0.9 % (FLUSH) 0.9 %
10 SYRINGE (ML) INJECTION
Status: DISCONTINUED | OUTPATIENT
Start: 2020-01-31 | End: 2020-01-31 | Stop reason: HOSPADM

## 2020-01-31 RX ADMIN — HEPARIN 500 UNITS: 100 SYRINGE at 12:01

## 2020-01-31 RX ADMIN — Medication 10 ML: at 12:01

## 2020-01-31 RX ADMIN — BEVACIZUMAB 1255 MG: 400 INJECTION, SOLUTION INTRAVENOUS at 11:01

## 2020-01-31 NOTE — PLAN OF CARE
Patient arrived to unit for Avastin. Patient denies any new or worsening symptoms at this time. Plan of care reviewed, patient agreeable to plan. Patient tolerated treatment well. No sign of reaction noted. VSS. Clean catch urine culture sent to lab. Patient received discharge instructions and follow up appointments. Patient instructed to return 2/21/20 for chemo. Verbalized understanding and ambulated unassisted off unit accompanied by family. Patient in NAD at time of discharge.

## 2020-01-31 NOTE — PROGRESS NOTES
"Subjective:       Patient ID: Celine Mahan is a 84 y.o. female.    Chief Complaint: Follow-up         Diagnosis: 1.Ovarian cancer status post debulking surgery 9/21/2017                     2. 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 S/p carbo/taxol x 6 completed 2/12/2018                   2. Carbo/Doxil 2/1/2019- 7/8/2019                   3. Bevacizumab maintenance  8/16/2019 -present         HPI ( per Dr. Feliciano) Patient is an 83 yo female who originally presented as a referral from Dr. Wills for pelvic mass. Patient reports LLQ pain for approximately 3 months which prompted evaluation.      Pelvic US 8/7/17 There is a complex echogenicity midline pelvic mass measuring 18.1 x 8.6 x 7.6 cm.  Ovaries not visualized.     CT A&P 8/9/17 Large solid/cystic mass within the lower abdomen/upper pelvis that is most concerning for a malignant neoplasm, likely of ovarian origin given its location.  Correlation with previous surgical history is recommended noting evidence of previous hysterectomy.  Mass abuts and displaces the adjacent bladder without definite CT findings to suggest hema invasion. Abdominal and pelvic lymphadenopathy concerning for lymphatic spread of neoplasm with index lymph nodes as above.      Medical history is significant for CKD (Cr 1.3), HTN, hypothyroid, HTN, HLD. She has a personal history of breast cancer in 1995 treated with mastectomy and adjuvant chemotherapy she reports. Last MMG 11/2016 normal. Adbominal surgery include cholecystectomy, TVH, xlap/removal ovary for ectopic. She reports that her physician told her "some ovary remains in situ". Family history significant for mother with pancreatic cancer.      CT chest 8/28/17 showed mediastinal adenopathy consistent with Stage IV disease.   RECIST Summary:  Mediastinal adenopathy:  1. Preaortic abnormal node measuring 1.7 cm short axis.  2. Pretracheal node measures 1.6 cm short axis.     She underwent " "surgical cytoreduction with xlap/LSO/omentectomy 9/21/17. Pathology showed high grade adenocarcinoma.        She is also  followed by Dr. Feliciano  She  transferred care to undergo  adjuvant chemo at this location   She completed adjuvant chemo with carbo AUC 6 and taxol 175mg/m2 q 21d  2/12/2018  She was considered for PRIMA clinical trial for maintenance therapy   Pt  Declined maintenance therapy      Pt with increasing  levels   849>230>37>23>22>12>40>309 ( 1/9/2019 )       CT c/a/p 1/14/2019   S/P resection of large LEFT ovarian/fallopian tube mass consistent with carcinoma.    Interval increase in size of LEFT pelvic/retroperitoneal lymph nodes.  No evidence for ascites or definitive carcinomatosis of mesentery.    Interval decrease in size of prevascular lymph node.      It was determined she has platinum sensitive recurrent ovarian cancer. Platinum free interval 11 months.   Dr. Feliciano Reviewed standard management options which are to re-treat with platinum-based chemotherapy (carbo + doxil/gem/taxol +/- avastin).   Pt undergoing treatment with Doxil 30mg/m2  Carbo AUC 5  q 28days      She is s/p cycle 6 of chemo  Carbo/doxil completed 7/8/2019   ( dosage reduction  sec to cytopenias)     Accompanied by dtr  Imaging studies 7/2019 reveals stable disease    She is undergoing maintenance therapy with bevacizumab 15mg/kg  q3 wks  She has hx of poorly controlled BP  She was started on  Amlodipine 5mg  per her PCP    Today, she c/o of " ringing in her left ear"  No SOB/CP  No fatigue  Appetite and weight stable   She has occasional HA -stable ,chronic  MRI brain 2/27/2019 - no brain mets  No melena, hematochezia or change in bowel habits  No rashes  No melena, hematochezia or change in bowel habits  Chronic neck pain-stable        CT c/a/p w/contrast 12/18/2019 -stable findings    MRI brain 2/27/2019 - no brain mets    CBC  reveals a white blood cell count of  4090mm 3 hemoglobin 11.9 grams/dL hematocrit of " "38% plt ct 189k.    Biochemical profile reveals stable Cr level from 1.8mg/dL  to 1.5mg/dL     [facility] ANALYSIS NEG    Past Medical History:   Diagnosis Date    Breast cancer     Cataract     CKD (chronic kidney disease) stage 3, GFR 30-59 ml/min     Essential hypertension 8/22/2012    Gastroesophageal reflux disease 10/17/2019    Hyperlipidemia     Hypertension     Hypothyroidism     Obesity     Osteopenia     Ovarian cancer 10/8/2017    Overactive bladder     Pelvic mass 8/27/2017    Thyroid disease          Past Surgical History:   Procedure Laterality Date    bt catarac surgery      CHOLECYSTECTOMY      HYSTERECTOMY      INSERTION OF TUNNELED CENTRAL VENOUS CATHETER (CVC) WITH SUBCUTANEOUS PORT N/A 6/3/2019    Procedure: INSERTION, PORT-A-CATH;  Surgeon: Andrey Diagnostic Provider;  Location: [facility] OR;  Service: Radiology;  Laterality: N/A;  RN PREOP 5/31/2019    MASTECTOMY Left              Review of Systems   Constitutional: Negative for appetite change, fatigue, fever and unexpected weight change.   HENT: Negative for mouth sores.    Eyes: Negative for visual disturbance.   Respiratory: Negative for cough and shortness of breath.    Cardiovascular: Negative for chest pain.   Gastrointestinal: Negative for abdominal pain, diarrhea and nausea.   Genitourinary: Negative for frequency.   Musculoskeletal: Positive for neck pain (chronic). Negative for back pain.   Skin: Negative for rash.        No petechiae   Neurological: Positive for numbness. Negative for weakness, light-headedness and headaches.        Intermittent tingling   Hematological: Negative for adenopathy.   Psychiatric/Behavioral: The patient is not nervous/anxious.        Objective:       Vitals:    01/31/20 0958   BP: (!) 149/69   BP Location: Right arm   Patient Position: Sitting   BP Method: Medium (Automatic)   Pulse: 69   Temp: 97.7 °F (36.5 °C)   TempSrc: Oral   SpO2: 95%   Weight: 83.7 kg (184 lb 8.4 oz)   Height: 5' 2" (1.575 " m)       Physical Exam   Constitutional: She is oriented to person, place, and time. She appears well-developed and well-nourished.   HENT:   Head: Normocephalic.   Mouth/Throat: Oropharynx is clear and moist. No oropharyngeal exudate.   Eyes: Conjunctivae and lids are normal. No scleral icterus.   Neck: Normal range of motion. Neck supple. No thyromegaly present.   Cardiovascular: Normal rate, regular rhythm and normal heart sounds.   No murmur heard.  Pulmonary/Chest: Breath sounds normal. She has no wheezes. She has no rales.   Abdominal: Soft. Bowel sounds are normal. She exhibits no distension. There is no hepatosplenomegaly. There is no tenderness.   Well-healed midline surgical scar   Musculoskeletal: Normal range of motion. She exhibits no edema or tenderness.   Lymphadenopathy:     She has no cervical adenopathy.     She has no axillary adenopathy.        Right: No supraclavicular adenopathy present.        Left: No supraclavicular adenopathy present.   Neurological: She is alert and oriented to person, place, and time. No cranial nerve deficit. Coordination normal.   Skin: Skin is warm and dry. No ecchymosis, no petechiae and no rash noted. No erythema.   Psychiatric: She has a normal mood and affect.         Labs:   Lab Results   Component Value Date    WBC 4.09 01/31/2020    HGB 11.9 (L) 01/31/2020    HCT 38.0 01/31/2020    MCV 91 01/31/2020     01/31/2020       Results for JONATAN SERRANO (MRN [id number]) as of 4/25/2019 08:52   Ref. Range 2/20/2019 09:44 3/7/2019 07:45 4/2/2019 09:32 4/17/2019 13:08    Latest Ref Range: 0 - 30 U/mL 236 (H)  81 (H) 86 (H)         Results for JONATAN SERRANO (MRN [id number]) as of 7/30/2019 15:34   Ref. Range 6/6/2019 10:00 6/28/2019 09:28 7/5/2019 10:13 7/22/2019 09:40    Latest Ref Range: 0 - 30 U/mL 55 (H)  52 (H) 58 (H)     MRI Cervical spine w/out contrast 4/30/2019   Cervical degenerative change most significant at C4-5 and C5-6.  Specific details at  each level discussed above.     CT c/a/p w/out contrast 12/19/2019   1. Postsurgical changes of hysterectomy and left salpingo-oophorectomy with unchanged retroperitoneal and pelvic lymph nodes as above.  2. Dilation of the main pulmonary artery which can be seen with pulmonary artery hypertension.  3. Diffuse colonic diverticulosis without evidence of acute diverticulitis.  Index lesions as follows:    Lesion 1: Left pelvic sidewall lymph node measuring 2.0 cm (series 2, image 164), previously 1.8 cm.    Lesion 2: Left periaortic node measuring 1.9 cm (series 2, image 114), previously 1.6 cm.      Results for JONATAN SERRANO (MRN [id number]) as of 10/18/2019 10:11   Ref. Range 7/5/2019 10:13 7/22/2019 09:40 9/6/2019 08:34    Latest Ref Range: 0 - 30 U/mL 52 (H) 58 (H) 33 (H)   Results for JONATAN SERRANO (MRN [id number]) as of 11/8/2019 09:38   Ref. Range 10/18/2019 09:30    Latest Ref Range: 0 - 30 U/mL 42 (H)   Results for JONATAN SERRANO (MRN [id number]) as of 12/20/2019 09:41   Ref. Range 12/19/2019 17:30    Latest Ref Range: 0 - 30 U/mL 84 (H)       Assessment:       1. Malignant neoplasm of ovary, unspecified laterality    2. Chemotherapy-induced neuropathy    3. CKD (chronic kidney disease) stage 3, GFR 30-59 ml/min    4. Essential hypertension    5. Elevated CA-125        Plan:   1- 5  Pt clinically stable      1.Ovarian cancer status post debulking surgery 9/21/2017   2. 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 S/p carbo/taxol x 6 completed 2/12/2018                   2. Carbo/Doxil     2/1/2019- 7/8/2019                   3. Bevacizumab maintenance  8/16/2019 -present       Patient with ovarian cancer status post debulking surgery 9/21/2017 undergoing adjuvant platinum-based chemotherapy with carbo/taxol .   S/p carbo/taxol x 6 completed 2/12/2018  Pt declined maintenance therapy on trial   [facility] ANALYSIS NEG      CT c/a/p 1/14/2019   S/P resection of large LEFT  ovarian/fallopian tube mass consistent with carcinoma.  Interval increase in size of LEFT pelvic/retroperitoneal lymph nodes.  No evidence for ascites or definitive carcinomatosis of mesentery.  Interval decrease in size of prevascular lymph node.    MRI brain 2/27/2019 - NEG for brain mets    It  was determined she has platinum sensitive recurrent ovarian cancer. Platinum free interval 11 months.   Dr. Feliciano Reviewed standard management options which are to re-treat with platinum-based chemotherapy (carbo + doxil/gem/taxol +/- avastin).   Pt underwent treatment with Doxil 30mg/m2  Carbo AUC 5  q 28days    she is s/p cycle 6 of chemo  Carbo/doxil completed 7/8/2019   CT c/a/p w/contrast 7/26/2019-stable findings    Previously Discussed  Dr. Feliciano about maintenance therapy options and this was discussed w/pt and dtr  Discussed potential detailed discussion with patient and daughter regarding potential maintenance therapy in this setting.  Options for maintenance therapy include bevacizumab IV 15mg/kg  every 3 weeks verses oral PARP  inhibitor.  We discussed the pros and cons of each approach.  We discussed the potential toxicities associated with the various therapies.  Ultimately, it was determined that patient elects to undergo maintenance chemotherapy with IV . Bevacizumab, We discussed the rare but serious adverse effect of GI perforation/hemorrhage associated with IV bevacizumab.  She will also be monitored for proteinuria.  Informed consent was obtained      CT a/p w/out contrast 12/19/2019   1. Postsurgical changes of hysterectomy and left salpingo-oophorectomy with unchanged retroperitoneal and pelvic lymph nodes as above.  2. Dilation of the main pulmonary artery which can be seen with pulmonary artery hypertension.  3. Diffuse colonic diverticulosis without evidence of acute diverticulitis.  Index lesions as follows:    Lesion 1: Left pelvic sidewall lymph node measuring 2.0 cm (series 2, image 164),  previously 1.8 cm.    Lesion 2: Left periaortic node measuring 1.9 cm (series 2, image 114), previously 1.6 cm.      Multiple antihypertensive drugs are being used to treat antiangiogenic therapy-induced hypertension including calcium channel blockers (CCB), inhibitors of the RAHEEM, beta-blockers and diuretics. At this time, however, no clinical evidence favoring one antihypertensive agent over another is available. Specific antihypertensive agents are primarily selected based on patients comorbidities. In our clinical experience, CCB and RAHEEM inhibitors are effective in treating antiangiogenic therapy-induced hypertensionHypertension. 2012 Sep; 60(3): 607-615.      No hema disease progression on CT imaging   Proceed with Didi maintenance  D/w dr. Feliciano    Cbc,cmp, UA prior to therapy  in  3 weeks      F/u in 6 wks with cbc,cmp,  prior to f/u     Cc: Iesha Feliciano M.D.          Anil Pelayo M.D.

## 2020-01-31 NOTE — Clinical Note
Add Urine culture today ( abnormal u/a)Chemo todayCbc,cmp, U/a prior to chemo in 3 wks and prior to f/u 6 wks on 2/12/2020

## 2020-02-03 LAB — BACTERIA UR CULT: ABNORMAL

## 2020-02-21 ENCOUNTER — INFUSION (OUTPATIENT)
Dept: INFUSION THERAPY | Facility: HOSPITAL | Age: 85
End: 2020-02-21
Attending: INTERNAL MEDICINE
Payer: MEDICARE

## 2020-02-21 VITALS
SYSTOLIC BLOOD PRESSURE: 149 MMHG | HEART RATE: 68 BPM | TEMPERATURE: 50 F | WEIGHT: 184.5 LBS | OXYGEN SATURATION: 99 % | BODY MASS INDEX: 33.75 KG/M2 | DIASTOLIC BLOOD PRESSURE: 66 MMHG | RESPIRATION RATE: 17 BRPM

## 2020-02-21 DIAGNOSIS — C56.9 MALIGNANT NEOPLASM OF OVARY, UNSPECIFIED LATERALITY: Primary | ICD-10-CM

## 2020-02-21 PROCEDURE — 96413 CHEMO IV INFUSION 1 HR: CPT

## 2020-02-21 PROCEDURE — 25000003 PHARM REV CODE 250: Performed by: INTERNAL MEDICINE

## 2020-02-21 PROCEDURE — 63600175 PHARM REV CODE 636 W HCPCS: Mod: JG | Performed by: INTERNAL MEDICINE

## 2020-02-21 PROCEDURE — A4216 STERILE WATER/SALINE, 10 ML: HCPCS | Performed by: INTERNAL MEDICINE

## 2020-02-21 RX ORDER — SODIUM CHLORIDE 0.9 % (FLUSH) 0.9 %
10 SYRINGE (ML) INJECTION
Status: DISCONTINUED | OUTPATIENT
Start: 2020-02-21 | End: 2020-02-21 | Stop reason: HOSPADM

## 2020-02-21 RX ORDER — HEPARIN 100 UNIT/ML
500 SYRINGE INTRAVENOUS
Status: CANCELLED | OUTPATIENT
Start: 2020-02-21

## 2020-02-21 RX ORDER — SODIUM CHLORIDE 0.9 % (FLUSH) 0.9 %
10 SYRINGE (ML) INJECTION
Status: CANCELLED | OUTPATIENT
Start: 2020-02-21

## 2020-02-21 RX ORDER — HEPARIN 100 UNIT/ML
500 SYRINGE INTRAVENOUS
Status: DISCONTINUED | OUTPATIENT
Start: 2020-02-21 | End: 2020-02-21 | Stop reason: HOSPADM

## 2020-02-21 RX ADMIN — BEVACIZUMAB 1255 MG: 400 INJECTION, SOLUTION INTRAVENOUS at 12:02

## 2020-02-21 RX ADMIN — Medication 10 ML: at 01:02

## 2020-02-21 RX ADMIN — HEPARIN 500 UNITS: 100 SYRINGE at 01:02

## 2020-02-21 NOTE — PLAN OF CARE
Pt arrived to unit from labs. No reported side effects of treatment. States she tired. Dr. Zhou notified of labs and ua results. Will proceed with Avastin. Tolerated infusion. Post VSS. AVS given to pt. No distress noted. Pt waiting for daughter.

## 2020-03-04 DIAGNOSIS — I10 ESSENTIAL HYPERTENSION: ICD-10-CM

## 2020-03-04 RX ORDER — VALSARTAN 320 MG/1
320 TABLET ORAL DAILY
Qty: 90 TABLET | Refills: 2 | Status: SHIPPED | OUTPATIENT
Start: 2020-03-04 | End: 2020-03-18 | Stop reason: SDUPTHER

## 2020-03-13 ENCOUNTER — INFUSION (OUTPATIENT)
Dept: INFUSION THERAPY | Facility: HOSPITAL | Age: 85
End: 2020-03-13
Attending: INTERNAL MEDICINE
Payer: MEDICARE

## 2020-03-13 ENCOUNTER — LAB VISIT (OUTPATIENT)
Dept: LAB | Facility: HOSPITAL | Age: 85
End: 2020-03-13
Attending: INTERNAL MEDICINE
Payer: MEDICARE

## 2020-03-13 ENCOUNTER — OFFICE VISIT (OUTPATIENT)
Dept: HEMATOLOGY/ONCOLOGY | Facility: CLINIC | Age: 85
End: 2020-03-13
Payer: MEDICARE

## 2020-03-13 VITALS
DIASTOLIC BLOOD PRESSURE: 66 MMHG | HEART RATE: 59 BPM | SYSTOLIC BLOOD PRESSURE: 146 MMHG | OXYGEN SATURATION: 98 % | TEMPERATURE: 98 F | RESPIRATION RATE: 17 BRPM

## 2020-03-13 VITALS
OXYGEN SATURATION: 96 % | TEMPERATURE: 98 F | WEIGHT: 181.19 LBS | SYSTOLIC BLOOD PRESSURE: 127 MMHG | HEART RATE: 46 BPM | BODY MASS INDEX: 33.34 KG/M2 | DIASTOLIC BLOOD PRESSURE: 67 MMHG | HEIGHT: 62 IN

## 2020-03-13 DIAGNOSIS — N18.30 CKD (CHRONIC KIDNEY DISEASE) STAGE 3, GFR 30-59 ML/MIN: ICD-10-CM

## 2020-03-13 DIAGNOSIS — C56.9 MALIGNANT NEOPLASM OF OVARY, UNSPECIFIED LATERALITY: Primary | ICD-10-CM

## 2020-03-13 DIAGNOSIS — R97.1 ELEVATED CA-125: ICD-10-CM

## 2020-03-13 DIAGNOSIS — C56.9 MALIGNANT NEOPLASM OF OVARY, UNSPECIFIED LATERALITY: ICD-10-CM

## 2020-03-13 DIAGNOSIS — G62.0 CHEMOTHERAPY-INDUCED NEUROPATHY: ICD-10-CM

## 2020-03-13 DIAGNOSIS — T45.1X5A CHEMOTHERAPY-INDUCED NEUROPATHY: ICD-10-CM

## 2020-03-13 DIAGNOSIS — R82.90 ABNORMAL URINALYSIS: ICD-10-CM

## 2020-03-13 LAB
ALBUMIN SERPL BCP-MCNC: 3.5 G/DL (ref 3.5–5.2)
ALP SERPL-CCNC: 78 U/L (ref 55–135)
ALT SERPL W/O P-5'-P-CCNC: 10 U/L (ref 10–44)
ANION GAP SERPL CALC-SCNC: 10 MMOL/L (ref 8–16)
AST SERPL-CCNC: 14 U/L (ref 10–40)
BACTERIA #/AREA URNS HPF: ABNORMAL /HPF
BASOPHILS # BLD AUTO: 0.04 K/UL (ref 0–0.2)
BASOPHILS NFR BLD: 0.8 % (ref 0–1.9)
BILIRUB SERPL-MCNC: 0.3 MG/DL (ref 0.1–1)
BILIRUB UR QL STRIP: NEGATIVE
BUN SERPL-MCNC: 32 MG/DL (ref 8–23)
CALCIUM SERPL-MCNC: 8.6 MG/DL (ref 8.7–10.5)
CHLORIDE SERPL-SCNC: 108 MMOL/L (ref 95–110)
CLARITY UR: ABNORMAL
CO2 SERPL-SCNC: 20 MMOL/L (ref 23–29)
COLOR UR: YELLOW
CREAT SERPL-MCNC: 2 MG/DL (ref 0.5–1.4)
DIFFERENTIAL METHOD: ABNORMAL
EOSINOPHIL # BLD AUTO: 0.3 K/UL (ref 0–0.5)
EOSINOPHIL NFR BLD: 5.5 % (ref 0–8)
ERYTHROCYTE [DISTWIDTH] IN BLOOD BY AUTOMATED COUNT: 15.7 % (ref 11.5–14.5)
EST. GFR  (AFRICAN AMERICAN): 26 ML/MIN/1.73 M^2
EST. GFR  (NON AFRICAN AMERICAN): 22 ML/MIN/1.73 M^2
GLUCOSE SERPL-MCNC: 133 MG/DL (ref 70–110)
GLUCOSE UR QL STRIP: NEGATIVE
GRAN CASTS #/AREA URNS LPF: 10 /LPF
HCT VFR BLD AUTO: 37.5 % (ref 37–48.5)
HGB BLD-MCNC: 11.7 G/DL (ref 12–16)
HGB UR QL STRIP: NEGATIVE
HYALINE CASTS #/AREA URNS LPF: 10 /LPF
IMM GRANULOCYTES # BLD AUTO: 0.03 K/UL (ref 0–0.04)
IMM GRANULOCYTES NFR BLD AUTO: 0.6 % (ref 0–0.5)
KETONES UR QL STRIP: NEGATIVE
LEUKOCYTE ESTERASE UR QL STRIP: ABNORMAL
LYMPHOCYTES # BLD AUTO: 1.2 K/UL (ref 1–4.8)
LYMPHOCYTES NFR BLD: 25.2 % (ref 18–48)
MCH RBC QN AUTO: 28.4 PG (ref 27–31)
MCHC RBC AUTO-ENTMCNC: 31.2 G/DL (ref 32–36)
MCV RBC AUTO: 91 FL (ref 82–98)
MICROSCOPIC COMMENT: ABNORMAL
MONOCYTES # BLD AUTO: 0.4 K/UL (ref 0.3–1)
MONOCYTES NFR BLD: 7.7 % (ref 4–15)
NEUTROPHILS # BLD AUTO: 3 K/UL (ref 1.8–7.7)
NEUTROPHILS NFR BLD: 60.2 % (ref 38–73)
NITRITE UR QL STRIP: NEGATIVE
NRBC BLD-RTO: 0 /100 WBC
PH UR STRIP: 5 [PH] (ref 5–8)
PLATELET # BLD AUTO: 181 K/UL (ref 150–350)
PMV BLD AUTO: 9.7 FL (ref 9.2–12.9)
POTASSIUM SERPL-SCNC: 4.5 MMOL/L (ref 3.5–5.1)
PROT SERPL-MCNC: 7.3 G/DL (ref 6–8.4)
PROT UR QL STRIP: ABNORMAL
RBC # BLD AUTO: 4.12 M/UL (ref 4–5.4)
RBC #/AREA URNS HPF: 0 /HPF (ref 0–4)
SODIUM SERPL-SCNC: 138 MMOL/L (ref 136–145)
SP GR UR STRIP: 1.02 (ref 1–1.03)
URN SPEC COLLECT METH UR: ABNORMAL
UROBILINOGEN UR STRIP-ACNC: NEGATIVE EU/DL
WBC # BLD AUTO: 4.93 K/UL (ref 3.9–12.7)
WBC #/AREA URNS HPF: 10 /HPF (ref 0–5)

## 2020-03-13 PROCEDURE — 3078F PR MOST RECENT DIASTOLIC BLOOD PRESSURE < 80 MM HG: ICD-10-PCS | Mod: CPTII,S$GLB,, | Performed by: INTERNAL MEDICINE

## 2020-03-13 PROCEDURE — 1159F PR MEDICATION LIST DOCUMENTED IN MEDICAL RECORD: ICD-10-PCS | Mod: S$GLB,,, | Performed by: INTERNAL MEDICINE

## 2020-03-13 PROCEDURE — 63600175 PHARM REV CODE 636 W HCPCS: Mod: JG | Performed by: INTERNAL MEDICINE

## 2020-03-13 PROCEDURE — 1125F PR PAIN SEVERITY QUANTIFIED, PAIN PRESENT: ICD-10-PCS | Mod: S$GLB,,, | Performed by: INTERNAL MEDICINE

## 2020-03-13 PROCEDURE — 1125F AMNT PAIN NOTED PAIN PRSNT: CPT | Mod: S$GLB,,, | Performed by: INTERNAL MEDICINE

## 2020-03-13 PROCEDURE — 25000003 PHARM REV CODE 250: Performed by: INTERNAL MEDICINE

## 2020-03-13 PROCEDURE — 1101F PR PT FALLS ASSESS DOC 0-1 FALLS W/OUT INJ PAST YR: ICD-10-PCS | Mod: CPTII,S$GLB,, | Performed by: INTERNAL MEDICINE

## 2020-03-13 PROCEDURE — 99215 PR OFFICE/OUTPT VISIT, EST, LEVL V, 40-54 MIN: ICD-10-PCS | Mod: S$GLB,,, | Performed by: INTERNAL MEDICINE

## 2020-03-13 PROCEDURE — 99215 OFFICE O/P EST HI 40 MIN: CPT | Mod: S$GLB,,, | Performed by: INTERNAL MEDICINE

## 2020-03-13 PROCEDURE — 99999 PR PBB SHADOW E&M-EST. PATIENT-LVL IV: CPT | Mod: PBBFAC,,, | Performed by: INTERNAL MEDICINE

## 2020-03-13 PROCEDURE — 80053 COMPREHEN METABOLIC PANEL: CPT

## 2020-03-13 PROCEDURE — 99499 UNLISTED E&M SERVICE: CPT | Mod: S$GLB,,, | Performed by: INTERNAL MEDICINE

## 2020-03-13 PROCEDURE — 99499 RISK ADDL DX/OHS AUDIT: ICD-10-PCS | Mod: S$GLB,,, | Performed by: INTERNAL MEDICINE

## 2020-03-13 PROCEDURE — 1101F PT FALLS ASSESS-DOCD LE1/YR: CPT | Mod: CPTII,S$GLB,, | Performed by: INTERNAL MEDICINE

## 2020-03-13 PROCEDURE — 3074F SYST BP LT 130 MM HG: CPT | Mod: CPTII,S$GLB,, | Performed by: INTERNAL MEDICINE

## 2020-03-13 PROCEDURE — 96413 CHEMO IV INFUSION 1 HR: CPT

## 2020-03-13 PROCEDURE — A4216 STERILE WATER/SALINE, 10 ML: HCPCS | Performed by: INTERNAL MEDICINE

## 2020-03-13 PROCEDURE — 1159F MED LIST DOCD IN RCRD: CPT | Mod: S$GLB,,, | Performed by: INTERNAL MEDICINE

## 2020-03-13 PROCEDURE — 3074F PR MOST RECENT SYSTOLIC BLOOD PRESSURE < 130 MM HG: ICD-10-PCS | Mod: CPTII,S$GLB,, | Performed by: INTERNAL MEDICINE

## 2020-03-13 PROCEDURE — 81000 URINALYSIS NONAUTO W/SCOPE: CPT

## 2020-03-13 PROCEDURE — 3078F DIAST BP <80 MM HG: CPT | Mod: CPTII,S$GLB,, | Performed by: INTERNAL MEDICINE

## 2020-03-13 PROCEDURE — 36415 COLL VENOUS BLD VENIPUNCTURE: CPT

## 2020-03-13 PROCEDURE — 85025 COMPLETE CBC W/AUTO DIFF WBC: CPT

## 2020-03-13 PROCEDURE — 99999 PR PBB SHADOW E&M-EST. PATIENT-LVL IV: ICD-10-PCS | Mod: PBBFAC,,, | Performed by: INTERNAL MEDICINE

## 2020-03-13 RX ORDER — HEPARIN 100 UNIT/ML
500 SYRINGE INTRAVENOUS
Status: CANCELLED | OUTPATIENT
Start: 2020-03-13

## 2020-03-13 RX ORDER — SODIUM CHLORIDE 0.9 % (FLUSH) 0.9 %
10 SYRINGE (ML) INJECTION
Status: CANCELLED | OUTPATIENT
Start: 2020-03-13

## 2020-03-13 RX ORDER — HEPARIN 100 UNIT/ML
500 SYRINGE INTRAVENOUS
Status: DISCONTINUED | OUTPATIENT
Start: 2020-03-13 | End: 2020-03-13 | Stop reason: HOSPADM

## 2020-03-13 RX ORDER — SODIUM CHLORIDE 0.9 % (FLUSH) 0.9 %
10 SYRINGE (ML) INJECTION
Status: DISCONTINUED | OUTPATIENT
Start: 2020-03-13 | End: 2020-03-13 | Stop reason: HOSPADM

## 2020-03-13 RX ADMIN — BEVACIZUMAB 1235 MG: 400 INJECTION, SOLUTION INTRAVENOUS at 10:03

## 2020-03-13 RX ADMIN — Medication 10 ML: at 11:03

## 2020-03-13 RX ADMIN — HEPARIN 500 UNITS: 100 SYRINGE at 11:03

## 2020-03-13 NOTE — PROGRESS NOTES
"Subjective:       Patient ID: Celine Mahan is a 84 y.o. female.    Chief Complaint: Follow-up         Diagnosis: 1.Ovarian cancer status post debulking surgery 9/21/2017                     2. 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 S/p carbo/taxol x 6 completed 2/12/2018                   2. Carbo/Doxil 2/1/2019- 7/8/2019                   3. Bevacizumab maintenance  8/16/2019 -present         HPI ( per Dr. Feliciano) Patient is an 85 yo female who originally presented as a referral from Dr. Wills for pelvic mass. Patient reports LLQ pain for approximately 3 months which prompted evaluation.      Pelvic US 8/7/17 There is a complex echogenicity midline pelvic mass measuring 18.1 x 8.6 x 7.6 cm.  Ovaries not visualized.     CT A&P 8/9/17 Large solid/cystic mass within the lower abdomen/upper pelvis that is most concerning for a malignant neoplasm, likely of ovarian origin given its location.  Correlation with previous surgical history is recommended noting evidence of previous hysterectomy.  Mass abuts and displaces the adjacent bladder without definite CT findings to suggest hema invasion. Abdominal and pelvic lymphadenopathy concerning for lymphatic spread of neoplasm with index lymph nodes as above.      Medical history is significant for CKD (Cr 1.3), HTN, hypothyroid, HTN, HLD. She has a personal history of breast cancer in 1995 treated with mastectomy and adjuvant chemotherapy she reports. Last MMG 11/2016 normal. Adbominal surgery include cholecystectomy, TVH, xlap/removal ovary for ectopic. She reports that her physician told her "some ovary remains in situ". Family history significant for mother with pancreatic cancer.      CT chest 8/28/17 showed mediastinal adenopathy consistent with Stage IV disease.   RECIST Summary:  Mediastinal adenopathy:  1. Preaortic abnormal node measuring 1.7 cm short axis.  2. Pretracheal node measures 1.6 cm short axis.     She underwent " surgical cytoreduction with xlap/LSO/omentectomy 9/21/17. Pathology showed high grade adenocarcinoma.        She is also  followed by Dr. Feliciano  She  transferred care to undergo  adjuvant chemo at this location   She completed adjuvant chemo with carbo AUC 6 and taxol 175mg/m2 q 21d  2/12/2018  She was considered for PRIMA clinical trial for maintenance therapy   Pt  Declined maintenance therapy      Pt with increasing  levels   849>230>37>23>22>12>40>309 ( 1/9/2019 )       CT c/a/p 1/14/2019   S/P resection of large LEFT ovarian/fallopian tube mass consistent with carcinoma.    Interval increase in size of LEFT pelvic/retroperitoneal lymph nodes.  No evidence for ascites or definitive carcinomatosis of mesentery.    Interval decrease in size of prevascular lymph node.      It was determined she has platinum sensitive recurrent ovarian cancer. Platinum free interval 11 months.   Dr. Feliciano Reviewed standard management options which are to re-treat with platinum-based chemotherapy (carbo + doxil/gem/taxol +/- avastin).   Pt undergoing treatment with Doxil 30mg/m2  Carbo AUC 5  q 28days      She is s/p cycle 6 of chemo  Carbo/doxil completed 7/8/2019   ( dosage reduction  sec to cytopenias)     Accompanied by dtr  Imaging studies 7/2019 reveals stable disease    She is undergoing maintenance therapy with bevacizumab 15mg/kg  q3 wks  She has hx of poorly controlled BP  She was started on  Amlodipine 5mg  per her PCP     No new issues  She continues with mild arthralgias  No SOB/CP  No fatigue  Appetite and weight stable   No melena, hematochezia or change in bowel habits  No rashes  Chronic neck pain-stable  She continues with minor tingling/numbness in extremities    No dysuria, hematuria or change in bowel habtis        CT c/a/p w/contrast 12/18/2019 -stable findings    MRI brain 2/27/2019 - no brain mets    CBC  reveals a white blood cell count of  4930mm 3 hemoglobin 11.7grams/dL hematocrit of 37.5% plt ct  "187k.    Biochemical profile reveals stable Cr level from 1.5mg/dL  to 2.0mg/dL     BRCA ANALYSIS NEG    Past Medical History:   Diagnosis Date    Breast cancer     Cataract     CKD (chronic kidney disease) stage 3, GFR 30-59 ml/min     Essential hypertension 8/22/2012    Gastroesophageal reflux disease 10/17/2019    Hyperlipidemia     Hypertension     Hypothyroidism     Obesity     Osteopenia     Ovarian cancer 10/8/2017    Overactive bladder     Pelvic mass 8/27/2017    Thyroid disease          Past Surgical History:   Procedure Laterality Date    bt catarac surgery      CHOLECYSTECTOMY      HYSTERECTOMY      INSERTION OF TUNNELED CENTRAL VENOUS CATHETER (CVC) WITH SUBCUTANEOUS PORT N/A 6/3/2019    Procedure: INSERTION, PORT-A-CATH;  Surgeon: Andrey Diagnostic Provider;  Location: [facility] OR;  Service: Radiology;  Laterality: N/A;  RN PREOP 5/31/2019    MASTECTOMY Left              Review of Systems   Constitutional: Negative for appetite change, fatigue, fever and unexpected weight change.   HENT: Negative for mouth sores.    Eyes: Negative for visual disturbance.   Respiratory: Negative for cough and shortness of breath.    Cardiovascular: Negative for chest pain.   Gastrointestinal: Negative for abdominal pain, diarrhea and nausea.   Genitourinary: Negative for frequency.   Musculoskeletal: Positive for neck pain (chronic). Negative for back pain.   Skin: Negative for rash.        No petechiae   Neurological: Positive for numbness. Negative for weakness, light-headedness and headaches.        Intermittent tingling   Hematological: Negative for adenopathy.   Psychiatric/Behavioral: The patient is not nervous/anxious.        Objective:       Vitals:    03/13/20 0916   BP: 127/67   BP Location: Right arm   Patient Position: Sitting   BP Method: Medium (Automatic)   Pulse: (!) 46   Temp: 97.8 °F (36.6 °C)   TempSrc: Oral   SpO2: 96%   Weight: 82.2 kg (181 lb 3.5 oz)   Height: 5' 2" (1.575 m) "       Physical Exam   Constitutional: She is oriented to person, place, and time. She appears well-developed and well-nourished.   HENT:   Head: Normocephalic.   Mouth/Throat: Oropharynx is clear and moist. No oropharyngeal exudate.   Eyes: Conjunctivae and lids are normal. No scleral icterus.   Neck: Normal range of motion. Neck supple. No thyromegaly present.   Cardiovascular: Normal rate, regular rhythm and normal heart sounds.   No murmur heard.  Pulmonary/Chest: Breath sounds normal. She has no wheezes. She has no rales.   Abdominal: Soft. Bowel sounds are normal. She exhibits no distension. There is no hepatosplenomegaly. There is no tenderness.   Well-healed midline surgical scar   Musculoskeletal: Normal range of motion. She exhibits no edema or tenderness.   Lymphadenopathy:     She has no cervical adenopathy.     She has no axillary adenopathy.        Right: No supraclavicular adenopathy present.        Left: No supraclavicular adenopathy present.   Neurological: She is alert and oriented to person, place, and time. No cranial nerve deficit. Coordination normal.   Skin: Skin is warm and dry. No ecchymosis, no petechiae and no rash noted. No erythema.   Psychiatric: She has a normal mood and affect.         Labs:   Lab Results   Component Value Date    WBC 4.93 03/13/2020    HGB 11.7 (L) 03/13/2020    HCT 37.5 03/13/2020    MCV 91 03/13/2020     03/13/2020       Results for JONATAN SERRANO (MRN [id number]) as of 4/25/2019 08:52   Ref. Range 2/20/2019 09:44 3/7/2019 07:45 4/2/2019 09:32 4/17/2019 13:08    Latest Ref Range: 0 - 30 U/mL 236 (H)  81 (H) 86 (H)         Results for JONATAN SERRANO (MRN [id number]) as of 7/30/2019 15:34   Ref. Range 6/6/2019 10:00 6/28/2019 09:28 7/5/2019 10:13 7/22/2019 09:40    Latest Ref Range: 0 - 30 U/mL 55 (H)  52 (H) 58 (H)     MRI Cervical spine w/out contrast 4/30/2019   Cervical degenerative change most significant at C4-5 and C5-6.  Specific details at each  level discussed above.     CT c/a/p w/out contrast 12/19/2019   1. Postsurgical changes of hysterectomy and left salpingo-oophorectomy with unchanged retroperitoneal and pelvic lymph nodes as above.  2. Dilation of the main pulmonary artery which can be seen with pulmonary artery hypertension.  3. Diffuse colonic diverticulosis without evidence of acute diverticulitis.  Index lesions as follows:    Lesion 1: Left pelvic sidewall lymph node measuring 2.0 cm (series 2, image 164), previously 1.8 cm.    Lesion 2: Left periaortic node measuring 1.9 cm (series 2, image 114), previously 1.6 cm.        Results for JONATAN SERRANO (MRN [id number]) as of 3/14/2020 13:30   Ref. Range 10/18/2019 09:30 12/19/2019 17:30 1/31/2020 08:51 2/21/2020 10:16    Latest Ref Range: 0 - 30 U/mL 42 (H) 84 (H) 140 (H) 139 (H)       Assessment:       1. Malignant neoplasm of ovary, unspecified laterality    2. Chemotherapy-induced neuropathy    3. CKD (chronic kidney disease) stage 3, GFR 30-59 ml/min    4. Abnormal urinalysis    5. Elevated CA-125        Plan:   1- 5  Pt clinically stable      1.Ovarian cancer status post debulking surgery 9/21/2017   2. 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 S/p carbo/taxol x 6 completed 2/12/2018                   2. Carbo/Doxil     2/1/2019- 7/8/2019                   3. Bevacizumab maintenance  8/16/2019 -present       Patient with ovarian cancer status post debulking surgery 9/21/2017 undergoing adjuvant platinum-based chemotherapy with carbo/taxol .   S/p carbo/taxol x 6 completed 2/12/2018  Pt declined maintenance therapy on trial   BRCA ANALYSIS NEG      CT c/a/p 1/14/2019   S/P resection of large LEFT ovarian/fallopian tube mass consistent with carcinoma.  Interval increase in size of LEFT pelvic/retroperitoneal lymph nodes.  No evidence for ascites or definitive carcinomatosis of mesentery.  Interval decrease in size of prevascular lymph node.    MRI brain 2/27/2019  - NEG for brain mets    It  was determined she has platinum sensitive recurrent ovarian cancer. Platinum free interval 11 months.   Dr. Feliciano Reviewed standard management options which are to re-treat with platinum-based chemotherapy (carbo + doxil/gem/taxol +/- avastin).   Pt underwent treatment with Doxil 30mg/m2  Carbo AUC 5  q 28days    she is s/p cycle 6 of chemo  Carbo/doxil completed 7/8/2019   CT c/a/p w/contrast 7/26/2019-stable findings    Previously Discussed  Dr. Feliciano about maintenance therapy options and this was discussed w/pt and dtr  Discussed potential detailed discussion with patient and daughter regarding potential maintenance therapy in this setting.  Options for maintenance therapy include bevacizumab IV 15mg/kg  every 3 weeks verses oral PARP  inhibitor.  We discussed the pros and cons of each approach.  We discussed the potential toxicities associated with the various therapies.  Ultimately, it was determined that patient elects to undergo maintenance chemotherapy with IV . Bevacizumab, We discussed the rare but serious adverse effect of GI perforation/hemorrhage associated with IV bevacizumab.  She will also be monitored for proteinuria.  Informed consent was obtained      CT a/p w/out contrast 12/19/2019 stable findings    Multiple antihypertensive drugs are being used to treat antiangiogenic therapy-induced hypertension including calcium channel blockers (CCB), inhibitors of the RAHEEM, beta-blockers and diuretics. At this time, however, no clinical evidence favoring one antihypertensive agent over another is available. Specific antihypertensive agents are primarily selected based on patients comorbidities. In our clinical experience, CCB and RAHEEM inhibitors are effective in treating antiangiogenic therapy-induced hypertensionHypertension. 2012 Sep; 60(3): 607-615.      No hema disease progression on CT imaging   Proceed with Didi maintenance    Plan CT imaging prior to f/u   Plan urine  cx  Cbc,cmp, UA prior to therapy  in  3 weeks          Cc: Iesha Feliciano M.D.          Anil Pelayo M.D.

## 2020-03-13 NOTE — PLAN OF CARE
Patient arrived to unit for Avastin. Patient denies any new or worsening symptoms at this time. Plan of care reviewed, patient agreeable to plan. Patient tolerated treatment well. No sign of reaction noted. VSS. Patient received discharge instructions and follow up appointments. Patient instructed to return 4/1/20 for labs and Dr. Reinoso f/u and 4/3/20 for chemo. Verbalized understanding and ambulated unassisted off unit accompanied by her daughter. Patient in NAD at time of discharge.

## 2020-03-18 DIAGNOSIS — I10 ESSENTIAL HYPERTENSION: ICD-10-CM

## 2020-03-18 RX ORDER — VALSARTAN 320 MG/1
320 TABLET ORAL DAILY
Qty: 90 TABLET | Refills: 2 | Status: CANCELLED | OUTPATIENT
Start: 2020-03-18 | End: 2021-03-18

## 2020-03-18 RX ORDER — VALSARTAN 320 MG/1
320 TABLET ORAL DAILY
Qty: 90 TABLET | Refills: 2 | Status: SHIPPED | OUTPATIENT
Start: 2020-03-18 | End: 2020-11-30 | Stop reason: SDUPTHER

## 2020-03-18 RX ORDER — AMLODIPINE BESYLATE 5 MG/1
5 TABLET ORAL DAILY
Qty: 90 TABLET | Refills: 2 | Status: CANCELLED | OUTPATIENT
Start: 2020-03-18

## 2020-03-18 RX ORDER — AMLODIPINE BESYLATE 5 MG/1
5 TABLET ORAL DAILY
Qty: 90 TABLET | Refills: 2 | Status: SHIPPED | OUTPATIENT
Start: 2020-03-18 | End: 2020-11-30 | Stop reason: SDUPTHER

## 2020-03-18 NOTE — TELEPHONE ENCOUNTER
Spoke to pt's daughter she states her mom needs refills on BP meds. She states her mom is not sick and on chemo and had concerns about her coming out for her appt.    Informed pt's daughter a message would be sent to the provider.    LOV 10 22/19

## 2020-03-19 ENCOUNTER — HOSPITAL ENCOUNTER (OUTPATIENT)
Dept: RADIOLOGY | Facility: HOSPITAL | Age: 85
Discharge: HOME | End: 2020-03-19
Attending: INTERNAL MEDICINE
Payer: MEDICARE

## 2020-03-19 DIAGNOSIS — C56.9 MALIGNANT NEOPLASM OF OVARY, UNSPECIFIED LATERALITY: ICD-10-CM

## 2020-03-19 PROCEDURE — 74176 CT CHEST ABDOMEN PELVIS WITHOUT CONTRAST(XPD): ICD-10-PCS | Mod: 26,,, | Performed by: RADIOLOGY

## 2020-03-19 PROCEDURE — 74176 CT ABD & PELVIS W/O CONTRAST: CPT | Mod: 26,,, | Performed by: RADIOLOGY

## 2020-03-19 PROCEDURE — 71250 CT THORAX DX C-: CPT | Mod: TC

## 2020-03-19 PROCEDURE — 71250 CT THORAX DX C-: CPT | Mod: 26,,, | Performed by: RADIOLOGY

## 2020-03-19 PROCEDURE — 71250 CT CHEST ABDOMEN PELVIS WITHOUT CONTRAST(XPD): ICD-10-PCS | Mod: 26,,, | Performed by: RADIOLOGY

## 2020-03-19 PROCEDURE — 74176 CT ABD & PELVIS W/O CONTRAST: CPT | Mod: TC

## 2020-04-01 ENCOUNTER — OFFICE VISIT (OUTPATIENT)
Dept: HEMATOLOGY/ONCOLOGY | Facility: CLINIC | Age: 85
End: 2020-04-01
Payer: MEDICARE

## 2020-04-01 DIAGNOSIS — R97.1 ELEVATED CA-125: ICD-10-CM

## 2020-04-01 DIAGNOSIS — N18.30 CKD (CHRONIC KIDNEY DISEASE) STAGE 3, GFR 30-59 ML/MIN: ICD-10-CM

## 2020-04-01 DIAGNOSIS — C56.9 MALIGNANT NEOPLASM OF OVARY, UNSPECIFIED LATERALITY: Primary | ICD-10-CM

## 2020-04-01 PROCEDURE — 1101F PT FALLS ASSESS-DOCD LE1/YR: CPT | Mod: CPTII,S$GLB,, | Performed by: INTERNAL MEDICINE

## 2020-04-01 PROCEDURE — 99215 OFFICE O/P EST HI 40 MIN: CPT | Mod: S$GLB,,, | Performed by: INTERNAL MEDICINE

## 2020-04-01 PROCEDURE — 99215 PR OFFICE/OUTPT VISIT, EST, LEVL V, 40-54 MIN: ICD-10-PCS | Mod: S$GLB,,, | Performed by: INTERNAL MEDICINE

## 2020-04-01 PROCEDURE — 1101F PR PT FALLS ASSESS DOC 0-1 FALLS W/OUT INJ PAST YR: ICD-10-PCS | Mod: CPTII,S$GLB,, | Performed by: INTERNAL MEDICINE

## 2020-04-01 PROCEDURE — 1159F PR MEDICATION LIST DOCUMENTED IN MEDICAL RECORD: ICD-10-PCS | Mod: S$GLB,,, | Performed by: INTERNAL MEDICINE

## 2020-04-01 PROCEDURE — 1159F MED LIST DOCD IN RCRD: CPT | Mod: S$GLB,,, | Performed by: INTERNAL MEDICINE

## 2020-04-01 NOTE — PROGRESS NOTES
Subjective:       Patient ID: Celine Mahan is a 84 y.o. female.    Chief Complaint: No chief complaint on file.     The patient location is: Home  The chief complaint leading to consultation is : Follow-up Ovarian Cancer  Visit type: Virtual visit with synchronous audio   Total time spent with patient: 25min  Each patient to whom he or she provides medical services by telemedicine is:  (1) informed of the relationship between the physician and patient and the respective role of any other health care provider with respect to management of the patient; and (2) notified that he or she may decline to receive medical services by telemedicine and may withdraw from such care at any time.        Diagnosis: 1.Ovarian cancer status post debulking surgery 9/21/2017                     2. 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 S/p carbo/taxol x 6 completed 2/12/2018                   2. Carbo/Doxil 2/1/2019- 7/8/2019                   3. Bevacizumab maintenance  8/16/2019 -3/14/2020      BRCA ANALYSIS NEG    HPI ( per Dr. Feliciano) Patient is an 83 yo female who originally presented as a referral from Dr. Wills for pelvic mass. Patient reports LLQ pain for approximately 3 months which prompted evaluation.      Pelvic US 8/7/17 There is a complex echogenicity midline pelvic mass measuring 18.1 x 8.6 x 7.6 cm.  Ovaries not visualized.     CT A&P 8/9/17 Large solid/cystic mass within the lower abdomen/upper pelvis that is most concerning for a malignant neoplasm, likely of ovarian origin given its location.  Correlation with previous surgical history is recommended noting evidence of previous hysterectomy.  Mass abuts and displaces the adjacent bladder without definite CT findings to suggest hema invasion. Abdominal and pelvic lymphadenopathy concerning for lymphatic spread of neoplasm with index lymph nodes as above.      Medical history is significant for CKD (Cr 1.3), HTN, hypothyroid, HTN,  "HLD. She has a personal history of breast cancer in 1995 treated with mastectomy and adjuvant chemotherapy she reports. Last MMG 11/2016 normal. Adbominal surgery include cholecystectomy, TVH, xlap/removal ovary for ectopic. She reports that her physician told her "some ovary remains in situ". Family history significant for mother with pancreatic cancer.      CT chest 8/28/17 showed mediastinal adenopathy consistent with Stage IV disease.   RECIST Summary:  Mediastinal adenopathy:  1. Preaortic abnormal node measuring 1.7 cm short axis.  2. Pretracheal node measures 1.6 cm short axis.     She underwent surgical cytoreduction with xlap/LSO/omentectomy 9/21/17. Pathology showed high grade adenocarcinoma.        She is also  followed by Dr. Feliciano  She  transferred care to undergo  adjuvant chemo at this location   She completed adjuvant chemo with carbo AUC 6 and taxol 175mg/m2 q 21d  2/12/2018  She was considered for PRIMA clinical trial for maintenance therapy but declined        Pt with increasing  levels   849>230>37>23>22>12>40>309 ( 1/9/2019 )     CT c/a/p 1/14/2019 S/P resection of large LEFT ovarian/fallopian tube mass consistent with carcinoma.  Interval increase in size of LEFT pelvic/retroperitoneal lymph nodes.  No evidence for ascites or definitive carcinomatosis of mesentery.  Interval decrease in size of prevascular lymph node.    She was then diagnosed with  platinum sensitive recurrent ovarian cancer. Platinum free interval 11 months.   Dr. Feliciano Reviewed standard management options which are to re-treat with platinum-based chemotherapy (carbo + doxil/gem/taxol +/- avastin).   She is s/p cycle 6 of chemo  Carbo/doxil (Doxil 30mg/m2  Carbo AUC 5)  q 28dayscompleted 7/8/2019   ( dosage reduction  sec to cytopenias)     Imaging studies 7/2019 revealed stable disease    Pt started on maintenance therapy with bevacizumab 15mg/kg  q3 wks    Pt developed increasing  levels and recent CT " imaging 3/19/2020 showed disease progression      No new issues  No SOB/CP  No fatigue  Appetite and weight stable   No melena, hematochezia or change in bowel habits  No rashes  Chronic neck pain-stable  She continues with minor tingling/numbness in extremities  No dysuria, hematuria or change in bowel habtis      CBC  reveals a white blood cell count of  4930mm 3 hemoglobin 11.7grams/dL hematocrit of 37.5% plt ct 181k.    Biochemical profile reveals stable Cr level from 1.5mg/dL  to 2.0mg/dL     BRCA ANALYSIS NEG    Past Medical History:   Diagnosis Date    Breast cancer     Cataract     CKD (chronic kidney disease) stage 3, GFR 30-59 ml/min     Essential hypertension 8/22/2012    Gastroesophageal reflux disease 10/17/2019    Hyperlipidemia     Hypertension     Hypothyroidism     Obesity     Osteopenia     Ovarian cancer 10/8/2017    Overactive bladder     Pelvic mass 8/27/2017    Thyroid disease          Past Surgical History:   Procedure Laterality Date    bt catarac surgery      CHOLECYSTECTOMY      HYSTERECTOMY      INSERTION OF TUNNELED CENTRAL VENOUS CATHETER (CVC) WITH SUBCUTANEOUS PORT N/A 6/3/2019    Procedure: INSERTION, PORT-A-CATH;  [facility];  Location: [facility] OR;  Service: Radiology;  Laterality: N/A;  RN PREOP 5/31/2019    MASTECTOMY Left              Review of Systems   Constitutional: Negative for appetite change, fatigue, fever and unexpected weight change.   HENT: Negative for mouth sores.    Eyes: Negative for visual disturbance.   Respiratory: Negative for cough and shortness of breath.    Cardiovascular: Negative for chest pain.   Gastrointestinal: Negative for abdominal pain, diarrhea and nausea.   Genitourinary: Negative for frequency.   Musculoskeletal: Positive for neck pain (chronic). Negative for back pain.   Skin: Negative for rash.        No petechiae   Neurological: Positive for numbness. Negative for weakness, light-headedness and headaches.         Intermittent tingling   Hematological: Negative for adenopathy.   Psychiatric/Behavioral: The patient is not nervous/anxious.        Objective:         Televisit   PE deferred      Labs:   Lab Results   Component Value Date    WBC 4.93 03/13/2020    HGB 11.7 (L) 03/13/2020    HCT 37.5 03/13/2020    MCV 91 03/13/2020     03/13/2020           MRI Cervical spine w/out contrast 4/30/2019   Cervical degenerative change most significant at C4-5 and C5-6.  Specific details at each level discussed above.     CT c/a/p w/out contrast 12/19/2019   1. Postsurgical changes of hysterectomy and left salpingo-oophorectomy with unchanged retroperitoneal and pelvic lymph nodes as above.  2. Dilation of the main pulmonary artery which can be seen with pulmonary artery hypertension.  3. Diffuse colonic diverticulosis without evidence of acute diverticulitis.  Index lesions as follows:    Lesion 1: Left pelvic sidewall lymph node measuring 2.0 cm (series 2, image 164), previously 1.8 cm.    Lesion 2: Left periaortic node measuring 1.9 cm (series 2, image 114), previously 1.6 cm.        Component      Latest Ref Rng & Units 2/21/2020 1/31/2020 12/19/2019 10/18/2019         0 - 30 U/mL 139 (H) 140 (H) 84 (H) 42 (H)     Component      Latest Ref Rng & Units 9/6/2019 7/22/2019 7/5/2019 6/6/2019         0 - 30 U/mL 33 (H) 58 (H) 52 (H) 55 (H)     Component      Latest Ref Rng & Units 4/17/2019         0 - 30 U/mL 86 (H)         Assessment:       1. Malignant neoplasm of ovary, unspecified laterality    2. CKD (chronic kidney disease) stage 3, GFR 30-59 ml/min    3. Elevated CA-125        Plan:   1- 3  Pt clinically stable      1.Ovarian cancer status post debulking surgery 9/21/2017   2. 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 S/p carbo/taxol x 6 completed 2/12/2018                   2. Carbo/Doxil     2/1/2019- 7/8/2019                   3. Bevacizumab maintenance  8/16/2019 -  3/14/2020      Patient with ovarian cancer status post debulking surgery 9/21/2017 undergoing adjuvant platinum-based chemotherapy with carbo/taxol .   S/p carbo/taxol x 6 completed 2/12/2018  Pt declined maintenance therapy on trial   BRCA ANALYSIS NEG    She then developed  platinum sensitive recurrent ovarian cancer. Platinum free interval 11 months.   Dr. Feliciano Reviewed standard management options which are to re-treat with platinum-based chemotherapy (carbo + doxil/gem/taxol +/- avastin).   Pt underwent treatment with Doxil 30mg/m2  Carbo AUC 5  q 28days   s/p cycle 6 of chemo  Carbo/doxil completed 7/8/2019   CT c/a/p w/contrast 7/26/2019-stable findings    Pt then treated with maintenance  bevacizumab.   Pt developed rising  levels  Recent CT imaging showed disease progression    Discussed with Dr. Feliciano . Patient with platinum sensitive recurrent ovarian cancer.Treatment options include single agent carbo or carbo/gem   Discussed The risks of chemotherapy include but not limited to hair and skin changes, bone marrow damage (anemia, thrombocytopenia, immune suppression, neutropenia), allergic reactions, diarrhea, constipation, mouth sores, neuropathy, secondary cancers, damage at injection sites and nearly death.   Patient has elected to move forward with Carbo AUC 4 / [place] 800 mg/m2 Day 1 /[place] 800mg/m2 DAY 8  [id number]  ( gem dosage reduction upfront planned )  Informed consent obtained     D/w Dr. Feliciano    45 minutes spent during this visit of which greater than 50% devoted to counseling and coordination of care regarding diagnosis and management plan.    Cc: Iesha Feliciano M.D.          Anil Pelayo M.D.

## 2020-04-01 NOTE — Clinical Note
Schedule chemo with carbo/gem next week Day 1 Cbc,cmp on morning of chemoCbc,cmp on morning of Day 8 of chemo televisit on 4/28 with cbc,cmp prior

## 2020-04-21 DIAGNOSIS — Z13.9 SCREENING FOR CONDITION: Primary | ICD-10-CM

## 2020-04-21 NOTE — PROGRESS NOTES
04/21/2020      In an effort to protect our immunocompromised patients from potential exposure to COVID-19, [facility] will now require all patients receiving an infusion, an injection, and/or radiation therapy to be tested for COVID-19 prior to their appointment.  All patients currently under treatment will be tested immediately, and patients initiating new treatment cycles or with one-time appointments (injections, transfusions, etc.) must be tested within 72 hours of their appointment.     Placed COVID-19 test order for patient.  A member of our team is to contact the patient in the near future to explain this process and the rationale behind it, to ask the COVID-19 screening questions, and to get the patient scheduled for their COVID-19 test.     The above was completed in accordance with instructions and guidelines set forth by [facility].     Signed,    Srikanth Juárez, MAURA     Date:  04/21/2020

## 2020-04-22 ENCOUNTER — TELEPHONE (OUTPATIENT)
Dept: ADMINISTRATIVE | Facility: CLINIC | Age: 85
End: 2020-04-22

## 2020-04-22 NOTE — TELEPHONE ENCOUNTER
Phoned the patient to schedule COVID testing.  No answer; left voicemail(s) with my contact information, and asked the patient to return my call at her earliest convenience.

## 2020-04-23 ENCOUNTER — TELEPHONE (OUTPATIENT)
Dept: HEMATOLOGY/ONCOLOGY | Facility: CLINIC | Age: 85
End: 2020-04-23

## 2020-04-23 NOTE — TELEPHONE ENCOUNTER
04/23/2020     Due to language barrier noted in chart, an  was present via telephone ([employer] - Lino ID#[id number]).  He phoned the patient; no answer, and Lino stated unable to leave a VM.  I have notified NOEMI Daniels RN.  Was calling to schedule rapid COVID for tomorrow prior to infusion appt at [facility].       The above was completed in accordance with instructions and guidelines set forth by [facility].     Signed,        Srikanth Juárez DNP     Date:  04/23/2020

## 2020-04-24 ENCOUNTER — LAB VISIT (OUTPATIENT)
Dept: INFUSION THERAPY | Facility: HOSPITAL | Age: 85
End: 2020-04-24
Attending: INTERNAL MEDICINE
Payer: MEDICARE

## 2020-04-24 VITALS
BODY MASS INDEX: 34.41 KG/M2 | HEART RATE: 57 BPM | WEIGHT: 187 LBS | DIASTOLIC BLOOD PRESSURE: 64 MMHG | HEIGHT: 62 IN | TEMPERATURE: 98 F | RESPIRATION RATE: 18 BRPM | OXYGEN SATURATION: 99 % | SYSTOLIC BLOOD PRESSURE: 143 MMHG

## 2020-04-24 DIAGNOSIS — C56.9 OVARIAN CANCER: Primary | ICD-10-CM

## 2020-04-24 DIAGNOSIS — C56.9 MALIGNANT NEOPLASM OF OVARY, UNSPECIFIED LATERALITY: ICD-10-CM

## 2020-04-24 DIAGNOSIS — C56.9 OVARIAN CA: Primary | ICD-10-CM

## 2020-04-24 LAB
ALBUMIN SERPL BCP-MCNC: 3.5 G/DL (ref 3.5–5.2)
ALP SERPL-CCNC: 72 U/L (ref 55–135)
ALT SERPL W/O P-5'-P-CCNC: 8 U/L (ref 10–44)
ANION GAP SERPL CALC-SCNC: 9 MMOL/L (ref 8–16)
AST SERPL-CCNC: 14 U/L (ref 10–40)
BILIRUB SERPL-MCNC: 0.3 MG/DL (ref 0.1–1)
BUN SERPL-MCNC: 37 MG/DL (ref 8–23)
CALCIUM SERPL-MCNC: 9.1 MG/DL (ref 8.7–10.5)
CHLORIDE SERPL-SCNC: 106 MMOL/L (ref 95–110)
CO2 SERPL-SCNC: 20 MMOL/L (ref 23–29)
CREAT SERPL-MCNC: 1.8 MG/DL (ref 0.5–1.4)
ERYTHROCYTE [DISTWIDTH] IN BLOOD BY AUTOMATED COUNT: 15.9 % (ref 11.5–14.5)
EST. GFR  (AFRICAN AMERICAN): 29 ML/MIN/1.73 M^2
EST. GFR  (NON AFRICAN AMERICAN): 25 ML/MIN/1.73 M^2
GLUCOSE SERPL-MCNC: 96 MG/DL (ref 70–110)
HCT VFR BLD AUTO: 34.7 % (ref 37–48.5)
HGB BLD-MCNC: 11 G/DL (ref 12–16)
IMM GRANULOCYTES # BLD AUTO: 0.03 K/UL (ref 0–0.04)
MCH RBC QN AUTO: 27.8 PG (ref 27–31)
MCHC RBC AUTO-ENTMCNC: 31.7 G/DL (ref 32–36)
MCV RBC AUTO: 88 FL (ref 82–98)
NEUTROPHILS # BLD AUTO: 2.4 K/UL (ref 1.8–7.7)
PLATELET # BLD AUTO: 214 K/UL (ref 150–350)
PMV BLD AUTO: 9 FL (ref 9.2–12.9)
POTASSIUM SERPL-SCNC: 4.6 MMOL/L (ref 3.5–5.1)
PROT SERPL-MCNC: 7.4 G/DL (ref 6–8.4)
RBC # BLD AUTO: 3.95 M/UL (ref 4–5.4)
SARS-COV-2 RDRP RESP QL NAA+PROBE: NEGATIVE
SODIUM SERPL-SCNC: 135 MMOL/L (ref 136–145)
WBC # BLD AUTO: 4.15 K/UL (ref 3.9–12.7)

## 2020-04-24 PROCEDURE — 25000003 PHARM REV CODE 250: Performed by: INTERNAL MEDICINE

## 2020-04-24 PROCEDURE — 63600175 PHARM REV CODE 636 W HCPCS: Performed by: INTERNAL MEDICINE

## 2020-04-24 PROCEDURE — 85027 COMPLETE CBC AUTOMATED: CPT

## 2020-04-24 PROCEDURE — 36591 DRAW BLOOD OFF VENOUS DEVICE: CPT

## 2020-04-24 PROCEDURE — U0002 COVID-19 LAB TEST NON-CDC: HCPCS

## 2020-04-24 PROCEDURE — 80053 COMPREHEN METABOLIC PANEL: CPT

## 2020-04-24 PROCEDURE — 96413 CHEMO IV INFUSION 1 HR: CPT

## 2020-04-24 PROCEDURE — A4216 STERILE WATER/SALINE, 10 ML: HCPCS | Performed by: INTERNAL MEDICINE

## 2020-04-24 PROCEDURE — 96361 HYDRATE IV INFUSION ADD-ON: CPT

## 2020-04-24 PROCEDURE — 96417 CHEMO IV INFUS EACH ADDL SEQ: CPT

## 2020-04-24 PROCEDURE — 96367 TX/PROPH/DG ADDL SEQ IV INF: CPT

## 2020-04-24 RX ORDER — ONDANSETRON 2 MG/ML
8 INJECTION INTRAMUSCULAR; INTRAVENOUS
Status: CANCELLED | OUTPATIENT
Start: 2020-05-08

## 2020-04-24 RX ORDER — SODIUM CHLORIDE 0.9 % (FLUSH) 0.9 %
10 SYRINGE (ML) INJECTION
Status: CANCELLED | OUTPATIENT
Start: 2020-04-24

## 2020-04-24 RX ORDER — HEPARIN 100 UNIT/ML
500 SYRINGE INTRAVENOUS
Status: CANCELLED | OUTPATIENT
Start: 2020-05-08

## 2020-04-24 RX ORDER — HEPARIN 100 UNIT/ML
500 SYRINGE INTRAVENOUS
Status: DISCONTINUED | OUTPATIENT
Start: 2020-04-24 | End: 2020-04-24 | Stop reason: HOSPADM

## 2020-04-24 RX ORDER — HEPARIN 100 UNIT/ML
500 SYRINGE INTRAVENOUS
Status: CANCELLED | OUTPATIENT
Start: 2020-04-24

## 2020-04-24 RX ORDER — SODIUM CHLORIDE 0.9 % (FLUSH) 0.9 %
10 SYRINGE (ML) INJECTION
Status: CANCELLED | OUTPATIENT
Start: 2020-05-08

## 2020-04-24 RX ORDER — SODIUM CHLORIDE 0.9 % (FLUSH) 0.9 %
10 SYRINGE (ML) INJECTION
Status: DISCONTINUED | OUTPATIENT
Start: 2020-04-24 | End: 2020-04-24 | Stop reason: HOSPADM

## 2020-04-24 RX ADMIN — GEMCITABINE HYDROCHLORIDE 1545 MG: 1 INJECTION, POWDER, LYOPHILIZED, FOR SOLUTION INTRAVENOUS at 03:04

## 2020-04-24 RX ADMIN — PALONOSETRON HYDROCHLORIDE: 0.25 INJECTION INTRAVENOUS at 01:04

## 2020-04-24 RX ADMIN — Medication 10 ML: at 03:04

## 2020-04-24 RX ADMIN — SODIUM CHLORIDE 500 ML: 0.9 INJECTION, SOLUTION INTRAVENOUS at 03:04

## 2020-04-24 RX ADMIN — CARBOPLATIN 225 MG: 10 INJECTION, SOLUTION INTRAVENOUS at 02:04

## 2020-04-24 RX ADMIN — HEPARIN 500 UNITS: 100 SYRINGE at 03:04

## 2020-04-24 NOTE — PLAN OF CARE
Patient arrived to unit following negative COVID testing. STAT labs drawn. MD reviewed. OK to proceed with C1D1 Gemcitabine + Carboplatin. VSS. Patient pre-medicated with aloxi/dex. Received [place] + Carbo. Tolerated treatment well. No reactions noted during visit. Received discharge instructions and follow up appointments. Verbalized understanding and assisted off unit via wheelchair by MA, in NAD at time of discharge.

## 2020-05-01 ENCOUNTER — LAB VISIT (OUTPATIENT)
Dept: INFUSION THERAPY | Facility: HOSPITAL | Age: 85
End: 2020-05-01
Attending: INTERNAL MEDICINE
Payer: MEDICARE

## 2020-05-01 VITALS
SYSTOLIC BLOOD PRESSURE: 156 MMHG | HEART RATE: 58 BPM | RESPIRATION RATE: 18 BRPM | OXYGEN SATURATION: 98 % | DIASTOLIC BLOOD PRESSURE: 70 MMHG | TEMPERATURE: 98 F

## 2020-05-01 DIAGNOSIS — C56.9 OVARIAN CA: Primary | ICD-10-CM

## 2020-05-01 DIAGNOSIS — C56.9 OVARIAN CANCER: Primary | ICD-10-CM

## 2020-05-01 LAB
ALBUMIN SERPL BCP-MCNC: 3.4 G/DL (ref 3.5–5.2)
ALP SERPL-CCNC: 76 U/L (ref 55–135)
ALT SERPL W/O P-5'-P-CCNC: 21 U/L (ref 10–44)
ANION GAP SERPL CALC-SCNC: 10 MMOL/L (ref 8–16)
AST SERPL-CCNC: 29 U/L (ref 10–40)
BILIRUB SERPL-MCNC: 0.4 MG/DL (ref 0.1–1)
BUN SERPL-MCNC: 30 MG/DL (ref 8–23)
CALCIUM SERPL-MCNC: 9 MG/DL (ref 8.7–10.5)
CHLORIDE SERPL-SCNC: 104 MMOL/L (ref 95–110)
CO2 SERPL-SCNC: 22 MMOL/L (ref 23–29)
CREAT SERPL-MCNC: 1.7 MG/DL (ref 0.5–1.4)
ERYTHROCYTE [DISTWIDTH] IN BLOOD BY AUTOMATED COUNT: 15.5 % (ref 11.5–14.5)
EST. GFR  (AFRICAN AMERICAN): 31 ML/MIN/1.73 M^2
EST. GFR  (NON AFRICAN AMERICAN): 27 ML/MIN/1.73 M^2
GLUCOSE SERPL-MCNC: 100 MG/DL (ref 70–110)
HCT VFR BLD AUTO: 33 % (ref 37–48.5)
HGB BLD-MCNC: 10.5 G/DL (ref 12–16)
IMM GRANULOCYTES # BLD AUTO: 0 K/UL (ref 0–0.04)
MCH RBC QN AUTO: 27.5 PG (ref 27–31)
MCHC RBC AUTO-ENTMCNC: 31.8 G/DL (ref 32–36)
MCV RBC AUTO: 86 FL (ref 82–98)
NEUTROPHILS # BLD AUTO: 0.5 K/UL (ref 1.8–7.7)
PLATELET # BLD AUTO: 106 K/UL (ref 150–350)
PMV BLD AUTO: 9.8 FL (ref 9.2–12.9)
POTASSIUM SERPL-SCNC: 4.8 MMOL/L (ref 3.5–5.1)
PROT SERPL-MCNC: 7.4 G/DL (ref 6–8.4)
RBC # BLD AUTO: 3.82 M/UL (ref 4–5.4)
SARS-COV-2 RDRP RESP QL NAA+PROBE: NEGATIVE
SODIUM SERPL-SCNC: 136 MMOL/L (ref 136–145)
WBC # BLD AUTO: 1.15 K/UL (ref 3.9–12.7)

## 2020-05-01 PROCEDURE — 25000003 PHARM REV CODE 250: Performed by: INTERNAL MEDICINE

## 2020-05-01 PROCEDURE — A4216 STERILE WATER/SALINE, 10 ML: HCPCS | Performed by: INTERNAL MEDICINE

## 2020-05-01 PROCEDURE — 85027 COMPLETE CBC AUTOMATED: CPT

## 2020-05-01 PROCEDURE — U0002 COVID-19 LAB TEST NON-CDC: HCPCS

## 2020-05-01 PROCEDURE — 80053 COMPREHEN METABOLIC PANEL: CPT

## 2020-05-01 PROCEDURE — 63600175 PHARM REV CODE 636 W HCPCS: Performed by: INTERNAL MEDICINE

## 2020-05-01 PROCEDURE — 36591 DRAW BLOOD OFF VENOUS DEVICE: CPT

## 2020-05-01 RX ORDER — SODIUM CHLORIDE 0.9 % (FLUSH) 0.9 %
10 SYRINGE (ML) INJECTION
Status: DISCONTINUED | OUTPATIENT
Start: 2020-05-01 | End: 2020-05-01 | Stop reason: HOSPADM

## 2020-05-01 RX ORDER — HEPARIN 100 UNIT/ML
500 SYRINGE INTRAVENOUS
Status: CANCELLED | OUTPATIENT
Start: 2020-05-01

## 2020-05-01 RX ORDER — SODIUM CHLORIDE 0.9 % (FLUSH) 0.9 %
10 SYRINGE (ML) INJECTION
Status: CANCELLED | OUTPATIENT
Start: 2020-05-01

## 2020-05-01 RX ORDER — HEPARIN 100 UNIT/ML
500 SYRINGE INTRAVENOUS
Status: DISCONTINUED | OUTPATIENT
Start: 2020-05-01 | End: 2020-05-01 | Stop reason: HOSPADM

## 2020-05-01 RX ADMIN — HEPARIN 500 UNITS: 100 SYRINGE at 12:05

## 2020-05-01 RX ADMIN — Medication 10 ML: at 12:05

## 2020-05-01 NOTE — PLAN OF CARE
Patient arrived to unit for C1D8 Gemzar, Carbo. Labs drawn, ANC 0.5, Dr. Zhou notified. Per provider, hold chemo today and have patient return next Friday for repeat labs and possible chemo pending lab results. Plan of care reviewed, patient agreeable to plan. Neutropenia precautions reviewed. Patient voiced understanding. VSS. Patient received discharge instructions and follow up appointments. Patient instructed to return 5/8/20. Verbalized understanding and was escorted off unit via w/c by MA. Patient in NAD at time of discharge.

## 2020-05-05 ENCOUNTER — TELEPHONE (OUTPATIENT)
Dept: ADMINISTRATIVE | Facility: CLINIC | Age: 85
End: 2020-05-05

## 2020-05-08 ENCOUNTER — INFUSION (OUTPATIENT)
Dept: INFUSION THERAPY | Facility: HOSPITAL | Age: 85
End: 2020-05-08
Attending: INTERNAL MEDICINE
Payer: MEDICARE

## 2020-05-08 VITALS
TEMPERATURE: 98 F | OXYGEN SATURATION: 99 % | DIASTOLIC BLOOD PRESSURE: 68 MMHG | SYSTOLIC BLOOD PRESSURE: 161 MMHG | HEART RATE: 57 BPM | RESPIRATION RATE: 17 BRPM

## 2020-05-08 DIAGNOSIS — C56.9 OVARIAN CANCER: Primary | ICD-10-CM

## 2020-05-08 DIAGNOSIS — C56.9 MALIGNANT NEOPLASM OF OVARY, UNSPECIFIED LATERALITY: ICD-10-CM

## 2020-05-08 LAB
ALBUMIN SERPL BCP-MCNC: 3.4 G/DL (ref 3.5–5.2)
ALP SERPL-CCNC: 76 U/L (ref 55–135)
ALT SERPL W/O P-5'-P-CCNC: 30 U/L (ref 10–44)
ANION GAP SERPL CALC-SCNC: 10 MMOL/L (ref 8–16)
AST SERPL-CCNC: 32 U/L (ref 10–40)
BILIRUB SERPL-MCNC: 0.3 MG/DL (ref 0.1–1)
BUN SERPL-MCNC: 25 MG/DL (ref 8–23)
CALCIUM SERPL-MCNC: 9 MG/DL (ref 8.7–10.5)
CHLORIDE SERPL-SCNC: 104 MMOL/L (ref 95–110)
CO2 SERPL-SCNC: 22 MMOL/L (ref 23–29)
CREAT SERPL-MCNC: 1.7 MG/DL (ref 0.5–1.4)
ERYTHROCYTE [DISTWIDTH] IN BLOOD BY AUTOMATED COUNT: 16.9 % (ref 11.5–14.5)
EST. GFR  (AFRICAN AMERICAN): 31 ML/MIN/1.73 M^2
EST. GFR  (NON AFRICAN AMERICAN): 27 ML/MIN/1.73 M^2
GLUCOSE SERPL-MCNC: 90 MG/DL (ref 70–110)
HCT VFR BLD AUTO: 31.9 % (ref 37–48.5)
HGB BLD-MCNC: 10.2 G/DL (ref 12–16)
IMM GRANULOCYTES # BLD AUTO: 0.01 K/UL (ref 0–0.04)
MCH RBC QN AUTO: 28.3 PG (ref 27–31)
MCHC RBC AUTO-ENTMCNC: 32 G/DL (ref 32–36)
MCV RBC AUTO: 89 FL (ref 82–98)
NEUTROPHILS # BLD AUTO: 1.5 K/UL (ref 1.8–7.7)
PLATELET # BLD AUTO: 108 K/UL (ref 150–350)
PMV BLD AUTO: 11.2 FL (ref 9.2–12.9)
POTASSIUM SERPL-SCNC: 4.4 MMOL/L (ref 3.5–5.1)
PROT SERPL-MCNC: 7.2 G/DL (ref 6–8.4)
RBC # BLD AUTO: 3.6 M/UL (ref 4–5.4)
SODIUM SERPL-SCNC: 136 MMOL/L (ref 136–145)
WBC # BLD AUTO: 2.92 K/UL (ref 3.9–12.7)

## 2020-05-08 PROCEDURE — 63600175 PHARM REV CODE 636 W HCPCS: Performed by: INTERNAL MEDICINE

## 2020-05-08 PROCEDURE — 96413 CHEMO IV INFUSION 1 HR: CPT

## 2020-05-08 PROCEDURE — 85027 COMPLETE CBC AUTOMATED: CPT

## 2020-05-08 PROCEDURE — 25000003 PHARM REV CODE 250: Performed by: INTERNAL MEDICINE

## 2020-05-08 PROCEDURE — 96375 TX/PRO/DX INJ NEW DRUG ADDON: CPT

## 2020-05-08 PROCEDURE — A4216 STERILE WATER/SALINE, 10 ML: HCPCS | Performed by: INTERNAL MEDICINE

## 2020-05-08 PROCEDURE — 80053 COMPREHEN METABOLIC PANEL: CPT

## 2020-05-08 PROCEDURE — 36591 DRAW BLOOD OFF VENOUS DEVICE: CPT

## 2020-05-08 RX ORDER — SODIUM CHLORIDE 0.9 % (FLUSH) 0.9 %
10 SYRINGE (ML) INJECTION
Status: DISCONTINUED | OUTPATIENT
Start: 2020-05-08 | End: 2020-05-08 | Stop reason: HOSPADM

## 2020-05-08 RX ORDER — ONDANSETRON 2 MG/ML
8 INJECTION INTRAMUSCULAR; INTRAVENOUS
Status: COMPLETED | OUTPATIENT
Start: 2020-05-08 | End: 2020-05-08

## 2020-05-08 RX ORDER — HEPARIN 100 UNIT/ML
500 SYRINGE INTRAVENOUS
Status: DISCONTINUED | OUTPATIENT
Start: 2020-05-08 | End: 2020-05-08 | Stop reason: HOSPADM

## 2020-05-08 RX ADMIN — HEPARIN 500 UNITS: 100 SYRINGE at 12:05

## 2020-05-08 RX ADMIN — GEMCITABINE HYDROCHLORIDE 1545 MG: 1 INJECTION, POWDER, LYOPHILIZED, FOR SOLUTION INTRAVENOUS at 11:05

## 2020-05-08 RX ADMIN — Medication 10 ML: at 11:05

## 2020-05-08 RX ADMIN — ONDANSETRON 8 MG: 2 INJECTION, SOLUTION INTRAMUSCULAR; INTRAVENOUS at 11:05

## 2020-05-08 NOTE — PLAN OF CARE
Patient arrived to unit afebrile for stat labs and C1D8 Gemzar infusion. Pt voiced no c/os. Plan of care reviewed, patient agreeable to plan. Port accessed and labs drawn. Labs reviewed and cleared for chemo.  Patient tolerated infusion well. VSS. Discharge instructions reviewed, patient instructed to return May 22 for C2 Day 1. Patient left unit in W/C accompanied by MA. Patient in NAD at time of discharge.

## 2020-05-20 ENCOUNTER — LAB VISIT (OUTPATIENT)
Dept: LAB | Facility: HOSPITAL | Age: 85
End: 2020-05-20
Attending: INTERNAL MEDICINE
Payer: MEDICARE

## 2020-05-20 DIAGNOSIS — C56.9 OVARIAN CANCER: ICD-10-CM

## 2020-05-20 LAB
ALBUMIN SERPL BCP-MCNC: 3.5 G/DL (ref 3.5–5.2)
ALP SERPL-CCNC: 80 U/L (ref 55–135)
ALT SERPL W/O P-5'-P-CCNC: 26 U/L (ref 10–44)
ANION GAP SERPL CALC-SCNC: 10 MMOL/L (ref 8–16)
AST SERPL-CCNC: 30 U/L (ref 10–40)
BILIRUB SERPL-MCNC: 0.3 MG/DL (ref 0.1–1)
BUN SERPL-MCNC: 28 MG/DL (ref 8–23)
CALCIUM SERPL-MCNC: 9.3 MG/DL (ref 8.7–10.5)
CHLORIDE SERPL-SCNC: 104 MMOL/L (ref 95–110)
CO2 SERPL-SCNC: 23 MMOL/L (ref 23–29)
CREAT SERPL-MCNC: 2 MG/DL (ref 0.5–1.4)
ERYTHROCYTE [DISTWIDTH] IN BLOOD BY AUTOMATED COUNT: 18.9 % (ref 11.5–14.5)
EST. GFR  (AFRICAN AMERICAN): 26 ML/MIN/1.73 M^2
EST. GFR  (NON AFRICAN AMERICAN): 22 ML/MIN/1.73 M^2
GLUCOSE SERPL-MCNC: 104 MG/DL (ref 70–110)
HCT VFR BLD AUTO: 32.3 % (ref 37–48.5)
HGB BLD-MCNC: 10.3 G/DL (ref 12–16)
IMM GRANULOCYTES # BLD AUTO: 0.05 K/UL (ref 0–0.04)
MCH RBC QN AUTO: 28.3 PG (ref 27–31)
MCHC RBC AUTO-ENTMCNC: 31.9 G/DL (ref 32–36)
MCV RBC AUTO: 89 FL (ref 82–98)
NEUTROPHILS # BLD AUTO: 1.3 K/UL (ref 1.8–7.7)
PLATELET # BLD AUTO: 174 K/UL (ref 150–350)
PMV BLD AUTO: 10.8 FL (ref 9.2–12.9)
POTASSIUM SERPL-SCNC: 4.7 MMOL/L (ref 3.5–5.1)
PROT SERPL-MCNC: 7.7 G/DL (ref 6–8.4)
RBC # BLD AUTO: 3.64 M/UL (ref 4–5.4)
SODIUM SERPL-SCNC: 137 MMOL/L (ref 136–145)
WBC # BLD AUTO: 2.76 K/UL (ref 3.9–12.7)

## 2020-05-20 PROCEDURE — 36415 COLL VENOUS BLD VENIPUNCTURE: CPT

## 2020-05-20 PROCEDURE — 85027 COMPLETE CBC AUTOMATED: CPT

## 2020-05-20 PROCEDURE — 80053 COMPREHEN METABOLIC PANEL: CPT

## 2020-05-21 ENCOUNTER — OFFICE VISIT (OUTPATIENT)
Dept: HEMATOLOGY/ONCOLOGY | Facility: CLINIC | Age: 85
End: 2020-05-21
Payer: MEDICARE

## 2020-05-21 DIAGNOSIS — D64.81 ANEMIA DUE TO CHEMOTHERAPY: ICD-10-CM

## 2020-05-21 DIAGNOSIS — C56.9 MALIGNANT NEOPLASM OF OVARY, UNSPECIFIED LATERALITY: Primary | ICD-10-CM

## 2020-05-21 DIAGNOSIS — N18.30 CKD (CHRONIC KIDNEY DISEASE) STAGE 3, GFR 30-59 ML/MIN: ICD-10-CM

## 2020-05-21 DIAGNOSIS — D70.1 CHEMOTHERAPY INDUCED NEUTROPENIA: ICD-10-CM

## 2020-05-21 DIAGNOSIS — T45.1X5A ANEMIA DUE TO CHEMOTHERAPY: ICD-10-CM

## 2020-05-21 DIAGNOSIS — T45.1X5A CHEMOTHERAPY INDUCED NEUTROPENIA: ICD-10-CM

## 2020-05-21 PROCEDURE — 1101F PR PT FALLS ASSESS DOC 0-1 FALLS W/OUT INJ PAST YR: ICD-10-PCS | Mod: CPTII,95,, | Performed by: INTERNAL MEDICINE

## 2020-05-21 PROCEDURE — 1159F PR MEDICATION LIST DOCUMENTED IN MEDICAL RECORD: ICD-10-PCS | Mod: 95,,, | Performed by: INTERNAL MEDICINE

## 2020-05-21 PROCEDURE — 1101F PT FALLS ASSESS-DOCD LE1/YR: CPT | Mod: CPTII,95,, | Performed by: INTERNAL MEDICINE

## 2020-05-21 PROCEDURE — 99443 PR PHYSICIAN TELEPHONE EVALUATION 21-30 MIN: ICD-10-PCS | Mod: 95,,, | Performed by: INTERNAL MEDICINE

## 2020-05-21 PROCEDURE — 1159F MED LIST DOCD IN RCRD: CPT | Mod: 95,,, | Performed by: INTERNAL MEDICINE

## 2020-05-21 PROCEDURE — 99443 PR PHYSICIAN TELEPHONE EVALUATION 21-30 MIN: CPT | Mod: 95,,, | Performed by: INTERNAL MEDICINE

## 2020-05-21 NOTE — PROGRESS NOTES
Subjective:       Patient ID: Celine Mahan is a 84 y.o. female.    Chief Complaint: No chief complaint on file.    Established Patient - Audio Only Telehealth Visit       The patient location is: Home  The chief complaint leading to consultation is : Follow-up Ovarian Cancer  Visit type: Virtual visit with synchronous audio   Total time spent with patient: 25min  Each patient to whom he or she provides medical services by telemedicine is:  (1) informed of the relationship between the physician and patient and the respective role of any other health care provider with respect to management of the patient; and (2) notified that he or she may decline to receive medical services by telemedicine and may withdraw from such care at any time.    This service was not originating from a related E/M service provided within the previous 7 days nor will  to an E/M service or procedure within the next 24 hours or my soonest available appointment.  Prevailing standard of care was able to be met in this audio-only visit.      Diagnosis: 1.Ovarian cancer status post debulking surgery 9/21/2017                     2. 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 platinum sensitive recurrent ovarian cancer 3/2020  Therapy: 1. S/p carbo/taxol x 6 completed 2/12/2018                   2. Carbo/Doxil 2/1/2019- 7/8/2019                   3. Bevacizumab maintenance  8/16/2019 -3/14/2020                   4. Carboplatin/Gemcitabine 4/24/2020- present      BRCA ANALYSIS NEG    HPI ( per Dr. Feliciano) Patient is an 83 yo female who originally presented as a referral from Dr. Wills for pelvic mass. Patient reports LLQ pain for approximately 3 months which prompted evaluation.      Pelvic US 8/7/17 There is a complex echogenicity midline pelvic mass measuring 18.1 x 8.6 x 7.6 cm.  Ovaries not visualized.     CT A&P 8/9/17 Large solid/cystic mass within the lower abdomen/upper pelvis that is most  "concerning for a malignant neoplasm, likely of ovarian origin given its location.  Correlation with previous surgical history is recommended noting evidence of previous hysterectomy.  Mass abuts and displaces the adjacent bladder without definite CT findings to suggest hema invasion. Abdominal and pelvic lymphadenopathy concerning for lymphatic spread of neoplasm with index lymph nodes as above.      Medical history is significant for CKD (Cr 1.3), HTN, hypothyroid, HTN, HLD. She has a personal history of breast cancer in 1995 treated with mastectomy and adjuvant chemotherapy she reports. Last MMG 11/2016 normal. Adbominal surgery include cholecystectomy, TVH, xlap/removal ovary for ectopic. She reports that her physician told her "some ovary remains in situ". Family history significant for mother with pancreatic cancer.      CT chest 8/28/17 showed mediastinal adenopathy consistent with Stage IV disease.   RECIST Summary:  Mediastinal adenopathy:  1. Preaortic abnormal node measuring 1.7 cm short axis.  2. Pretracheal node measures 1.6 cm short axis.     She underwent surgical cytoreduction with xlap/LSO/omentectomy 9/21/17. Pathology showed high grade adenocarcinoma.        She is also  followed by Dr. Feliciano  She  transferred care to undergo  adjuvant chemo at this location   She completed adjuvant chemo with carbo AUC 6 and taxol 175mg/m2 q 21d  2/12/2018  She was considered for PRIMA clinical trial for maintenance therapy but declined    Pt with increasing  levels   849>230>37>23>22>12>40>309 ( 1/9/2019 )     CT c/a/p 1/14/2019 S/P resection of large LEFT ovarian/fallopian tube mass consistent with carcinoma.  Interval increase in size of LEFT pelvic/retroperitoneal lymph nodes.  No evidence for ascites or definitive carcinomatosis of mesentery.  Interval decrease in size of prevascular lymph node.    She was then diagnosed with  platinum sensitive recurrent ovarian cancer. Platinum free interval 11 " months.   Dr. Feliciano Reviewed standard management options which are to re-treat with platinum-based chemotherapy (carbo + doxil/gem/taxol +/- avastin).   She is s/p cycle 6 of chemo  Carbo/doxil (Doxil 30mg/m2  Carbo AUC 5)  q 28dayscompleted 7/8/2019   ( dosage reduction  sec to cytopenias)     Imaging studies 7/2019 revealed stable disease    Pt started on maintenance therapy with bevacizumab 15mg/kg  q3 wks    Pt developed increasing  levels and  CT imaging 3/19/2020 showed disease progression    She was started on carbo/gem for platinum sensitive recurrent ovarian cancer.    She is s/p C1 5/8/20   Grandson present during televisit     No new issues  Appetite and weight stable   No SOB/CP  No fatigue  Appetite and weight stable   No melena, hematochezia or change in bowel habits  No rashes  Chronic neck pain-stable  She continues with minor tingling/numbness in extremities  No dysuria, hematuria or change in bowel habtis      CBC  reveals a white blood cell count of  2760mm 3 hemoglobin 10.3grams/dL hematocrit of 32.3% plt ct 174k.    Biochemical profile reveals stable Cr level from 1.5mg/dL  to 2.0mg/dL     BRCA ANALYSIS NEG    Past Medical History:   Diagnosis Date    Breast cancer     Cataract     CKD (chronic kidney disease) stage 3, GFR 30-59 ml/min     Essential hypertension 8/22/2012    Gastroesophageal reflux disease 10/17/2019    Hyperlipidemia     Hypertension     Hypothyroidism     Obesity     Osteopenia     Ovarian cancer 10/8/2017    Overactive bladder     Pelvic mass 8/27/2017    Thyroid disease          Past Surgical History:   Procedure Laterality Date    bt catarac surgery      CHOLECYSTECTOMY      HYSTERECTOMY      INSERTION OF TUNNELED CENTRAL VENOUS CATHETER (CVC) WITH SUBCUTANEOUS PORT N/A 6/3/2019    Procedure: INSERTION, PORT-A-CATH;  Surgeon: Andrey Diagnostic Provider;  Location: [facility] OR;  Service: Radiology;  Laterality: N/A;  RN PREOP 5/31/2019    MASTECTOMY Left               Review of Systems   Constitutional: Negative for appetite change, fatigue, fever and unexpected weight change.   HENT: Negative for mouth sores.    Eyes: Negative for visual disturbance.   Respiratory: Negative for cough and shortness of breath.    Cardiovascular: Negative for chest pain.   Gastrointestinal: Negative for abdominal pain, diarrhea and nausea.   Genitourinary: Negative for frequency.   Musculoskeletal: Positive for neck pain (chronic). Negative for back pain.   Skin: Negative for rash.   Neurological: Positive for numbness. Negative for weakness, light-headedness and headaches.        Intermittent tingling   Hematological: Negative for adenopathy.   Psychiatric/Behavioral: The patient is not nervous/anxious.        Objective:         Televisit   PE deferred      Labs:   Lab Results   Component Value Date    WBC 2.76 (L) 05/20/2020    HGB 10.3 (L) 05/20/2020    HCT 32.3 (L) 05/20/2020    MCV 89 05/20/2020     05/20/2020           MRI Cervical spine w/out contrast 4/30/2019   Cervical degenerative change most significant at C4-5 and C5-6.  Specific details at each level discussed above.     CT c/a/p w/out contrast 3/19/2020    1. Status post hysterectomy and left oophorectomy surgery with interval enlarging retroperitoneal and pelvic lymph nodes discussed above.  2. No new metastatic or recurrent disease confirmed.  3. Additional remote findings above.  4. Lesion 1: Left pelvic 6 sidewall lymph node 2.4 cm, was 2 cm.  5. Lesion 2: Left Madalyn aortic node 2.4 cm, was 1.9 cm.      Component      Latest Ref Rng & Units 2/21/2020 1/31/2020 12/19/2019 10/18/2019         0 - 30 U/mL 139 (H) 140 (H) 84 (H) 42 (H)     Component      Latest Ref Rng & Units 9/6/2019 7/22/2019 7/5/2019 6/6/2019         0 - 30 U/mL 33 (H) 58 (H) 52 (H) 55 (H)     Component      Latest Ref Rng & Units 4/17/2019         0 - 30 U/mL 86 (H)         Assessment:       1. Malignant neoplasm of ovary,  unspecified laterality    2. Chemotherapy induced neutropenia    3. Anemia due to chemotherapy    4. CKD (chronic kidney disease) stage 3, GFR 30-59 ml/min        Plan:   1- 4  Pt clinically stable      1.Ovarian cancer status post debulking surgery 9/21/2017   2. 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 S/p carbo/taxol x 6 completed 2/12/2018                   2. Carbo/Doxil     2/1/2019- 7/8/2019                   3. Bevacizumab maintenance  8/16/2019 - 3/14/2020      Patient with ovarian cancer status post debulking surgery 9/21/2017 undergoing adjuvant platinum-based chemotherapy with carbo/taxol .   S/p carbo/taxol x 6 completed 2/12/2018  Pt declined maintenance therapy on trial   BRCA ANALYSIS NEG    She then developed  platinum sensitive recurrent ovarian cancer. Platinum free interval 11 months.   Dr. Feliciano Reviewed standard management options which are to re-treat with platinum-based chemotherapy (carbo + doxil/gem/taxol +/- avastin).   Pt underwent treatment with Doxil 30mg/m2  Carbo AUC 5  q 28days   s/p cycle 6 of chemo  Carbo/doxil completed 7/8/2019   CT c/a/p w/contrast 7/26/2019-stable findings    Pt then treated with maintenance  bevacizumab.   Pt developed rising  levels  CT imaging showed disease progression    Discussed with Dr. Feliciano . Patient with platinum sensitive recurrent ovarian cancer.Treatment options include single agent carbo or carbo/gem   Discussed The risks of chemotherapy include but not limited to hair and skin changes, bone marrow damage (anemia, thrombocytopenia, immune suppression, neutropenia), allergic reactions, diarrhea, constipation, mouth sores, neuropathy, secondary cancers, damage at injection sites and nearly death.   Patient  elected to move forward with Carbo AUC 4 / Snyder 800 mg/m2 Day 1 /Snyder 800mg/m2 DAY 8  [id number]  ( gem dosage reduction upfront planned )      ANC 1300, afebrile , asymptomatic   Repeat CBC 5/22/2020  Proceed with C2  on 5/22 ( pending lab parameters)    Hb stable 10.3g/dL     Cbc,cmp,  priro to follow-up 3 weeks        Cc: Iesha Feliciano M.D.          Anil Pelayo M.D.

## 2020-05-22 ENCOUNTER — INFUSION (OUTPATIENT)
Dept: INFUSION THERAPY | Facility: HOSPITAL | Age: 85
End: 2020-05-22
Attending: INTERNAL MEDICINE
Payer: MEDICARE

## 2020-05-22 VITALS
TEMPERATURE: 98 F | RESPIRATION RATE: 17 BRPM | DIASTOLIC BLOOD PRESSURE: 70 MMHG | OXYGEN SATURATION: 99 % | SYSTOLIC BLOOD PRESSURE: 118 MMHG | HEART RATE: 59 BPM

## 2020-05-22 DIAGNOSIS — C56.9 OVARIAN CANCER: Primary | ICD-10-CM

## 2020-05-22 DIAGNOSIS — C56.9 OVARIAN CANCER: ICD-10-CM

## 2020-05-22 DIAGNOSIS — C56.9 MALIGNANT NEOPLASM OF OVARY, UNSPECIFIED LATERALITY: ICD-10-CM

## 2020-05-22 LAB
ALBUMIN SERPL BCP-MCNC: 3.5 G/DL (ref 3.5–5.2)
ALP SERPL-CCNC: 83 U/L (ref 55–135)
ALT SERPL W/O P-5'-P-CCNC: 22 U/L (ref 10–44)
ANION GAP SERPL CALC-SCNC: 10 MMOL/L (ref 8–16)
AST SERPL-CCNC: 29 U/L (ref 10–40)
BILIRUB SERPL-MCNC: 0.2 MG/DL (ref 0.1–1)
BUN SERPL-MCNC: 25 MG/DL (ref 8–23)
CALCIUM SERPL-MCNC: 9.1 MG/DL (ref 8.7–10.5)
CHLORIDE SERPL-SCNC: 106 MMOL/L (ref 95–110)
CO2 SERPL-SCNC: 21 MMOL/L (ref 23–29)
CREAT SERPL-MCNC: 1.9 MG/DL (ref 0.5–1.4)
ERYTHROCYTE [DISTWIDTH] IN BLOOD BY AUTOMATED COUNT: 19.9 % (ref 11.5–14.5)
EST. GFR  (AFRICAN AMERICAN): 28 ML/MIN/1.73 M^2
EST. GFR  (NON AFRICAN AMERICAN): 24 ML/MIN/1.73 M^2
GLUCOSE SERPL-MCNC: 114 MG/DL (ref 70–110)
HCT VFR BLD AUTO: 31.3 % (ref 37–48.5)
HGB BLD-MCNC: 9.8 G/DL (ref 12–16)
IMM GRANULOCYTES # BLD AUTO: 0.08 K/UL (ref 0–0.04)
MCH RBC QN AUTO: 28.3 PG (ref 27–31)
MCHC RBC AUTO-ENTMCNC: 31.3 G/DL (ref 32–36)
MCV RBC AUTO: 91 FL (ref 82–98)
NEUTROPHILS # BLD AUTO: 1.3 K/UL (ref 1.8–7.7)
PLATELET # BLD AUTO: 275 K/UL (ref 150–350)
PMV BLD AUTO: 9.7 FL (ref 9.2–12.9)
POTASSIUM SERPL-SCNC: 4.4 MMOL/L (ref 3.5–5.1)
PROT SERPL-MCNC: 7.4 G/DL (ref 6–8.4)
RBC # BLD AUTO: 3.46 M/UL (ref 4–5.4)
SARS-COV-2 RDRP RESP QL NAA+PROBE: NEGATIVE
SODIUM SERPL-SCNC: 137 MMOL/L (ref 136–145)
WBC # BLD AUTO: 2.83 K/UL (ref 3.9–12.7)

## 2020-05-22 PROCEDURE — U0002 COVID-19 LAB TEST NON-CDC: HCPCS

## 2020-05-22 PROCEDURE — 85027 COMPLETE CBC AUTOMATED: CPT

## 2020-05-22 PROCEDURE — 36591 DRAW BLOOD OFF VENOUS DEVICE: CPT

## 2020-05-22 PROCEDURE — A4216 STERILE WATER/SALINE, 10 ML: HCPCS | Performed by: INTERNAL MEDICINE

## 2020-05-22 PROCEDURE — 25000003 PHARM REV CODE 250: Performed by: INTERNAL MEDICINE

## 2020-05-22 PROCEDURE — 80053 COMPREHEN METABOLIC PANEL: CPT

## 2020-05-22 PROCEDURE — 63600175 PHARM REV CODE 636 W HCPCS: Performed by: INTERNAL MEDICINE

## 2020-05-22 RX ORDER — SODIUM CHLORIDE 0.9 % (FLUSH) 0.9 %
10 SYRINGE (ML) INJECTION
Status: DISCONTINUED | OUTPATIENT
Start: 2020-05-22 | End: 2020-05-22 | Stop reason: HOSPADM

## 2020-05-22 RX ORDER — HEPARIN 100 UNIT/ML
500 SYRINGE INTRAVENOUS
Status: COMPLETED | OUTPATIENT
Start: 2020-05-22 | End: 2020-05-22

## 2020-05-22 RX ADMIN — HEPARIN 500 UNITS: 100 SYRINGE at 11:05

## 2020-05-22 RX ADMIN — Medication 10 ML: at 11:05

## 2020-05-22 NOTE — PLAN OF CARE
Pt arrived to unit. VSS. Pt afebrile. Covid test negative. Pt tolerated lab draw from port. Dr. Campo notified and chemo will be held due to ANC of 1300. Pt and pt's daughter informed and verbalized understanding. Pt in wheelchair, discharged from unit with MA. No distress noted.

## 2020-05-29 ENCOUNTER — INFUSION (OUTPATIENT)
Dept: INFUSION THERAPY | Facility: HOSPITAL | Age: 85
End: 2020-05-29
Attending: INTERNAL MEDICINE
Payer: MEDICARE

## 2020-05-29 VITALS
SYSTOLIC BLOOD PRESSURE: 132 MMHG | RESPIRATION RATE: 18 BRPM | HEART RATE: 60 BPM | OXYGEN SATURATION: 99 % | HEIGHT: 62 IN | WEIGHT: 187 LBS | BODY MASS INDEX: 34.41 KG/M2 | DIASTOLIC BLOOD PRESSURE: 61 MMHG | TEMPERATURE: 97 F

## 2020-05-29 DIAGNOSIS — C56.9 OVARIAN CANCER: Primary | ICD-10-CM

## 2020-05-29 DIAGNOSIS — C56.9 MALIGNANT NEOPLASM OF OVARY, UNSPECIFIED LATERALITY: ICD-10-CM

## 2020-05-29 LAB
ALBUMIN SERPL BCP-MCNC: 3.4 G/DL (ref 3.5–5.2)
ALP SERPL-CCNC: 80 U/L (ref 55–135)
ALT SERPL W/O P-5'-P-CCNC: 19 U/L (ref 10–44)
ANION GAP SERPL CALC-SCNC: 8 MMOL/L (ref 8–16)
AST SERPL-CCNC: 25 U/L (ref 10–40)
BILIRUB SERPL-MCNC: 0.3 MG/DL (ref 0.1–1)
BUN SERPL-MCNC: 25 MG/DL (ref 8–23)
CALCIUM SERPL-MCNC: 9.1 MG/DL (ref 8.7–10.5)
CHLORIDE SERPL-SCNC: 105 MMOL/L (ref 95–110)
CO2 SERPL-SCNC: 23 MMOL/L (ref 23–29)
CREAT SERPL-MCNC: 1.9 MG/DL (ref 0.5–1.4)
ERYTHROCYTE [DISTWIDTH] IN BLOOD BY AUTOMATED COUNT: 21.2 % (ref 11.5–14.5)
EST. GFR  (AFRICAN AMERICAN): 28 ML/MIN/1.73 M^2
EST. GFR  (NON AFRICAN AMERICAN): 24 ML/MIN/1.73 M^2
GLUCOSE SERPL-MCNC: 131 MG/DL (ref 70–110)
HCT VFR BLD AUTO: 32.8 % (ref 37–48.5)
HGB BLD-MCNC: 10.2 G/DL (ref 12–16)
IMM GRANULOCYTES # BLD AUTO: 0.08 K/UL (ref 0–0.04)
MCH RBC QN AUTO: 28.4 PG (ref 27–31)
MCHC RBC AUTO-ENTMCNC: 31.1 G/DL (ref 32–36)
MCV RBC AUTO: 91 FL (ref 82–98)
NEUTROPHILS # BLD AUTO: 1.6 K/UL (ref 1.8–7.7)
PLATELET # BLD AUTO: 340 K/UL (ref 150–350)
PMV BLD AUTO: 9.6 FL (ref 9.2–12.9)
POTASSIUM SERPL-SCNC: 4.5 MMOL/L (ref 3.5–5.1)
PROT SERPL-MCNC: 7.4 G/DL (ref 6–8.4)
RBC # BLD AUTO: 3.59 M/UL (ref 4–5.4)
SODIUM SERPL-SCNC: 136 MMOL/L (ref 136–145)
WBC # BLD AUTO: 3.3 K/UL (ref 3.9–12.7)

## 2020-05-29 PROCEDURE — 63600175 PHARM REV CODE 636 W HCPCS: Performed by: INTERNAL MEDICINE

## 2020-05-29 PROCEDURE — 96417 CHEMO IV INFUS EACH ADDL SEQ: CPT

## 2020-05-29 PROCEDURE — 96413 CHEMO IV INFUSION 1 HR: CPT

## 2020-05-29 PROCEDURE — 25000003 PHARM REV CODE 250: Performed by: INTERNAL MEDICINE

## 2020-05-29 PROCEDURE — A4216 STERILE WATER/SALINE, 10 ML: HCPCS | Performed by: INTERNAL MEDICINE

## 2020-05-29 PROCEDURE — 96361 HYDRATE IV INFUSION ADD-ON: CPT

## 2020-05-29 PROCEDURE — 80053 COMPREHEN METABOLIC PANEL: CPT

## 2020-05-29 PROCEDURE — 85027 COMPLETE CBC AUTOMATED: CPT

## 2020-05-29 PROCEDURE — 96367 TX/PROPH/DG ADDL SEQ IV INF: CPT

## 2020-05-29 RX ORDER — HEPARIN 100 UNIT/ML
500 SYRINGE INTRAVENOUS
Status: CANCELLED | OUTPATIENT
Start: 2020-05-29

## 2020-05-29 RX ORDER — HEPARIN 100 UNIT/ML
500 SYRINGE INTRAVENOUS
Status: DISCONTINUED | OUTPATIENT
Start: 2020-05-29 | End: 2020-05-29 | Stop reason: HOSPADM

## 2020-05-29 RX ORDER — SODIUM CHLORIDE 0.9 % (FLUSH) 0.9 %
10 SYRINGE (ML) INJECTION
Status: CANCELLED | OUTPATIENT
Start: 2020-05-29

## 2020-05-29 RX ORDER — SODIUM CHLORIDE 0.9 % (FLUSH) 0.9 %
10 SYRINGE (ML) INJECTION
Status: DISCONTINUED | OUTPATIENT
Start: 2020-05-29 | End: 2020-05-29 | Stop reason: HOSPADM

## 2020-05-29 RX ADMIN — GEMCITABINE 1545 MG: 38 INJECTION, POWDER, LYOPHILIZED, FOR SOLUTION INTRAVENOUS at 11:05

## 2020-05-29 RX ADMIN — PALONOSETRON HYDROCHLORIDE: 0.25 INJECTION INTRAVENOUS at 10:05

## 2020-05-29 RX ADMIN — HEPARIN 500 UNITS: 100 SYRINGE at 12:05

## 2020-05-29 RX ADMIN — Medication 10 ML: at 12:05

## 2020-05-29 RX ADMIN — CARBOPLATIN 220 MG: 10 INJECTION, SOLUTION INTRAVENOUS at 11:05

## 2020-05-29 RX ADMIN — SODIUM CHLORIDE 500 ML: 0.9 INJECTION, SOLUTION INTRAVENOUS at 10:05

## 2020-05-29 NOTE — PLAN OF CARE
Patient arrived to unit for C2D1 Gemzar, Carbo. Labs drawn. Patient reports she is feeling well, denies dizziness which she experienced last cycle. Plan of care reviewed, patient agreeable to plan. Patient tolerated treatment well. No sign of reaction noted. VSS. Patient received discharge instructions and follow up appointments. Patient instructed to return 6/5/20. Verbalized understanding and was escorted off unit via w/c by MA. Patient in NAD at time of discharge.

## 2020-06-04 RX ORDER — ONDANSETRON 2 MG/ML
8 INJECTION INTRAMUSCULAR; INTRAVENOUS
Status: CANCELLED | OUTPATIENT
Start: 2020-06-09

## 2020-06-04 RX ORDER — SODIUM CHLORIDE 0.9 % (FLUSH) 0.9 %
10 SYRINGE (ML) INJECTION
Status: CANCELLED | OUTPATIENT
Start: 2020-06-09

## 2020-06-04 RX ORDER — HEPARIN 100 UNIT/ML
500 SYRINGE INTRAVENOUS
Status: CANCELLED | OUTPATIENT
Start: 2020-06-09

## 2020-06-09 ENCOUNTER — INFUSION (OUTPATIENT)
Dept: INFUSION THERAPY | Facility: HOSPITAL | Age: 85
End: 2020-06-09
Attending: INTERNAL MEDICINE
Payer: MEDICARE

## 2020-06-09 VITALS
SYSTOLIC BLOOD PRESSURE: 138 MMHG | HEART RATE: 47 BPM | OXYGEN SATURATION: 99 % | TEMPERATURE: 97 F | RESPIRATION RATE: 17 BRPM | DIASTOLIC BLOOD PRESSURE: 62 MMHG

## 2020-06-09 DIAGNOSIS — C56.9 OVARIAN CANCER: Primary | ICD-10-CM

## 2020-06-09 DIAGNOSIS — C56.9 MALIGNANT NEOPLASM OF OVARY, UNSPECIFIED LATERALITY: ICD-10-CM

## 2020-06-09 LAB
ALBUMIN SERPL BCP-MCNC: 3.5 G/DL (ref 3.5–5.2)
ALP SERPL-CCNC: 69 U/L (ref 55–135)
ALT SERPL W/O P-5'-P-CCNC: 42 U/L (ref 10–44)
ANION GAP SERPL CALC-SCNC: 8 MMOL/L (ref 8–16)
AST SERPL-CCNC: 46 U/L (ref 10–40)
BILIRUB SERPL-MCNC: 0.3 MG/DL (ref 0.1–1)
BUN SERPL-MCNC: 19 MG/DL (ref 8–23)
CALCIUM SERPL-MCNC: 8.8 MG/DL (ref 8.7–10.5)
CHLORIDE SERPL-SCNC: 106 MMOL/L (ref 95–110)
CO2 SERPL-SCNC: 23 MMOL/L (ref 23–29)
CREAT SERPL-MCNC: 1.8 MG/DL (ref 0.5–1.4)
ERYTHROCYTE [DISTWIDTH] IN BLOOD BY AUTOMATED COUNT: 21.2 % (ref 11.5–14.5)
EST. GFR  (AFRICAN AMERICAN): 29 ML/MIN/1.73 M^2
EST. GFR  (NON AFRICAN AMERICAN): 25 ML/MIN/1.73 M^2
GLUCOSE SERPL-MCNC: 104 MG/DL (ref 70–110)
HCT VFR BLD AUTO: 30.3 % (ref 37–48.5)
HGB BLD-MCNC: 9.6 G/DL (ref 12–16)
IMM GRANULOCYTES # BLD AUTO: 0.03 K/UL (ref 0–0.04)
MCH RBC QN AUTO: 29.3 PG (ref 27–31)
MCHC RBC AUTO-ENTMCNC: 31.7 G/DL (ref 32–36)
MCV RBC AUTO: 92 FL (ref 82–98)
NEUTROPHILS # BLD AUTO: 1.7 K/UL (ref 1.8–7.7)
PLATELET # BLD AUTO: 95 K/UL (ref 150–350)
PMV BLD AUTO: 11 FL (ref 9.2–12.9)
POTASSIUM SERPL-SCNC: 4.6 MMOL/L (ref 3.5–5.1)
PROT SERPL-MCNC: 7 G/DL (ref 6–8.4)
RBC # BLD AUTO: 3.28 M/UL (ref 4–5.4)
SODIUM SERPL-SCNC: 137 MMOL/L (ref 136–145)
WBC # BLD AUTO: 3.23 K/UL (ref 3.9–12.7)

## 2020-06-09 PROCEDURE — 63600175 PHARM REV CODE 636 W HCPCS: Performed by: INTERNAL MEDICINE

## 2020-06-09 PROCEDURE — 85027 COMPLETE CBC AUTOMATED: CPT

## 2020-06-09 PROCEDURE — 96413 CHEMO IV INFUSION 1 HR: CPT

## 2020-06-09 PROCEDURE — A4216 STERILE WATER/SALINE, 10 ML: HCPCS | Performed by: INTERNAL MEDICINE

## 2020-06-09 PROCEDURE — 25000003 PHARM REV CODE 250: Performed by: INTERNAL MEDICINE

## 2020-06-09 PROCEDURE — 96375 TX/PRO/DX INJ NEW DRUG ADDON: CPT

## 2020-06-09 PROCEDURE — 80053 COMPREHEN METABOLIC PANEL: CPT

## 2020-06-09 RX ORDER — SODIUM CHLORIDE 0.9 % (FLUSH) 0.9 %
10 SYRINGE (ML) INJECTION
Status: DISCONTINUED | OUTPATIENT
Start: 2020-06-09 | End: 2020-06-09 | Stop reason: HOSPADM

## 2020-06-09 RX ORDER — ONDANSETRON 2 MG/ML
8 INJECTION INTRAMUSCULAR; INTRAVENOUS
Status: COMPLETED | OUTPATIENT
Start: 2020-06-09 | End: 2020-06-09

## 2020-06-09 RX ORDER — HEPARIN 100 UNIT/ML
500 SYRINGE INTRAVENOUS
Status: DISCONTINUED | OUTPATIENT
Start: 2020-06-09 | End: 2020-06-09 | Stop reason: HOSPADM

## 2020-06-09 RX ADMIN — ONDANSETRON 8 MG: 2 INJECTION INTRAMUSCULAR; INTRAVENOUS at 09:06

## 2020-06-09 RX ADMIN — Medication 10 ML: at 10:06

## 2020-06-09 RX ADMIN — GEMCITABINE 1545 MG: 38 INJECTION, POWDER, LYOPHILIZED, FOR SOLUTION INTRAVENOUS at 10:06

## 2020-06-09 RX ADMIN — HEPARIN 500 UNITS: 100 SYRINGE at 10:06

## 2020-06-09 NOTE — PLAN OF CARE
Patient arrived to unit for C2D8 Gemcitabine pending lab results. STAT labs drawn. VSS. She denies any new or worsening symptoms at this time.Labs resulted and reviewed. Patient pre-medicated with zofran IVP. Received Gemzar. Tolerated treatment well. No reactions noted during visit. Received discharge instructions and follow up appointments. Verbalized understanding and assisted off unit via wheelchair by MA, in NAD at time of discharge.

## 2020-06-17 ENCOUNTER — LAB VISIT (OUTPATIENT)
Dept: LAB | Facility: HOSPITAL | Age: 85
End: 2020-06-17
Attending: INTERNAL MEDICINE
Payer: MEDICARE

## 2020-06-17 DIAGNOSIS — C56.9 MALIGNANT NEOPLASM OF OVARY, UNSPECIFIED LATERALITY: ICD-10-CM

## 2020-06-17 LAB
ALBUMIN SERPL BCP-MCNC: 3.7 G/DL (ref 3.5–5.2)
ALP SERPL-CCNC: 74 U/L (ref 55–135)
ALT SERPL W/O P-5'-P-CCNC: 39 U/L (ref 10–44)
ANION GAP SERPL CALC-SCNC: 8 MMOL/L (ref 8–16)
AST SERPL-CCNC: 45 U/L (ref 10–40)
BASOPHILS # BLD AUTO: 0.03 K/UL (ref 0–0.2)
BASOPHILS NFR BLD: 1.1 % (ref 0–1.9)
BILIRUB SERPL-MCNC: 0.4 MG/DL (ref 0.1–1)
BUN SERPL-MCNC: 23 MG/DL (ref 8–23)
CALCIUM SERPL-MCNC: 9.3 MG/DL (ref 8.7–10.5)
CANCER AG125 SERPL-ACNC: 54 U/ML (ref 0–30)
CHLORIDE SERPL-SCNC: 104 MMOL/L (ref 95–110)
CO2 SERPL-SCNC: 24 MMOL/L (ref 23–29)
CREAT SERPL-MCNC: 1.9 MG/DL (ref 0.5–1.4)
DIFFERENTIAL METHOD: ABNORMAL
EOSINOPHIL # BLD AUTO: 0.1 K/UL (ref 0–0.5)
EOSINOPHIL NFR BLD: 5.3 % (ref 0–8)
ERYTHROCYTE [DISTWIDTH] IN BLOOD BY AUTOMATED COUNT: 21.4 % (ref 11.5–14.5)
EST. GFR  (AFRICAN AMERICAN): 28 ML/MIN/1.73 M^2
EST. GFR  (NON AFRICAN AMERICAN): 24 ML/MIN/1.73 M^2
GLUCOSE SERPL-MCNC: 136 MG/DL (ref 70–110)
HCT VFR BLD AUTO: 31.6 % (ref 37–48.5)
HGB BLD-MCNC: 9.8 G/DL (ref 12–16)
IMM GRANULOCYTES # BLD AUTO: 0.12 K/UL (ref 0–0.04)
IMM GRANULOCYTES NFR BLD AUTO: 4.5 % (ref 0–0.5)
LYMPHOCYTES # BLD AUTO: 0.8 K/UL (ref 1–4.8)
LYMPHOCYTES NFR BLD: 29.8 % (ref 18–48)
MCH RBC QN AUTO: 28.4 PG (ref 27–31)
MCHC RBC AUTO-ENTMCNC: 31 G/DL (ref 32–36)
MCV RBC AUTO: 92 FL (ref 82–98)
MONOCYTES # BLD AUTO: 0.2 K/UL (ref 0.3–1)
MONOCYTES NFR BLD: 8.3 % (ref 4–15)
NEUTROPHILS # BLD AUTO: 1.4 K/UL (ref 1.8–7.7)
NEUTROPHILS NFR BLD: 51 % (ref 38–73)
NRBC BLD-RTO: 2 /100 WBC
PLATELET # BLD AUTO: 74 K/UL (ref 150–350)
PMV BLD AUTO: 12.2 FL (ref 9.2–12.9)
POTASSIUM SERPL-SCNC: 5.1 MMOL/L (ref 3.5–5.1)
PROT SERPL-MCNC: 7.7 G/DL (ref 6–8.4)
RBC # BLD AUTO: 3.45 M/UL (ref 4–5.4)
SODIUM SERPL-SCNC: 136 MMOL/L (ref 136–145)
WBC # BLD AUTO: 2.65 K/UL (ref 3.9–12.7)

## 2020-06-17 PROCEDURE — 36415 COLL VENOUS BLD VENIPUNCTURE: CPT | Mod: PN

## 2020-06-17 PROCEDURE — 85025 COMPLETE CBC W/AUTO DIFF WBC: CPT

## 2020-06-17 PROCEDURE — 86304 IMMUNOASSAY TUMOR CA 125: CPT

## 2020-06-17 PROCEDURE — 80053 COMPREHEN METABOLIC PANEL: CPT

## 2020-06-18 ENCOUNTER — OFFICE VISIT (OUTPATIENT)
Dept: HEMATOLOGY/ONCOLOGY | Facility: CLINIC | Age: 85
End: 2020-06-18
Payer: MEDICARE

## 2020-06-18 VITALS
OXYGEN SATURATION: 94 % | SYSTOLIC BLOOD PRESSURE: 143 MMHG | TEMPERATURE: 98 F | WEIGHT: 187.63 LBS | HEART RATE: 76 BPM | DIASTOLIC BLOOD PRESSURE: 67 MMHG | HEIGHT: 62 IN | BODY MASS INDEX: 34.53 KG/M2

## 2020-06-18 DIAGNOSIS — T45.1X5A CHEMOTHERAPY-INDUCED THROMBOCYTOPENIA: ICD-10-CM

## 2020-06-18 DIAGNOSIS — T45.1X5A CHEMOTHERAPY INDUCED NEUTROPENIA: ICD-10-CM

## 2020-06-18 DIAGNOSIS — G62.0 CHEMOTHERAPY-INDUCED NEUROPATHY: ICD-10-CM

## 2020-06-18 DIAGNOSIS — C56.9 MALIGNANT NEOPLASM OF OVARY, UNSPECIFIED LATERALITY: Primary | ICD-10-CM

## 2020-06-18 DIAGNOSIS — G89.4 CHRONIC PAIN SYNDROME: ICD-10-CM

## 2020-06-18 DIAGNOSIS — D69.59 CHEMOTHERAPY-INDUCED THROMBOCYTOPENIA: ICD-10-CM

## 2020-06-18 DIAGNOSIS — T45.1X5A CHEMOTHERAPY-INDUCED NEUROPATHY: ICD-10-CM

## 2020-06-18 DIAGNOSIS — D70.1 CHEMOTHERAPY INDUCED NEUTROPENIA: ICD-10-CM

## 2020-06-18 PROCEDURE — 3077F SYST BP >= 140 MM HG: CPT | Mod: CPTII,S$GLB,, | Performed by: INTERNAL MEDICINE

## 2020-06-18 PROCEDURE — 1125F PR PAIN SEVERITY QUANTIFIED, PAIN PRESENT: ICD-10-PCS | Mod: S$GLB,,, | Performed by: INTERNAL MEDICINE

## 2020-06-18 PROCEDURE — 1101F PT FALLS ASSESS-DOCD LE1/YR: CPT | Mod: CPTII,S$GLB,, | Performed by: INTERNAL MEDICINE

## 2020-06-18 PROCEDURE — 3077F PR MOST RECENT SYSTOLIC BLOOD PRESSURE >= 140 MM HG: ICD-10-PCS | Mod: CPTII,S$GLB,, | Performed by: INTERNAL MEDICINE

## 2020-06-18 PROCEDURE — 1101F PR PT FALLS ASSESS DOC 0-1 FALLS W/OUT INJ PAST YR: ICD-10-PCS | Mod: CPTII,S$GLB,, | Performed by: INTERNAL MEDICINE

## 2020-06-18 PROCEDURE — 99999 PR PBB SHADOW E&M-EST. PATIENT-LVL IV: CPT | Mod: PBBFAC,,, | Performed by: INTERNAL MEDICINE

## 2020-06-18 PROCEDURE — 1159F MED LIST DOCD IN RCRD: CPT | Mod: S$GLB,,, | Performed by: INTERNAL MEDICINE

## 2020-06-18 PROCEDURE — 99499 RISK ADDL DX/OHS AUDIT: ICD-10-PCS | Mod: S$GLB,,, | Performed by: INTERNAL MEDICINE

## 2020-06-18 PROCEDURE — 3078F DIAST BP <80 MM HG: CPT | Mod: CPTII,S$GLB,, | Performed by: INTERNAL MEDICINE

## 2020-06-18 PROCEDURE — 99499 UNLISTED E&M SERVICE: CPT | Mod: S$GLB,,, | Performed by: INTERNAL MEDICINE

## 2020-06-18 PROCEDURE — 99999 PR PBB SHADOW E&M-EST. PATIENT-LVL IV: ICD-10-PCS | Mod: PBBFAC,,, | Performed by: INTERNAL MEDICINE

## 2020-06-18 PROCEDURE — 99214 PR OFFICE/OUTPT VISIT, EST, LEVL IV, 30-39 MIN: ICD-10-PCS | Mod: S$GLB,,, | Performed by: INTERNAL MEDICINE

## 2020-06-18 PROCEDURE — 99214 OFFICE O/P EST MOD 30 MIN: CPT | Mod: S$GLB,,, | Performed by: INTERNAL MEDICINE

## 2020-06-18 PROCEDURE — 3078F PR MOST RECENT DIASTOLIC BLOOD PRESSURE < 80 MM HG: ICD-10-PCS | Mod: CPTII,S$GLB,, | Performed by: INTERNAL MEDICINE

## 2020-06-18 PROCEDURE — 1159F PR MEDICATION LIST DOCUMENTED IN MEDICAL RECORD: ICD-10-PCS | Mod: S$GLB,,, | Performed by: INTERNAL MEDICINE

## 2020-06-18 PROCEDURE — 1125F AMNT PAIN NOTED PAIN PRSNT: CPT | Mod: S$GLB,,, | Performed by: INTERNAL MEDICINE

## 2020-06-18 RX ORDER — ACETAMINOPHEN 325 MG/1
325 TABLET ORAL EVERY 6 HOURS PRN
COMMUNITY
End: 2020-07-02

## 2020-06-18 RX ORDER — TRAMADOL HYDROCHLORIDE 50 MG/1
50 TABLET ORAL EVERY 12 HOURS PRN
Qty: 60 TABLET | Refills: 0 | Status: SHIPPED | OUTPATIENT
Start: 2020-06-18 | End: 2020-11-30 | Stop reason: SDUPTHER

## 2020-06-18 NOTE — PROGRESS NOTES
"Subjective:       Patient ID: Celine Mahan is a 84 y.o. female.    Chief Complaint: Follow-up      20 min LATE for VISIT    Diagnosis: 1.Ovarian cancer status post debulking surgery 9/21/2017                     2. 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 Platinum sensitive recurrent ovarian cancer 3/2020  Therapy: 1. S/p carbo/taxol x 6 completed 2/12/2018                   2. Carbo/Doxil 2/1/2019- 7/8/2019                   3. Bevacizumab maintenance  8/16/2019 -3/14/2020                   4. Carboplatin/Gemcitabine 4/24/2020- present      [facility] ANALYSIS NEG    HPI ( per Dr. Feliciano) Patient is an 83 yo female who originally presented as a referral from Dr. Wills for pelvic mass. Patient reports LLQ pain for approximately 3 months which prompted evaluation.      Pelvic US 8/7/17 There is a complex echogenicity midline pelvic mass measuring 18.1 x 8.6 x 7.6 cm.  Ovaries not visualized.     CT A&P 8/9/17 Large solid/cystic mass within the lower abdomen/upper pelvis that is most concerning for a malignant neoplasm, likely of ovarian origin given its location.  Correlation with previous surgical history is recommended noting evidence of previous hysterectomy.  Mass abuts and displaces the adjacent bladder without definite CT findings to suggest hema invasion. Abdominal and pelvic lymphadenopathy concerning for lymphatic spread of neoplasm with index lymph nodes as above.      Medical history is significant for CKD (Cr 1.3), HTN, hypothyroid, HTN, HLD. She has a personal history of breast cancer in 1995 treated with mastectomy and adjuvant chemotherapy she reports. Last MMG 11/2016 normal. Adbominal surgery include cholecystectomy, TVH, xlap/removal ovary for ectopic. She reports that her physician told her "some ovary remains in situ". Family history significant for mother with pancreatic cancer.      CT chest 8/28/17 showed mediastinal adenopathy consistent with Stage IV " disease.   RECIST Summary:  Mediastinal adenopathy:  1. Preaortic abnormal node measuring 1.7 cm short axis.  2. Pretracheal node measures 1.6 cm short axis.     She underwent surgical cytoreduction with xlap/LSO/omentectomy 9/21/17. Pathology showed high grade adenocarcinoma.        She is also  followed by Dr. Feliciano  She  transferred care to undergo  adjuvant chemo at this location   She completed adjuvant chemo with carbo AUC 6 and taxol 175mg/m2 q 21d  2/12/2018  She was considered for PRIMA clinical trial for maintenance therapy but declined    Pt with increasing  levels   849>230>37>23>22>12>40>309 ( 1/9/2019 )     CT c/a/p 1/14/2019 S/P resection of large LEFT ovarian/fallopian tube mass consistent with carcinoma.  Interval increase in size of LEFT pelvic/retroperitoneal lymph nodes.  No evidence for ascites or definitive carcinomatosis of mesentery.  Interval decrease in size of prevascular lymph node.    She was then diagnosed with  platinum sensitive recurrent ovarian cancer. Platinum free interval 11 months.   Dr. Feliciano Reviewed standard management options which are to re-treat with platinum-based chemotherapy (carbo + doxil/gem/taxol +/- avastin).   She is s/p cycle 6 of chemo  Carbo/doxil (Doxil 30mg/m2  Carbo AUC 5)  q 28dayscompleted 7/8/2019   ( dosage reduction  sec to cytopenias)     Imaging studies 7/2019 revealed stable disease    Pt started on maintenance therapy with bevacizumab 15mg/kg  q3 wks    Pt developed increasing  levels and  CT imaging 3/19/2020 showed disease progression    She was started on carbo/gem for platinum sensitive recurrent ovarian cancer.     C2D8 completed 6/9/2020  No new issues  Appetite and weight stable   No fatigue  Appetite and weight stable   Chronic neck pain-stable  She has chronic diffuse arthralgias  She has been spending a lot of time in her room according to her dtr  She has not been as mobile , lives w/dtr  Dtr reports mother always been  withdrawn  She continues with minor tingling/numbness in extremities  No dysuria, hematuria or change in bowel habits  No SOB/CP/cough   No bleeding-rectal/nasal /urinary      CBC  reveals a white blood cell count of  2650mm 3 hemoglobin 9.8 grams/dL hematocrit of 31.6 % plt ct 74k.    Biochemical profile reveals stable Cr level from 1.5mg/dL  to 2.0mg/dL     [facility] ANALYSIS NEG    Past Medical History:   Diagnosis Date    Breast cancer     Cataract     CKD (chronic kidney disease) stage 3, GFR 30-59 ml/min     Essential hypertension 8/22/2012    Gastroesophageal reflux disease 10/17/2019    Hyperlipidemia     Hypertension     Hypothyroidism     Obesity     Osteopenia     Ovarian cancer 10/8/2017    Overactive bladder     Pelvic mass 8/27/2017    Thyroid disease          Past Surgical History:   Procedure Laterality Date    bt catarac surgery      CHOLECYSTECTOMY      HYSTERECTOMY      INSERTION OF TUNNELED CENTRAL VENOUS CATHETER (CVC) WITH SUBCUTANEOUS PORT N/A 6/3/2019    Procedure: INSERTION, PORT-A-CATH;  Surgeon: Andrye Diagnostic Provider;  Location: [facility];  Service: Radiology;  Laterality: N/A;  RN PREOP 5/31/2019    MASTECTOMY Left              Review of Systems   Constitutional: Negative for appetite change, fatigue, fever and unexpected weight change.   HENT: Negative for mouth sores.    Eyes: Negative for visual disturbance.   Respiratory: Negative for cough and shortness of breath.    Cardiovascular: Negative for chest pain.   Gastrointestinal: Negative for abdominal pain, diarrhea and nausea.   Genitourinary: Negative for frequency.   Musculoskeletal: Positive for neck pain (chronic). Negative for back pain.   Skin: Negative for rash.   Neurological: Positive for numbness. Negative for weakness, light-headedness and headaches.        Intermittent tingling   Hematological: Negative for adenopathy.   Psychiatric/Behavioral: The patient is not nervous/anxious.        Objective:      "    Vitals:    06/18/20 1139   BP: (!) 143/67   BP Location: Right arm   Patient Position: Sitting   BP Method: Medium (Automatic)   Pulse: 76   Temp: 98.2 °F (36.8 °C)   TempSrc: Oral   SpO2: (!) 94%   Weight: 85.1 kg (187 lb 9.8 oz)   Height: 5' 2" (1.575 m)         Physical Exam   Constitutional: She is oriented to person, place, and time. She appears well-developed and well-nourished.   HENT:   Head: Normocephalic.   Mouth/Throat: Oropharynx is clear and moist. No oropharyngeal exudate.   Eyes: Conjunctivae and lids are normal. Pupils are equal, round, and reactive to light. No scleral icterus.   Neck: Normal range of motion. Neck supple. No thyromegaly present.   Cardiovascular: Normal rate, regular rhythm and normal heart sounds.    No murmur heard.  Pulmonary/Chest: Breath sounds normal. She has no wheezes. She has no rales.   Abdominal: Soft. Bowel sounds are normal. She exhibits no distension and no mass. There is no hepatosplenomegaly. There is no tenderness. There is no rebound and no guarding.   Musculoskeletal: Normal range of motion. She exhibits no edema and no tenderness.   Lymphadenopathy:     She has no cervical adenopathy.     She has no axillary adenopathy.        Right: No supraclavicular adenopathy present.        Left: No supraclavicular adenopathy present.   Neurological: She is alert and oriented to person, place, and time. No cranial nerve deficit. Coordination normal.   Skin: Skin is warm and dry. No ecchymosis, no petechiae and no rash noted. No erythema.   Psychiatric: She has a normal mood and affect.         Labs:   Lab Results   Component Value Date    WBC 2.65 (L) 06/17/2020    HGB 9.8 (L) 06/17/2020    HCT 31.6 (L) 06/17/2020    MCV 92 06/17/2020    PLT 74 (L) 06/17/2020           MRI Cervical spine w/out contrast 4/30/2019   Cervical degenerative change most significant at C4-5 and C5-6.  Specific details at each level discussed above.     CT c/a/p w/out contrast 3/19/2020    1. " Status post hysterectomy and left oophorectomy surgery with interval enlarging retroperitoneal and pelvic lymph nodes discussed above.  2. No new metastatic or recurrent disease confirmed.  3. Additional remote findings above.  4. Lesion 1: Left pelvic 6 sidewall lymph node 2.4 cm, was 2 cm.  5. Lesion 2: Left Madalyn aortic node 2.4 cm, was 1.9 cm.      Component      Latest Ref Rng & Units 2/21/2020 1/31/2020 12/19/2019 10/18/2019         0 - 30 U/mL 139 (H) 140 (H) 84 (H) 42 (H)     Component      Latest Ref Rng & Units 9/6/2019 7/22/2019 7/5/2019 6/6/2019         0 - 30 U/mL 33 (H) 58 (H) 52 (H) 55 (H)     Component      Latest Ref Rng & Units 4/17/2019         0 - 30 U/mL 86 (H)       Results for JONATAN SERRANO (MRN [id number]) as of 6/20/2020 13:27   Ref. Range 6/17/2020 09:40    Latest Ref Range: 0 - 30 U/mL 54 (H)     Assessment:       1. Malignant neoplasm of ovary, unspecified laterality    2. Chemotherapy-induced thrombocytopenia    3. Chemotherapy induced neutropenia    4. Chemotherapy-induced neuropathy    5. Chronic pain syndrome        Plan:    Pt clinically stable      1.Ovarian cancer status post debulking surgery 9/21/2017   . 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 S/p carbo/taxol x 6 completed 2/12/2018                   2. Carbo/Doxil     2/1/2019- 7/8/2019                   3. Bevacizumab maintenance  8/16/2019 - 3/14/2020      Patient with ovarian cancer status post debulking surgery 9/21/2017 undergoing adjuvant platinum-based chemotherapy with carbo/taxol .   S/p carbo/taxol x 6 completed 2/12/2018  Pt declined maintenance therapy on trial   [facility] ANALYSIS NEG    She then developed  platinum sensitive recurrent ovarian cancer. Platinum free interval 11 months.   Dr. Feliciano Reviewed standard management options which are to re-treat with platinum-based chemotherapy (carbo + doxil/gem/taxol +/- avastin).   Pt underwent treatment with Doxil 30mg/m2   Carbo AUC 5  q 28days   s/p cycle 6 of chemo  Carbo/doxil completed 7/8/2019   CT c/a/p w/contrast 7/26/2019-stable findings    Pt then treated with maintenance  bevacizumab.   Pt developed rising  levels  CT imaging showed disease progression    Discussed with Dr. Feliciano . Patient with platinum sensitive recurrent ovarian cancer.Treatment options include single agent carbo or carbo/gem   Discussed The risks of chemotherapy include but not limited to hair and skin changes, bone marrow damage (anemia, thrombocytopenia, immune suppression, neutropenia), allergic reactions, diarrhea, constipation, mouth sores, neuropathy, secondary cancers, damage at injection sites and nearly death.   Patient  elected to move forward with Carbo AUC 4 / [place] 800 mg/m2 Day 1 /[place] 800mg/m2 DAY 8  [id number]  ( gem dosage reduction upfront planned )    2. Patient's thrombocytopenia noted. Patient asymptomatic. No active bleeding-nasal/urinary/rectal.   Repeat CBC prior to chemo    3 ANC 1400, afebrile , asymptomatic   Repeat CBC 6/23/2020   4. Stable   5. Rx for tramadol provided        Cbc,cmp,  prior to follow-up 3 weeks        Cc: Iesha Feliciano M.D.          Anil Pelayo M.D.

## 2020-06-18 NOTE — Clinical Note
Cbc,cmp,  prior to follow-up 7/13/2020   REepat CBC prior to chemo next week ( Dr. WHITESIDE to authorize)    If chemo is rescheduled( due to cytopenias)  please adjust f/u accordingly to 1-2 days prior to Cycle 4

## 2020-06-20 NOTE — PROGRESS NOTES
Patient, Celine Mahan (MRN #[id number]), presented with a recent Platelet count less than 150 K/uL consistent with the definition of thrombocytopenia (ICD10 - D69.6).    Platelets   Date Value Ref Range Status   06/17/2020 74 (L) 150 - 350 K/uL Final     The patient's thrombocytopenia was monitored, evaluated, addressed and/or treated. This addendum to the medical record is made on 06/20/2020.

## 2020-06-23 ENCOUNTER — INFUSION (OUTPATIENT)
Dept: INFUSION THERAPY | Facility: HOSPITAL | Age: 85
End: 2020-06-23
Attending: INTERNAL MEDICINE
Payer: MEDICARE

## 2020-06-23 VITALS
HEART RATE: 64 BPM | DIASTOLIC BLOOD PRESSURE: 72 MMHG | OXYGEN SATURATION: 99 % | SYSTOLIC BLOOD PRESSURE: 166 MMHG | TEMPERATURE: 97 F | RESPIRATION RATE: 17 BRPM

## 2020-06-23 DIAGNOSIS — C56.9 MALIGNANT NEOPLASM OF OVARY, UNSPECIFIED LATERALITY: ICD-10-CM

## 2020-06-23 DIAGNOSIS — M25.511 ACUTE PAIN OF RIGHT SHOULDER: Primary | ICD-10-CM

## 2020-06-23 DIAGNOSIS — C56.9 OVARIAN CANCER: Primary | ICD-10-CM

## 2020-06-23 LAB
ALBUMIN SERPL BCP-MCNC: 3.3 G/DL (ref 3.5–5.2)
ALP SERPL-CCNC: 73 U/L (ref 55–135)
ALT SERPL W/O P-5'-P-CCNC: 24 U/L (ref 10–44)
ANION GAP SERPL CALC-SCNC: 6 MMOL/L (ref 8–16)
AST SERPL-CCNC: 30 U/L (ref 10–40)
BILIRUB SERPL-MCNC: 0.2 MG/DL (ref 0.1–1)
BUN SERPL-MCNC: 24 MG/DL (ref 8–23)
CALCIUM SERPL-MCNC: 8.4 MG/DL (ref 8.7–10.5)
CHLORIDE SERPL-SCNC: 105 MMOL/L (ref 95–110)
CO2 SERPL-SCNC: 24 MMOL/L (ref 23–29)
CREAT SERPL-MCNC: 2 MG/DL (ref 0.5–1.4)
ERYTHROCYTE [DISTWIDTH] IN BLOOD BY AUTOMATED COUNT: 24.3 % (ref 11.5–14.5)
EST. GFR  (AFRICAN AMERICAN): 26 ML/MIN/1.73 M^2
EST. GFR  (NON AFRICAN AMERICAN): 22 ML/MIN/1.73 M^2
GLUCOSE SERPL-MCNC: 149 MG/DL (ref 70–110)
HCT VFR BLD AUTO: 27.4 % (ref 37–48.5)
HGB BLD-MCNC: 8.5 G/DL (ref 12–16)
IMM GRANULOCYTES # BLD AUTO: 0.03 K/UL (ref 0–0.04)
MCH RBC QN AUTO: 29.4 PG (ref 27–31)
MCHC RBC AUTO-ENTMCNC: 31 G/DL (ref 32–36)
MCV RBC AUTO: 95 FL (ref 82–98)
NEUTROPHILS # BLD AUTO: 1.6 K/UL (ref 1.8–7.7)
PLATELET # BLD AUTO: 176 K/UL (ref 150–350)
PMV BLD AUTO: 10.6 FL (ref 9.2–12.9)
POTASSIUM SERPL-SCNC: 4.7 MMOL/L (ref 3.5–5.1)
PROT SERPL-MCNC: 6.7 G/DL (ref 6–8.4)
RBC # BLD AUTO: 2.89 M/UL (ref 4–5.4)
SODIUM SERPL-SCNC: 135 MMOL/L (ref 136–145)
WBC # BLD AUTO: 2.99 K/UL (ref 3.9–12.7)

## 2020-06-23 PROCEDURE — 63600175 PHARM REV CODE 636 W HCPCS: Performed by: INTERNAL MEDICINE

## 2020-06-23 PROCEDURE — A4216 STERILE WATER/SALINE, 10 ML: HCPCS | Performed by: INTERNAL MEDICINE

## 2020-06-23 PROCEDURE — 25000003 PHARM REV CODE 250: Performed by: INTERNAL MEDICINE

## 2020-06-23 PROCEDURE — 96413 CHEMO IV INFUSION 1 HR: CPT

## 2020-06-23 PROCEDURE — 96375 TX/PRO/DX INJ NEW DRUG ADDON: CPT

## 2020-06-23 PROCEDURE — 96367 TX/PROPH/DG ADDL SEQ IV INF: CPT

## 2020-06-23 PROCEDURE — 85027 COMPLETE CBC AUTOMATED: CPT

## 2020-06-23 PROCEDURE — 96417 CHEMO IV INFUS EACH ADDL SEQ: CPT

## 2020-06-23 PROCEDURE — 80053 COMPREHEN METABOLIC PANEL: CPT

## 2020-06-23 PROCEDURE — 96361 HYDRATE IV INFUSION ADD-ON: CPT

## 2020-06-23 RX ORDER — HEPARIN 100 UNIT/ML
500 SYRINGE INTRAVENOUS
Status: DISCONTINUED | OUTPATIENT
Start: 2020-06-23 | End: 2020-06-23 | Stop reason: HOSPADM

## 2020-06-23 RX ORDER — HYDROMORPHONE HYDROCHLORIDE 2 MG/ML
1 INJECTION, SOLUTION INTRAMUSCULAR; INTRAVENOUS; SUBCUTANEOUS ONCE
Status: COMPLETED | OUTPATIENT
Start: 2020-06-23 | End: 2020-06-23

## 2020-06-23 RX ORDER — HEPARIN 100 UNIT/ML
500 SYRINGE INTRAVENOUS
Status: CANCELLED | OUTPATIENT
Start: 2020-07-02

## 2020-06-23 RX ORDER — SODIUM CHLORIDE 0.9 % (FLUSH) 0.9 %
10 SYRINGE (ML) INJECTION
Status: DISCONTINUED | OUTPATIENT
Start: 2020-06-23 | End: 2020-06-23 | Stop reason: HOSPADM

## 2020-06-23 RX ORDER — SODIUM CHLORIDE 0.9 % (FLUSH) 0.9 %
10 SYRINGE (ML) INJECTION
Status: CANCELLED | OUTPATIENT
Start: 2020-07-02

## 2020-06-23 RX ORDER — ONDANSETRON 2 MG/ML
8 INJECTION INTRAMUSCULAR; INTRAVENOUS ONCE
Status: COMPLETED | OUTPATIENT
Start: 2020-06-23 | End: 2020-06-23

## 2020-06-23 RX ORDER — ONDANSETRON 2 MG/ML
8 INJECTION INTRAMUSCULAR; INTRAVENOUS
Status: CANCELLED | OUTPATIENT
Start: 2020-07-02

## 2020-06-23 RX ORDER — SODIUM CHLORIDE 0.9 % (FLUSH) 0.9 %
10 SYRINGE (ML) INJECTION
Status: CANCELLED | OUTPATIENT
Start: 2020-06-23

## 2020-06-23 RX ORDER — HEPARIN 100 UNIT/ML
500 SYRINGE INTRAVENOUS
Status: CANCELLED | OUTPATIENT
Start: 2020-06-23

## 2020-06-23 RX ADMIN — SODIUM CHLORIDE 500 ML: 0.9 INJECTION, SOLUTION INTRAVENOUS at 12:06

## 2020-06-23 RX ADMIN — HYDROMORPHONE HYDROCHLORIDE 1 MG: 2 INJECTION, SOLUTION INTRAMUSCULAR; INTRAVENOUS; SUBCUTANEOUS at 10:06

## 2020-06-23 RX ADMIN — Medication 10 ML: at 02:06

## 2020-06-23 RX ADMIN — CARBOPLATIN 210 MG: 10 INJECTION, SOLUTION INTRAVENOUS at 01:06

## 2020-06-23 RX ADMIN — GEMCITABINE 1545 MG: 200 INJECTION, POWDER, LYOPHILIZED, FOR SOLUTION INTRAVENOUS at 12:06

## 2020-06-23 RX ADMIN — HEPARIN 500 UNITS: 100 SYRINGE at 02:06

## 2020-06-23 RX ADMIN — PROMETHAZINE HYDROCHLORIDE 12.5 MG: 25 INJECTION INTRAMUSCULAR; INTRAVENOUS at 12:06

## 2020-06-23 RX ADMIN — ONDANSETRON 8 MG: 2 INJECTION INTRAMUSCULAR; INTRAVENOUS at 11:06

## 2020-06-23 RX ADMIN — DEXAMETHASONE SODIUM PHOSPHATE: 10 INJECTION, SOLUTION INTRAMUSCULAR; INTRAVENOUS at 11:06

## 2020-06-23 NOTE — PLAN OF CARE
Patient arrived to unit for C3D1 Gemzar, Carbo. Labs drawn. Plan of care reviewed, patient agreeable to plan. Patient complained of 10/10 R shoulder pain that started 2 days ago. She denies injuring it. Patient stated she took one Tylenol yesterday but that did not help. Patient stated she could not sleep last night due to the pain. Dilaudid given and heat pack provided. Patient stated pain decreased to 6/10. ~30min after Dilaudid administration patient reported nausea. Zofran given. Patient reported no relief of nausea so Phenergan also administered. Patient sleeping comfortably for the rest of her appointment. Patient tolerated Gemzar, Carbo infusions. No sign of reaction noted. VSS. Dr. Campo evaluated patient's shoulder at chairside. Dr. Campo ordered an xray to be scheduled this week by clinic and requested that the patient take her home med Tramadol for pain as needed (take her nausea med prior to to prevent upset stomach). Patient received discharge instructions and follow up appointments. Patient instructed to return 6/30/20 for Dr. Reinoso follow up and C3D8. Verbalized understanding and was escorted off unit via w/c by MA. Patient in NAD at time of discharge.

## 2020-06-26 ENCOUNTER — HOSPITAL ENCOUNTER (OUTPATIENT)
Dept: RADIOLOGY | Facility: HOSPITAL | Age: 85
Discharge: HOME | End: 2020-06-26
Attending: INTERNAL MEDICINE
Payer: MEDICARE

## 2020-06-26 DIAGNOSIS — M25.511 ACUTE PAIN OF RIGHT SHOULDER: ICD-10-CM

## 2020-06-26 PROCEDURE — 73030 XR SHOULDER COMPLETE 2 OR MORE VIEWS RIGHT: ICD-10-PCS | Mod: 26,RT,, | Performed by: RADIOLOGY

## 2020-06-26 PROCEDURE — 73030 X-RAY EXAM OF SHOULDER: CPT | Mod: TC,FY,RT

## 2020-06-26 PROCEDURE — 73030 X-RAY EXAM OF SHOULDER: CPT | Mod: 26,RT,, | Performed by: RADIOLOGY

## 2020-07-02 ENCOUNTER — OFFICE VISIT (OUTPATIENT)
Dept: HEMATOLOGY/ONCOLOGY | Facility: CLINIC | Age: 85
End: 2020-07-02
Payer: MEDICARE

## 2020-07-02 ENCOUNTER — INFUSION (OUTPATIENT)
Dept: INFUSION THERAPY | Facility: HOSPITAL | Age: 85
End: 2020-07-02
Attending: INTERNAL MEDICINE
Payer: MEDICARE

## 2020-07-02 VITALS
DIASTOLIC BLOOD PRESSURE: 65 MMHG | OXYGEN SATURATION: 99 % | RESPIRATION RATE: 18 BRPM | TEMPERATURE: 98 F | SYSTOLIC BLOOD PRESSURE: 144 MMHG | HEART RATE: 80 BPM

## 2020-07-02 VITALS
DIASTOLIC BLOOD PRESSURE: 76 MMHG | BODY MASS INDEX: 35.57 KG/M2 | TEMPERATURE: 98 F | WEIGHT: 193.31 LBS | OXYGEN SATURATION: 96 % | HEART RATE: 72 BPM | SYSTOLIC BLOOD PRESSURE: 165 MMHG | HEIGHT: 62 IN

## 2020-07-02 DIAGNOSIS — C56.9 MALIGNANT NEOPLASM OF OVARY, UNSPECIFIED LATERALITY: Primary | ICD-10-CM

## 2020-07-02 DIAGNOSIS — T45.1X5A CHEMOTHERAPY-INDUCED THROMBOCYTOPENIA: ICD-10-CM

## 2020-07-02 DIAGNOSIS — N18.4 CKD (CHRONIC KIDNEY DISEASE) STAGE 4, GFR 15-29 ML/MIN: ICD-10-CM

## 2020-07-02 DIAGNOSIS — D70.1 CHEMOTHERAPY INDUCED NEUTROPENIA: ICD-10-CM

## 2020-07-02 DIAGNOSIS — T45.1X5A CHEMOTHERAPY INDUCED NEUTROPENIA: ICD-10-CM

## 2020-07-02 DIAGNOSIS — D69.59 CHEMOTHERAPY-INDUCED THROMBOCYTOPENIA: ICD-10-CM

## 2020-07-02 DIAGNOSIS — M25.511 RIGHT SHOULDER PAIN, UNSPECIFIED CHRONICITY: ICD-10-CM

## 2020-07-02 DIAGNOSIS — C56.9 OVARIAN CANCER: ICD-10-CM

## 2020-07-02 LAB
ALBUMIN SERPL BCP-MCNC: 3.3 G/DL (ref 3.5–5.2)
ALP SERPL-CCNC: 85 U/L (ref 55–135)
ALT SERPL W/O P-5'-P-CCNC: 65 U/L (ref 10–44)
ANION GAP SERPL CALC-SCNC: 6 MMOL/L (ref 8–16)
AST SERPL-CCNC: 72 U/L (ref 10–40)
BILIRUB SERPL-MCNC: 0.3 MG/DL (ref 0.1–1)
BUN SERPL-MCNC: 23 MG/DL (ref 8–23)
CALCIUM SERPL-MCNC: 8.6 MG/DL (ref 8.7–10.5)
CHLORIDE SERPL-SCNC: 102 MMOL/L (ref 95–110)
CO2 SERPL-SCNC: 26 MMOL/L (ref 23–29)
CREAT SERPL-MCNC: 1.7 MG/DL (ref 0.5–1.4)
ERYTHROCYTE [DISTWIDTH] IN BLOOD BY AUTOMATED COUNT: 23.6 % (ref 11.5–14.5)
EST. GFR  (AFRICAN AMERICAN): 31 ML/MIN/1.73 M^2
EST. GFR  (NON AFRICAN AMERICAN): 27 ML/MIN/1.73 M^2
GLUCOSE SERPL-MCNC: 131 MG/DL (ref 70–110)
HCT VFR BLD AUTO: 25 % (ref 37–48.5)
HGB BLD-MCNC: 7.9 G/DL (ref 12–16)
IMM GRANULOCYTES # BLD AUTO: 0.01 K/UL (ref 0–0.04)
MCH RBC QN AUTO: 29.7 PG (ref 27–31)
MCHC RBC AUTO-ENTMCNC: 31.6 G/DL (ref 32–36)
MCV RBC AUTO: 94 FL (ref 82–98)
NEUTROPHILS # BLD AUTO: 1.2 K/UL (ref 1.8–7.7)
PLATELET # BLD AUTO: 103 K/UL (ref 150–350)
PMV BLD AUTO: 10.5 FL (ref 9.2–12.9)
POTASSIUM SERPL-SCNC: 4.6 MMOL/L (ref 3.5–5.1)
PROT SERPL-MCNC: 6.7 G/DL (ref 6–8.4)
RBC # BLD AUTO: 2.66 M/UL (ref 4–5.4)
SODIUM SERPL-SCNC: 134 MMOL/L (ref 136–145)
WBC # BLD AUTO: 1.89 K/UL (ref 3.9–12.7)

## 2020-07-02 PROCEDURE — A4216 STERILE WATER/SALINE, 10 ML: HCPCS | Performed by: INTERNAL MEDICINE

## 2020-07-02 PROCEDURE — 3078F DIAST BP <80 MM HG: CPT | Mod: CPTII,S$GLB,, | Performed by: INTERNAL MEDICINE

## 2020-07-02 PROCEDURE — 99499 RISK ADDL DX/OHS AUDIT: ICD-10-PCS | Mod: S$GLB,,, | Performed by: INTERNAL MEDICINE

## 2020-07-02 PROCEDURE — 80053 COMPREHEN METABOLIC PANEL: CPT

## 2020-07-02 PROCEDURE — 1125F AMNT PAIN NOTED PAIN PRSNT: CPT | Mod: S$GLB,,, | Performed by: INTERNAL MEDICINE

## 2020-07-02 PROCEDURE — 36591 DRAW BLOOD OFF VENOUS DEVICE: CPT

## 2020-07-02 PROCEDURE — 3077F SYST BP >= 140 MM HG: CPT | Mod: CPTII,S$GLB,, | Performed by: INTERNAL MEDICINE

## 2020-07-02 PROCEDURE — 3077F PR MOST RECENT SYSTOLIC BLOOD PRESSURE >= 140 MM HG: ICD-10-PCS | Mod: CPTII,S$GLB,, | Performed by: INTERNAL MEDICINE

## 2020-07-02 PROCEDURE — 99499 UNLISTED E&M SERVICE: CPT | Mod: S$GLB,,, | Performed by: INTERNAL MEDICINE

## 2020-07-02 PROCEDURE — 1125F PR PAIN SEVERITY QUANTIFIED, PAIN PRESENT: ICD-10-PCS | Mod: S$GLB,,, | Performed by: INTERNAL MEDICINE

## 2020-07-02 PROCEDURE — 99999 PR PBB SHADOW E&M-EST. PATIENT-LVL III: CPT | Mod: PBBFAC,,, | Performed by: INTERNAL MEDICINE

## 2020-07-02 PROCEDURE — 99214 PR OFFICE/OUTPT VISIT, EST, LEVL IV, 30-39 MIN: ICD-10-PCS | Mod: S$GLB,,, | Performed by: INTERNAL MEDICINE

## 2020-07-02 PROCEDURE — 63600175 PHARM REV CODE 636 W HCPCS: Performed by: INTERNAL MEDICINE

## 2020-07-02 PROCEDURE — 1159F MED LIST DOCD IN RCRD: CPT | Mod: S$GLB,,, | Performed by: INTERNAL MEDICINE

## 2020-07-02 PROCEDURE — 25000003 PHARM REV CODE 250: Performed by: INTERNAL MEDICINE

## 2020-07-02 PROCEDURE — 1159F PR MEDICATION LIST DOCUMENTED IN MEDICAL RECORD: ICD-10-PCS | Mod: S$GLB,,, | Performed by: INTERNAL MEDICINE

## 2020-07-02 PROCEDURE — 3078F PR MOST RECENT DIASTOLIC BLOOD PRESSURE < 80 MM HG: ICD-10-PCS | Mod: CPTII,S$GLB,, | Performed by: INTERNAL MEDICINE

## 2020-07-02 PROCEDURE — 1101F PR PT FALLS ASSESS DOC 0-1 FALLS W/OUT INJ PAST YR: ICD-10-PCS | Mod: CPTII,S$GLB,, | Performed by: INTERNAL MEDICINE

## 2020-07-02 PROCEDURE — 1101F PT FALLS ASSESS-DOCD LE1/YR: CPT | Mod: CPTII,S$GLB,, | Performed by: INTERNAL MEDICINE

## 2020-07-02 PROCEDURE — 99999 PR PBB SHADOW E&M-EST. PATIENT-LVL III: ICD-10-PCS | Mod: PBBFAC,,, | Performed by: INTERNAL MEDICINE

## 2020-07-02 PROCEDURE — 99214 OFFICE O/P EST MOD 30 MIN: CPT | Mod: S$GLB,,, | Performed by: INTERNAL MEDICINE

## 2020-07-02 PROCEDURE — 85027 COMPLETE CBC AUTOMATED: CPT

## 2020-07-02 RX ORDER — SODIUM CHLORIDE 0.9 % (FLUSH) 0.9 %
10 SYRINGE (ML) INJECTION
Status: DISCONTINUED | OUTPATIENT
Start: 2020-07-02 | End: 2020-07-02 | Stop reason: HOSPADM

## 2020-07-02 RX ORDER — HEPARIN 100 UNIT/ML
500 SYRINGE INTRAVENOUS
Status: COMPLETED | OUTPATIENT
Start: 2020-07-02 | End: 2020-07-02

## 2020-07-02 RX ADMIN — HEPARIN 500 UNITS: 100 SYRINGE at 03:07

## 2020-07-02 RX ADMIN — Medication 10 ML: at 03:07

## 2020-07-02 NOTE — PLAN OF CARE
Pt arrived to unit. VSS. Pt afebrile. Blood collected from port. Pt ate lunch in infusion. Pt in wheelchair to md felix, accompanied by RN. Chemo to be held today. Reschedule to 7/17. Pt and pt's daughter informed. Needle removed and pt discharged from unit with daughter. No distress noted.

## 2020-07-02 NOTE — Clinical Note
Hold day 8   Next chemo will be on Friday July 17th cycle 4 with cbc,cmp , mg  prior to chemo     Reschedule f/u to Cbc,cmp and CT imaging prior to f/u on 8/7/2019

## 2020-07-02 NOTE — PROGRESS NOTES
"Subjective:       Patient ID: Celine Mahan is a 85 y.o. female.    Chief Complaint: Follow-up      20 min LATE for VISIT    Diagnosis: 1.Ovarian cancer status post debulking surgery 9/21/2017                     2. 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 Platinum sensitive recurrent ovarian cancer 3/2020  Therapy: 1. S/p carbo/taxol x 6 completed 2/12/2018                   2. Carbo/Doxil 2/1/2019- 7/8/2019                   3. Bevacizumab maintenance  8/16/2019 -3/14/2020                   4. Carboplatin/Gemcitabine 4/24/2020- present      BRCA ANALYSIS NEG    HPI ( per Dr. Feliciano) Patient is an 85 yo female who originally presented as a referral from Dr. Wills for pelvic mass. Patient reports LLQ pain for approximately 3 months which prompted evaluation.      Pelvic US 8/7/17 There is a complex echogenicity midline pelvic mass measuring 18.1 x 8.6 x 7.6 cm.  Ovaries not visualized.     CT A&P 8/9/17 Large solid/cystic mass within the lower abdomen/upper pelvis that is most concerning for a malignant neoplasm, likely of ovarian origin given its location.  Correlation with previous surgical history is recommended noting evidence of previous hysterectomy.  Mass abuts and displaces the adjacent bladder without definite CT findings to suggest hema invasion. Abdominal and pelvic lymphadenopathy concerning for lymphatic spread of neoplasm with index lymph nodes as above.      Medical history is significant for CKD (Cr 1.3), HTN, hypothyroid, HTN, HLD. She has a personal history of breast cancer in 1995 treated with mastectomy and adjuvant chemotherapy she reports. Last MMG 11/2016 normal. Adbominal surgery include cholecystectomy, TVH, xlap/removal ovary for ectopic. She reports that her physician told her "some ovary remains in situ". Family history significant for mother with pancreatic cancer.      CT chest 8/28/17 showed mediastinal adenopathy consistent with Stage IV " disease.   RECIST Summary:  Mediastinal adenopathy:  1. Preaortic abnormal node measuring 1.7 cm short axis.  2. Pretracheal node measures 1.6 cm short axis.     She underwent surgical cytoreduction with xlap/LSO/omentectomy 9/21/17. Pathology showed high grade adenocarcinoma.        She is also  followed by Dr. Feliciano  She  transferred care to undergo  adjuvant chemo at this location   She completed adjuvant chemo with carbo AUC 6 and taxol 175mg/m2 q 21d  2/12/2018  She was considered for PRIMA clinical trial for maintenance therapy but declined    Pt with increasing  levels   849>230>37>23>22>12>40>309 ( 1/9/2019 )     CT c/a/p 1/14/2019 S/P resection of large LEFT ovarian/fallopian tube mass consistent with carcinoma.  Interval increase in size of LEFT pelvic/retroperitoneal lymph nodes.  No evidence for ascites or definitive carcinomatosis of mesentery.  Interval decrease in size of prevascular lymph node.    She was then diagnosed with  platinum sensitive recurrent ovarian cancer. Platinum free interval 11 months.   Dr. Feliciano Reviewed standard management options which are to re-treat with platinum-based chemotherapy (carbo + doxil/gem/taxol +/- avastin).   She is s/p cycle 6 of chemo  Carbo/doxil (Doxil 30mg/m2  Carbo AUC 5)  q 28dayscompleted 7/8/2019   ( dosage reduction  sec to cytopenias)     Imaging studies 7/2019 revealed stable disease    Pt started on maintenance therapy with bevacizumab 15mg/kg  q3 wks    Pt developed increasing  levels and  CT imaging 3/19/2020 showed disease progression    She was started on carbo/gem for platinum sensitive recurrent ovarian cancer.     C3D8 due today       She is here f/u for rt shoulder pain  Recent XRAY reveals degenerative changes   No numbness/tingling or decreased MS  Appetite and weight stable   No fatigue    She has chronic diffuse arthralgias  She has been spending a lot of time in her room according to her dtr  She has not been as mobile ,  lives w/dtr        CBC  reveals a white blood cell count of  1890mm 3 hemoglobin 7.9 grams/dL hematocrit of 25 % plt ct 103k. ANC 1200    Biochemical profile reveals stable Cr level from 1.5mg/dL  to 2.0mg/dL     BRCA ANALYSIS NEG    Past Medical History:   Diagnosis Date    Breast cancer     Cataract     CKD (chronic kidney disease) stage 3, GFR 30-59 ml/min     Essential hypertension 8/22/2012    Gastroesophageal reflux disease 10/17/2019    Hyperlipidemia     Hypertension     Hypothyroidism     Obesity     Osteopenia     Ovarian cancer 10/8/2017    Overactive bladder     Pelvic mass 8/27/2017    Thyroid disease          Past Surgical History:   Procedure Laterality Date    bt catarac surgery      CHOLECYSTECTOMY      HYSTERECTOMY      INSERTION OF TUNNELED CENTRAL VENOUS CATHETER (CVC) WITH SUBCUTANEOUS PORT N/A 6/3/2019    Procedure: INSERTION, PORT-A-CATH;  Surgeon: Andery Diagnostic Provider;  Location: [facility] OR;  Service: Radiology;  Laterality: N/A;  RN PREOP 5/31/2019    MASTECTOMY Left              Review of Systems   Constitutional: Negative for appetite change, fatigue, fever and unexpected weight change.   HENT: Negative for mouth sores.    Eyes: Negative for visual disturbance.   Respiratory: Negative for cough and shortness of breath.    Cardiovascular: Negative for chest pain.   Gastrointestinal: Negative for abdominal pain, diarrhea and nausea.   Genitourinary: Negative for frequency.   Musculoskeletal: Positive for neck pain (chronic). Negative for back pain.   Skin: Negative for rash.   Neurological: Positive for numbness. Negative for weakness, light-headedness and headaches.        Intermittent tingling   Hematological: Negative for adenopathy.   Psychiatric/Behavioral: The patient is not nervous/anxious.        Objective:         Vitals:    07/02/20 1453   BP: (!) 165/76   BP Location: Right arm   Patient Position: Sitting   BP Method: Medium (Automatic)   Pulse: 72   Temp: 97.9  "°F (36.6 °C)   TempSrc: Oral   SpO2: 96%   Weight: 87.7 kg (193 lb 5.5 oz)   Height: 5' 2" (1.575 m)         Physical Exam   Constitutional: She is oriented to person, place, and time. She appears well-developed and well-nourished.   HENT:   Head: Normocephalic.   Mouth/Throat: Oropharynx is clear and moist. No oropharyngeal exudate.   Eyes: Conjunctivae and lids are normal. Pupils are equal, round, and reactive to light. No scleral icterus.   Neck: Normal range of motion. Neck supple. No thyromegaly present.   Cardiovascular: Normal rate, regular rhythm and normal heart sounds.    No murmur heard.  Pulmonary/Chest: Breath sounds normal. She has no wheezes. She has no rales.   Abdominal: Soft. Bowel sounds are normal. She exhibits no distension and no mass. There is no hepatosplenomegaly. There is no tenderness. There is no rebound and no guarding.   Musculoskeletal: Normal range of motion. She exhibits no edema and no tenderness.   Lymphadenopathy:     She has no cervical adenopathy.     She has no axillary adenopathy.        Right: No supraclavicular adenopathy present.        Left: No supraclavicular adenopathy present.   Neurological: She is alert and oriented to person, place, and time. No cranial nerve deficit. Coordination normal.   Skin: Skin is warm and dry. No ecchymosis, no petechiae and no rash noted. No erythema.   Psychiatric: She has a normal mood and affect.         Labs:   Lab Results   Component Value Date    WBC 1.89 (LL) 07/02/2020    HGB 7.9 (L) 07/02/2020    HCT 25.0 (L) 07/02/2020    MCV 94 07/02/2020     (L) 07/02/2020           MRI Cervical spine w/out contrast 4/30/2019   Cervical degenerative change most significant at C4-5 and C5-6.  Specific details at each level discussed above.     CT c/a/p w/out contrast 3/19/2020    1. Status post hysterectomy and left oophorectomy surgery with interval enlarging retroperitoneal and pelvic lymph nodes discussed above.  2. No new metastatic or " recurrent disease confirmed.  3. Additional remote findings above.  4. Lesion 1: Left pelvic 6 sidewall lymph node 2.4 cm, was 2 cm.  5. Lesion 2: Left Madalyn aortic node 2.4 cm, was 1.9 cm.      Component      Latest Ref Rng & Units 2/21/2020 1/31/2020 12/19/2019 10/18/2019         0 - 30 U/mL 139 (H) 140 (H) 84 (H) 42 (H)     Component      Latest Ref Rng & Units 9/6/2019 7/22/2019 7/5/2019 6/6/2019         0 - 30 U/mL 33 (H) 58 (H) 52 (H) 55 (H)     Component      Latest Ref Rng & Units 4/17/2019         0 - 30 U/mL 86 (H)       Results for JONATAN SERRANO (MRN [id number]) as of 6/20/2020 13:27   Ref. Range 6/17/2020 09:40    Latest Ref Range: 0 - 30 U/mL 54 (H)     Assessment:       1. Malignant neoplasm of ovary, unspecified laterality    2. Chemotherapy induced neutropenia    3. Chemotherapy-induced thrombocytopenia    4. Right shoulder pain, unspecified chronicity    5. CKD (chronic kidney disease) stage 4, GFR 15-29 ml/min        Plan:    Pt clinically stable      1.Ovarian cancer status post debulking surgery 9/21/2017   . 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 S/p carbo/taxol x 6 completed 2/12/2018                   2. Carbo/Doxil     2/1/2019- 7/8/2019                   3. Bevacizumab maintenance  8/16/2019 - 3/14/2020      Patient with ovarian cancer status post debulking surgery 9/21/2017 undergoing adjuvant platinum-based chemotherapy with carbo/taxol .   S/p carbo/taxol x 6 completed 2/12/2018  Pt declined maintenance therapy on trial   BRCA ANALYSIS NEG    She then developed  platinum sensitive recurrent ovarian cancer. Platinum free interval 11 months.   Dr. Feliciano Reviewed standard management options which are to re-treat with platinum-based chemotherapy (carbo + doxil/gem/taxol +/- avastin).   Pt underwent treatment with Doxil 30mg/m2  Carbo AUC 5  q 28days   s/p cycle 6 of chemo  Carbo/doxil completed 7/8/2019   CT c/a/p w/contrast 7/26/2019-stable  findings    Pt then treated with maintenance  bevacizumab.   Pt developed rising  levels  CT imaging showed disease progression    Discussed with Dr. Feliciano . Patient with platinum sensitive recurrent ovarian cancer.Treatment options include single agent carbo or carbo/gem   Discussed The risks of chemotherapy include but not limited to hair and skin changes, bone marrow damage (anemia, thrombocytopenia, immune suppression, neutropenia), allergic reactions, diarrhea, constipation, mouth sores, neuropathy, secondary cancers, damage at injection sites and nearly death.   Patient  elected to move forward with Carbo AUC 4 / Bergen 800 mg/m2 Day 1 /Bergen 800mg/m2 DAY 8  [id number]  ( gem dosage reduction upfront planned )    CBC  reveals a white blood cell count of  1890mm 3 hemoglobin 7.9 grams/dL hematocrit of 25 % plt ct 103k. ANC 1200    . Patient's thrombocytopenia noted. Patient asymptomatic. No active bleeding-nasal/urinary/rectal.   Repeat CBC prior to chemo     ANC 1200, afebrile , asymptomatic     Hold chemo   C3 D8 planned 7/16/2020  Hold day 8 due to cytopenias  Next chemo will be on Friday July 17th cycle 4 with cbc,cmp , mg  prior to chemo     Shoulder films reviewed- degenerative changes  Pain mgmt referral declined   Avoid NSAIDS in settting of CKD     Reschedule f/u to Cbc,cmp,   and CT imaging prior to f/u on 8/7/2020        Cc: Iesha Feliciano M.D.          Anil Pelayo M.D.

## 2020-07-07 ENCOUNTER — TELEPHONE (OUTPATIENT)
Dept: HEMATOLOGY/ONCOLOGY | Facility: CLINIC | Age: 85
End: 2020-07-07

## 2020-07-08 DIAGNOSIS — R11.0 CHEMOTHERAPY-INDUCED NAUSEA: ICD-10-CM

## 2020-07-08 DIAGNOSIS — C56.9 MALIGNANT NEOPLASM OF OVARY, UNSPECIFIED LATERALITY: ICD-10-CM

## 2020-07-08 DIAGNOSIS — T45.1X5A CHEMOTHERAPY-INDUCED NAUSEA: ICD-10-CM

## 2020-07-08 RX ORDER — ONDANSETRON HYDROCHLORIDE 8 MG/1
8 TABLET, FILM COATED ORAL EVERY 8 HOURS PRN
Qty: 30 TABLET | Refills: 3 | Status: SHIPPED | OUTPATIENT
Start: 2020-07-08

## 2020-07-13 DIAGNOSIS — M25.511 RIGHT SHOULDER PAIN, UNSPECIFIED CHRONICITY: Primary | ICD-10-CM

## 2020-07-14 ENCOUNTER — CLINICAL SUPPORT (OUTPATIENT)
Dept: REHABILITATION | Facility: HOSPITAL | Age: 85
End: 2020-07-14
Attending: INTERNAL MEDICINE
Payer: MEDICARE

## 2020-07-14 DIAGNOSIS — M25.511 RIGHT SHOULDER PAIN, UNSPECIFIED CHRONICITY: ICD-10-CM

## 2020-07-14 DIAGNOSIS — M25.611 DECREASED RANGE OF MOTION OF RIGHT SHOULDER: ICD-10-CM

## 2020-07-14 DIAGNOSIS — R29.3 POOR POSTURE: ICD-10-CM

## 2020-07-14 DIAGNOSIS — M62.81 MUSCLE WEAKNESS: ICD-10-CM

## 2020-07-14 DIAGNOSIS — M25.511 CHRONIC RIGHT SHOULDER PAIN: ICD-10-CM

## 2020-07-14 DIAGNOSIS — G89.29 CHRONIC RIGHT SHOULDER PAIN: ICD-10-CM

## 2020-07-14 PROCEDURE — 97110 THERAPEUTIC EXERCISES: CPT | Mod: PN

## 2020-07-14 PROCEDURE — 97161 PT EVAL LOW COMPLEX 20 MIN: CPT | Mod: PN

## 2020-07-14 NOTE — PLAN OF CARE
[facility]  Physical Therapy Initial Evaluation    Name: Celine Mahan  Clinic Number: [id number]    Therapy Diagnosis:   Encounter Diagnoses   Name Primary?    Right shoulder pain, unspecified chronicity     Chronic right shoulder pain     Decreased range of motion of right shoulder     Muscle weakness     Poor posture      Physician: Marylu Reinoso MD    Physician Orders: PT Eval and Treat   Medical Diagnosis from Referral: Right shoulder pain, unspecified chronicity  Evaluation Date: 7/14/2020  Authorization Period Expiration: 7/28/2020  Plan of Care Expiration: 10/14/2020  Visit # / Visits authorized: 1/6    Time In: 2:27p  Time Out: 3:00p  Total Billable Time: 33 minutes    Precautions: Standard, Fall and cancer    Subjective   Date of onset: 1 month ago   History of current condition - Celine reports with assistance of her daughter: that they did an x-ray and 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 She has cancer and she still gets chemo and she is feeling like it is getting harder and harder to move and she hates being sick. She states there is no numbness/tingling in hand - all pain in upper arm. She states it has started about a month and she can't describe it but it is not burning. She is right handed. It hurts to lift up overhead. She has to use her other arm to help her R arm lift overhead. Since she lives with her daughter she doesn't have to cook, clean, or shop but she also thinks she would be unable to. She is very sedentary especially with quarantine she has started using wheelchair because she feels tired.       Medical History:   Past Medical History:   Diagnosis Date    Breast cancer     Cataract     CKD (chronic kidney disease) stage 3, GFR 30-59 ml/min     Essential hypertension 8/22/2012    Gastroesophageal reflux disease 10/17/2019    Hyperlipidemia     Hypertension     Hypothyroidism     Obesity     Osteopenia     Ovarian cancer 10/8/2017    Overactive  "bladder     Pelvic mass 8/27/2017    Thyroid disease        Surgical History:   Celine Mahan  has a past surgical history that includes Hysterectomy; bt catarac surgery; Cholecystectomy; Mastectomy (Left); and Insertion of tunneled central venous catheter (CVC) with subcutaneous port (N/A, 6/3/2019).    Medications:   Celine has a current medication list which includes the following prescription(s): amlodipine, garlic, levothyroxine, omeprazole, ondansetron, tramadol, and valsartan.    Allergies:   Review of patient's allergies indicates:  No Known Allergies     Imaging, x-ray: 6/23/2020: FINDINGS:  No evidence of acute fracture, dislocation or bone destruction.  Moderate degenerative changes of the acromioclavicular and glenohumeral joints.  Alignment is satisfactory.  There is a partially visualized right chest port.  Otherwise, surrounding soft tissue structures demonstrate no significant abnormalities.    Prior Therapy: no   Social History: lives with their daughter and grandchildren   Occupation: retired   Prior Level of Function: independent   Current Level of Function: sedentary - requires assistance with cooking, cleaning; pain with UE dressing     Pain: pt unable to rate: "it is just there"  Location: right shoulder   Description: unable to describe - "feels painful"  Aggravating Factors: sleeping on her R side  Easing Factors: nothing    Pts goals: she is tired of being sick     Objective     Observation: pt presents with daughter who is here for interpretation and clarification     Posture: rounded shoulders, slouched posture, increased thoracic kyphosis     Cervical Screen:   Cervical Range of Motion:    Degrees Pain   Flexion 50 None      Extension ~10 None      Right Rotation WFL  Significant increase in pain in neck and head on L side      Left Rotation WFL  No pain    Right Side Bending Unable - may be due to language barrier  None    Left Side Bending Unable - may be due to language barrier None "      Spurling's: pain in neck - not pain in shoulder     Shoulder:   Active Range of Motion:   Shoulder Right Left   Flexion 130 135   Abduction 120 130   ER at 0 50 NT   IR 40 NT     Functional ER: R/L: C7/T2  Functional IR: R/L: L2/T10    Passive Range of Motion:   Shoulder Right Left   Flexion 150 - empty end feel  NT   Abduction 110 - empty end feel - more pain returning to neutral  NT   ER at 0 60 NT   IR 50 with pain  NT     Upper Extremity Strength   (R) UE (L) UE   Shoulder flexion: 3+/5 4-/5   Shoulder Abduction: 3+/5 4-/5   Shoulder ER 4-/5 4-/5   Shoulder IR 4-/5 4-/5     Special Tests:    Impingement Right Left   Empty Can Test + -   Hawkin's Raudel + -   Neer's Test + -     Instability Right Left   Anterior Apprehension Test + -   Relocation Test + -   Posterior Apprehension Test - -   Sulcus Sign - -     Rotator Cuff Lesion Right Left   Drop Arm test - -   Subscapularis Lift Off - -   Belly Press Test - -   External rotation Lag Sign - -       AC Joint Right Left   AC Joint Compression Test + -     Joint Mobility: not formally tested due to pain and time limitations     Palpation: ttp over R shoulder girdle, including pec major/minor, posterior RTC muscles, long head biceps tendon    Sensation: intact to light touch grossly BUE     CMS Impairment/Limitation/Restriction for FOTO Shoulder Survey    Therapist reviewed FOTO scores for Celine Mahan on 7/14/2020.   FOTO documents entered into [employer] - see Media section.    Limitation Score: 49%  Category: Carrying    Current : CK = at least 40% but < 60% impaired, limited or restricted  Goal: CJ = at least 20% but < 40% impaired, limited or restricted       TREATMENT   Treatment Time In: 2:50p  Treatment Time Out: 3:00p  Total Treatment time separate from Evaluation: 10 minutes    Celine received therapeutic exercises to develop strength, endurance, ROM, flexibility, posture and core stabilization for 10 minutes including:    Supine shoulder flexion through  "limited ROM to tolerance x10   Standing shoulder flexion and abduction table slides x10   scap retracts x10, 3 sec hold     Home Exercises and Patient Education Provided    Education provided:   - decrease laying on RUE for now, find positions that decrease pain, improve light mobility throughout day     Written Home Exercises Provided: yes.  Exercises were reviewed and Celine was able to demonstrate them prior to the end of the session.  Celine demonstrated fair  understanding of the education provided.     See EMR under Patient Instructions for exercises provided 7/14/2020.    Assessment   Celine is a 85 y.o. female referred to outpatient Physical Therapy with a medical diagnosis of right shoulder pain, unspecified chronicity. Pt presents with complaints of R shoulder pain. Pt arrived late, so unable to test everything as noted above by "NT". Upon examination, pt demonstrates cervical pain, R shoulder pain, decreased shoulder and cervical ROM, UE and trunk weakness, endurance deficits, poor posture and awareness, and decreased tolerance to functional mobility. Pt had positive shoulder impingement tests, instability tests, and acromioclavicular tests. Pt had similar active and passive ROM indicating contractile and non-contractile tissues being affected. She had difficulty performing transitional movements, putting weight through her RUE, and laying on her RUE due to pain. Pt is appropriate for physical therapy and would benefit from improving shoulder mobility, increasing periscapular and rotator cuff strengthening, and reducing static positions throughout day.     Pt prognosis is Fair.   Pt will benefit from skilled outpatient Physical Therapy to address the deficits stated above and in the chart below, provide pt/family education, and to maximize pt's level of independence.     Plan of care discussed with patient: Yes  Pt's spiritual, cultural and educational needs considered and patient is agreeable to the plan of " care and goals as stated below:     Anticipated Barriers for therapy: multiple comorbidities; sedentary lifestyle     Medical Necessity is demonstrated by the following  History  Co-morbidities and personal factors that may impact the plan of care Co-morbidities:   breast cancer, ovarian cancer, cataract, CKD Stage 3, HTN, GERD, hyperlipidemia, hypothyroidism, obesity, osteopenia, overactive bladder, pelvic mass, thyroid disease    Personal Factors:   lifestyle     high   Examination  Body Structures and Functions, activity limitations and participation restrictions that may impact the plan of care Body Regions:   neck  upper extremities  trunk    Body Systems:    gross symmetry  ROM  strength  gross coordinated movement  balance  gait  transfers  transitions  motor control  motor learning    Participation Restrictions:   Minimal to moderate     Activity limitations:   Learning and applying knowledge  no deficits    General Tasks and Commands  no deficits    Communication  no deficits    Mobility  lifting and carrying objects  fine hand use (grasping/picking up)  moving around using equipment (WC)    Self care  washing oneself (bathing, drying, washing hands)  dressing  looking after one's health    Domestic Life  shopping  cooking  doing house work (cleaning house, washing dishes, laundry)  assisting others    Interactions/Relationships  no deficits    Life Areas  no deficits    Community and Social Life  community life  recreation and leisure         moderate   Clinical Presentation stable and uncomplicated low   Decision Making/ Complexity Score: low     Goals:  Short Term Goals: 4 weeks   1. Pt will demonstrate improved (flex/abd/ER/IR) ROM in R shoulder by 5 degrees in order to improve overhead lifting and functional mobility.   2. Pt will demonstrate improved strength in BUE by 1/3 MMT in order to increase functional activities with less effort.   3. Pt will be able to reach a shelf that is at shoulder height  with no difficulty to improve independence within household.   4. Pt will be independent with HEP to improve self-care and management.   5. Pt will be able to lift a full glass of water and drink it with no difficulty to improve independence with functional mobility.     Long Term Goals: 8 weeks   1. Pt will demonstrate improved (flex/abd/ER/IR) ROM in R shoulder by 10 degrees in order to improve overhead lifting and functional mobility.   2. Pt will have a decreased subjective pain rating by 75% since starting physical therapy to improve tolerance to functional mobility.   3. Pt will demonstrate improved strength in BUE by 1 MMT in order to increase functional activities with less effort.   4. Pt will be able to place a 1# object on a shelf at shoulder height to improve independence with cooking.   5. Pt will demonstrate a 37% limitation score on the FOTO to demonstrate improved functional mobility since start of care.     Plan   Plan of care Certification: 7/14/2020 to 10/14/2020.    Outpatient Physical Therapy 1-2 times weekly for 12 weeks to include the following interventions: Manual Therapy, Moist Heat/ Ice, Neuromuscular Re-ed, Patient Education, Self Care, Therapeutic Activites and Therapeutic Exercise.     Mariana Soto, PT  07/14/2020    I have seen the patient, reviewed the therapist's plan of care, and I agree with the plan of care.      I certify the need for these services furnished under this plan of treatment and while under my care.     ___________________ ________ Physician/Referring Practitioner            ___________________________ Date of Signature

## 2020-07-17 ENCOUNTER — INFUSION (OUTPATIENT)
Dept: INFUSION THERAPY | Facility: HOSPITAL | Age: 85
End: 2020-07-17
Attending: INTERNAL MEDICINE
Payer: MEDICARE

## 2020-07-17 VITALS
TEMPERATURE: 97 F | DIASTOLIC BLOOD PRESSURE: 63 MMHG | BODY MASS INDEX: 34.87 KG/M2 | SYSTOLIC BLOOD PRESSURE: 145 MMHG | HEIGHT: 62 IN | WEIGHT: 189.5 LBS | OXYGEN SATURATION: 98 % | HEART RATE: 74 BPM | RESPIRATION RATE: 17 BRPM

## 2020-07-17 DIAGNOSIS — C56.9 OVARIAN CANCER: Primary | ICD-10-CM

## 2020-07-17 DIAGNOSIS — C56.9 MALIGNANT NEOPLASM OF OVARY, UNSPECIFIED LATERALITY: ICD-10-CM

## 2020-07-17 LAB
ALBUMIN SERPL BCP-MCNC: 3.7 G/DL (ref 3.5–5.2)
ALP SERPL-CCNC: 81 U/L (ref 55–135)
ALT SERPL W/O P-5'-P-CCNC: 9 U/L (ref 10–44)
ANION GAP SERPL CALC-SCNC: 9 MMOL/L (ref 8–16)
AST SERPL-CCNC: 20 U/L (ref 10–40)
BILIRUB SERPL-MCNC: 0.4 MG/DL (ref 0.1–1)
BUN SERPL-MCNC: 24 MG/DL (ref 8–23)
CALCIUM SERPL-MCNC: 8.9 MG/DL (ref 8.7–10.5)
CHLORIDE SERPL-SCNC: 101 MMOL/L (ref 95–110)
CO2 SERPL-SCNC: 24 MMOL/L (ref 23–29)
CREAT SERPL-MCNC: 1.8 MG/DL (ref 0.5–1.4)
ERYTHROCYTE [DISTWIDTH] IN BLOOD BY AUTOMATED COUNT: 25.2 % (ref 11.5–14.5)
EST. GFR  (AFRICAN AMERICAN): 29 ML/MIN/1.73 M^2
EST. GFR  (NON AFRICAN AMERICAN): 25 ML/MIN/1.73 M^2
GLUCOSE SERPL-MCNC: 96 MG/DL (ref 70–110)
HCT VFR BLD AUTO: 28.1 % (ref 37–48.5)
HGB BLD-MCNC: 9 G/DL (ref 12–16)
IMM GRANULOCYTES # BLD AUTO: 0.05 K/UL (ref 0–0.04)
MCH RBC QN AUTO: 31.1 PG (ref 27–31)
MCHC RBC AUTO-ENTMCNC: 32 G/DL (ref 32–36)
MCV RBC AUTO: 97 FL (ref 82–98)
NEUTROPHILS # BLD AUTO: 1.6 K/UL (ref 1.8–7.7)
PLATELET # BLD AUTO: 187 K/UL (ref 150–350)
PMV BLD AUTO: 9.7 FL (ref 9.2–12.9)
POTASSIUM SERPL-SCNC: 4.6 MMOL/L (ref 3.5–5.1)
PROT SERPL-MCNC: 7.2 G/DL (ref 6–8.4)
RBC # BLD AUTO: 2.89 M/UL (ref 4–5.4)
SODIUM SERPL-SCNC: 134 MMOL/L (ref 136–145)
WBC # BLD AUTO: 3.01 K/UL (ref 3.9–12.7)

## 2020-07-17 PROCEDURE — 96413 CHEMO IV INFUSION 1 HR: CPT

## 2020-07-17 PROCEDURE — 63600175 PHARM REV CODE 636 W HCPCS: Performed by: INTERNAL MEDICINE

## 2020-07-17 PROCEDURE — 85027 COMPLETE CBC AUTOMATED: CPT

## 2020-07-17 PROCEDURE — 96367 TX/PROPH/DG ADDL SEQ IV INF: CPT

## 2020-07-17 PROCEDURE — 96417 CHEMO IV INFUS EACH ADDL SEQ: CPT

## 2020-07-17 PROCEDURE — 96375 TX/PRO/DX INJ NEW DRUG ADDON: CPT

## 2020-07-17 PROCEDURE — 25000003 PHARM REV CODE 250: Performed by: INTERNAL MEDICINE

## 2020-07-17 PROCEDURE — 80053 COMPREHEN METABOLIC PANEL: CPT

## 2020-07-17 PROCEDURE — A4216 STERILE WATER/SALINE, 10 ML: HCPCS | Performed by: INTERNAL MEDICINE

## 2020-07-17 RX ORDER — SODIUM CHLORIDE 0.9 % (FLUSH) 0.9 %
10 SYRINGE (ML) INJECTION
Status: CANCELLED | OUTPATIENT
Start: 2020-07-17

## 2020-07-17 RX ORDER — HEPARIN 100 UNIT/ML
500 SYRINGE INTRAVENOUS
Status: DISCONTINUED | OUTPATIENT
Start: 2020-07-17 | End: 2020-07-17 | Stop reason: HOSPADM

## 2020-07-17 RX ORDER — HYDROMORPHONE HYDROCHLORIDE 2 MG/ML
1 INJECTION, SOLUTION INTRAMUSCULAR; INTRAVENOUS; SUBCUTANEOUS ONCE
Status: COMPLETED | OUTPATIENT
Start: 2020-07-17 | End: 2020-07-17

## 2020-07-17 RX ORDER — ONDANSETRON HYDROCHLORIDE 4 MG/5ML
8 SOLUTION ORAL ONCE
Status: DISCONTINUED | OUTPATIENT
Start: 2020-07-17 | End: 2020-07-17

## 2020-07-17 RX ORDER — HEPARIN 100 UNIT/ML
500 SYRINGE INTRAVENOUS
Status: CANCELLED | OUTPATIENT
Start: 2020-07-31

## 2020-07-17 RX ORDER — ONDANSETRON 2 MG/ML
8 INJECTION INTRAMUSCULAR; INTRAVENOUS
Status: CANCELLED | OUTPATIENT
Start: 2020-07-31

## 2020-07-17 RX ORDER — ONDANSETRON 2 MG/ML
8 INJECTION INTRAMUSCULAR; INTRAVENOUS
Status: COMPLETED | OUTPATIENT
Start: 2020-07-17 | End: 2020-07-17

## 2020-07-17 RX ORDER — SODIUM CHLORIDE 0.9 % (FLUSH) 0.9 %
10 SYRINGE (ML) INJECTION
Status: DISCONTINUED | OUTPATIENT
Start: 2020-07-17 | End: 2020-07-17 | Stop reason: HOSPADM

## 2020-07-17 RX ORDER — HEPARIN 100 UNIT/ML
500 SYRINGE INTRAVENOUS
Status: CANCELLED | OUTPATIENT
Start: 2020-07-17

## 2020-07-17 RX ORDER — SODIUM CHLORIDE 0.9 % (FLUSH) 0.9 %
10 SYRINGE (ML) INJECTION
Status: CANCELLED | OUTPATIENT
Start: 2020-07-31

## 2020-07-17 RX ADMIN — Medication 10 ML: at 01:07

## 2020-07-17 RX ADMIN — CARBOPLATIN 225 MG: 10 INJECTION, SOLUTION INTRAVENOUS at 12:07

## 2020-07-17 RX ADMIN — GEMCITABINE 1550 MG: 200 INJECTION, POWDER, LYOPHILIZED, FOR SOLUTION INTRAVENOUS at 11:07

## 2020-07-17 RX ADMIN — SODIUM CHLORIDE 500 ML: 0.9 INJECTION, SOLUTION INTRAVENOUS at 12:07

## 2020-07-17 RX ADMIN — ONDANSETRON 8 MG: 2 INJECTION INTRAMUSCULAR; INTRAVENOUS at 10:07

## 2020-07-17 RX ADMIN — HYDROMORPHONE HYDROCHLORIDE 1 MG: 2 INJECTION, SOLUTION INTRAMUSCULAR; INTRAVENOUS; SUBCUTANEOUS at 09:07

## 2020-07-17 RX ADMIN — PALONOSETRON HYDROCHLORIDE: 0.25 INJECTION INTRAVENOUS at 11:07

## 2020-07-17 RX ADMIN — HEPARIN 500 UNITS: 100 SYRINGE at 01:07

## 2020-07-17 NOTE — PLAN OF CARE
Patient arrived to unit for C4D1 Gemcitabine + Carboplatin pending lab results. STAT labs drawn. VSS. Labs resulted. Treatment plan reviewed and signed by physician. Patient pre-medicated with aloxi/dex IVPB. She received gemzar and carboplatin. Tolerated treatment well. Received discharge instructions and follow up appointments. Verbalized understanding and ambulated off unit with walker escorted by MA, in NAD at time of discharge.

## 2020-07-24 ENCOUNTER — INFUSION (OUTPATIENT)
Dept: INFUSION THERAPY | Facility: HOSPITAL | Age: 85
End: 2020-07-24
Attending: INTERNAL MEDICINE
Payer: MEDICARE

## 2020-07-24 VITALS
DIASTOLIC BLOOD PRESSURE: 70 MMHG | SYSTOLIC BLOOD PRESSURE: 150 MMHG | TEMPERATURE: 97 F | RESPIRATION RATE: 18 BRPM | HEART RATE: 70 BPM | OXYGEN SATURATION: 100 %

## 2020-07-24 DIAGNOSIS — C56.9 OVARIAN CANCER: Primary | ICD-10-CM

## 2020-07-24 LAB
ALBUMIN SERPL BCP-MCNC: 3.7 G/DL (ref 3.5–5.2)
ALP SERPL-CCNC: 77 U/L (ref 55–135)
ALT SERPL W/O P-5'-P-CCNC: 37 U/L (ref 10–44)
ANION GAP SERPL CALC-SCNC: 9 MMOL/L (ref 8–16)
AST SERPL-CCNC: 50 U/L (ref 10–40)
BILIRUB SERPL-MCNC: 0.5 MG/DL (ref 0.1–1)
BUN SERPL-MCNC: 23 MG/DL (ref 8–23)
CALCIUM SERPL-MCNC: 8.5 MG/DL (ref 8.7–10.5)
CHLORIDE SERPL-SCNC: 100 MMOL/L (ref 95–110)
CO2 SERPL-SCNC: 23 MMOL/L (ref 23–29)
CREAT SERPL-MCNC: 1.6 MG/DL (ref 0.5–1.4)
ERYTHROCYTE [DISTWIDTH] IN BLOOD BY AUTOMATED COUNT: 22.5 % (ref 11.5–14.5)
EST. GFR  (AFRICAN AMERICAN): 34 ML/MIN/1.73 M^2
EST. GFR  (NON AFRICAN AMERICAN): 29 ML/MIN/1.73 M^2
GLUCOSE SERPL-MCNC: 101 MG/DL (ref 70–110)
HCT VFR BLD AUTO: 26.5 % (ref 37–48.5)
HGB BLD-MCNC: 8.4 G/DL (ref 12–16)
IMM GRANULOCYTES # BLD AUTO: 0.01 K/UL (ref 0–0.04)
MCH RBC QN AUTO: 30.2 PG (ref 27–31)
MCHC RBC AUTO-ENTMCNC: 31.7 G/DL (ref 32–36)
MCV RBC AUTO: 95 FL (ref 82–98)
NEUTROPHILS # BLD AUTO: 0.4 K/UL (ref 1.8–7.7)
PLATELET # BLD AUTO: 100 K/UL (ref 150–350)
PMV BLD AUTO: 10.7 FL (ref 9.2–12.9)
POTASSIUM SERPL-SCNC: 4.2 MMOL/L (ref 3.5–5.1)
PROT SERPL-MCNC: 7.1 G/DL (ref 6–8.4)
RBC # BLD AUTO: 2.78 M/UL (ref 4–5.4)
SODIUM SERPL-SCNC: 132 MMOL/L (ref 136–145)
WBC # BLD AUTO: 0.97 K/UL (ref 3.9–12.7)

## 2020-07-24 PROCEDURE — 63600175 PHARM REV CODE 636 W HCPCS: Performed by: INTERNAL MEDICINE

## 2020-07-24 PROCEDURE — A4216 STERILE WATER/SALINE, 10 ML: HCPCS | Performed by: INTERNAL MEDICINE

## 2020-07-24 PROCEDURE — 25000003 PHARM REV CODE 250: Performed by: INTERNAL MEDICINE

## 2020-07-24 PROCEDURE — 80053 COMPREHEN METABOLIC PANEL: CPT

## 2020-07-24 PROCEDURE — 36591 DRAW BLOOD OFF VENOUS DEVICE: CPT

## 2020-07-24 PROCEDURE — 85027 COMPLETE CBC AUTOMATED: CPT

## 2020-07-24 RX ORDER — HEPARIN 100 UNIT/ML
500 SYRINGE INTRAVENOUS
Status: DISCONTINUED | OUTPATIENT
Start: 2020-07-24 | End: 2020-07-24 | Stop reason: HOSPADM

## 2020-07-24 RX ORDER — SODIUM CHLORIDE 0.9 % (FLUSH) 0.9 %
10 SYRINGE (ML) INJECTION
Status: CANCELLED | OUTPATIENT
Start: 2020-07-24

## 2020-07-24 RX ORDER — HEPARIN 100 UNIT/ML
500 SYRINGE INTRAVENOUS
Status: CANCELLED | OUTPATIENT
Start: 2020-07-24

## 2020-07-24 RX ORDER — SODIUM CHLORIDE 0.9 % (FLUSH) 0.9 %
10 SYRINGE (ML) INJECTION
Status: DISCONTINUED | OUTPATIENT
Start: 2020-07-24 | End: 2020-07-24 | Stop reason: HOSPADM

## 2020-07-24 RX ADMIN — Medication 10 ML: at 10:07

## 2020-07-24 RX ADMIN — HEPARIN 500 UNITS: 100 SYRINGE at 10:07

## 2020-07-24 NOTE — PLAN OF CARE
Patient arrived to unit for C4D8 Gemzar. Labs drawn, ANC 0.4. Dr. Campo notified. Per provider hold treatment today and have patient return in one week for repeat labs and possible chemo. Plan of care reviewed, patient agreeable to plan. VSS. Patient received discharge instructions and follow up appointments. Patient instructed to return 7/30/20 for CT scan and 7/31/20 for chemo. Verbalized understanding and ambulated off unit with walker accompanied by MA. Patient in NAD at time of discharge.

## 2020-07-30 ENCOUNTER — HOSPITAL ENCOUNTER (OUTPATIENT)
Dept: RADIOLOGY | Facility: HOSPITAL | Age: 85
Discharge: HOME | End: 2020-07-30
Attending: INTERNAL MEDICINE
Payer: MEDICARE

## 2020-07-30 DIAGNOSIS — C56.9 MALIGNANT NEOPLASM OF OVARY, UNSPECIFIED LATERALITY: ICD-10-CM

## 2020-07-30 PROCEDURE — 71250 CT THORAX DX C-: CPT | Mod: 26,,, | Performed by: RADIOLOGY

## 2020-07-30 PROCEDURE — 71250 CT CHEST ABDOMEN PELVIS WITHOUT CONTRAST(XPD): ICD-10-PCS | Mod: 26,,, | Performed by: RADIOLOGY

## 2020-07-30 PROCEDURE — 74176 CT ABD & PELVIS W/O CONTRAST: CPT | Mod: TC

## 2020-07-30 PROCEDURE — 74176 CT ABD & PELVIS W/O CONTRAST: CPT | Mod: 26,,, | Performed by: RADIOLOGY

## 2020-07-30 PROCEDURE — 71250 CT THORAX DX C-: CPT | Mod: TC

## 2020-07-30 PROCEDURE — 25500020 PHARM REV CODE 255: Performed by: INTERNAL MEDICINE

## 2020-07-30 PROCEDURE — 74176 CT CHEST ABDOMEN PELVIS WITHOUT CONTRAST(XPD): ICD-10-PCS | Mod: 26,,, | Performed by: RADIOLOGY

## 2020-07-30 RX ADMIN — IOHEXOL 15 ML: 300 INJECTION, SOLUTION INTRAVENOUS at 10:07

## 2020-07-31 ENCOUNTER — INFUSION (OUTPATIENT)
Dept: INFUSION THERAPY | Facility: HOSPITAL | Age: 85
End: 2020-07-31
Attending: INTERNAL MEDICINE
Payer: MEDICARE

## 2020-07-31 VITALS
DIASTOLIC BLOOD PRESSURE: 62 MMHG | OXYGEN SATURATION: 99 % | SYSTOLIC BLOOD PRESSURE: 135 MMHG | TEMPERATURE: 98 F | RESPIRATION RATE: 18 BRPM | HEART RATE: 55 BPM

## 2020-07-31 DIAGNOSIS — C56.9 OVARIAN CANCER: Primary | ICD-10-CM

## 2020-07-31 DIAGNOSIS — C56.9 MALIGNANT NEOPLASM OF OVARY, UNSPECIFIED LATERALITY: ICD-10-CM

## 2020-07-31 LAB
ERYTHROCYTE [DISTWIDTH] IN BLOOD BY AUTOMATED COUNT: 24.3 % (ref 11.5–14.5)
HCT VFR BLD AUTO: 26.3 % (ref 37–48.5)
HGB BLD-MCNC: 8.2 G/DL (ref 12–16)
IMM GRANULOCYTES # BLD AUTO: 0.01 K/UL (ref 0–0.04)
MCH RBC QN AUTO: 30.7 PG (ref 27–31)
MCHC RBC AUTO-ENTMCNC: 31.2 G/DL (ref 32–36)
MCV RBC AUTO: 99 FL (ref 82–98)
NEUTROPHILS # BLD AUTO: 1.3 K/UL (ref 1.8–7.7)
PLATELET # BLD AUTO: 83 K/UL (ref 150–350)
PMV BLD AUTO: 11 FL (ref 9.2–12.9)
RBC # BLD AUTO: 2.67 M/UL (ref 4–5.4)
WBC # BLD AUTO: 2.65 K/UL (ref 3.9–12.7)

## 2020-07-31 PROCEDURE — 63600175 PHARM REV CODE 636 W HCPCS: Performed by: INTERNAL MEDICINE

## 2020-07-31 PROCEDURE — 96367 TX/PROPH/DG ADDL SEQ IV INF: CPT

## 2020-07-31 PROCEDURE — 25000003 PHARM REV CODE 250: Performed by: INTERNAL MEDICINE

## 2020-07-31 PROCEDURE — 96413 CHEMO IV INFUSION 1 HR: CPT

## 2020-07-31 PROCEDURE — 85027 COMPLETE CBC AUTOMATED: CPT

## 2020-07-31 PROCEDURE — 96375 TX/PRO/DX INJ NEW DRUG ADDON: CPT

## 2020-07-31 PROCEDURE — A4216 STERILE WATER/SALINE, 10 ML: HCPCS | Performed by: INTERNAL MEDICINE

## 2020-07-31 RX ORDER — HYDROMORPHONE HYDROCHLORIDE 2 MG/ML
1 INJECTION, SOLUTION INTRAMUSCULAR; INTRAVENOUS; SUBCUTANEOUS
Status: COMPLETED | OUTPATIENT
Start: 2020-07-31 | End: 2020-07-31

## 2020-07-31 RX ORDER — HEPARIN 100 UNIT/ML
500 SYRINGE INTRAVENOUS
Status: DISCONTINUED | OUTPATIENT
Start: 2020-07-31 | End: 2020-07-31 | Stop reason: HOSPADM

## 2020-07-31 RX ORDER — SODIUM CHLORIDE 0.9 % (FLUSH) 0.9 %
10 SYRINGE (ML) INJECTION
Status: DISCONTINUED | OUTPATIENT
Start: 2020-07-31 | End: 2020-07-31 | Stop reason: HOSPADM

## 2020-07-31 RX ORDER — ONDANSETRON 2 MG/ML
8 INJECTION INTRAMUSCULAR; INTRAVENOUS
Status: COMPLETED | OUTPATIENT
Start: 2020-07-31 | End: 2020-07-31

## 2020-07-31 RX ADMIN — GEMCITABINE HYDROCHLORIDE 775 MG: 1 INJECTION, POWDER, LYOPHILIZED, FOR SOLUTION INTRAVENOUS at 11:07

## 2020-07-31 RX ADMIN — HEPARIN 500 UNITS: 100 SYRINGE at 12:07

## 2020-07-31 RX ADMIN — ONDANSETRON 8 MG: 2 INJECTION INTRAMUSCULAR; INTRAVENOUS at 09:07

## 2020-07-31 RX ADMIN — HYDROMORPHONE HYDROCHLORIDE 1 MG: 2 INJECTION, SOLUTION INTRAMUSCULAR; INTRAVENOUS; SUBCUTANEOUS at 09:07

## 2020-07-31 RX ADMIN — Medication 10 ML: at 12:07

## 2020-07-31 RX ADMIN — PROMETHAZINE HYDROCHLORIDE 12.5 MG: 25 INJECTION INTRAMUSCULAR; INTRAVENOUS at 11:07

## 2020-07-31 NOTE — PLAN OF CARE
STAT cbc drawn upon arrival. ANC 1300 Plts 83k MD notified. Ordered proceed with C4D8 Gemzar with dose reduction. Patient received gemzar. Tolerated well. Received dilaudid for right shoulder pain and phenergan for nausea. She received discharge instructions and follow up appointments. Assisted off unit via wheelchair by MA, in NAD at time of dc.

## 2020-08-04 ENCOUNTER — TELEPHONE (OUTPATIENT)
Dept: HEMATOLOGY/ONCOLOGY | Facility: CLINIC | Age: 85
End: 2020-08-04

## 2020-08-04 NOTE — TELEPHONE ENCOUNTER
The patient daughter want to know the patient CT scan results as she is not scheduled to come back until 8/14

## 2020-08-12 ENCOUNTER — OFFICE VISIT (OUTPATIENT)
Dept: HEMATOLOGY/ONCOLOGY | Facility: CLINIC | Age: 85
End: 2020-08-12
Payer: MEDICARE

## 2020-08-12 ENCOUNTER — LAB VISIT (OUTPATIENT)
Dept: LAB | Facility: HOSPITAL | Age: 85
End: 2020-08-12
Attending: INTERNAL MEDICINE
Payer: MEDICARE

## 2020-08-12 VITALS
TEMPERATURE: 98 F | WEIGHT: 181.88 LBS | DIASTOLIC BLOOD PRESSURE: 61 MMHG | HEART RATE: 75 BPM | SYSTOLIC BLOOD PRESSURE: 134 MMHG | BODY MASS INDEX: 33.47 KG/M2 | OXYGEN SATURATION: 96 % | HEIGHT: 62 IN

## 2020-08-12 DIAGNOSIS — N18.4 CKD (CHRONIC KIDNEY DISEASE) STAGE 4, GFR 15-29 ML/MIN: ICD-10-CM

## 2020-08-12 DIAGNOSIS — T45.1X5A CHEMOTHERAPY INDUCED NEUTROPENIA: ICD-10-CM

## 2020-08-12 DIAGNOSIS — C56.9 MALIGNANT NEOPLASM OF OVARY, UNSPECIFIED LATERALITY: ICD-10-CM

## 2020-08-12 DIAGNOSIS — C56.9 MALIGNANT NEOPLASM OF OVARY, UNSPECIFIED LATERALITY: Primary | ICD-10-CM

## 2020-08-12 DIAGNOSIS — D70.1 CHEMOTHERAPY INDUCED NEUTROPENIA: ICD-10-CM

## 2020-08-12 DIAGNOSIS — M25.511 RIGHT SHOULDER PAIN, UNSPECIFIED CHRONICITY: ICD-10-CM

## 2020-08-12 DIAGNOSIS — D69.59 CHEMOTHERAPY-INDUCED THROMBOCYTOPENIA: ICD-10-CM

## 2020-08-12 DIAGNOSIS — T45.1X5A CHEMOTHERAPY-INDUCED THROMBOCYTOPENIA: ICD-10-CM

## 2020-08-12 LAB
ALBUMIN SERPL BCP-MCNC: 4.1 G/DL (ref 3.5–5.2)
ALP SERPL-CCNC: 77 U/L (ref 55–135)
ALT SERPL W/O P-5'-P-CCNC: 16 U/L (ref 10–44)
ANION GAP SERPL CALC-SCNC: 9 MMOL/L (ref 8–16)
AST SERPL-CCNC: 27 U/L (ref 10–40)
BASOPHILS # BLD AUTO: 0.02 K/UL (ref 0–0.2)
BASOPHILS NFR BLD: 0.7 % (ref 0–1.9)
BILIRUB SERPL-MCNC: 0.5 MG/DL (ref 0.1–1)
BUN SERPL-MCNC: 27 MG/DL (ref 8–23)
CALCIUM SERPL-MCNC: 9.2 MG/DL (ref 8.7–10.5)
CANCER AG125 SERPL-ACNC: 31 U/ML (ref 0–30)
CHLORIDE SERPL-SCNC: 102 MMOL/L (ref 95–110)
CO2 SERPL-SCNC: 23 MMOL/L (ref 23–29)
CREAT SERPL-MCNC: 2 MG/DL (ref 0.5–1.4)
DIFFERENTIAL METHOD: ABNORMAL
EOSINOPHIL # BLD AUTO: 0.1 K/UL (ref 0–0.5)
EOSINOPHIL NFR BLD: 4.5 % (ref 0–8)
ERYTHROCYTE [DISTWIDTH] IN BLOOD BY AUTOMATED COUNT: 24.7 % (ref 11.5–14.5)
EST. GFR  (AFRICAN AMERICAN): 26 ML/MIN/1.73 M^2
EST. GFR  (NON AFRICAN AMERICAN): 22 ML/MIN/1.73 M^2
GLUCOSE SERPL-MCNC: 108 MG/DL (ref 70–110)
HCT VFR BLD AUTO: 28.8 % (ref 37–48.5)
HGB BLD-MCNC: 9.2 G/DL (ref 12–16)
IMM GRANULOCYTES # BLD AUTO: 0.03 K/UL (ref 0–0.04)
IMM GRANULOCYTES NFR BLD AUTO: 1.1 % (ref 0–0.5)
LYMPHOCYTES # BLD AUTO: 0.7 K/UL (ref 1–4.8)
LYMPHOCYTES NFR BLD: 25.5 % (ref 18–48)
MCH RBC QN AUTO: 32.4 PG (ref 27–31)
MCHC RBC AUTO-ENTMCNC: 31.9 G/DL (ref 32–36)
MCV RBC AUTO: 101 FL (ref 82–98)
MONOCYTES # BLD AUTO: 0.4 K/UL (ref 0.3–1)
MONOCYTES NFR BLD: 13.9 % (ref 4–15)
NEUTROPHILS # BLD AUTO: 1.5 K/UL (ref 1.8–7.7)
NEUTROPHILS NFR BLD: 54.3 % (ref 38–73)
NRBC BLD-RTO: 0 /100 WBC
PLATELET # BLD AUTO: 104 K/UL (ref 150–350)
PMV BLD AUTO: 11.4 FL (ref 9.2–12.9)
POTASSIUM SERPL-SCNC: 4.5 MMOL/L (ref 3.5–5.1)
PROT SERPL-MCNC: 7.8 G/DL (ref 6–8.4)
RBC # BLD AUTO: 2.84 M/UL (ref 4–5.4)
SODIUM SERPL-SCNC: 134 MMOL/L (ref 136–145)
WBC # BLD AUTO: 2.67 K/UL (ref 3.9–12.7)

## 2020-08-12 PROCEDURE — 80053 COMPREHEN METABOLIC PANEL: CPT

## 2020-08-12 PROCEDURE — 99214 OFFICE O/P EST MOD 30 MIN: CPT | Mod: S$GLB,,, | Performed by: INTERNAL MEDICINE

## 2020-08-12 PROCEDURE — 99499 RISK ADDL DX/OHS AUDIT: ICD-10-PCS | Mod: S$GLB,,, | Performed by: INTERNAL MEDICINE

## 2020-08-12 PROCEDURE — 86304 IMMUNOASSAY TUMOR CA 125: CPT

## 2020-08-12 PROCEDURE — 1101F PT FALLS ASSESS-DOCD LE1/YR: CPT | Mod: CPTII,S$GLB,, | Performed by: INTERNAL MEDICINE

## 2020-08-12 PROCEDURE — 99999 PR PBB SHADOW E&M-EST. PATIENT-LVL IV: CPT | Mod: PBBFAC,,, | Performed by: INTERNAL MEDICINE

## 2020-08-12 PROCEDURE — 1126F PR PAIN SEVERITY QUANTIFIED, NO PAIN PRESENT: ICD-10-PCS | Mod: S$GLB,,, | Performed by: INTERNAL MEDICINE

## 2020-08-12 PROCEDURE — 3075F SYST BP GE 130 - 139MM HG: CPT | Mod: CPTII,S$GLB,, | Performed by: INTERNAL MEDICINE

## 2020-08-12 PROCEDURE — 99499 UNLISTED E&M SERVICE: CPT | Mod: S$GLB,,, | Performed by: INTERNAL MEDICINE

## 2020-08-12 PROCEDURE — 99214 PR OFFICE/OUTPT VISIT, EST, LEVL IV, 30-39 MIN: ICD-10-PCS | Mod: S$GLB,,, | Performed by: INTERNAL MEDICINE

## 2020-08-12 PROCEDURE — 1159F PR MEDICATION LIST DOCUMENTED IN MEDICAL RECORD: ICD-10-PCS | Mod: S$GLB,,, | Performed by: INTERNAL MEDICINE

## 2020-08-12 PROCEDURE — 36415 COLL VENOUS BLD VENIPUNCTURE: CPT

## 2020-08-12 PROCEDURE — 1159F MED LIST DOCD IN RCRD: CPT | Mod: S$GLB,,, | Performed by: INTERNAL MEDICINE

## 2020-08-12 PROCEDURE — 1101F PR PT FALLS ASSESS DOC 0-1 FALLS W/OUT INJ PAST YR: ICD-10-PCS | Mod: CPTII,S$GLB,, | Performed by: INTERNAL MEDICINE

## 2020-08-12 PROCEDURE — 85025 COMPLETE CBC W/AUTO DIFF WBC: CPT

## 2020-08-12 PROCEDURE — 3075F PR MOST RECENT SYSTOLIC BLOOD PRESS GE 130-139MM HG: ICD-10-PCS | Mod: CPTII,S$GLB,, | Performed by: INTERNAL MEDICINE

## 2020-08-12 PROCEDURE — 99999 PR PBB SHADOW E&M-EST. PATIENT-LVL IV: ICD-10-PCS | Mod: PBBFAC,,, | Performed by: INTERNAL MEDICINE

## 2020-08-12 PROCEDURE — 3078F DIAST BP <80 MM HG: CPT | Mod: CPTII,S$GLB,, | Performed by: INTERNAL MEDICINE

## 2020-08-12 PROCEDURE — 1126F AMNT PAIN NOTED NONE PRSNT: CPT | Mod: S$GLB,,, | Performed by: INTERNAL MEDICINE

## 2020-08-12 PROCEDURE — 3078F PR MOST RECENT DIASTOLIC BLOOD PRESSURE < 80 MM HG: ICD-10-PCS | Mod: CPTII,S$GLB,, | Performed by: INTERNAL MEDICINE

## 2020-08-12 RX ORDER — SODIUM CHLORIDE 0.9 % (FLUSH) 0.9 %
10 SYRINGE (ML) INJECTION
Status: CANCELLED | OUTPATIENT
Start: 2020-08-14

## 2020-08-12 RX ORDER — HEPARIN 100 UNIT/ML
500 SYRINGE INTRAVENOUS
Status: CANCELLED | OUTPATIENT
Start: 2020-08-21

## 2020-08-12 RX ORDER — SODIUM CHLORIDE 0.9 % (FLUSH) 0.9 %
10 SYRINGE (ML) INJECTION
Status: CANCELLED | OUTPATIENT
Start: 2020-08-21

## 2020-08-12 RX ORDER — ONDANSETRON 2 MG/ML
8 INJECTION INTRAMUSCULAR; INTRAVENOUS
Status: CANCELLED | OUTPATIENT
Start: 2020-08-21

## 2020-08-12 RX ORDER — HEPARIN 100 UNIT/ML
500 SYRINGE INTRAVENOUS
Status: CANCELLED | OUTPATIENT
Start: 2020-08-14

## 2020-08-12 NOTE — Clinical Note
Chemo Friday  Cbc,cmp piror to chemo on 8/21  Cbc,cmp prior ot chemo on 9/4  Cbc,cmp and CA 12 5 prior to f/u on 9/9  Standing orders

## 2020-08-12 NOTE — PROGRESS NOTES
"Subjective:       Patient ID: Celine Mahan is a 85 y.o. female.    Chief Complaint: Ovarian Cancer          Diagnosis: 1.Ovarian cancer status post debulking surgery 9/21/2017                     2. 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 Platinum sensitive recurrent ovarian cancer 3/2020  Therapy: 1. S/p carbo/taxol x 6 completed 2/12/2018                   2. Carbo/Doxil 2/1/2019- 7/8/2019                   3. Bevacizumab maintenance  8/16/2019 -3/14/2020                   4. Carboplatin/Gemcitabine 4/24/2020- present      [facility] ANALYSIS NEG    HPI ( per Dr. Feliciano) Patient is an 83 yo female who originally presented as a referral from Dr. Wills for pelvic mass. Patient reports LLQ pain for approximately 3 months which prompted evaluation.      Pelvic US 8/7/17 There is a complex echogenicity midline pelvic mass measuring 18.1 x 8.6 x 7.6 cm.  Ovaries not visualized.     CT A&P 8/9/17 Large solid/cystic mass within the lower abdomen/upper pelvis that is most concerning for a malignant neoplasm, likely of ovarian origin given its location.  Correlation with previous surgical history is recommended noting evidence of previous hysterectomy.  Mass abuts and displaces the adjacent bladder without definite CT findings to suggest hema invasion. Abdominal and pelvic lymphadenopathy concerning for lymphatic spread of neoplasm with index lymph nodes as above.      Medical history is significant for CKD (Cr 1.3), HTN, hypothyroid, HTN, HLD. She has a personal history of breast cancer in 1995 treated with mastectomy and adjuvant chemotherapy she reports. Last MMG 11/2016 normal. Adbominal surgery include cholecystectomy, TVH, xlap/removal ovary for ectopic. She reports that her physician told her "some ovary remains in situ". Family history significant for mother with pancreatic cancer.      CT chest 8/28/17 showed mediastinal adenopathy consistent with Stage IV disease.   RECIST " Summary:  Mediastinal adenopathy:  1. Preaortic abnormal node measuring 1.7 cm short axis.  2. Pretracheal node measures 1.6 cm short axis.     She underwent surgical cytoreduction with xlap/LSO/omentectomy 9/21/17. Pathology showed high grade adenocarcinoma.        She is also  followed by Dr. Feliciano  She  transferred care to undergo  adjuvant chemo at this location   She completed adjuvant chemo with carbo AUC 6 and taxol 175mg/m2 q 21d  2/12/2018  She was considered for PRIMA clinical trial for maintenance therapy but declined    Pt with increasing  levels   849>230>37>23>22>12>40>309 ( 1/9/2019 )     CT c/a/p 1/14/2019 S/P resection of large LEFT ovarian/fallopian tube mass consistent with carcinoma.  Interval increase in size of LEFT pelvic/retroperitoneal lymph nodes.  No evidence for ascites or definitive carcinomatosis of mesentery.  Interval decrease in size of prevascular lymph node.    She was then diagnosed with  platinum sensitive recurrent ovarian cancer. Platinum free interval 11 months.   Dr. Feliciano Reviewed standard management options which are to re-treat with platinum-based chemotherapy (carbo + doxil/gem/taxol +/- avastin).   She is s/p cycle 6 of chemo  Carbo/doxil (Doxil 30mg/m2  Carbo AUC 5)  q 28dayscompleted 7/8/2019   ( dosage reduction  sec to cytopenias)     Imaging studies 7/2019 revealed stable disease    Pt started on maintenance therapy with bevacizumab 15mg/kg  q3 wks    Pt developed increasing  levels and  CT imaging 3/19/2020 showed disease progression    She was started on carbo/gem for platinum sensitive recurrent ovarian cancer.      Accompanied by her grandson  She continues with r rt shoulder pain   XRAY reveals degenerative changes   Appetite and weight stable   No fatigue  She has chronic diffuse arthralgias  She continues to spend  a lot of time in her room according to her dtr  She has not been as mobile , lives w/dtr    Recent CT imaging shows positive  response to therapy    CBC  reveals a white blood cell count of  2670mm 3 hemoglobin 9.2grams/dL hematocrit of 28.8 %   plt ct 104k. ANC 1500    Biochemical profile reveals stable Cr level from 1.5mg/dL  to 2.0mg/dL     [facility] ANALYSIS NEG    Past Medical History:   Diagnosis Date    Breast cancer     Cataract     CKD (chronic kidney disease) stage 3, GFR 30-59 ml/min     Essential hypertension 8/22/2012    Gastroesophageal reflux disease 10/17/2019    Hyperlipidemia     Hypertension     Hypothyroidism     Obesity     Osteopenia     Ovarian cancer 10/8/2017    Overactive bladder     Pelvic mass 8/27/2017    Thyroid disease          Past Surgical History:   Procedure Laterality Date    bt catarac surgery      CHOLECYSTECTOMY      HYSTERECTOMY      INSERTION OF TUNNELED CENTRAL VENOUS CATHETER (CVC) WITH SUBCUTANEOUS PORT N/A 6/3/2019    Procedure: INSERTION, PORT-A-CATH;  Surgeon: Andrey Diagnostic Provider;  Location: [facility];  Service: Radiology;  Laterality: N/A;  RN PREOP 5/31/2019    MASTECTOMY Left              Review of Systems   Constitutional: Negative for appetite change, fatigue, fever and unexpected weight change.   HENT: Negative for mouth sores.    Eyes: Negative for visual disturbance.   Respiratory: Negative for cough and shortness of breath.    Cardiovascular: Negative for chest pain.   Gastrointestinal: Negative for abdominal pain, diarrhea and nausea.   Genitourinary: Negative for frequency.   Musculoskeletal: Positive for neck pain (chronic). Negative for back pain.   Skin: Negative for rash.   Neurological: Positive for numbness. Negative for weakness, light-headedness and headaches.        Intermittent tingling   Hematological: Negative for adenopathy.   Psychiatric/Behavioral: The patient is not nervous/anxious.        Objective:         Vitals:    08/12/20 1109   BP: 134/61   BP Location: Left arm   Patient Position: Sitting   BP Method: Large (Automatic)   Pulse: 75  "  Temp: 97.8 °F (36.6 °C)   TempSrc: Temporal   SpO2: 96%   Weight: 82.5 kg (181 lb 14.1 oz)   Height: 5' 2" (1.575 m)         Physical Exam   Constitutional: She is oriented to person, place, and time. She appears well-developed and well-nourished.   HENT:   Head: Normocephalic.   Mouth/Throat: Oropharynx is clear and moist. No oropharyngeal exudate.   Eyes: Conjunctivae and lids are normal. Pupils are equal, round, and reactive to light. No scleral icterus.   Neck: Normal range of motion. Neck supple. No thyromegaly present.   Cardiovascular: Normal rate, regular rhythm and normal heart sounds.    No murmur heard.  Pulmonary/Chest: Breath sounds normal. She has no wheezes. She has no rales.   Abdominal: Soft. Bowel sounds are normal. She exhibits no distension and no mass. There is no hepatosplenomegaly. There is no tenderness. There is no rebound and no guarding.   Musculoskeletal: Normal range of motion. She exhibits no edema and no tenderness.   Lymphadenopathy:     She has no cervical adenopathy.     She has no axillary adenopathy.        Right: No supraclavicular adenopathy present.        Left: No supraclavicular adenopathy present.   Neurological: She is alert and oriented to person, place, and time. No cranial nerve deficit. Coordination normal.   Skin: Skin is warm and dry. No ecchymosis, no petechiae and no rash noted. No erythema.   Psychiatric: She has a normal mood and affect.         Labs:   Lab Results   Component Value Date    WBC 2.67 (L) 08/12/2020    HGB 9.2 (L) 08/12/2020    HCT 28.8 (L) 08/12/2020     (H) 08/12/2020     (L) 08/12/2020           MRI Cervical spine w/out contrast 4/30/2019   Cervical degenerative change most significant at C4-5 and C5-6.  Specific details at each level discussed above.     CT c/a/p w/out contrast 3/19/2020    1. Status post hysterectomy and left oophorectomy surgery with interval enlarging retroperitoneal and pelvic lymph nodes discussed above.  2. " No new metastatic or recurrent disease confirmed.  3. Additional remote findings above.  4. Lesion 1: Left pelvic 6 sidewall lymph node 2.4 cm, was 2 cm.  5. Lesion 2: Left Madalyn aortic node 2.4 cm, was 1.9 cm.      Component      Latest Ref Rng & Units 2/21/2020 1/31/2020 12/19/2019 10/18/2019         0 - 30 U/mL 139 (H) 140 (H) 84 (H) 42 (H)     Component      Latest Ref Rng & Units 9/6/2019 7/22/2019 7/5/2019 6/6/2019         0 - 30 U/mL 33 (H) 58 (H) 52 (H) 55 (H)     Component      Latest Ref Rng & Units 4/17/2019         0 - 30 U/mL 86 (H)       Results for JONATAN SERRANO (MRN [id number]) as of 6/20/2020 13:27   Ref. Range 6/17/2020 09:40    Latest Ref Range: 0 - 30 U/mL 54 (H)     Results for JONATAN SERRANO (MRN [id number]) as of 8/12/2020 16:00   Ref. Range 7/13/2020 09:53    Latest Ref Range: 0 - 30 U/mL 35 (H)       C a/p w/out contrast 7/30/2020    --In this patient with reported history of malignant ovarian neoplasm, overall findings suggest positive response to therapy--     1. No appreciable new or enlarging suspicious or enhancing nodules above or below the diaphragm.  2. Interval changes include: 1.0-cm (previously 1.4-cm) left iliac chain/pelvic sidewall soft tissue nodule/lymph node, 1.4-cm (previously 2.0-cm) left iliac chain/pelvic side wall soft tissue nodule/lymph node, 1.3-cm (previously 1.5-cm) retroperitoneal/periaortic soft tissue nodule/lymph node, 1.0-cm (previously 1.2-cm) retroperitoneal/periaortic soft tissue nodule/lymph node and 0.9-cm (previously 1.2-cm) retroperitoneal/periaortic soft tissue nodule/lymph node.  3. 2 mm right middle lobe and 3 mm left lower lobe ground-glass pulmonary nodules and a single mildly enlarged mediastinal/precarinal space lymph node measuring up to 1.2-cm are unchanged.       Assessment:       1. Malignant neoplasm of ovary, unspecified laterality    2. Chemotherapy induced neutropenia    3. Chemotherapy-induced thrombocytopenia     4. CKD (chronic kidney disease) stage 4, GFR 15-29 ml/min    5. Right shoulder pain, unspecified chronicity        Plan:    Pt clinically stable      1.Ovarian cancer status post debulking surgery 9/21/2017   . 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 S/p carbo/taxol x 6 completed 2/12/2018                   2. Carbo/Doxil     2/1/2019- 7/8/2019                   3. Bevacizumab maintenance  8/16/2019 - 3/14/2020      Patient with ovarian cancer status post debulking surgery 9/21/2017 undergoing adjuvant platinum-based chemotherapy with carbo/taxol .   S/p carbo/taxol x 6 completed 2/12/2018  Pt declined maintenance therapy on trial   [facility]CA ANALYSIS NEG    She then developed  platinum sensitive recurrent ovarian cancer. Platinum free interval 11 months.   Dr. Feliciano Reviewed standard management options which are to re-treat with platinum-based chemotherapy (carbo + doxil/gem/taxol +/- avastin).   Pt underwent treatment with Doxil 30mg/m2  Carbo AUC 5  q 28days   s/p cycle 6 of chemo  Carbo/doxil completed 7/8/2019   CT c/a/p w/contrast 7/26/2019-stable findings    Pt then treated with maintenance  bevacizumab.   Pt developed rising  levels  CT imaging showed disease progression    Discussed with Dr. Feliciano . Patient with platinum sensitive recurrent ovarian cancer.Treatment options include single agent carbo or carbo/gem   Pt undergoing  Carbo AUC 4 / [place] 800 mg/m2 Day 1 /[place] 800mg/m2 DAY 8  [id number]  ( gem dosage reduction upfront planned )    Recent imaging reveals disease response    Cont chemo and switch to maintenance with olaparib following C6     2-3 Patient's thrombocytopenia noted. Patient asymptomatic. No active bleeding-nasal/urinary/rectal.   ANC 1500  , afebrile  Okay to proceed with C5 of chemo     4. Stable   Cont to monitor  Avoid NSAIDS in settting of CKD    5. Shoulder films reviewed- degenerative changes  Pain mgmt referral declined       Chemo 8/14/2020  Cbc,cmp piror  to chemo on 8/21   Cbc,cmp prior ot chemo on 9/4   Cbc,cmp and  prior to f/u on 9/9       Cbc,cmp,   and CT imaging prior to f/u on 8/7/2020        Cc: Iesha Feliciano M.D.          Anil Pelayo M.D.          Jane Domingo MD

## 2020-08-14 ENCOUNTER — INFUSION (OUTPATIENT)
Dept: INFUSION THERAPY | Facility: HOSPITAL | Age: 85
End: 2020-08-14
Attending: INTERNAL MEDICINE
Payer: MEDICARE

## 2020-08-14 VITALS
HEART RATE: 73 BPM | OXYGEN SATURATION: 96 % | RESPIRATION RATE: 17 BRPM | TEMPERATURE: 98 F | SYSTOLIC BLOOD PRESSURE: 135 MMHG | DIASTOLIC BLOOD PRESSURE: 63 MMHG

## 2020-08-14 DIAGNOSIS — C56.9 MALIGNANT NEOPLASM OF OVARY, UNSPECIFIED LATERALITY: Primary | ICD-10-CM

## 2020-08-14 PROCEDURE — 96417 CHEMO IV INFUS EACH ADDL SEQ: CPT

## 2020-08-14 PROCEDURE — A4216 STERILE WATER/SALINE, 10 ML: HCPCS | Performed by: INTERNAL MEDICINE

## 2020-08-14 PROCEDURE — 96375 TX/PRO/DX INJ NEW DRUG ADDON: CPT

## 2020-08-14 PROCEDURE — 25000003 PHARM REV CODE 250: Performed by: INTERNAL MEDICINE

## 2020-08-14 PROCEDURE — 96413 CHEMO IV INFUSION 1 HR: CPT

## 2020-08-14 PROCEDURE — 63600175 PHARM REV CODE 636 W HCPCS: Performed by: INTERNAL MEDICINE

## 2020-08-14 PROCEDURE — 96361 HYDRATE IV INFUSION ADD-ON: CPT

## 2020-08-14 PROCEDURE — 96367 TX/PROPH/DG ADDL SEQ IV INF: CPT

## 2020-08-14 RX ORDER — SODIUM CHLORIDE 0.9 % (FLUSH) 0.9 %
10 SYRINGE (ML) INJECTION
Status: DISCONTINUED | OUTPATIENT
Start: 2020-08-14 | End: 2020-08-14 | Stop reason: HOSPADM

## 2020-08-14 RX ORDER — ONDANSETRON 2 MG/ML
8 INJECTION INTRAMUSCULAR; INTRAVENOUS ONCE
Status: COMPLETED | OUTPATIENT
Start: 2020-08-14 | End: 2020-08-14

## 2020-08-14 RX ORDER — HEPARIN 100 UNIT/ML
500 SYRINGE INTRAVENOUS
Status: DISCONTINUED | OUTPATIENT
Start: 2020-08-14 | End: 2020-08-14 | Stop reason: HOSPADM

## 2020-08-14 RX ADMIN — GEMCITABINE HYDROCHLORIDE 1550 MG: 1 INJECTION, POWDER, LYOPHILIZED, FOR SOLUTION INTRAVENOUS at 10:08

## 2020-08-14 RX ADMIN — DEXAMETHASONE SODIUM PHOSPHATE: 10 INJECTION, SOLUTION INTRAMUSCULAR; INTRAVENOUS at 09:08

## 2020-08-14 RX ADMIN — ONDANSETRON 8 MG: 2 INJECTION INTRAMUSCULAR; INTRAVENOUS at 10:08

## 2020-08-14 RX ADMIN — Medication 10 ML: at 12:08

## 2020-08-14 RX ADMIN — SODIUM CHLORIDE 500 ML: 0.9 INJECTION, SOLUTION INTRAVENOUS at 11:08

## 2020-08-14 RX ADMIN — HEPARIN 500 UNITS: 100 SYRINGE at 12:08

## 2020-08-14 RX ADMIN — CARBOPLATIN 210 MG: 10 INJECTION, SOLUTION INTRAVENOUS at 11:08

## 2020-08-14 NOTE — PLAN OF CARE
Pt arrived to unit. VSS. Pt afebrile. Pt reports nausea and decreased appetite. Tolerated Gemzar, Carboplatin and IVF. Pt ate lunch after zofran was administered. Pt given AVS. Pt discharged from unit in wheelchair, accompanied by MA. No distress noted.

## 2020-08-21 ENCOUNTER — INFUSION (OUTPATIENT)
Dept: INFUSION THERAPY | Facility: HOSPITAL | Age: 85
End: 2020-08-21
Attending: INTERNAL MEDICINE
Payer: MEDICARE

## 2020-08-21 VITALS
OXYGEN SATURATION: 97 % | DIASTOLIC BLOOD PRESSURE: 67 MMHG | TEMPERATURE: 98 F | RESPIRATION RATE: 17 BRPM | SYSTOLIC BLOOD PRESSURE: 126 MMHG | HEART RATE: 74 BPM

## 2020-08-21 DIAGNOSIS — C56.9 OVARIAN CANCER: Primary | ICD-10-CM

## 2020-08-21 LAB
ALBUMIN SERPL BCP-MCNC: 3.7 G/DL (ref 3.5–5.2)
ALP SERPL-CCNC: 76 U/L (ref 55–135)
ALT SERPL W/O P-5'-P-CCNC: 46 U/L (ref 10–44)
ANION GAP SERPL CALC-SCNC: 6 MMOL/L (ref 8–16)
AST SERPL-CCNC: 63 U/L (ref 10–40)
BILIRUB SERPL-MCNC: 0.6 MG/DL (ref 0.1–1)
BUN SERPL-MCNC: 26 MG/DL (ref 8–23)
CALCIUM SERPL-MCNC: 8.3 MG/DL (ref 8.7–10.5)
CHLORIDE SERPL-SCNC: 102 MMOL/L (ref 95–110)
CO2 SERPL-SCNC: 25 MMOL/L (ref 23–29)
CREAT SERPL-MCNC: 1.8 MG/DL (ref 0.5–1.4)
ERYTHROCYTE [DISTWIDTH] IN BLOOD BY AUTOMATED COUNT: 21.9 % (ref 11.5–14.5)
EST. GFR  (AFRICAN AMERICAN): 29 ML/MIN/1.73 M^2
EST. GFR  (NON AFRICAN AMERICAN): 25 ML/MIN/1.73 M^2
GLUCOSE SERPL-MCNC: 97 MG/DL (ref 70–110)
HCT VFR BLD AUTO: 25.2 % (ref 37–48.5)
HGB BLD-MCNC: 8 G/DL (ref 12–16)
IMM GRANULOCYTES # BLD AUTO: 0.01 K/UL (ref 0–0.04)
MCH RBC QN AUTO: 32.1 PG (ref 27–31)
MCHC RBC AUTO-ENTMCNC: 31.7 G/DL (ref 32–36)
MCV RBC AUTO: 101 FL (ref 82–98)
NEUTROPHILS # BLD AUTO: 0.2 K/UL (ref 1.8–7.7)
PLATELET # BLD AUTO: 104 K/UL (ref 150–350)
PMV BLD AUTO: 9.8 FL (ref 9.2–12.9)
POTASSIUM SERPL-SCNC: 4.3 MMOL/L (ref 3.5–5.1)
PROT SERPL-MCNC: 7.2 G/DL (ref 6–8.4)
RBC # BLD AUTO: 2.49 M/UL (ref 4–5.4)
SODIUM SERPL-SCNC: 133 MMOL/L (ref 136–145)
WBC # BLD AUTO: 0.67 K/UL (ref 3.9–12.7)

## 2020-08-21 PROCEDURE — 80053 COMPREHEN METABOLIC PANEL: CPT

## 2020-08-21 PROCEDURE — 36591 DRAW BLOOD OFF VENOUS DEVICE: CPT

## 2020-08-21 PROCEDURE — 63600175 PHARM REV CODE 636 W HCPCS: Performed by: INTERNAL MEDICINE

## 2020-08-21 PROCEDURE — 85027 COMPLETE CBC AUTOMATED: CPT

## 2020-08-21 PROCEDURE — A4216 STERILE WATER/SALINE, 10 ML: HCPCS | Performed by: INTERNAL MEDICINE

## 2020-08-21 PROCEDURE — 25000003 PHARM REV CODE 250: Performed by: INTERNAL MEDICINE

## 2020-08-21 RX ORDER — HEPARIN 100 UNIT/ML
500 SYRINGE INTRAVENOUS
Status: CANCELLED | OUTPATIENT
Start: 2020-08-21

## 2020-08-21 RX ORDER — HEPARIN 100 UNIT/ML
500 SYRINGE INTRAVENOUS
Status: COMPLETED | OUTPATIENT
Start: 2020-08-21 | End: 2020-08-21

## 2020-08-21 RX ORDER — SODIUM CHLORIDE 0.9 % (FLUSH) 0.9 %
10 SYRINGE (ML) INJECTION
Status: COMPLETED | OUTPATIENT
Start: 2020-08-21 | End: 2020-08-21

## 2020-08-21 RX ORDER — SODIUM CHLORIDE 0.9 % (FLUSH) 0.9 %
10 SYRINGE (ML) INJECTION
Status: CANCELLED | OUTPATIENT
Start: 2020-08-21

## 2020-08-21 RX ADMIN — HEPARIN 500 UNITS: 100 SYRINGE at 11:08

## 2020-08-21 RX ADMIN — Medication 10 ML: at 11:08

## 2020-08-21 NOTE — PLAN OF CARE
Patient arrived to unit for labs and possible C5D8 Gemzar infusion. Pt c/o continued Right arm/ shoulder pain. No other c/o at this time. Labs drawn from Port and sent stat to lab. Pt was able to eat 75% of her breakfast that was provided. Labs reviewed by Dr. Campo for Dr. Reinoso. Orders given to hold chemo and have pt repeat labs on 9/4 and 9/9; also on 9/9  pt to see Dr. Reinoso as planned. Port flushed,  + blood return noted and Heparin administered. Patient left off unit in W/C accompanied by MA. Patient in NAD at time of discharge.

## 2020-09-04 ENCOUNTER — LAB VISIT (OUTPATIENT)
Dept: LAB | Facility: HOSPITAL | Age: 85
End: 2020-09-04
Attending: INTERNAL MEDICINE
Payer: MEDICARE

## 2020-09-04 DIAGNOSIS — C56.9 OVARIAN CANCER: ICD-10-CM

## 2020-09-04 LAB
ALBUMIN SERPL BCP-MCNC: 3.9 G/DL (ref 3.5–5.2)
ALP SERPL-CCNC: 70 U/L (ref 55–135)
ALT SERPL W/O P-5'-P-CCNC: 11 U/L (ref 10–44)
ANION GAP SERPL CALC-SCNC: 11 MMOL/L (ref 8–16)
AST SERPL-CCNC: 24 U/L (ref 10–40)
BILIRUB SERPL-MCNC: 0.5 MG/DL (ref 0.1–1)
BUN SERPL-MCNC: 17 MG/DL (ref 8–23)
CALCIUM SERPL-MCNC: 9.4 MG/DL (ref 8.7–10.5)
CHLORIDE SERPL-SCNC: 101 MMOL/L (ref 95–110)
CO2 SERPL-SCNC: 23 MMOL/L (ref 23–29)
CREAT SERPL-MCNC: 2.1 MG/DL (ref 0.5–1.4)
ERYTHROCYTE [DISTWIDTH] IN BLOOD BY AUTOMATED COUNT: 22.5 % (ref 11.5–14.5)
EST. GFR  (AFRICAN AMERICAN): 24 ML/MIN/1.73 M^2
EST. GFR  (NON AFRICAN AMERICAN): 21 ML/MIN/1.73 M^2
GLUCOSE SERPL-MCNC: 110 MG/DL (ref 70–110)
HCT VFR BLD AUTO: 30.8 % (ref 37–48.5)
HGB BLD-MCNC: 9.7 G/DL (ref 12–16)
IMM GRANULOCYTES # BLD AUTO: 0.03 K/UL (ref 0–0.04)
MCH RBC QN AUTO: 32.9 PG (ref 27–31)
MCHC RBC AUTO-ENTMCNC: 31.5 G/DL (ref 32–36)
MCV RBC AUTO: 104 FL (ref 82–98)
NEUTROPHILS # BLD AUTO: 0.9 K/UL (ref 1.8–7.7)
PLATELET # BLD AUTO: 131 K/UL (ref 150–350)
PMV BLD AUTO: 9.9 FL (ref 9.2–12.9)
POTASSIUM SERPL-SCNC: 4.4 MMOL/L (ref 3.5–5.1)
PROT SERPL-MCNC: 7.7 G/DL (ref 6–8.4)
RBC # BLD AUTO: 2.95 M/UL (ref 4–5.4)
SODIUM SERPL-SCNC: 135 MMOL/L (ref 136–145)
WBC # BLD AUTO: 2.38 K/UL (ref 3.9–12.7)

## 2020-09-04 PROCEDURE — 80053 COMPREHEN METABOLIC PANEL: CPT

## 2020-09-04 PROCEDURE — 85027 COMPLETE CBC AUTOMATED: CPT

## 2020-09-04 PROCEDURE — 36415 COLL VENOUS BLD VENIPUNCTURE: CPT

## 2020-09-10 ENCOUNTER — OFFICE VISIT (OUTPATIENT)
Dept: HEMATOLOGY/ONCOLOGY | Facility: CLINIC | Age: 85
End: 2020-09-10
Payer: MEDICARE

## 2020-09-10 VITALS
BODY MASS INDEX: 32.46 KG/M2 | HEIGHT: 62 IN | OXYGEN SATURATION: 97 % | DIASTOLIC BLOOD PRESSURE: 70 MMHG | SYSTOLIC BLOOD PRESSURE: 140 MMHG | HEART RATE: 78 BPM | TEMPERATURE: 98 F | WEIGHT: 176.38 LBS

## 2020-09-10 DIAGNOSIS — T45.1X5A ANTINEOPLASTIC CHEMOTHERAPY INDUCED ANEMIA: ICD-10-CM

## 2020-09-10 DIAGNOSIS — N18.4 CKD (CHRONIC KIDNEY DISEASE) STAGE 4, GFR 15-29 ML/MIN: ICD-10-CM

## 2020-09-10 DIAGNOSIS — C56.9 MALIGNANT NEOPLASM OF OVARY, UNSPECIFIED LATERALITY: Primary | ICD-10-CM

## 2020-09-10 DIAGNOSIS — D64.81 ANTINEOPLASTIC CHEMOTHERAPY INDUCED ANEMIA: ICD-10-CM

## 2020-09-10 PROCEDURE — 99499 RISK ADDL DX/OHS AUDIT: ICD-10-PCS | Mod: S$GLB,,, | Performed by: INTERNAL MEDICINE

## 2020-09-10 PROCEDURE — 3078F DIAST BP <80 MM HG: CPT | Mod: CPTII,S$GLB,, | Performed by: INTERNAL MEDICINE

## 2020-09-10 PROCEDURE — 3078F PR MOST RECENT DIASTOLIC BLOOD PRESSURE < 80 MM HG: ICD-10-PCS | Mod: CPTII,S$GLB,, | Performed by: INTERNAL MEDICINE

## 2020-09-10 PROCEDURE — 1125F AMNT PAIN NOTED PAIN PRSNT: CPT | Mod: S$GLB,,, | Performed by: INTERNAL MEDICINE

## 2020-09-10 PROCEDURE — 1159F PR MEDICATION LIST DOCUMENTED IN MEDICAL RECORD: ICD-10-PCS | Mod: S$GLB,,, | Performed by: INTERNAL MEDICINE

## 2020-09-10 PROCEDURE — 3077F SYST BP >= 140 MM HG: CPT | Mod: CPTII,S$GLB,, | Performed by: INTERNAL MEDICINE

## 2020-09-10 PROCEDURE — 3077F PR MOST RECENT SYSTOLIC BLOOD PRESSURE >= 140 MM HG: ICD-10-PCS | Mod: CPTII,S$GLB,, | Performed by: INTERNAL MEDICINE

## 2020-09-10 PROCEDURE — 99214 PR OFFICE/OUTPT VISIT, EST, LEVL IV, 30-39 MIN: ICD-10-PCS | Mod: S$GLB,,, | Performed by: INTERNAL MEDICINE

## 2020-09-10 PROCEDURE — 99214 OFFICE O/P EST MOD 30 MIN: CPT | Mod: S$GLB,,, | Performed by: INTERNAL MEDICINE

## 2020-09-10 PROCEDURE — 1101F PT FALLS ASSESS-DOCD LE1/YR: CPT | Mod: CPTII,S$GLB,, | Performed by: INTERNAL MEDICINE

## 2020-09-10 PROCEDURE — 1101F PR PT FALLS ASSESS DOC 0-1 FALLS W/OUT INJ PAST YR: ICD-10-PCS | Mod: CPTII,S$GLB,, | Performed by: INTERNAL MEDICINE

## 2020-09-10 PROCEDURE — 1159F MED LIST DOCD IN RCRD: CPT | Mod: S$GLB,,, | Performed by: INTERNAL MEDICINE

## 2020-09-10 PROCEDURE — 99999 PR PBB SHADOW E&M-EST. PATIENT-LVL IV: ICD-10-PCS | Mod: PBBFAC,,, | Performed by: INTERNAL MEDICINE

## 2020-09-10 PROCEDURE — 99999 PR PBB SHADOW E&M-EST. PATIENT-LVL IV: CPT | Mod: PBBFAC,,, | Performed by: INTERNAL MEDICINE

## 2020-09-10 PROCEDURE — 1125F PR PAIN SEVERITY QUANTIFIED, PAIN PRESENT: ICD-10-PCS | Mod: S$GLB,,, | Performed by: INTERNAL MEDICINE

## 2020-09-10 PROCEDURE — 99499 UNLISTED E&M SERVICE: CPT | Mod: S$GLB,,, | Performed by: INTERNAL MEDICINE

## 2020-09-10 NOTE — PROGRESS NOTES
"Subjective:       Patient ID: Celine Mahan is a 85 y.o. female.    Chief Complaint: Follow-up (malignant neoplasm of ovary)          Diagnosis: 1.Ovarian cancer status post debulking surgery 9/21/2017                     2. 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 Platinum sensitive recurrent ovarian cancer 3/2020  Therapy: 1. S/p carbo/taxol x 6 completed 2/12/2018                   2. Carbo/Doxil 2/1/2019- 7/8/2019                   3. Bevacizumab maintenance  8/16/2019 -3/14/2020                   4. Carboplatin/Gemcitabine 4/24/2020- present      [facility] ANALYSIS NEG    HPI ( per Dr. Feliciano) Patient is an 85 yo female who originally presented as a referral from Dr. Wills for pelvic mass. Patient reports LLQ pain for approximately 3 months which prompted evaluation.      Pelvic US 8/7/17 There is a complex echogenicity midline pelvic mass measuring 18.1 x 8.6 x 7.6 cm.  Ovaries not visualized.     CT A&P 8/9/17 Large solid/cystic mass within the lower abdomen/upper pelvis that is most concerning for a malignant neoplasm, likely of ovarian origin given its location.  Correlation with previous surgical history is recommended noting evidence of previous hysterectomy.  Mass abuts and displaces the adjacent bladder without definite CT findings to suggest hema invasion. Abdominal and pelvic lymphadenopathy concerning for lymphatic spread of neoplasm with index lymph nodes as above.      Medical history is significant for CKD (Cr 1.3), HTN, hypothyroid, HTN, HLD. She has a personal history of breast cancer in 1995 treated with mastectomy and adjuvant chemotherapy she reports. Last MMG 11/2016 normal. Adbominal surgery include cholecystectomy, TVH, xlap/removal ovary for ectopic. She reports that her physician told her "some ovary remains in situ". Family history significant for mother with pancreatic cancer.      CT chest 8/28/17 showed mediastinal adenopathy consistent with " Stage IV disease.   RECIST Summary:  Mediastinal adenopathy:  1. Preaortic abnormal node measuring 1.7 cm short axis.  2. Pretracheal node measures 1.6 cm short axis.     She underwent surgical cytoreduction with xlap/LSO/omentectomy 9/21/17. Pathology showed high grade adenocarcinoma.        She is also  followed by Dr. Feliciano  She  transferred care to undergo  adjuvant chemo at this location   She completed adjuvant chemo with carbo AUC 6 and taxol 175mg/m2 q 21d  2/12/2018  She was considered for PRIMA clinical trial for maintenance therapy but declined    Pt with increasing  levels   849>230>37>23>22>12>40>309 ( 1/9/2019 )     CT c/a/p 1/14/2019 S/P resection of large LEFT ovarian/fallopian tube mass consistent with carcinoma.  Interval increase in size of LEFT pelvic/retroperitoneal lymph nodes.  No evidence for ascites or definitive carcinomatosis of mesentery.  Interval decrease in size of prevascular lymph node.    She was then diagnosed with  platinum sensitive recurrent ovarian cancer. Platinum free interval 11 months.   Dr. Feliciano Reviewed standard management options which are to re-treat with platinum-based chemotherapy (carbo + doxil/gem/taxol +/- avastin).   She is s/p cycle 6 of chemo  Carbo/doxil (Doxil 30mg/m2  Carbo AUC 5)  q 28dayscompleted 7/8/2019   ( dosage reduction  sec to cytopenias)     Imaging studies 7/2019 revealed stable disease    Pt started on maintenance therapy with bevacizumab 15mg/kg  q3 wks    Pt developed increasing  levels and  CT imaging 3/19/2020 showed disease progression    She was started on carbo/gem for platinum sensitive recurrent ovarian cancer.      Accompanied by her grandson    Appetite and weight stable   No fatigue  She has chronic diffuse arthralgias  Spoke to dtr and dtr reported pt mentioned she did not want to do chemo anymore  Pt lives with dtr and continues to spend most of day alone in her room      Recent CT imaging shows positive response to  "therapy      Biochemical profile reveals stable Cr level from 1.5mg/dL  to 2.0mg/dL     [facility] ANALYSIS NEG    Past Medical History:   Diagnosis Date    Breast cancer     Cataract     CKD (chronic kidney disease) stage 3, GFR 30-59 ml/min     Essential hypertension 8/22/2012    Gastroesophageal reflux disease 10/17/2019    Hyperlipidemia     Hypertension     Hypothyroidism     Obesity     Osteopenia     Ovarian cancer 10/8/2017    Overactive bladder     Pelvic mass 8/27/2017    Thyroid disease          Past Surgical History:   Procedure Laterality Date    bt catarac surgery      CHOLECYSTECTOMY      HYSTERECTOMY      INSERTION OF TUNNELED CENTRAL VENOUS CATHETER (CVC) WITH SUBCUTANEOUS PORT N/A 6/3/2019    Procedure: INSERTION, PORT-A-CATH;  Surgeon: Andrey Diagnostic Provider;  Location: [facility];  Service: Radiology;  Laterality: N/A;  RN PREOP 5/31/2019    MASTECTOMY Left              Review of Systems   Constitutional: Negative for appetite change, fatigue, fever and unexpected weight change.   HENT: Negative for mouth sores.    Eyes: Negative for visual disturbance.   Respiratory: Negative for cough and shortness of breath.    Cardiovascular: Negative for chest pain.   Gastrointestinal: Negative for abdominal pain, diarrhea and nausea.   Genitourinary: Negative for frequency.   Musculoskeletal: Positive for neck pain (chronic). Negative for back pain.   Skin: Negative for rash.   Neurological: Positive for numbness. Negative for weakness, light-headedness and headaches.        Intermittent tingling   Hematological: Negative for adenopathy.   Psychiatric/Behavioral: The patient is not nervous/anxious.        Objective:         Vitals:    09/10/20 1337   BP: (!) 140/70   BP Location: Right arm   Patient Position: Sitting   BP Method: Medium (Automatic)   Pulse: 78   Temp: 98 °F (36.7 °C)   TempSrc: Oral   SpO2: 97%   Weight: 80 kg (176 lb 5.9 oz)   Height: 5' 2" (1.575 m)         Physical Exam "   Constitutional: She is oriented to person, place, and time. She appears well-developed and well-nourished.   HENT:   Head: Normocephalic.   Mouth/Throat: Oropharynx is clear and moist. No oropharyngeal exudate.   Eyes: Conjunctivae and lids are normal. Pupils are equal, round, and reactive to light. No scleral icterus.   Neck: Normal range of motion. Neck supple. No thyromegaly present.   Cardiovascular: Normal rate, regular rhythm and normal heart sounds.    No murmur heard.  Pulmonary/Chest: Breath sounds normal. She has no wheezes. She has no rales.   Abdominal: Soft. Bowel sounds are normal. She exhibits no distension and no mass. There is no hepatosplenomegaly. There is no tenderness. There is no rebound and no guarding.   Musculoskeletal: Normal range of motion. She exhibits no edema and no tenderness.   Lymphadenopathy:     She has no cervical adenopathy.     She has no axillary adenopathy.        Right: No supraclavicular adenopathy present.        Left: No supraclavicular adenopathy present.   Neurological: She is alert and oriented to person, place, and time. No cranial nerve deficit. Coordination normal.   Skin: Skin is warm and dry. No ecchymosis, no petechiae and no rash noted. No erythema.   Psychiatric: She has a normal mood and affect.         Labs:   Lab Results   Component Value Date    WBC 3.77 (L) 09/10/2020    HGB 10.6 (L) 09/10/2020    HCT 34.2 (L) 09/10/2020     (H) 09/10/2020    PLT SEE COMMENT 09/10/2020           MRI Cervical spine w/out contrast 4/30/2019   Cervical degenerative change most significant at C4-5 and C5-6.  Specific details at each level discussed above.     CT c/a/p w/out contrast 3/19/2020    1. Status post hysterectomy and left oophorectomy surgery with interval enlarging retroperitoneal and pelvic lymph nodes discussed above.  2. No new metastatic or recurrent disease confirmed.  3. Additional remote findings above.  4. Lesion 1: Left pelvic 6 sidewall lymph node  2.4 cm, was 2 cm.  5. Lesion 2: Left Madalyn aortic node 2.4 cm, was 1.9 cm.      Component      Latest Ref Rng & Units 2/21/2020 1/31/2020 12/19/2019 10/18/2019         0 - 30 U/mL 139 (H) 140 (H) 84 (H) 42 (H)     Component      Latest Ref Rng & Units 9/6/2019 7/22/2019 7/5/2019 6/6/2019         0 - 30 U/mL 33 (H) 58 (H) 52 (H) 55 (H)     Component      Latest Ref Rng & Units 4/17/2019         0 - 30 U/mL 86 (H)       Results for JONATAN SERRANO (MRN [id number]) as of 6/20/2020 13:27   Ref. Range 6/17/2020 09:40    Latest Ref Range: 0 - 30 U/mL 54 (H)     Results for JONATAN ESRRANO (MRN [id number]) as of 8/12/2020 16:00   Ref. Range 7/13/2020 09:53    Latest Ref Range: 0 - 30 U/mL 35 (H)       C a/p w/out contrast 7/30/2020    --In this patient with reported history of malignant ovarian neoplasm, overall findings suggest positive response to therapy--     1. No appreciable new or enlarging suspicious or enhancing nodules above or below the diaphragm.  2. Interval changes include: 1.0-cm (previously 1.4-cm) left iliac chain/pelvic sidewall soft tissue nodule/lymph node, 1.4-cm (previously 2.0-cm) left iliac chain/pelvic side wall soft tissue nodule/lymph node, 1.3-cm (previously 1.5-cm) retroperitoneal/periaortic soft tissue nodule/lymph node, 1.0-cm (previously 1.2-cm) retroperitoneal/periaortic soft tissue nodule/lymph node and 0.9-cm (previously 1.2-cm) retroperitoneal/periaortic soft tissue nodule/lymph node.  3. 2 mm right middle lobe and 3 mm left lower lobe ground-glass pulmonary nodules and a single mildly enlarged mediastinal/precarinal space lymph node measuring up to 1.2-cm are unchanged.       Assessment:       1. Malignant neoplasm of ovary, unspecified laterality    2. CKD (chronic kidney disease) stage 4, GFR 15-29 ml/min    3. Antineoplastic chemotherapy induced anemia        Plan:    Pt clinically stable      1-2Ovarian cancer status post debulking surgery 9/21/2017   .  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 S/p carbo/taxol x 6 completed 2/12/2018                   2. Carbo/Doxil     2/1/2019- 7/8/2019                   3. Bevacizumab maintenance  8/16/2019 - 3/14/2020      Patient with ovarian cancer status post debulking surgery 9/21/2017 undergoing adjuvant platinum-based chemotherapy with carbo/taxol .   S/p carbo/taxol x 6 completed 2/12/2018  Pt declined maintenance therapy on trial   [facility] ANALYSIS NEG    She then developed  platinum sensitive recurrent ovarian cancer. Platinum free interval 11 months.   Dr. Feliciano Reviewed standard management options which are to re-treat with platinum-based chemotherapy (carbo + doxil/gem/taxol +/- avastin).   Pt underwent treatment with Doxil 30mg/m2  Carbo AUC 5  q 28days   s/p cycle 6 of chemo  Carbo/doxil completed 7/8/2019   CT c/a/p w/contrast 7/26/2019-stable findings    Pt then treated with maintenance  bevacizumab.   Pt developed rising  levels  CT imaging showed disease progression    Discussed with Dr. Feliciano . Patient with platinum sensitive recurrent ovarian cancer.Treatment options include single agent carbo or carbo/gem   Pt undergoing  Carbo AUC 4 / [place] 800 mg/m2 Day 1 /[place] 800mg/m2 DAY 8  [id number]  ( gem dosage reduction upfront planned )    Recent imaging reveals disease response    Plan to proceed with C6 ( pending lab parameters)  Detailed d/w pt regarding proceeding with treatment. Pt agreeable to proceed with C6   Plan follow-up imaging following C6   Will d/w pt  switch to maintenance with olaparib following C6     3.10.6g/dL-stable        Repeat CBC in am  F/u in 1mo with cbc,cmp,  and CT imaging prior to f/u 5 wks    Cc: Iesha Feliciano M.D.          HORACE Childers MD

## 2020-09-11 ENCOUNTER — INFUSION (OUTPATIENT)
Dept: INFUSION THERAPY | Facility: HOSPITAL | Age: 85
End: 2020-09-11
Attending: INTERNAL MEDICINE
Payer: MEDICARE

## 2020-09-11 VITALS
DIASTOLIC BLOOD PRESSURE: 60 MMHG | TEMPERATURE: 98 F | OXYGEN SATURATION: 98 % | SYSTOLIC BLOOD PRESSURE: 112 MMHG | HEART RATE: 86 BPM | RESPIRATION RATE: 18 BRPM

## 2020-09-11 DIAGNOSIS — C56.9 MALIGNANT NEOPLASM OF OVARY, UNSPECIFIED LATERALITY: ICD-10-CM

## 2020-09-11 DIAGNOSIS — C56.9 OVARIAN CANCER: Primary | ICD-10-CM

## 2020-09-11 LAB
ERYTHROCYTE [DISTWIDTH] IN BLOOD BY AUTOMATED COUNT: 21.1 % (ref 11.5–14.5)
HCT VFR BLD AUTO: 28.9 % (ref 37–48.5)
HGB BLD-MCNC: 9.2 G/DL (ref 12–16)
IMM GRANULOCYTES # BLD AUTO: 0.01 K/UL (ref 0–0.04)
MCH RBC QN AUTO: 33.3 PG (ref 27–31)
MCHC RBC AUTO-ENTMCNC: 31.8 G/DL (ref 32–36)
MCV RBC AUTO: 105 FL (ref 82–98)
NEUTROPHILS # BLD AUTO: 1.7 K/UL (ref 1.8–7.7)
PLATELET # BLD AUTO: 126 K/UL (ref 150–350)
PMV BLD AUTO: 9.2 FL (ref 9.2–12.9)
RBC # BLD AUTO: 2.76 M/UL (ref 4–5.4)
WBC # BLD AUTO: 3.15 K/UL (ref 3.9–12.7)

## 2020-09-11 PROCEDURE — 85027 COMPLETE CBC AUTOMATED: CPT

## 2020-09-11 PROCEDURE — 63600175 PHARM REV CODE 636 W HCPCS: Performed by: INTERNAL MEDICINE

## 2020-09-11 PROCEDURE — 25000003 PHARM REV CODE 250: Performed by: INTERNAL MEDICINE

## 2020-09-11 PROCEDURE — 96367 TX/PROPH/DG ADDL SEQ IV INF: CPT

## 2020-09-11 PROCEDURE — A4216 STERILE WATER/SALINE, 10 ML: HCPCS | Performed by: INTERNAL MEDICINE

## 2020-09-11 PROCEDURE — 96361 HYDRATE IV INFUSION ADD-ON: CPT

## 2020-09-11 PROCEDURE — 96417 CHEMO IV INFUS EACH ADDL SEQ: CPT

## 2020-09-11 PROCEDURE — 96413 CHEMO IV INFUSION 1 HR: CPT

## 2020-09-11 RX ORDER — SODIUM CHLORIDE 0.9 % (FLUSH) 0.9 %
10 SYRINGE (ML) INJECTION
Status: CANCELLED | OUTPATIENT
Start: 2020-09-23

## 2020-09-11 RX ORDER — HEPARIN 100 UNIT/ML
500 SYRINGE INTRAVENOUS
Status: CANCELLED | OUTPATIENT
Start: 2020-09-23

## 2020-09-11 RX ORDER — SODIUM CHLORIDE 0.9 % (FLUSH) 0.9 %
10 SYRINGE (ML) INJECTION
Status: CANCELLED | OUTPATIENT
Start: 2020-09-11

## 2020-09-11 RX ORDER — HEPARIN 100 UNIT/ML
500 SYRINGE INTRAVENOUS
Status: DISCONTINUED | OUTPATIENT
Start: 2020-09-11 | End: 2020-09-11 | Stop reason: HOSPADM

## 2020-09-11 RX ORDER — HEPARIN 100 UNIT/ML
500 SYRINGE INTRAVENOUS
Status: CANCELLED | OUTPATIENT
Start: 2020-09-11

## 2020-09-11 RX ORDER — SODIUM CHLORIDE 0.9 % (FLUSH) 0.9 %
10 SYRINGE (ML) INJECTION
Status: DISCONTINUED | OUTPATIENT
Start: 2020-09-11 | End: 2020-09-11 | Stop reason: HOSPADM

## 2020-09-11 RX ORDER — ONDANSETRON 2 MG/ML
8 INJECTION INTRAMUSCULAR; INTRAVENOUS
Status: CANCELLED | OUTPATIENT
Start: 2020-09-23

## 2020-09-11 RX ADMIN — GEMCITABINE HYDROCHLORIDE 1550 MG: 200 INJECTION, POWDER, LYOPHILIZED, FOR SOLUTION INTRAVENOUS at 10:09

## 2020-09-11 RX ADMIN — HEPARIN 500 UNITS: 100 SYRINGE at 11:09

## 2020-09-11 RX ADMIN — CARBOPLATIN 220 MG: 10 INJECTION, SOLUTION INTRAVENOUS at 10:09

## 2020-09-11 RX ADMIN — Medication 10 ML: at 11:09

## 2020-09-11 RX ADMIN — SODIUM CHLORIDE 500 ML: 0.9 INJECTION, SOLUTION INTRAVENOUS at 10:09

## 2020-09-11 RX ADMIN — DEXAMETHASONE SODIUM PHOSPHATE: 10 INJECTION, SOLUTION INTRAMUSCULAR; INTRAVENOUS at 09:09

## 2020-09-11 NOTE — PLAN OF CARE
Patient arrived to unit for C6D1 Gemzar, Carbo. Plan of care reviewed, patient agreeable to plan. Patient tolerated treatment well. No sign of reaction noted. VSS. Patient received discharge instructions and follow up appointments. Patient instructed to return 9/18/20 for C6D8 chemo. Verbalized understanding and was escorted off unit via w/c by MA. Patient in NAD at time of discharge.

## 2020-09-16 ENCOUNTER — DOCUMENTATION ONLY (OUTPATIENT)
Dept: REHABILITATION | Facility: HOSPITAL | Age: 85
End: 2020-09-16

## 2020-09-16 PROBLEM — G89.29 CHRONIC RIGHT SHOULDER PAIN: Status: RESOLVED | Noted: 2020-07-14 | Resolved: 2020-09-16

## 2020-09-16 PROBLEM — R29.3 POOR POSTURE: Status: RESOLVED | Noted: 2020-07-14 | Resolved: 2020-09-16

## 2020-09-16 PROBLEM — M25.511 CHRONIC RIGHT SHOULDER PAIN: Status: RESOLVED | Noted: 2020-07-14 | Resolved: 2020-09-16

## 2020-09-16 PROBLEM — M25.611 DECREASED RANGE OF MOTION OF RIGHT SHOULDER: Status: RESOLVED | Noted: 2020-07-14 | Resolved: 2020-09-16

## 2020-09-16 PROBLEM — M62.81 MUSCLE WEAKNESS: Status: RESOLVED | Noted: 2020-07-14 | Resolved: 2020-09-16

## 2020-09-16 NOTE — PROGRESS NOTES
Outpatient Therapy Discharge Summary     Name: Celine Mahan  Clinic Number: [id number]    Therapy Diagnosis:        Encounter Diagnoses   Name Primary?    Right shoulder pain, unspecified chronicity      Chronic right shoulder pain      Decreased range of motion of right shoulder      Muscle weakness      Poor posture        Physician: Marylu Reinoso MD     Physician Orders: PT Eval and Treat   Medical Diagnosis from Referral: Right shoulder pain, unspecified chronicity  Evaluation Date: 7/14/2020    Date of Last visit: 7/14/2020  Total Visits Received: 1    Assessment    Goals not met due to patient attending only initial evaluation   Short Term Goals: 4 weeks   1. Pt will demonstrate improved (flex/abd/ER/IR) ROM in R shoulder by 5 degrees in order to improve overhead lifting and functional mobility.   2. Pt will demonstrate improved strength in BUE by 1/3 MMT in order to increase functional activities with less effort.   3. Pt will be able to reach a shelf that is at shoulder height with no difficulty to improve independence within household.   4. Pt will be independent with HEP to improve self-care and management.   5. Pt will be able to lift a full glass of water and drink it with no difficulty to improve independence with functional mobility.      Long Term Goals: 8 weeks   1. Pt will demonstrate improved (flex/abd/ER/IR) ROM in R shoulder by 10 degrees in order to improve overhead lifting and functional mobility.   2. Pt will have a decreased subjective pain rating by 75% since starting physical therapy to improve tolerance to functional mobility.   3. Pt will demonstrate improved strength in BUE by 1 MMT in order to increase functional activities with less effort.   4. Pt will be able to place a 1# object on a shelf at shoulder height to improve independence with cooking.   5. Pt will demonstrate a 37% limitation score on the FOTO to demonstrate improved functional mobility since start of care.      Discharge reason: Patient has not attended therapy since 7/14/2020 - she did not come to any appointment after her initial evaluation     Plan   This patient is discharged from Physical Therapy    Mariana Soto, PT, DPT   3/25/2020

## 2020-09-23 ENCOUNTER — INFUSION (OUTPATIENT)
Dept: INFUSION THERAPY | Facility: HOSPITAL | Age: 85
End: 2020-09-23
Attending: INTERNAL MEDICINE
Payer: MEDICARE

## 2020-09-23 VITALS
TEMPERATURE: 98 F | DIASTOLIC BLOOD PRESSURE: 56 MMHG | OXYGEN SATURATION: 97 % | HEART RATE: 87 BPM | SYSTOLIC BLOOD PRESSURE: 110 MMHG | RESPIRATION RATE: 17 BRPM

## 2020-09-23 DIAGNOSIS — C56.9 OVARIAN CANCER: Primary | ICD-10-CM

## 2020-09-23 LAB
ALBUMIN SERPL BCP-MCNC: 3.9 G/DL (ref 3.5–5.2)
ALP SERPL-CCNC: 64 U/L (ref 55–135)
ALT SERPL W/O P-5'-P-CCNC: 17 U/L (ref 10–44)
ANION GAP SERPL CALC-SCNC: 10 MMOL/L (ref 8–16)
AST SERPL-CCNC: 31 U/L (ref 10–40)
BILIRUB SERPL-MCNC: 0.6 MG/DL (ref 0.1–1)
BUN SERPL-MCNC: 26 MG/DL (ref 8–23)
CALCIUM SERPL-MCNC: 9.2 MG/DL (ref 8.7–10.5)
CHLORIDE SERPL-SCNC: 103 MMOL/L (ref 95–110)
CO2 SERPL-SCNC: 22 MMOL/L (ref 23–29)
CREAT SERPL-MCNC: 2.1 MG/DL (ref 0.5–1.4)
ERYTHROCYTE [DISTWIDTH] IN BLOOD BY AUTOMATED COUNT: 18.5 % (ref 11.5–14.5)
EST. GFR  (AFRICAN AMERICAN): 24 ML/MIN/1.73 M^2
EST. GFR  (NON AFRICAN AMERICAN): 21 ML/MIN/1.73 M^2
GLUCOSE SERPL-MCNC: 153 MG/DL (ref 70–110)
HCT VFR BLD AUTO: 27.8 % (ref 37–48.5)
HGB BLD-MCNC: 8.9 G/DL (ref 12–16)
IMM GRANULOCYTES # BLD AUTO: 0 K/UL (ref 0–0.04)
MCH RBC QN AUTO: 33.5 PG (ref 27–31)
MCHC RBC AUTO-ENTMCNC: 32 G/DL (ref 32–36)
MCV RBC AUTO: 105 FL (ref 82–98)
NEUTROPHILS # BLD AUTO: 1 K/UL (ref 1.8–7.7)
PLATELET # BLD AUTO: 56 K/UL (ref 150–350)
PMV BLD AUTO: 13.1 FL (ref 9.2–12.9)
POTASSIUM SERPL-SCNC: 3.9 MMOL/L (ref 3.5–5.1)
PROT SERPL-MCNC: 7.5 G/DL (ref 6–8.4)
RBC # BLD AUTO: 2.66 M/UL (ref 4–5.4)
SODIUM SERPL-SCNC: 135 MMOL/L (ref 136–145)
WBC # BLD AUTO: 2.2 K/UL (ref 3.9–12.7)

## 2020-09-23 PROCEDURE — 25000003 PHARM REV CODE 250: Performed by: INTERNAL MEDICINE

## 2020-09-23 PROCEDURE — 80053 COMPREHEN METABOLIC PANEL: CPT

## 2020-09-23 PROCEDURE — 85027 COMPLETE CBC AUTOMATED: CPT

## 2020-09-23 PROCEDURE — 36591 DRAW BLOOD OFF VENOUS DEVICE: CPT

## 2020-09-23 PROCEDURE — 63600175 PHARM REV CODE 636 W HCPCS: Performed by: INTERNAL MEDICINE

## 2020-09-23 PROCEDURE — A4216 STERILE WATER/SALINE, 10 ML: HCPCS | Performed by: INTERNAL MEDICINE

## 2020-09-23 RX ORDER — HEPARIN 100 UNIT/ML
500 SYRINGE INTRAVENOUS
Status: COMPLETED | OUTPATIENT
Start: 2020-09-23 | End: 2020-09-23

## 2020-09-23 RX ORDER — SODIUM CHLORIDE 0.9 % (FLUSH) 0.9 %
10 SYRINGE (ML) INJECTION
Status: COMPLETED | OUTPATIENT
Start: 2020-09-23 | End: 2020-09-23

## 2020-09-23 RX ORDER — HEPARIN 100 UNIT/ML
500 SYRINGE INTRAVENOUS
Status: CANCELLED | OUTPATIENT
Start: 2020-09-23

## 2020-09-23 RX ORDER — SODIUM CHLORIDE 0.9 % (FLUSH) 0.9 %
10 SYRINGE (ML) INJECTION
Status: CANCELLED | OUTPATIENT
Start: 2020-09-23

## 2020-09-23 RX ADMIN — HEPARIN 500 UNITS: 100 SYRINGE at 11:09

## 2020-09-23 RX ADMIN — Medication 10 ML: at 11:09

## 2020-09-23 NOTE — PLAN OF CARE
Patient arrived to unit afebrile for stat labs and potential C6D8 Gemzar infusion accompanied by hesham.No new symptoms to report. Labs resulted, ANC 1000. Dr. Reinoso notified and orders given to hold C6D8 Gemzar and not to reschedule. Labs to be drawn on 10/13 and Pt will f/u with Dr. Reinoso on Oct 15.VSS. AVS given to hesham and Discharge instructions reviewed. Patient left off unit using rolling walker accompanied by hesham Herron. Patient in NAD at time of discharge.

## 2020-10-12 ENCOUNTER — HOSPITAL ENCOUNTER (OUTPATIENT)
Dept: RADIOLOGY | Facility: HOSPITAL | Age: 85
Discharge: HOME | End: 2020-10-12
Attending: INTERNAL MEDICINE
Payer: MEDICARE

## 2020-10-12 ENCOUNTER — LAB VISIT (OUTPATIENT)
Dept: LAB | Facility: HOSPITAL | Age: 85
End: 2020-10-12
Attending: INTERNAL MEDICINE
Payer: MEDICARE

## 2020-10-12 DIAGNOSIS — C56.9 MALIGNANT NEOPLASM OF OVARY, UNSPECIFIED LATERALITY: ICD-10-CM

## 2020-10-12 DIAGNOSIS — C56.9 OVARIAN CANCER: ICD-10-CM

## 2020-10-12 LAB
ALBUMIN SERPL BCP-MCNC: 3.7 G/DL (ref 3.5–5.2)
ALP SERPL-CCNC: 67 U/L (ref 55–135)
ALT SERPL W/O P-5'-P-CCNC: 9 U/L (ref 10–44)
ANION GAP SERPL CALC-SCNC: 14 MMOL/L (ref 8–16)
AST SERPL-CCNC: 19 U/L (ref 10–40)
BILIRUB SERPL-MCNC: 0.6 MG/DL (ref 0.1–1)
BUN SERPL-MCNC: 21 MG/DL (ref 8–23)
CALCIUM SERPL-MCNC: 9.1 MG/DL (ref 8.7–10.5)
CANCER AG125 SERPL-ACNC: 48 U/ML (ref 0–30)
CHLORIDE SERPL-SCNC: 104 MMOL/L (ref 95–110)
CO2 SERPL-SCNC: 20 MMOL/L (ref 23–29)
CREAT SERPL-MCNC: 1.9 MG/DL (ref 0.5–1.4)
ERYTHROCYTE [DISTWIDTH] IN BLOOD BY AUTOMATED COUNT: 19.2 % (ref 11.5–14.5)
EST. GFR  (AFRICAN AMERICAN): 27 ML/MIN/1.73 M^2
EST. GFR  (NON AFRICAN AMERICAN): 24 ML/MIN/1.73 M^2
GLUCOSE SERPL-MCNC: 119 MG/DL (ref 70–110)
HCT VFR BLD AUTO: 34 % (ref 37–48.5)
HGB BLD-MCNC: 11.1 G/DL (ref 12–16)
IMM GRANULOCYTES # BLD AUTO: 0.04 K/UL (ref 0–0.04)
MCH RBC QN AUTO: 33.9 PG (ref 27–31)
MCHC RBC AUTO-ENTMCNC: 32.6 G/DL (ref 32–36)
MCV RBC AUTO: 104 FL (ref 82–98)
NEUTROPHILS # BLD AUTO: 1.4 K/UL (ref 1.8–7.7)
PLATELET # BLD AUTO: ABNORMAL K/UL (ref 150–350)
PMV BLD AUTO: ABNORMAL FL (ref 9.2–12.9)
POTASSIUM SERPL-SCNC: 4.6 MMOL/L (ref 3.5–5.1)
PROT SERPL-MCNC: 7.5 G/DL (ref 6–8.4)
RBC # BLD AUTO: 3.27 M/UL (ref 4–5.4)
SODIUM SERPL-SCNC: 138 MMOL/L (ref 136–145)
WBC # BLD AUTO: 3.02 K/UL (ref 3.9–12.7)

## 2020-10-12 PROCEDURE — 71250 CT THORAX DX C-: CPT | Mod: 26,,, | Performed by: RADIOLOGY

## 2020-10-12 PROCEDURE — 85027 COMPLETE CBC AUTOMATED: CPT

## 2020-10-12 PROCEDURE — 36415 COLL VENOUS BLD VENIPUNCTURE: CPT

## 2020-10-12 PROCEDURE — 71250 CT THORAX DX C-: CPT | Mod: TC

## 2020-10-12 PROCEDURE — 74176 CT ABD & PELVIS W/O CONTRAST: CPT | Mod: 26,,, | Performed by: RADIOLOGY

## 2020-10-12 PROCEDURE — 86304 IMMUNOASSAY TUMOR CA 125: CPT

## 2020-10-12 PROCEDURE — 74176 CT CHEST ABDOMEN PELVIS WITHOUT CONTRAST(XPD): ICD-10-PCS | Mod: 26,,, | Performed by: RADIOLOGY

## 2020-10-12 PROCEDURE — 71250 CT CHEST ABDOMEN PELVIS WITHOUT CONTRAST(XPD): ICD-10-PCS | Mod: 26,,, | Performed by: RADIOLOGY

## 2020-10-12 PROCEDURE — 74176 CT ABD & PELVIS W/O CONTRAST: CPT | Mod: TC

## 2020-10-12 PROCEDURE — 80053 COMPREHEN METABOLIC PANEL: CPT

## 2020-10-14 ENCOUNTER — OFFICE VISIT (OUTPATIENT)
Dept: HEMATOLOGY/ONCOLOGY | Facility: CLINIC | Age: 85
End: 2020-10-14
Payer: MEDICARE

## 2020-10-14 VITALS
WEIGHT: 166.69 LBS | BODY MASS INDEX: 30.67 KG/M2 | HEART RATE: 81 BPM | TEMPERATURE: 98 F | OXYGEN SATURATION: 96 % | DIASTOLIC BLOOD PRESSURE: 57 MMHG | HEIGHT: 62 IN | SYSTOLIC BLOOD PRESSURE: 113 MMHG

## 2020-10-14 DIAGNOSIS — I10 ESSENTIAL HYPERTENSION: ICD-10-CM

## 2020-10-14 DIAGNOSIS — F32.A DEPRESSION, UNSPECIFIED DEPRESSION TYPE: ICD-10-CM

## 2020-10-14 DIAGNOSIS — N18.4 CKD (CHRONIC KIDNEY DISEASE) STAGE 4, GFR 15-29 ML/MIN: ICD-10-CM

## 2020-10-14 DIAGNOSIS — R63.0 ANOREXIA: ICD-10-CM

## 2020-10-14 DIAGNOSIS — C56.9 MALIGNANT NEOPLASM OF OVARY, UNSPECIFIED LATERALITY: Primary | ICD-10-CM

## 2020-10-14 PROCEDURE — 1101F PT FALLS ASSESS-DOCD LE1/YR: CPT | Mod: CPTII,S$GLB,, | Performed by: INTERNAL MEDICINE

## 2020-10-14 PROCEDURE — 99215 PR OFFICE/OUTPT VISIT, EST, LEVL V, 40-54 MIN: ICD-10-PCS | Mod: S$GLB,,, | Performed by: INTERNAL MEDICINE

## 2020-10-14 PROCEDURE — 1101F PR PT FALLS ASSESS DOC 0-1 FALLS W/OUT INJ PAST YR: ICD-10-PCS | Mod: CPTII,S$GLB,, | Performed by: INTERNAL MEDICINE

## 2020-10-14 PROCEDURE — 1159F MED LIST DOCD IN RCRD: CPT | Mod: S$GLB,,, | Performed by: INTERNAL MEDICINE

## 2020-10-14 PROCEDURE — 1125F PR PAIN SEVERITY QUANTIFIED, PAIN PRESENT: ICD-10-PCS | Mod: S$GLB,,, | Performed by: INTERNAL MEDICINE

## 2020-10-14 PROCEDURE — 3078F DIAST BP <80 MM HG: CPT | Mod: CPTII,S$GLB,, | Performed by: INTERNAL MEDICINE

## 2020-10-14 PROCEDURE — 3074F SYST BP LT 130 MM HG: CPT | Mod: CPTII,S$GLB,, | Performed by: INTERNAL MEDICINE

## 2020-10-14 PROCEDURE — 99499 UNLISTED E&M SERVICE: CPT | Mod: S$GLB,,, | Performed by: INTERNAL MEDICINE

## 2020-10-14 PROCEDURE — 99999 PR PBB SHADOW E&M-EST. PATIENT-LVL IV: ICD-10-PCS | Mod: PBBFAC,,, | Performed by: INTERNAL MEDICINE

## 2020-10-14 PROCEDURE — 99499 RISK ADDL DX/OHS AUDIT: ICD-10-PCS | Mod: S$GLB,,, | Performed by: INTERNAL MEDICINE

## 2020-10-14 PROCEDURE — 99999 PR PBB SHADOW E&M-EST. PATIENT-LVL IV: CPT | Mod: PBBFAC,,, | Performed by: INTERNAL MEDICINE

## 2020-10-14 PROCEDURE — 1159F PR MEDICATION LIST DOCUMENTED IN MEDICAL RECORD: ICD-10-PCS | Mod: S$GLB,,, | Performed by: INTERNAL MEDICINE

## 2020-10-14 PROCEDURE — 1125F AMNT PAIN NOTED PAIN PRSNT: CPT | Mod: S$GLB,,, | Performed by: INTERNAL MEDICINE

## 2020-10-14 PROCEDURE — 3074F PR MOST RECENT SYSTOLIC BLOOD PRESSURE < 130 MM HG: ICD-10-PCS | Mod: CPTII,S$GLB,, | Performed by: INTERNAL MEDICINE

## 2020-10-14 PROCEDURE — 3078F PR MOST RECENT DIASTOLIC BLOOD PRESSURE < 80 MM HG: ICD-10-PCS | Mod: CPTII,S$GLB,, | Performed by: INTERNAL MEDICINE

## 2020-10-14 PROCEDURE — 99215 OFFICE O/P EST HI 40 MIN: CPT | Mod: S$GLB,,, | Performed by: INTERNAL MEDICINE

## 2020-10-14 RX ORDER — MIRTAZAPINE 15 MG/1
15 TABLET, ORALLY DISINTEGRATING ORAL NIGHTLY
Qty: 30 TABLET | Refills: 1 | Status: SHIPPED | OUTPATIENT
Start: 2020-10-14 | End: 2020-11-30 | Stop reason: SDUPTHER

## 2020-10-14 NOTE — PROGRESS NOTES
"Subjective:       Patient ID: Celine Mahan is a 85 y.o. female.    Chief Complaint: malignant neoplasm of ovary (5 week followup)          Diagnosis: 1.Ovarian cancer status post debulking surgery 9/21/2017                     2. 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 Platinum sensitive recurrent ovarian cancer 3/2020  Therapy: 1. S/p carbo/taxol x 6 completed 2/12/2018                   2. Carbo/Doxil 2/1/2019- 7/8/2019                   3. Bevacizumab maintenance  8/16/2019 -3/14/2020                   4. Carboplatin/Gemcitabine 4/24/2020- present      [facility] ANALYSIS NEG    HPI ( per Dr. Feliciano) Patient is an 85 yo female who originally presented as a referral from Dr. Wills for pelvic mass. Patient reports LLQ pain for approximately 3 months which prompted evaluation.      Pelvic US 8/7/17 There is a complex echogenicity midline pelvic mass measuring 18.1 x 8.6 x 7.6 cm.  Ovaries not visualized.     CT A&P 8/9/17 Large solid/cystic mass within the lower abdomen/upper pelvis that is most concerning for a malignant neoplasm, likely of ovarian origin given its location.  Correlation with previous surgical history is recommended noting evidence of previous hysterectomy.  Mass abuts and displaces the adjacent bladder without definite CT findings to suggest hema invasion. Abdominal and pelvic lymphadenopathy concerning for lymphatic spread of neoplasm with index lymph nodes as above.      Medical history is significant for CKD (Cr 1.3), HTN, hypothyroid, HTN, HLD. She has a personal history of breast cancer in 1995 treated with mastectomy and adjuvant chemotherapy she reports. Last MMG 11/2016 normal. Adbominal surgery include cholecystectomy, TVH, xlap/removal ovary for ectopic. She reports that her physician told her "some ovary remains in situ". Family history significant for mother with pancreatic cancer.      CT chest 8/28/17 showed mediastinal adenopathy consistent " with Stage IV disease.   RECIST Summary:  Mediastinal adenopathy:  1. Preaortic abnormal node measuring 1.7 cm short axis.  2. Pretracheal node measures 1.6 cm short axis.     She underwent surgical cytoreduction with xlap/LSO/omentectomy 9/21/17. Pathology showed high grade adenocarcinoma.        She is also  followed by Dr. Feliciano  She  transferred care to undergo  adjuvant chemo at this location   She completed adjuvant chemo with carbo AUC 6 and taxol 175mg/m2 q 21d  2/12/2018  She was considered for PRIMA clinical trial for maintenance therapy but declined    Pt with increasing  levels   849>230>37>23>22>12>40>309 ( 1/9/2019 )     CT c/a/p 1/14/2019 S/P resection of large LEFT ovarian/fallopian tube mass consistent with carcinoma.  Interval increase in size of LEFT pelvic/retroperitoneal lymph nodes.  No evidence for ascites or definitive carcinomatosis of mesentery.  Interval decrease in size of prevascular lymph node.    She was then diagnosed with  platinum sensitive recurrent ovarian cancer. Platinum free interval 11 months.   Dr. Feliciano Reviewed standard management options which are to re-treat with platinum-based chemotherapy (carbo + doxil/gem/taxol +/- avastin).   She is s/p cycle 6 of chemo  Carbo/doxil (Doxil 30mg/m2  Carbo AUC 5)  q 28dayscompleted 7/8/2019   ( dosage reduction  sec to cytopenias)     Imaging studies 7/2019 revealed stable disease    Pt started on maintenance therapy with bevacizumab 15mg/kg  q3 wks    Pt developed increasing  levels and  CT imaging 3/19/2020 showed disease progression    She was started on carbo/gem for platinum sensitive recurrent ovarian cancer.      Accompanied by her dtr  Pt lives with dtr and continues to spend most of day alone in her room  Weight down 10 lbs  Dtr reports pt continues to spend most of the day in her room  She does not eat dinners with family  She occasionally has friend over. Her friend visits pt in her room.   Pt also sometimes  "uses bedside commode to avoid leaving room to go to bathroom  Pt asked on numerous visits why she continues to spend most of her time in her room and patient reprorts " I don't know."  Upon further questioning regarding whether she may be depressed pt reports, " I don't know."   No fatigue  She has chronic diffuse arthralgias  Pt mentioned she did not want to do chemo anymore  No cough/CP  No fevers    Recent CT imaging shows No interval suspicious new findings to suggest a malignant/aggressive lesion      Biochemical profile reveals stable Cr level from 1.5mg/dL  to 2.0mg/dL     [facility] ANALYSIS NEG    Past Medical History:   Diagnosis Date    Breast cancer     Cataract     CKD (chronic kidney disease) stage 3, GFR 30-59 ml/min     Essential hypertension 8/22/2012    Gastroesophageal reflux disease 10/17/2019    Hyperlipidemia     Hypertension     Hypothyroidism     Obesity     Osteopenia     Ovarian cancer 10/8/2017    Overactive bladder     Pelvic mass 8/27/2017    Thyroid disease          Past Surgical History:   Procedure Laterality Date    bt catarac surgery      CHOLECYSTECTOMY      HYSTERECTOMY      INSERTION OF TUNNELED CENTRAL VENOUS CATHETER (CVC) WITH SUBCUTANEOUS PORT N/A 6/3/2019    Procedure: INSERTION, PORT-A-CATH;  Surgeon: Dosbernard Diagnostic Provider;  Location: [facility];  Service: Radiology;  Laterality: N/A;  RN PREOP 5/31/2019    MASTECTOMY Left              Review of Systems   Constitutional: Positive for appetite change and mild fatigue and wt loss  HENT: Negative for mouth sores.    Eyes: Negative for visual disturbance.   Respiratory: Negative for cough and shortness of breath.    Cardiovascular: Negative for chest pain.   Gastrointestinal: Negative for abdominal pain, diarrhea and nausea.   Genitourinary: Negative for frequency.   Musculoskeletal: Positive for neck pain (chronic)-stable Negative for back pain.   Skin: Negative for rash.   Neurological: No numbness or tingling. " "Negative for weakness, light-headedness and headaches.   Hematological: Negative for adenopathy.   Psychiatric/Behavioral: The patient is not nervous/anxious.        Objective:         Vitals:    10/14/20 1052   BP: (!) 113/57   BP Location: Right arm   Patient Position: Sitting   BP Method: Medium (Automatic)   Pulse: 81   Temp: 97.5 °F (36.4 °C)   TempSrc: Oral   SpO2: 96%   Weight: 75.6 kg (166 lb 10.7 oz)   Height: 5' 2" (1.575 m)         Physical Exam   Constitutional: She is oriented to person, place, and time. She appears well-developed and well-nourished.   HENT:   Head: Normocephalic.   Mouth/Throat: Oropharynx is clear and moist. No oropharyngeal exudate.   Eyes: Conjunctivae and lids are normal. Pupils are equal, round, and reactive to light. No scleral icterus.   Neck: Normal range of motion. Neck supple. No thyromegaly present.   Cardiovascular: Normal rate, regular rhythm and normal heart sounds.    No murmur heard.  Pulmonary/Chest: Breath sounds normal. She has no wheezes. She has no rales.   Abdominal: Soft. Bowel sounds are normal. She exhibits no distension and no mass. There is no hepatosplenomegaly. There is no tenderness. There is no rebound and no guarding.   Musculoskeletal: Normal range of motion. She exhibits no edema and no tenderness.   Lymphadenopathy:     She has no cervical adenopathy.     She has no axillary adenopathy.        Right: No supraclavicular adenopathy present.        Left: No supraclavicular adenopathy present.   Neurological: She is alert and oriented to person, place, and time. No cranial nerve deficit. Coordination normal.   Skin: Skin is warm and dry. No ecchymosis, no petechiae and no rash noted. No erythema.   Psychiatric: She has flat affect. Dysphoric mood        Labs:   Lab Results   Component Value Date    WBC 3.02 (L) 10/12/2020    HGB 11.1 (L) 10/12/2020    HCT 34.0 (L) 10/12/2020     (H) 10/12/2020    PLT SEE COMMENT 10/12/2020           MRI Cervical " spine w/out contrast 4/30/2019   Cervical degenerative change most significant at C4-5 and C5-6.  Specific details at each level discussed above.     CT c/a/p w/out contrast 3/19/2020    1. Status post hysterectomy and left oophorectomy surgery with interval enlarging retroperitoneal and pelvic lymph nodes discussed above.  2. No new metastatic or recurrent disease confirmed.  3. Additional remote findings above.  4. Lesion 1: Left pelvic 6 sidewall lymph node 2.4 cm, was 2 cm.  5. Lesion 2: Left Madalyn aortic node 2.4 cm, was 1.9 cm.      Component      Latest Ref Rng & Units 2/21/2020 1/31/2020 12/19/2019 10/18/2019         0 - 30 U/mL 139 (H) 140 (H) 84 (H) 42 (H)     Component      Latest Ref Rng & Units 9/6/2019 7/22/2019 7/5/2019 6/6/2019         0 - 30 U/mL 33 (H) 58 (H) 52 (H) 55 (H)     Component      Latest Ref Rng & Units 4/17/2019         0 - 30 U/mL 86 (H)         Component      Latest Ref Rng & Units 10/12/2020 9/10/2020 8/12/2020 7/13/2020         0 - 30 U/mL 48 (H) 38 (H) 31 (H) 35 (H)     Component      Latest Ref Rng & Units 6/17/2020         0 - 30 U/mL 54 (H)       C a/p w/out contrast 7/30/2020    --In this patient with reported history of malignant ovarian neoplasm, overall findings suggest positive response to therapy--     1. No appreciable new or enlarging suspicious or enhancing nodules above or below the diaphragm.  2. Interval changes include: 1.0-cm (previously 1.4-cm) left iliac chain/pelvic sidewall soft tissue nodule/lymph node, 1.4-cm (previously 2.0-cm) left iliac chain/pelvic side wall soft tissue nodule/lymph node, 1.3-cm (previously 1.5-cm) retroperitoneal/periaortic soft tissue nodule/lymph node, 1.0-cm (previously 1.2-cm) retroperitoneal/periaortic soft tissue nodule/lymph node and 0.9-cm (previously 1.2-cm) retroperitoneal/periaortic soft tissue nodule/lymph node.  3. 2 mm right middle lobe and 3 mm left lower lobe ground-glass pulmonary nodules and  a single mildly enlarged mediastinal/precarinal space lymph node measuring up to 1.2-cm are unchanged.       CT c/a/p w/out contrast 10/12/2020     Impression:     No interval suspicious new findings to suggest a malignant/aggressive lesion in the chest abdomen or pelvis in this patient with history of ovarian neoplasm as imaged without IV contrast.     Patchy areas of mild ground-glass opacification without nodular appearance are now noted in the right lung and are nonspecific.  Correlation is needed.     Prior noted ground-glass tiny nodules are not apparent.     Several scattered retroperitoneal and mesenteric prominent lymph nodes in the abdomen and pelvis are unchanged as described above in detail.           Assessment:       1. Malignant neoplasm of ovary, unspecified laterality    2. CKD (chronic kidney disease) stage 4, GFR 15-29 ml/min    3. Depression, unspecified depression type        Plan:    Pt clinically stable      1-2  Ovarian cancer status post debulking surgery 9/21/2017   . 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 S/p carbo/taxol x 6 completed 2/12/2018                   2. Carbo/Doxil     2/1/2019- 7/8/2019                   3. Bevacizumab maintenance  8/16/2019 - 3/14/2020      Patient with ovarian cancer status post debulking surgery 9/21/2017 undergoing adjuvant platinum-based chemotherapy with carbo/taxol .   S/p carbo/taxol x 6 completed 2/12/2018  Pt declined maintenance therapy on trial   [facility] ANALYSIS NEG    She then developed  platinum sensitive recurrent ovarian cancer. Platinum free interval 11 months.   Dr. Feliciano Reviewed standard management options which are to re-treat with platinum-based chemotherapy (carbo + doxil/gem/taxol +/- avastin).   Pt underwent treatment with Doxil 30mg/m2  Carbo AUC 5  q 28days   s/p cycle 6 of chemo  Carbo/doxil completed 7/8/2019   CT c/a/p w/contrast 7/26/2019-stable findings    Pt then treated with maintenance  bevacizumab.    Pt developed rising  levels  CT imaging showed disease progression    Discussed with Dr. Feliciano . Patient with platinum sensitive recurrent ovarian cancer.Treatment options include single agent carbo or carbo/gem   Pt completed Carbo AUC 4 / [place] 800 mg/m2 Day 1 /[place] 800mg/m2 DAY 8  [id number]  ( gem dosage reduction upfront planned )    Recent imaging reveals disease response    S/p C6     CT c/a/p w/out contrast 10/12/2020  Shows No interval suspicious new findings to suggest a malignant/aggressive lesion in the chest abdomen or pelvis in this patient with history of ovarian neoplasm as imaged without IV contrast    Detailed d/w pt regarding CT findings and maintenance treatment  Discussed pros vs cons of proceeding with maintenance  The potential risks of  chemotherapy include but not limited to hair and skin changes, bone marrow damage (anemia, thrombocytopenia, immune suppression, neutropenia), allergic reactions, diarrhea, constipation, mouth sores, neuropathy)     Following a detailed discussion following answer and questions session, it was determined not to proceed with maintenance therapy    3. MIRTAZAPINE started  Pt advised/encouraged to go out of bedroom     All questions posed answered to patient's ans  F/u in 6 weeks with cbc,cmp,  and CT imaging prior to f/u     35 minutes spent during this visit of which greater than 50% devoted to counseling and coordination of care regarding diagnosis and management plan.    Cc: Iesha Feliciano M.D.          Anil Pelayo M.D.          Jane Domingo MD

## 2020-11-25 ENCOUNTER — LAB VISIT (OUTPATIENT)
Dept: LAB | Facility: HOSPITAL | Age: 85
End: 2020-11-25
Attending: INTERNAL MEDICINE
Payer: MEDICARE

## 2020-11-25 DIAGNOSIS — C56.9 MALIGNANT NEOPLASM OF OVARY, UNSPECIFIED LATERALITY: ICD-10-CM

## 2020-11-25 LAB
ALBUMIN SERPL BCP-MCNC: 3.6 G/DL (ref 3.5–5.2)
ALP SERPL-CCNC: 76 U/L (ref 55–135)
ALT SERPL W/O P-5'-P-CCNC: 6 U/L (ref 10–44)
ANION GAP SERPL CALC-SCNC: 9 MMOL/L (ref 8–16)
AST SERPL-CCNC: 13 U/L (ref 10–40)
BASOPHILS # BLD AUTO: 0.04 K/UL (ref 0–0.2)
BASOPHILS NFR BLD: 1 % (ref 0–1.9)
BILIRUB SERPL-MCNC: 0.4 MG/DL (ref 0.1–1)
BUN SERPL-MCNC: 48 MG/DL (ref 8–23)
CALCIUM SERPL-MCNC: 8.9 MG/DL (ref 8.7–10.5)
CHLORIDE SERPL-SCNC: 105 MMOL/L (ref 95–110)
CO2 SERPL-SCNC: 20 MMOL/L (ref 23–29)
CREAT SERPL-MCNC: 1.9 MG/DL (ref 0.5–1.4)
DIFFERENTIAL METHOD: ABNORMAL
EOSINOPHIL # BLD AUTO: 0.3 K/UL (ref 0–0.5)
EOSINOPHIL NFR BLD: 6.5 % (ref 0–8)
ERYTHROCYTE [DISTWIDTH] IN BLOOD BY AUTOMATED COUNT: 14.4 % (ref 11.5–14.5)
EST. GFR  (AFRICAN AMERICAN): 27 ML/MIN/1.73 M^2
EST. GFR  (NON AFRICAN AMERICAN): 24 ML/MIN/1.73 M^2
GLUCOSE SERPL-MCNC: 97 MG/DL (ref 70–110)
HCT VFR BLD AUTO: 33.6 % (ref 37–48.5)
HGB BLD-MCNC: 11.1 G/DL (ref 12–16)
IMM GRANULOCYTES # BLD AUTO: 0.03 K/UL (ref 0–0.04)
IMM GRANULOCYTES NFR BLD AUTO: 0.8 % (ref 0–0.5)
LYMPHOCYTES # BLD AUTO: 1.3 K/UL (ref 1–4.8)
LYMPHOCYTES NFR BLD: 32.6 % (ref 18–48)
MCH RBC QN AUTO: 33.6 PG (ref 27–31)
MCHC RBC AUTO-ENTMCNC: 33 G/DL (ref 32–36)
MCV RBC AUTO: 102 FL (ref 82–98)
MONOCYTES # BLD AUTO: 0.2 K/UL (ref 0.3–1)
MONOCYTES NFR BLD: 6.2 % (ref 4–15)
NEUTROPHILS # BLD AUTO: 2 K/UL (ref 1.8–7.7)
NEUTROPHILS NFR BLD: 52.9 % (ref 38–73)
NRBC BLD-RTO: 0 /100 WBC
PLATELET # BLD AUTO: 154 K/UL (ref 150–350)
PMV BLD AUTO: 10.1 FL (ref 9.2–12.9)
POTASSIUM SERPL-SCNC: 4.2 MMOL/L (ref 3.5–5.1)
PROT SERPL-MCNC: 7.4 G/DL (ref 6–8.4)
RBC # BLD AUTO: 3.3 M/UL (ref 4–5.4)
SODIUM SERPL-SCNC: 134 MMOL/L (ref 136–145)
WBC # BLD AUTO: 3.86 K/UL (ref 3.9–12.7)

## 2020-11-25 PROCEDURE — 86304 IMMUNOASSAY TUMOR CA 125: CPT

## 2020-11-25 PROCEDURE — 80053 COMPREHEN METABOLIC PANEL: CPT

## 2020-11-25 PROCEDURE — 36415 COLL VENOUS BLD VENIPUNCTURE: CPT

## 2020-11-25 PROCEDURE — 85025 COMPLETE CBC W/AUTO DIFF WBC: CPT

## 2020-11-26 LAB — CANCER AG125 SERPL-ACNC: 125 U/ML (ref 0–30)

## 2020-11-30 ENCOUNTER — OFFICE VISIT (OUTPATIENT)
Dept: FAMILY MEDICINE | Facility: CLINIC | Age: 85
End: 2020-11-30
Payer: MEDICARE

## 2020-11-30 ENCOUNTER — OFFICE VISIT (OUTPATIENT)
Dept: HEMATOLOGY/ONCOLOGY | Facility: CLINIC | Age: 85
End: 2020-11-30
Payer: MEDICARE

## 2020-11-30 ENCOUNTER — LAB VISIT (OUTPATIENT)
Dept: LAB | Facility: HOSPITAL | Age: 85
End: 2020-11-30
Attending: INTERNAL MEDICINE
Payer: MEDICARE

## 2020-11-30 VITALS
WEIGHT: 164.88 LBS | HEART RATE: 75 BPM | BODY MASS INDEX: 30.34 KG/M2 | DIASTOLIC BLOOD PRESSURE: 64 MMHG | HEIGHT: 62 IN | TEMPERATURE: 98 F | OXYGEN SATURATION: 98 % | SYSTOLIC BLOOD PRESSURE: 132 MMHG

## 2020-11-30 VITALS
SYSTOLIC BLOOD PRESSURE: 100 MMHG | DIASTOLIC BLOOD PRESSURE: 66 MMHG | OXYGEN SATURATION: 95 % | BODY MASS INDEX: 30.39 KG/M2 | HEART RATE: 93 BPM | WEIGHT: 165.13 LBS | TEMPERATURE: 98 F | HEIGHT: 62 IN

## 2020-11-30 DIAGNOSIS — C56.9 MALIGNANT NEOPLASM OF OVARY, UNSPECIFIED LATERALITY: Primary | ICD-10-CM

## 2020-11-30 DIAGNOSIS — N18.4 CKD (CHRONIC KIDNEY DISEASE) STAGE 4, GFR 15-29 ML/MIN: ICD-10-CM

## 2020-11-30 DIAGNOSIS — F32.A DEPRESSION, UNSPECIFIED DEPRESSION TYPE: ICD-10-CM

## 2020-11-30 DIAGNOSIS — R79.9 ABNORMAL FINDING OF BLOOD CHEMISTRY, UNSPECIFIED: ICD-10-CM

## 2020-11-30 DIAGNOSIS — I10 ESSENTIAL HYPERTENSION: ICD-10-CM

## 2020-11-30 DIAGNOSIS — R97.1 ELEVATED CA-125: ICD-10-CM

## 2020-11-30 DIAGNOSIS — G89.4 CHRONIC PAIN SYNDROME: ICD-10-CM

## 2020-11-30 DIAGNOSIS — I10 ESSENTIAL HYPERTENSION: Primary | ICD-10-CM

## 2020-11-30 DIAGNOSIS — E03.9 HYPOTHYROIDISM, UNSPECIFIED TYPE: ICD-10-CM

## 2020-11-30 LAB
CHOLEST SERPL-MCNC: 204 MG/DL (ref 120–199)
CHOLEST/HDLC SERPL: 3.6 {RATIO} (ref 2–5)
HDLC SERPL-MCNC: 56 MG/DL (ref 40–75)
HDLC SERPL: 27.5 % (ref 20–50)
LDLC SERPL CALC-MCNC: 119.6 MG/DL (ref 63–159)
NONHDLC SERPL-MCNC: 148 MG/DL
T4 FREE SERPL-MCNC: 1.17 NG/DL (ref 0.71–1.51)
TRIGL SERPL-MCNC: 142 MG/DL (ref 30–150)
TSH SERPL DL<=0.005 MIU/L-ACNC: 3.58 UIU/ML (ref 0.4–4)

## 2020-11-30 PROCEDURE — 3288F PR FALLS RISK ASSESSMENT DOCUMENTED: ICD-10-PCS | Mod: CPTII,S$GLB,, | Performed by: INTERNAL MEDICINE

## 2020-11-30 PROCEDURE — 3288F FALL RISK ASSESSMENT DOCD: CPT | Mod: CPTII,S$GLB,, | Performed by: INTERNAL MEDICINE

## 2020-11-30 PROCEDURE — 1159F MED LIST DOCD IN RCRD: CPT | Mod: S$GLB,,, | Performed by: INTERNAL MEDICINE

## 2020-11-30 PROCEDURE — 3075F PR MOST RECENT SYSTOLIC BLOOD PRESS GE 130-139MM HG: ICD-10-PCS | Mod: CPTII,S$GLB,, | Performed by: INTERNAL MEDICINE

## 2020-11-30 PROCEDURE — 36415 COLL VENOUS BLD VENIPUNCTURE: CPT | Mod: PO

## 2020-11-30 PROCEDURE — 1157F ADVNC CARE PLAN IN RCRD: CPT | Mod: S$GLB,,, | Performed by: INTERNAL MEDICINE

## 2020-11-30 PROCEDURE — 1101F PR PT FALLS ASSESS DOC 0-1 FALLS W/OUT INJ PAST YR: ICD-10-PCS | Mod: CPTII,S$GLB,, | Performed by: INTERNAL MEDICINE

## 2020-11-30 PROCEDURE — 3075F SYST BP GE 130 - 139MM HG: CPT | Mod: CPTII,S$GLB,, | Performed by: INTERNAL MEDICINE

## 2020-11-30 PROCEDURE — 1101F PT FALLS ASSESS-DOCD LE1/YR: CPT | Mod: CPTII,S$GLB,, | Performed by: INTERNAL MEDICINE

## 2020-11-30 PROCEDURE — 99999 PR PBB SHADOW E&M-EST. PATIENT-LVL IV: ICD-10-PCS | Mod: PBBFAC,,, | Performed by: INTERNAL MEDICINE

## 2020-11-30 PROCEDURE — 1125F AMNT PAIN NOTED PAIN PRSNT: CPT | Mod: S$GLB,,, | Performed by: INTERNAL MEDICINE

## 2020-11-30 PROCEDURE — 99499 UNLISTED E&M SERVICE: CPT | Mod: S$GLB,,, | Performed by: INTERNAL MEDICINE

## 2020-11-30 PROCEDURE — 1125F PR PAIN SEVERITY QUANTIFIED, PAIN PRESENT: ICD-10-PCS | Mod: S$GLB,,, | Performed by: INTERNAL MEDICINE

## 2020-11-30 PROCEDURE — 99214 OFFICE O/P EST MOD 30 MIN: CPT | Mod: S$GLB,,, | Performed by: INTERNAL MEDICINE

## 2020-11-30 PROCEDURE — 3074F PR MOST RECENT SYSTOLIC BLOOD PRESSURE < 130 MM HG: ICD-10-PCS | Mod: CPTII,S$GLB,, | Performed by: INTERNAL MEDICINE

## 2020-11-30 PROCEDURE — 3078F PR MOST RECENT DIASTOLIC BLOOD PRESSURE < 80 MM HG: ICD-10-PCS | Mod: CPTII,S$GLB,, | Performed by: INTERNAL MEDICINE

## 2020-11-30 PROCEDURE — 99214 PR OFFICE/OUTPT VISIT, EST, LEVL IV, 30-39 MIN: ICD-10-PCS | Mod: S$GLB,,, | Performed by: INTERNAL MEDICINE

## 2020-11-30 PROCEDURE — 99499 RISK ADDL DX/OHS AUDIT: ICD-10-PCS | Mod: S$GLB,,, | Performed by: INTERNAL MEDICINE

## 2020-11-30 PROCEDURE — 3074F SYST BP LT 130 MM HG: CPT | Mod: CPTII,S$GLB,, | Performed by: INTERNAL MEDICINE

## 2020-11-30 PROCEDURE — 3078F DIAST BP <80 MM HG: CPT | Mod: CPTII,S$GLB,, | Performed by: INTERNAL MEDICINE

## 2020-11-30 PROCEDURE — 1157F PR ADVANCE CARE PLAN OR EQUIV PRESENT IN MEDICAL RECORD: ICD-10-PCS | Mod: S$GLB,,, | Performed by: INTERNAL MEDICINE

## 2020-11-30 PROCEDURE — 99999 PR PBB SHADOW E&M-EST. PATIENT-LVL IV: CPT | Mod: PBBFAC,,, | Performed by: INTERNAL MEDICINE

## 2020-11-30 PROCEDURE — 84443 ASSAY THYROID STIM HORMONE: CPT

## 2020-11-30 PROCEDURE — 83036 HEMOGLOBIN GLYCOSYLATED A1C: CPT

## 2020-11-30 PROCEDURE — 99999 PR PBB SHADOW E&M-EST. PATIENT-LVL III: CPT | Mod: PBBFAC,,, | Performed by: INTERNAL MEDICINE

## 2020-11-30 PROCEDURE — 1159F PR MEDICATION LIST DOCUMENTED IN MEDICAL RECORD: ICD-10-PCS | Mod: S$GLB,,, | Performed by: INTERNAL MEDICINE

## 2020-11-30 PROCEDURE — 99999 PR PBB SHADOW E&M-EST. PATIENT-LVL III: ICD-10-PCS | Mod: PBBFAC,,, | Performed by: INTERNAL MEDICINE

## 2020-11-30 PROCEDURE — 84439 ASSAY OF FREE THYROXINE: CPT

## 2020-11-30 PROCEDURE — 80061 LIPID PANEL: CPT

## 2020-11-30 RX ORDER — LEVOTHYROXINE SODIUM 125 UG/1
125 TABLET ORAL
Qty: 90 TABLET | Refills: 3 | Status: SHIPPED | OUTPATIENT
Start: 2020-11-30 | End: 2021-11-30

## 2020-11-30 RX ORDER — TRAMADOL HYDROCHLORIDE 50 MG/1
50 TABLET ORAL EVERY 12 HOURS PRN
Qty: 60 TABLET | Refills: 0 | Status: SHIPPED | OUTPATIENT
Start: 2020-11-30 | End: 2021-03-31 | Stop reason: SDUPTHER

## 2020-11-30 RX ORDER — VALSARTAN 320 MG/1
320 TABLET ORAL DAILY
Qty: 90 TABLET | Refills: 3 | Status: SHIPPED | OUTPATIENT
Start: 2020-11-30 | End: 2021-11-30

## 2020-11-30 RX ORDER — MIRTAZAPINE 15 MG/1
15 TABLET, ORALLY DISINTEGRATING ORAL NIGHTLY
Qty: 90 TABLET | Refills: 3 | Status: SHIPPED | OUTPATIENT
Start: 2020-11-30 | End: 2021-11-30

## 2020-11-30 RX ORDER — AMLODIPINE BESYLATE 5 MG/1
5 TABLET ORAL DAILY
Qty: 90 TABLET | Refills: 3 | Status: SHIPPED | OUTPATIENT
Start: 2020-11-30

## 2020-11-30 NOTE — PROGRESS NOTES
SUBJECTIVE     Chief Complaint   Patient presents with    Medication Refill    Annual Exam       HPI  Celine Mahan is a 85 y.o. female with multiple medical diagnoses as listed in the medical history and problem list that presents for follow-up for medication refills for HTN, chronic pain, and Hypothyroidism. Pt has been doing well since her last visit. She is fully compliant meds and denies any adverse side effects. Pt is also mostly compliant with a low Na diet, but does not exercise. Pt reports eating everything that she enjoys, but her daughter reports she does not eat very much. Pt is without any complaints today and simply presents for a refill on meds.     PAST MEDICAL HISTORY:  Past Medical History:   Diagnosis Date    Breast cancer     Cataract     CKD (chronic kidney disease) stage 3, GFR 30-59 ml/min     Essential hypertension 8/22/2012    Gastroesophageal reflux disease 10/17/2019    Hyperlipidemia     Hypertension     Hypothyroidism     Obesity     Osteopenia     Ovarian cancer 10/8/2017    Overactive bladder     Pelvic mass 8/27/2017    Thyroid disease        PAST SURGICAL HISTORY:  Past Surgical History:   Procedure Laterality Date    bt catarac surgery      CHOLECYSTECTOMY      HYSTERECTOMY      INSERTION OF TUNNELED CENTRAL VENOUS CATHETER (CVC) WITH SUBCUTANEOUS PORT N/A 6/3/2019    Procedure: INSERTION, PORT-A-CATH;  Surgeon: Andrey Diagnostic Provider;  Location: [facility] OR;  Service: Radiology;  Laterality: N/A;  RN PREOP 5/31/2019    MASTECTOMY Left        SOCIAL HISTORY:  Social History     Socioeconomic History    Marital status:      Spouse name: Not on file    Number of children: Not on file    Years of education: Not on file    Highest education level: Not on file   Occupational History    Not on file   Social Needs    Financial resource strain: Not on file    Food insecurity     Worry: Not on file     Inability: Not on file    Transportation needs      Medical: Not on file     Non-medical: Not on file   Tobacco Use    Smoking status: Former Smoker     Types: Cigarettes     Quit date: 1977     Years since quittin.9    Smokeless tobacco: Never Used   Substance and Sexual Activity    Alcohol use: No    Drug use: No    Sexual activity: Not Currently   Lifestyle    Physical activity     Days per week: Not on file     Minutes per session: Not on file    Stress: Not at all   Relationships    Social connections     Talks on phone: Not on file     Gets together: Not on file     Attends Religion service: Not on file     Active member of club or organization: Not on file     Attends meetings of clubs or organizations: Not on file     Relationship status: Not on file   Other Topics Concern    Not on file   Social History Narrative    Not on file       FAMILY HISTORY:  Family History   Problem Relation Age of Onset    Cancer Mother         pancreas ca       ALLERGIES AND MEDICATIONS: updated and reviewed.  Review of patient's allergies indicates:  No Known Allergies  Current Outpatient Medications   Medication Sig Dispense Refill    garlic 1,000 mg Cap Take by mouth once daily.       omeprazole (PRILOSEC) 20 MG capsule TAKE 1 CAPSULE(20 MG) BY MOUTH DAILY AS NEEDED 90 capsule 0    ondansetron (ZOFRAN) 8 MG tablet Take 1 tablet (8 mg total) by mouth every 8 (eight) hours as needed for Nausea. 30 tablet 3    amLODIPine (NORVASC) 5 MG tablet Take 1 tablet (5 mg total) by mouth once daily. 90 tablet 3    levothyroxine (SYNTHROID) 125 MCG tablet Take 1 tablet (125 mcg total) by mouth before breakfast. 90 tablet 3    mirtazapine (REMERON SOL-TAB) 15 MG disintegrating tablet Take 1 tablet (15 mg total) by mouth nightly. 90 tablet 3    traMADoL (ULTRAM) 50 mg tablet Take 1 tablet (50 mg total) by mouth every 12 (twelve) hours as needed for Pain. TAKE 1 TABLET(50 MG) BY MOUTH EVERY 8 HOURS AS NEEDED FOR PAIN 60 tablet 0    valsartan (DIOVAN) 320 MG tablet  "Take 1 tablet (320 mg total) by mouth once daily. 90 tablet 3     No current facility-administered medications for this visit.        ROS  Review of Systems   Constitutional: Negative for chills and fever.   HENT: Negative for hearing loss and sore throat.    Eyes: Negative for visual disturbance.   Respiratory: Negative for cough and shortness of breath.    Cardiovascular: Negative for chest pain, palpitations and leg swelling.   Gastrointestinal: Negative for abdominal pain, constipation, diarrhea, nausea and vomiting.   Genitourinary: Negative for dysuria, frequency and urgency.   Musculoskeletal: Positive for arthralgias (RUE). Negative for joint swelling and myalgias.   Skin: Negative for rash and wound.   Neurological: Negative for headaches.   Psychiatric/Behavioral: Negative for agitation and confusion. The patient is not nervous/anxious.          OBJECTIVE     Physical Exam  Vitals:    11/30/20 1116   BP: 100/66   Pulse: 93   Temp: 98 °F (36.7 °C)    Body mass index is 30.2 kg/m².  Weight: 74.9 kg (165 lb 2 oz)   Height: 5' 2" (157.5 cm)     Physical Exam  Constitutional:       General: She is not in acute distress.     Appearance: She is well-developed.   HENT:      Head: Normocephalic and atraumatic.      Right Ear: External ear normal.      Left Ear: External ear normal.      Nose: Nose normal.   Eyes:      General: No scleral icterus.        Right eye: No discharge.         Left eye: No discharge.      Conjunctiva/sclera: Conjunctivae normal.   Neck:      Musculoskeletal: Normal range of motion and neck supple.      Vascular: No JVD.      Trachea: No tracheal deviation.   Cardiovascular:      Rate and Rhythm: Normal rate and regular rhythm.      Heart sounds: No murmur. No friction rub. No gallop.    Pulmonary:      Effort: Pulmonary effort is normal. No respiratory distress.      Breath sounds: Normal breath sounds. No wheezing.   Abdominal:      General: Bowel sounds are normal. There is no " distension.      Palpations: Abdomen is soft. There is no mass.      Tenderness: There is no abdominal tenderness. There is no guarding or rebound.   Musculoskeletal: Normal range of motion.         General: No tenderness or deformity.   Skin:     General: Skin is warm and dry.      Findings: No erythema or rash.   Neurological:      Mental Status: She is alert and oriented to person, place, and time.      Motor: No abnormal muscle tone.      Coordination: Coordination normal.   Psychiatric:         Behavior: Behavior normal.         Thought Content: Thought content normal.         Judgment: Judgment normal.           Health Maintenance       Date Due Completion Date    Shingles Vaccine (1 of 2) 06/26/1985 ---    Influenza Vaccine (1) 06/30/2021 (Originally 8/1/2020) ---    DEXA SCAN 10/29/2022 10/29/2019    Lipid Panel 12/13/2023 12/13/2018    TETANUS VACCINE 07/28/2026 7/28/2016            ASSESSMENT     85 y.o. female with     1. Essential hypertension    2. Chronic pain syndrome    3. Depression, unspecified depression type    4. Hypothyroidism, unspecified type    5. CKD (chronic kidney disease) stage 4, GFR 15-29 ml/min    6. Abnormal finding of blood chemistry, unspecified         PLAN:     1. Essential hypertension  - BP well controlled; at goal of <140/90  - The current medical regimen is effective;  continue present plan and medications.  - amLODIPine (NORVASC) 5 MG tablet; Take 1 tablet (5 mg total) by mouth once daily.  Dispense: 90 tablet; Refill: 3  - valsartan (DIOVAN) 320 MG tablet; Take 1 tablet (320 mg total) by mouth once daily.  Dispense: 90 tablet; Refill: 3  - Hemoglobin A1C; Future  - Lipid Panel; Future    2. Chronic pain syndrome  - Stable; no acute issues  - The current medical regimen is effective;  continue present plan and medications.  - traMADoL (ULTRAM) 50 mg tablet; Take 1 tablet (50 mg total) by mouth every 12 (twelve) hours as needed for Pain. TAKE 1 TABLET(50 MG) BY MOUTH EVERY 8  HOURS AS NEEDED FOR PAIN  Dispense: 60 tablet; Refill: 0    3. Depression, unspecified depression type  - Stable; no acute issues  - The current medical regimen is effective;  continue present plan and medications.  - mirtazapine (REMERON SOL-TAB) 15 MG disintegrating tablet; Take 1 tablet (15 mg total) by mouth nightly.  Dispense: 90 tablet; Refill: 3    4. Hypothyroidism, unspecified type  - Stable; no acute issues  - The current medical regimen is effective;  continue present plan and medications.  - levothyroxine (SYNTHROID) 125 MCG tablet; Take 1 tablet (125 mcg total) by mouth before breakfast.  Dispense: 90 tablet; Refill: 3  - TSH; Future  - T4, Free; Future    5. CKD (chronic kidney disease) stage 4, GFR 15-29 ml/min  - Stable; no acute issues  - Monitor    6. Abnormal finding of blood chemistry, unspecified   - Hemoglobin A1C; Future        RTC in 6 months     Jane Domingo MD  11/30/2020 11:53 AM        No follow-ups on file.

## 2020-11-30 NOTE — PROGRESS NOTES
"Subjective:       Patient ID: Celine Mahan is a 85 y.o. female.    Chief Complaint: malignant neoplasm of ovary, unspecified laterality (6 week followup)          Diagnosis: 1.Ovarian cancer status post debulking surgery 9/21/2017                     2. 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 Platinum sensitive recurrent ovarian cancer 3/2020  Therapy: 1. S/p carbo/taxol x 6 completed 2/12/2018                   2. Carbo/Doxil 2/1/2019- 7/8/2019                   3. Bevacizumab maintenance  8/16/2019 -3/14/2020                   4. Carboplatin/Gemcitabine 4/24/2020- present      [facility] ANALYSIS NEG    HPI ( per Dr. Feliciano) Patient is an 85 yo female who originally presented as a referral from Dr. Wills for pelvic mass. Patient reports LLQ pain for approximately 3 months which prompted evaluation.      Pelvic US 8/7/17 There is a complex echogenicity midline pelvic mass measuring 18.1 x 8.6 x 7.6 cm.  Ovaries not visualized.     CT A&P 8/9/17 Large solid/cystic mass within the lower abdomen/upper pelvis that is most concerning for a malignant neoplasm, likely of ovarian origin given its location.  Correlation with previous surgical history is recommended noting evidence of previous hysterectomy.  Mass abuts and displaces the adjacent bladder without definite CT findings to suggest hema invasion. Abdominal and pelvic lymphadenopathy concerning for lymphatic spread of neoplasm with index lymph nodes as above.      Medical history is significant for CKD (Cr 1.3), HTN, hypothyroid, HTN, HLD. She has a personal history of breast cancer in 1995 treated with mastectomy and adjuvant chemotherapy she reports. Last MMG 11/2016 normal. Adbominal surgery include cholecystectomy, TVH, xlap/removal ovary for ectopic. She reports that her physician told her "some ovary remains in situ". Family history significant for mother with pancreatic cancer.      CT chest 8/28/17 showed mediastinal " adenopathy consistent with Stage IV disease.   RECIST Summary:  Mediastinal adenopathy:  1. Preaortic abnormal node measuring 1.7 cm short axis.  2. Pretracheal node measures 1.6 cm short axis.     She underwent surgical cytoreduction with xlap/LSO/omentectomy 9/21/17. Pathology showed high grade adenocarcinoma.        She is also  followed by Dr. Feliciano  She  transferred care to undergo  adjuvant chemo at this location   She completed adjuvant chemo with carbo AUC 6 and taxol 175mg/m2 q 21d  2/12/2018  She was considered for PRIMA clinical trial for maintenance therapy but declined    Pt with increasing  levels   849>230>37>23>22>12>40>309 ( 1/9/2019 )     CT c/a/p 1/14/2019 S/P resection of large LEFT ovarian/fallopian tube mass consistent with carcinoma.  Interval increase in size of LEFT pelvic/retroperitoneal lymph nodes.  No evidence for ascites or definitive carcinomatosis of mesentery.  Interval decrease in size of prevascular lymph node.    She was then diagnosed with  platinum sensitive recurrent ovarian cancer. Platinum free interval 11 months.   Dr. Feliciano Reviewed standard management options which are to re-treat with platinum-based chemotherapy (carbo + doxil/gem/taxol +/- avastin).   She is s/p cycle 6 of chemo  Carbo/doxil (Doxil 30mg/m2  Carbo AUC 5)  q 28dayscompleted 7/8/2019   ( dosage reduction  sec to cytopenias)     Imaging studies 7/2019 revealed stable disease    Pt started on maintenance therapy with bevacizumab 15mg/kg  q3 wks    Pt developed increasing  levels and  CT imaging 3/19/2020 showed disease progression    She was started on carbo/gem for platinum sensitive recurrent ovarian cancer.      Accompanied by her dtr  No new issues   Pt lives with dtr and continues to spend most of day alone in her room  Weight stable  Pt remains on remeron  She does not eat dinners with family  She occasionally has friend over. Her friend visits pt in her room.   Pt also sometimes uses  "bedside commode to avoid leaving room to go to bathroom  Pt asked on numerous visits why she continues to spend most of her time in her room and patient reprorts " I don't know."  Minor fatigue  She has chronic diffuse arthralgias  Pt mentioned she did not want to do chemo anymore  No cough/CP  No fevers    Recent CT imaging shows No interval suspicious new findings to suggest a malignant/aggressive lesion      Biochemical profile reveals stable Cr level from 1.5mg/dL  to 2.0mg/dL     [facility] ANALYSIS NEG    Past Medical History:   Diagnosis Date    Breast cancer     Cataract     CKD (chronic kidney disease) stage 3, GFR 30-59 ml/min     Essential hypertension 8/22/2012    Gastroesophageal reflux disease 10/17/2019    Hyperlipidemia     Hypertension     Hypothyroidism     Obesity     Osteopenia     Ovarian cancer 10/8/2017    Overactive bladder     Pelvic mass 8/27/2017    Thyroid disease          Past Surgical History:   Procedure Laterality Date    bt catarac surgery      CHOLECYSTECTOMY      HYSTERECTOMY      INSERTION OF TUNNELED CENTRAL VENOUS CATHETER (CVC) WITH SUBCUTANEOUS PORT N/A 6/3/2019    Procedure: INSERTION, PORT-A-CATH;  Surgeon: Andrey Diagnostic Provider;  Location: [facility] OR;  Service: Radiology;  Laterality: N/A;  RN PREOP 5/31/2019    MASTECTOMY Left              Review of Systems   Constitutional: Positive for minor fatigue appetite stable, no  wt loss  HENT: Negative for mouth sores.    Eyes: Negative for visual disturbance.   Respiratory: Negative for cough and shortness of breath.    Cardiovascular: Negative for chest pain.   Gastrointestinal: Negative for abdominal pain, diarrhea and nausea.   Genitourinary: Negative for frequency.   Musculoskeletal: Positive for neck pain (chronic)-stable Negative for back pain.   Skin: Negative for rash.   Neurological: No numbness or tingling. Negative for weakness, light-headedness and headaches.   Hematological: Negative for adenopathy. " "  Psychiatric/Behavioral: The patient is not nervous/anxious.        Objective:         Vitals:    11/30/20 1321   BP: 132/64   BP Location: Right arm   Patient Position: Sitting   BP Method: Medium (Automatic)   Pulse: 75   Temp: 97.8 °F (36.6 °C)   TempSrc: Oral   SpO2: 98%   Weight: 74.8 kg (164 lb 14.5 oz)   Height: 5' 2" (1.575 m)         Physical Exam   Constitutional: She is oriented to person, place, and time. She appears well-developed and well-nourished.   HENT:   Head: Normocephalic.   Mouth/Throat: Oropharynx is clear and moist. No oropharyngeal exudate.   Eyes: Conjunctivae and lids are normal. Pupils are equal, round, and reactive to light. No scleral icterus.   Neck: Normal range of motion. Neck supple. No thyromegaly present.   Cardiovascular: Normal rate, regular rhythm and normal heart sounds.    No murmur heard.  Pulmonary/Chest: Breath sounds normal. She has no wheezes. She has no rales.   Abdominal: Soft. Bowel sounds are normal. She exhibits no distension and no mass. There is no hepatosplenomegaly. There is no tenderness. There is no rebound and no guarding.   Musculoskeletal: Normal range of motion. She exhibits no edema and no tenderness.   Lymphadenopathy:     She has no cervical adenopathy.     She has no axillary adenopathy.        Right: No supraclavicular adenopathy present.        Left: No supraclavicular adenopathy present.   Neurological: She is alert and oriented to person, place, and time. No cranial nerve deficit. Coordination normal.   Skin: Skin is warm and dry. No ecchymosis, no petechiae and no rash noted. No erythema.   Psychiatric: She has flat affect. Dysphoric mood        Labs:   Lab Results   Component Value Date    WBC 3.86 (L) 11/25/2020    HGB 11.1 (L) 11/25/2020    HCT 33.6 (L) 11/25/2020     (H) 11/25/2020     11/25/2020           MRI Cervical spine w/out contrast 4/30/2019   Cervical degenerative change most significant at C4-5 and C5-6.  Specific " details at each level discussed above.     CT c/a/p w/out contrast 3/19/2020    1. Status post hysterectomy and left oophorectomy surgery with interval enlarging retroperitoneal and pelvic lymph nodes discussed above.  2. No new metastatic or recurrent disease confirmed.  3. Additional remote findings above.  4. Lesion 1: Left pelvic 6 sidewall lymph node 2.4 cm, was 2 cm.  5. Lesion 2: Left Madalyn aortic node 2.4 cm, was 1.9 cm.      Component      Latest Ref Rng & Units 2/21/2020 1/31/2020 12/19/2019 10/18/2019         0 - 30 U/mL 139 (H) 140 (H) 84 (H) 42 (H)     Component      Latest Ref Rng & Units 9/6/2019 7/22/2019 7/5/2019 6/6/2019         0 - 30 U/mL 33 (H) 58 (H) 52 (H) 55 (H)     Component      Latest Ref Rng & Units 4/17/2019         0 - 30 U/mL 86 (H)         Component      Latest Ref Rng & Units 10/12/2020 9/10/2020 8/12/2020 7/13/2020         0 - 30 U/mL 48 (H) 38 (H) 31 (H) 35 (H)     Component      Latest Ref Rng & Units 6/17/2020         0 - 30 U/mL 54 (H)       C a/p w/out contrast 7/30/2020    --In this patient with reported history of malignant ovarian neoplasm, overall findings suggest positive response to therapy--     1. No appreciable new or enlarging suspicious or enhancing nodules above or below the diaphragm.  2. Interval changes include: 1.0-cm (previously 1.4-cm) left iliac chain/pelvic sidewall soft tissue nodule/lymph node, 1.4-cm (previously 2.0-cm) left iliac chain/pelvic side wall soft tissue nodule/lymph node, 1.3-cm (previously 1.5-cm) retroperitoneal/periaortic soft tissue nodule/lymph node, 1.0-cm (previously 1.2-cm) retroperitoneal/periaortic soft tissue nodule/lymph node and 0.9-cm (previously 1.2-cm) retroperitoneal/periaortic soft tissue nodule/lymph node.  3. 2 mm right middle lobe and 3 mm left lower lobe ground-glass pulmonary nodules and a single mildly enlarged mediastinal/precarinal space lymph node measuring up to 1.2-cm are  unchanged.       CT c/a/p w/out contrast 10/12/2020     Impression:     No interval suspicious new findings to suggest a malignant/aggressive lesion in the chest abdomen or pelvis in this patient with history of ovarian neoplasm as imaged without IV contrast.     Patchy areas of mild ground-glass opacification without nodular appearance are now noted in the right lung and are nonspecific.  Correlation is needed.     Prior noted ground-glass tiny nodules are not apparent.     Several scattered retroperitoneal and mesenteric prominent lymph nodes in the abdomen and pelvis are unchanged as described above in detail.     Results for JONATAN SERRANO (MRN [id number]) as of 11/30/2020 13:32   Ref. Range 10/12/2020 08:10 10/12/2020 08:43 11/25/2020 17:03    Latest Ref Range: 0 - 30 U/mL 48 (H)  125 (H)         Assessment:       1. Malignant neoplasm of ovary, unspecified laterality    2. Elevated CA-125    3. Depression, unspecified depression type    4. CKD (chronic kidney disease) stage 4, GFR 15-29 ml/min        Plan:    Pt clinically stable      1-2  Ovarian cancer status post debulking surgery 9/21/2017   . 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 S/p carbo/taxol x 6 completed 2/12/2018                   2. Carbo/Doxil     2/1/2019- 7/8/2019                   3. Bevacizumab maintenance  8/16/2019 - 3/14/2020      Patient with ovarian cancer status post debulking surgery 9/21/2017 undergoing adjuvant platinum-based chemotherapy with carbo/taxol .   S/p carbo/taxol x 6 completed 2/12/2018  Pt declined maintenance therapy on trial   [facility] ANALYSIS NEG    She then developed  platinum sensitive recurrent ovarian cancer. Platinum free interval 11 months.   Dr. Feliciano Reviewed standard management options which are to re-treat with platinum-based chemotherapy (carbo + doxil/gem/taxol +/- avastin).   Pt underwent treatment with Doxil 30mg/m2  Carbo AUC 5  q 28days   s/p cycle 6 of chemo  Carbo/doxil  completed 7/8/2019   CT c/a/p w/contrast 7/26/2019-stable findings    Pt then treated with maintenance  bevacizumab.   Pt developed rising  levels  CT imaging showed disease progression    Discussed with Dr. Feliciano . Patient with platinum sensitive recurrent ovarian cancer.Treatment options include single agent carbo or carbo/gem   Pt completed Carbo AUC 4 / Wilmot 800 mg/m2 Day 1 /[place]mot 800mg/m2 DAY 8  [id number]  ( gem dosage reduction upfront planned )    Recent imaging reveals disease response    S/p C6     CT c/a/p w/out contrast 10/12/2020  Shows No interval suspicious new findings to suggest a malignant/aggressive lesion in the chest abdomen or pelvis in this patient with history of ovarian neoplasm as imaged without IV contrast    Detailed d/w pt regarding CT findings and maintenance treatment  Discussed pros vs cons of proceeding with maintenance  The potential risks of  chemotherapy include but not limited to hair and skin changes, bone marrow damage (anemia, thrombocytopenia, immune suppression, neutropenia), allergic reactions, diarrhea, constipation, mouth sores, neuropathy)     Following a detailed discussion following answer and questions session, it was determined not to proceed with maintenance therapy   rising  Plan CT imaging if  continues to rise  No further chemo planned as risks would outweigh benefits    3. MIRTAZAPINE started  Pt advised/encouraged to go out of bedroom     4. Cr level 1.9mg/dL  Cont to monitor     All questions posed answered to patient's ans    F/u in 6 weeks with cbc,cmp,  and CT imaging prior to f/u     35 minutes spent during this visit of which greater than 50% devoted to counseling and coordination of care regarding diagnosis and management plan.    Cc: Iesha Feliciano M.D.          HORACE Childers MD

## 2020-12-01 LAB
ESTIMATED AVG GLUCOSE: 103 MG/DL (ref 68–131)
HBA1C MFR BLD HPLC: 5.2 % (ref 4–5.6)

## 2021-01-09 ENCOUNTER — LAB VISIT (OUTPATIENT)
Dept: LAB | Facility: HOSPITAL | Age: 86
End: 2021-01-09
Attending: INTERNAL MEDICINE
Payer: MEDICARE

## 2021-01-09 DIAGNOSIS — C56.9 MALIGNANT NEOPLASM OF OVARY, UNSPECIFIED LATERALITY: ICD-10-CM

## 2021-01-09 LAB
ALBUMIN SERPL BCP-MCNC: 3.6 G/DL (ref 3.5–5.2)
ALP SERPL-CCNC: 98 U/L (ref 55–135)
ALT SERPL W/O P-5'-P-CCNC: 6 U/L (ref 10–44)
ANION GAP SERPL CALC-SCNC: 11 MMOL/L (ref 8–16)
AST SERPL-CCNC: 14 U/L (ref 10–40)
BASOPHILS # BLD AUTO: 0.04 K/UL (ref 0–0.2)
BASOPHILS NFR BLD: 1.2 % (ref 0–1.9)
BILIRUB SERPL-MCNC: 0.3 MG/DL (ref 0.1–1)
BUN SERPL-MCNC: 42 MG/DL (ref 8–23)
CALCIUM SERPL-MCNC: 9 MG/DL (ref 8.7–10.5)
CANCER AG125 SERPL-ACNC: 304 U/ML (ref 0–30)
CHLORIDE SERPL-SCNC: 105 MMOL/L (ref 95–110)
CO2 SERPL-SCNC: 20 MMOL/L (ref 23–29)
CREAT SERPL-MCNC: 2.1 MG/DL (ref 0.5–1.4)
DIFFERENTIAL METHOD: ABNORMAL
EOSINOPHIL # BLD AUTO: 0.2 K/UL (ref 0–0.5)
EOSINOPHIL NFR BLD: 4.8 % (ref 0–8)
ERYTHROCYTE [DISTWIDTH] IN BLOOD BY AUTOMATED COUNT: 13.6 % (ref 11.5–14.5)
EST. GFR  (AFRICAN AMERICAN): 24 ML/MIN/1.73 M^2
EST. GFR  (NON AFRICAN AMERICAN): 21 ML/MIN/1.73 M^2
GLUCOSE SERPL-MCNC: 106 MG/DL (ref 70–110)
HCT VFR BLD AUTO: 35.8 % (ref 37–48.5)
HGB BLD-MCNC: 11.6 G/DL (ref 12–16)
IMM GRANULOCYTES # BLD AUTO: 0.02 K/UL (ref 0–0.04)
IMM GRANULOCYTES NFR BLD AUTO: 0.6 % (ref 0–0.5)
LYMPHOCYTES # BLD AUTO: 1 K/UL (ref 1–4.8)
LYMPHOCYTES NFR BLD: 31.1 % (ref 18–48)
MCH RBC QN AUTO: 31.5 PG (ref 27–31)
MCHC RBC AUTO-ENTMCNC: 32.4 G/DL (ref 32–36)
MCV RBC AUTO: 97 FL (ref 82–98)
MONOCYTES # BLD AUTO: 0.2 K/UL (ref 0.3–1)
MONOCYTES NFR BLD: 6.6 % (ref 4–15)
NEUTROPHILS # BLD AUTO: 1.9 K/UL (ref 1.8–7.7)
NEUTROPHILS NFR BLD: 55.7 % (ref 38–73)
NRBC BLD-RTO: 0 /100 WBC
PLATELET # BLD AUTO: 191 K/UL (ref 150–350)
PMV BLD AUTO: 10.4 FL (ref 9.2–12.9)
POTASSIUM SERPL-SCNC: 4.3 MMOL/L (ref 3.5–5.1)
PROT SERPL-MCNC: 7.6 G/DL (ref 6–8.4)
RBC # BLD AUTO: 3.68 M/UL (ref 4–5.4)
SODIUM SERPL-SCNC: 136 MMOL/L (ref 136–145)
WBC # BLD AUTO: 3.34 K/UL (ref 3.9–12.7)

## 2021-01-09 PROCEDURE — 86304 IMMUNOASSAY TUMOR CA 125: CPT

## 2021-01-09 PROCEDURE — 80053 COMPREHEN METABOLIC PANEL: CPT

## 2021-01-09 PROCEDURE — 85025 COMPLETE CBC W/AUTO DIFF WBC: CPT

## 2021-01-12 ENCOUNTER — OFFICE VISIT (OUTPATIENT)
Dept: HEMATOLOGY/ONCOLOGY | Facility: CLINIC | Age: 86
End: 2021-01-12
Payer: MEDICARE

## 2021-01-12 VITALS
DIASTOLIC BLOOD PRESSURE: 72 MMHG | TEMPERATURE: 98 F | SYSTOLIC BLOOD PRESSURE: 128 MMHG | HEART RATE: 66 BPM | WEIGHT: 172.81 LBS | OXYGEN SATURATION: 95 % | BODY MASS INDEX: 31.8 KG/M2 | HEIGHT: 62 IN

## 2021-01-12 DIAGNOSIS — T45.1X5A CHEMOTHERAPY-INDUCED NEUROPATHY: ICD-10-CM

## 2021-01-12 DIAGNOSIS — C56.9 MALIGNANT NEOPLASM OF OVARY, UNSPECIFIED LATERALITY: Primary | ICD-10-CM

## 2021-01-12 DIAGNOSIS — G62.0 CHEMOTHERAPY-INDUCED NEUROPATHY: ICD-10-CM

## 2021-01-12 DIAGNOSIS — N18.4 CKD (CHRONIC KIDNEY DISEASE) STAGE 4, GFR 15-29 ML/MIN: ICD-10-CM

## 2021-01-12 DIAGNOSIS — R97.1 ELEVATED CA-125: ICD-10-CM

## 2021-01-12 PROCEDURE — 1157F ADVNC CARE PLAN IN RCRD: CPT | Mod: S$GLB,,, | Performed by: INTERNAL MEDICINE

## 2021-01-12 PROCEDURE — 3288F FALL RISK ASSESSMENT DOCD: CPT | Mod: CPTII,S$GLB,, | Performed by: INTERNAL MEDICINE

## 2021-01-12 PROCEDURE — 1101F PT FALLS ASSESS-DOCD LE1/YR: CPT | Mod: CPTII,S$GLB,, | Performed by: INTERNAL MEDICINE

## 2021-01-12 PROCEDURE — 3074F SYST BP LT 130 MM HG: CPT | Mod: CPTII,S$GLB,, | Performed by: INTERNAL MEDICINE

## 2021-01-12 PROCEDURE — 99214 OFFICE O/P EST MOD 30 MIN: CPT | Mod: S$GLB,,, | Performed by: INTERNAL MEDICINE

## 2021-01-12 PROCEDURE — 3078F PR MOST RECENT DIASTOLIC BLOOD PRESSURE < 80 MM HG: ICD-10-PCS | Mod: CPTII,S$GLB,, | Performed by: INTERNAL MEDICINE

## 2021-01-12 PROCEDURE — 99999 PR PBB SHADOW E&M-EST. PATIENT-LVL IV: CPT | Mod: PBBFAC,,, | Performed by: INTERNAL MEDICINE

## 2021-01-12 PROCEDURE — 99499 RISK ADDL DX/OHS AUDIT: ICD-10-PCS | Mod: S$GLB,,, | Performed by: INTERNAL MEDICINE

## 2021-01-12 PROCEDURE — 3074F PR MOST RECENT SYSTOLIC BLOOD PRESSURE < 130 MM HG: ICD-10-PCS | Mod: CPTII,S$GLB,, | Performed by: INTERNAL MEDICINE

## 2021-01-12 PROCEDURE — 1126F PR PAIN SEVERITY QUANTIFIED, NO PAIN PRESENT: ICD-10-PCS | Mod: S$GLB,,, | Performed by: INTERNAL MEDICINE

## 2021-01-12 PROCEDURE — 99999 PR PBB SHADOW E&M-EST. PATIENT-LVL IV: ICD-10-PCS | Mod: PBBFAC,,, | Performed by: INTERNAL MEDICINE

## 2021-01-12 PROCEDURE — 1101F PR PT FALLS ASSESS DOC 0-1 FALLS W/OUT INJ PAST YR: ICD-10-PCS | Mod: CPTII,S$GLB,, | Performed by: INTERNAL MEDICINE

## 2021-01-12 PROCEDURE — 1126F AMNT PAIN NOTED NONE PRSNT: CPT | Mod: S$GLB,,, | Performed by: INTERNAL MEDICINE

## 2021-01-12 PROCEDURE — 1157F PR ADVANCE CARE PLAN OR EQUIV PRESENT IN MEDICAL RECORD: ICD-10-PCS | Mod: S$GLB,,, | Performed by: INTERNAL MEDICINE

## 2021-01-12 PROCEDURE — 99214 PR OFFICE/OUTPT VISIT, EST, LEVL IV, 30-39 MIN: ICD-10-PCS | Mod: S$GLB,,, | Performed by: INTERNAL MEDICINE

## 2021-01-12 PROCEDURE — 1159F MED LIST DOCD IN RCRD: CPT | Mod: S$GLB,,, | Performed by: INTERNAL MEDICINE

## 2021-01-12 PROCEDURE — 3288F PR FALLS RISK ASSESSMENT DOCUMENTED: ICD-10-PCS | Mod: CPTII,S$GLB,, | Performed by: INTERNAL MEDICINE

## 2021-01-12 PROCEDURE — 99499 UNLISTED E&M SERVICE: CPT | Mod: S$GLB,,, | Performed by: INTERNAL MEDICINE

## 2021-01-12 PROCEDURE — 3078F DIAST BP <80 MM HG: CPT | Mod: CPTII,S$GLB,, | Performed by: INTERNAL MEDICINE

## 2021-01-12 PROCEDURE — 1159F PR MEDICATION LIST DOCUMENTED IN MEDICAL RECORD: ICD-10-PCS | Mod: S$GLB,,, | Performed by: INTERNAL MEDICINE

## 2021-02-02 ENCOUNTER — HOSPITAL ENCOUNTER (OUTPATIENT)
Dept: RADIOLOGY | Facility: HOSPITAL | Age: 86
Discharge: HOME | End: 2021-02-02
Attending: INTERNAL MEDICINE
Payer: MEDICARE

## 2021-02-02 DIAGNOSIS — C56.9 MALIGNANT NEOPLASM OF OVARY, UNSPECIFIED LATERALITY: ICD-10-CM

## 2021-02-02 DIAGNOSIS — R97.1 ELEVATED CA-125: ICD-10-CM

## 2021-02-02 PROCEDURE — 71250 CT THORAX DX C-: CPT | Mod: TC

## 2021-02-02 PROCEDURE — 74176 CT ABD & PELVIS W/O CONTRAST: CPT | Mod: 26,,, | Performed by: RADIOLOGY

## 2021-02-02 PROCEDURE — 71250 CT CHEST ABDOMEN PELVIS WITHOUT CONTRAST(XPD): ICD-10-PCS | Mod: 26,,, | Performed by: RADIOLOGY

## 2021-02-02 PROCEDURE — 74176 CT ABD & PELVIS W/O CONTRAST: CPT | Mod: TC

## 2021-02-02 PROCEDURE — 74176 CT CHEST ABDOMEN PELVIS WITHOUT CONTRAST(XPD): ICD-10-PCS | Mod: 26,,, | Performed by: RADIOLOGY

## 2021-02-02 PROCEDURE — 71250 CT THORAX DX C-: CPT | Mod: 26,,, | Performed by: RADIOLOGY

## 2021-02-20 ENCOUNTER — LAB VISIT (OUTPATIENT)
Dept: LAB | Facility: HOSPITAL | Age: 86
End: 2021-02-20
Attending: INTERNAL MEDICINE
Payer: MEDICARE

## 2021-02-20 DIAGNOSIS — C56.9 MALIGNANT NEOPLASM OF OVARY, UNSPECIFIED LATERALITY: ICD-10-CM

## 2021-02-20 DIAGNOSIS — R97.1 ELEVATED CA-125: ICD-10-CM

## 2021-02-20 LAB
ALBUMIN SERPL BCP-MCNC: 3.5 G/DL (ref 3.5–5.2)
ALP SERPL-CCNC: 89 U/L (ref 55–135)
ALT SERPL W/O P-5'-P-CCNC: 6 U/L (ref 10–44)
ANION GAP SERPL CALC-SCNC: 9 MMOL/L (ref 8–16)
AST SERPL-CCNC: 16 U/L (ref 10–40)
BASOPHILS # BLD AUTO: 0.04 K/UL (ref 0–0.2)
BASOPHILS NFR BLD: 0.8 % (ref 0–1.9)
BILIRUB SERPL-MCNC: 0.3 MG/DL (ref 0.1–1)
BUN SERPL-MCNC: 31 MG/DL (ref 8–23)
CALCIUM SERPL-MCNC: 8.8 MG/DL (ref 8.7–10.5)
CANCER AG125 SERPL-ACNC: 549 U/ML (ref 0–30)
CHLORIDE SERPL-SCNC: 101 MMOL/L (ref 95–110)
CO2 SERPL-SCNC: 24 MMOL/L (ref 23–29)
CREAT SERPL-MCNC: 2.1 MG/DL (ref 0.5–1.4)
DIFFERENTIAL METHOD: ABNORMAL
EOSINOPHIL # BLD AUTO: 0.2 K/UL (ref 0–0.5)
EOSINOPHIL NFR BLD: 3.6 % (ref 0–8)
ERYTHROCYTE [DISTWIDTH] IN BLOOD BY AUTOMATED COUNT: 14.4 % (ref 11.5–14.5)
EST. GFR  (AFRICAN AMERICAN): 24 ML/MIN/1.73 M^2
EST. GFR  (NON AFRICAN AMERICAN): 21 ML/MIN/1.73 M^2
GLUCOSE SERPL-MCNC: 112 MG/DL (ref 70–110)
HCT VFR BLD AUTO: 33.8 % (ref 37–48.5)
HGB BLD-MCNC: 11.1 G/DL (ref 12–16)
IMM GRANULOCYTES # BLD AUTO: 0.04 K/UL (ref 0–0.04)
IMM GRANULOCYTES NFR BLD AUTO: 0.8 % (ref 0–0.5)
LYMPHOCYTES # BLD AUTO: 1.1 K/UL (ref 1–4.8)
LYMPHOCYTES NFR BLD: 23.3 % (ref 18–48)
MCH RBC QN AUTO: 30.6 PG (ref 27–31)
MCHC RBC AUTO-ENTMCNC: 32.8 G/DL (ref 32–36)
MCV RBC AUTO: 93 FL (ref 82–98)
MONOCYTES # BLD AUTO: 0.5 K/UL (ref 0.3–1)
MONOCYTES NFR BLD: 9.9 % (ref 4–15)
NEUTROPHILS # BLD AUTO: 2.9 K/UL (ref 1.8–7.7)
NEUTROPHILS NFR BLD: 61.6 % (ref 38–73)
NRBC BLD-RTO: 0 /100 WBC
PLATELET # BLD AUTO: 181 K/UL (ref 150–350)
PMV BLD AUTO: 10 FL (ref 9.2–12.9)
POTASSIUM SERPL-SCNC: 4.5 MMOL/L (ref 3.5–5.1)
PROT SERPL-MCNC: 7.3 G/DL (ref 6–8.4)
RBC # BLD AUTO: 3.63 M/UL (ref 4–5.4)
SODIUM SERPL-SCNC: 134 MMOL/L (ref 136–145)
WBC # BLD AUTO: 4.73 K/UL (ref 3.9–12.7)

## 2021-02-20 PROCEDURE — 80053 COMPREHEN METABOLIC PANEL: CPT

## 2021-02-20 PROCEDURE — 85025 COMPLETE CBC W/AUTO DIFF WBC: CPT

## 2021-02-20 PROCEDURE — 86304 IMMUNOASSAY TUMOR CA 125: CPT

## 2021-02-20 PROCEDURE — 36415 COLL VENOUS BLD VENIPUNCTURE: CPT

## 2021-02-22 ENCOUNTER — OFFICE VISIT (OUTPATIENT)
Dept: HEMATOLOGY/ONCOLOGY | Facility: CLINIC | Age: 86
End: 2021-02-22
Payer: MEDICARE

## 2021-02-22 VITALS
BODY MASS INDEX: 31.56 KG/M2 | OXYGEN SATURATION: 97 % | HEART RATE: 47 BPM | HEIGHT: 62 IN | TEMPERATURE: 99 F | SYSTOLIC BLOOD PRESSURE: 131 MMHG | DIASTOLIC BLOOD PRESSURE: 71 MMHG | WEIGHT: 171.5 LBS

## 2021-02-22 DIAGNOSIS — Z71.89 DNR (DO NOT RESUSCITATE) DISCUSSION: ICD-10-CM

## 2021-02-22 DIAGNOSIS — G62.0 CHEMOTHERAPY-INDUCED NEUROPATHY: ICD-10-CM

## 2021-02-22 DIAGNOSIS — N18.4 CKD (CHRONIC KIDNEY DISEASE) STAGE 4, GFR 15-29 ML/MIN: ICD-10-CM

## 2021-02-22 DIAGNOSIS — F32.A DEPRESSION, UNSPECIFIED DEPRESSION TYPE: ICD-10-CM

## 2021-02-22 DIAGNOSIS — C56.9 MALIGNANT NEOPLASM OF OVARY, UNSPECIFIED LATERALITY: Primary | ICD-10-CM

## 2021-02-22 DIAGNOSIS — T45.1X5A CHEMOTHERAPY-INDUCED NEUROPATHY: ICD-10-CM

## 2021-02-22 PROCEDURE — 3288F PR FALLS RISK ASSESSMENT DOCUMENTED: ICD-10-PCS | Mod: CPTII,S$GLB,, | Performed by: INTERNAL MEDICINE

## 2021-02-22 PROCEDURE — 1123F ACP DISCUSS/DSCN MKR DOCD: CPT | Mod: S$GLB,,, | Performed by: INTERNAL MEDICINE

## 2021-02-22 PROCEDURE — 1159F MED LIST DOCD IN RCRD: CPT | Mod: S$GLB,,, | Performed by: INTERNAL MEDICINE

## 2021-02-22 PROCEDURE — 3078F PR MOST RECENT DIASTOLIC BLOOD PRESSURE < 80 MM HG: ICD-10-PCS | Mod: CPTII,S$GLB,, | Performed by: INTERNAL MEDICINE

## 2021-02-22 PROCEDURE — 1125F AMNT PAIN NOTED PAIN PRSNT: CPT | Mod: S$GLB,,, | Performed by: INTERNAL MEDICINE

## 2021-02-22 PROCEDURE — 99499 UNLISTED E&M SERVICE: CPT | Mod: S$GLB,,, | Performed by: INTERNAL MEDICINE

## 2021-02-22 PROCEDURE — 99215 PR OFFICE/OUTPT VISIT, EST, LEVL V, 40-54 MIN: ICD-10-PCS | Mod: S$GLB,,, | Performed by: INTERNAL MEDICINE

## 2021-02-22 PROCEDURE — 1125F PR PAIN SEVERITY QUANTIFIED, PAIN PRESENT: ICD-10-PCS | Mod: S$GLB,,, | Performed by: INTERNAL MEDICINE

## 2021-02-22 PROCEDURE — 3075F PR MOST RECENT SYSTOLIC BLOOD PRESS GE 130-139MM HG: ICD-10-PCS | Mod: CPTII,S$GLB,, | Performed by: INTERNAL MEDICINE

## 2021-02-22 PROCEDURE — 99999 PR PBB SHADOW E&M-EST. PATIENT-LVL IV: CPT | Mod: PBBFAC,,, | Performed by: INTERNAL MEDICINE

## 2021-02-22 PROCEDURE — 1101F PT FALLS ASSESS-DOCD LE1/YR: CPT | Mod: CPTII,S$GLB,, | Performed by: INTERNAL MEDICINE

## 2021-02-22 PROCEDURE — 99999 PR PBB SHADOW E&M-EST. PATIENT-LVL IV: ICD-10-PCS | Mod: PBBFAC,,, | Performed by: INTERNAL MEDICINE

## 2021-02-22 PROCEDURE — 1159F PR MEDICATION LIST DOCUMENTED IN MEDICAL RECORD: ICD-10-PCS | Mod: S$GLB,,, | Performed by: INTERNAL MEDICINE

## 2021-02-22 PROCEDURE — 3075F SYST BP GE 130 - 139MM HG: CPT | Mod: CPTII,S$GLB,, | Performed by: INTERNAL MEDICINE

## 2021-02-22 PROCEDURE — 1101F PR PT FALLS ASSESS DOC 0-1 FALLS W/OUT INJ PAST YR: ICD-10-PCS | Mod: CPTII,S$GLB,, | Performed by: INTERNAL MEDICINE

## 2021-02-22 PROCEDURE — 1123F PR ADV CARE PLAN DISCUSSED, PLAN OR SURROGATE DOCUMENTED: ICD-10-PCS | Mod: S$GLB,,, | Performed by: INTERNAL MEDICINE

## 2021-02-22 PROCEDURE — 99499 RISK ADDL DX/OHS AUDIT: ICD-10-PCS | Mod: S$GLB,,, | Performed by: INTERNAL MEDICINE

## 2021-02-22 PROCEDURE — 99215 OFFICE O/P EST HI 40 MIN: CPT | Mod: S$GLB,,, | Performed by: INTERNAL MEDICINE

## 2021-02-22 PROCEDURE — 3078F DIAST BP <80 MM HG: CPT | Mod: CPTII,S$GLB,, | Performed by: INTERNAL MEDICINE

## 2021-02-22 PROCEDURE — 3288F FALL RISK ASSESSMENT DOCD: CPT | Mod: CPTII,S$GLB,, | Performed by: INTERNAL MEDICINE

## 2021-03-31 ENCOUNTER — OFFICE VISIT (OUTPATIENT)
Dept: HEMATOLOGY/ONCOLOGY | Facility: CLINIC | Age: 86
End: 2021-03-31
Payer: MEDICARE

## 2021-03-31 ENCOUNTER — OFFICE VISIT (OUTPATIENT)
Dept: PALLIATIVE MEDICINE | Facility: CLINIC | Age: 86
End: 2021-03-31
Payer: MEDICARE

## 2021-03-31 ENCOUNTER — IMMUNIZATION (OUTPATIENT)
Dept: OBSTETRICS AND GYNECOLOGY | Facility: CLINIC | Age: 86
End: 2021-03-31
Payer: MEDICARE

## 2021-03-31 ENCOUNTER — LAB VISIT (OUTPATIENT)
Dept: LAB | Facility: HOSPITAL | Age: 86
End: 2021-03-31
Attending: INTERNAL MEDICINE
Payer: MEDICARE

## 2021-03-31 VITALS
DIASTOLIC BLOOD PRESSURE: 75 MMHG | BODY MASS INDEX: 32.05 KG/M2 | OXYGEN SATURATION: 97 % | WEIGHT: 174.19 LBS | TEMPERATURE: 98 F | HEIGHT: 62 IN | SYSTOLIC BLOOD PRESSURE: 153 MMHG | HEART RATE: 81 BPM

## 2021-03-31 DIAGNOSIS — G89.3 NEOPLASM RELATED PAIN: ICD-10-CM

## 2021-03-31 DIAGNOSIS — Z23 NEED FOR VACCINATION: Primary | ICD-10-CM

## 2021-03-31 DIAGNOSIS — G62.0 CHEMOTHERAPY-INDUCED NEUROPATHY: ICD-10-CM

## 2021-03-31 DIAGNOSIS — C56.9 MALIGNANT NEOPLASM OF OVARY, UNSPECIFIED LATERALITY: Primary | ICD-10-CM

## 2021-03-31 DIAGNOSIS — T45.1X5A CHEMOTHERAPY-INDUCED NEUROPATHY: ICD-10-CM

## 2021-03-31 DIAGNOSIS — N18.4 CKD (CHRONIC KIDNEY DISEASE) STAGE 4, GFR 15-29 ML/MIN: ICD-10-CM

## 2021-03-31 DIAGNOSIS — C56.9 MALIGNANT NEOPLASM OF OVARY, UNSPECIFIED LATERALITY: ICD-10-CM

## 2021-03-31 DIAGNOSIS — Z71.89 ADVANCE CARE PLANNING: Primary | ICD-10-CM

## 2021-03-31 LAB
ALBUMIN SERPL BCP-MCNC: 3.8 G/DL (ref 3.5–5.2)
ALP SERPL-CCNC: 93 U/L (ref 55–135)
ALT SERPL W/O P-5'-P-CCNC: 6 U/L (ref 10–44)
ANION GAP SERPL CALC-SCNC: 9 MMOL/L (ref 8–16)
AST SERPL-CCNC: 17 U/L (ref 10–40)
BASOPHILS # BLD AUTO: 0.05 K/UL (ref 0–0.2)
BASOPHILS NFR BLD: 1.2 % (ref 0–1.9)
BILIRUB SERPL-MCNC: 0.4 MG/DL (ref 0.1–1)
BUN SERPL-MCNC: 47 MG/DL (ref 8–23)
CALCIUM SERPL-MCNC: 9.3 MG/DL (ref 8.7–10.5)
CANCER AG125 SERPL-ACNC: 1260 U/ML (ref 0–30)
CHLORIDE SERPL-SCNC: 105 MMOL/L (ref 95–110)
CO2 SERPL-SCNC: 24 MMOL/L (ref 23–29)
CREAT SERPL-MCNC: 2.1 MG/DL (ref 0.5–1.4)
DIFFERENTIAL METHOD: ABNORMAL
EOSINOPHIL # BLD AUTO: 0.2 K/UL (ref 0–0.5)
EOSINOPHIL NFR BLD: 5.8 % (ref 0–8)
ERYTHROCYTE [DISTWIDTH] IN BLOOD BY AUTOMATED COUNT: 15.7 % (ref 11.5–14.5)
EST. GFR  (AFRICAN AMERICAN): 24 ML/MIN/1.73 M^2
EST. GFR  (NON AFRICAN AMERICAN): 21 ML/MIN/1.73 M^2
GLUCOSE SERPL-MCNC: 122 MG/DL (ref 70–110)
HCT VFR BLD AUTO: 39.4 % (ref 37–48.5)
HGB BLD-MCNC: 12.7 G/DL (ref 12–16)
IMM GRANULOCYTES # BLD AUTO: 0.03 K/UL (ref 0–0.04)
IMM GRANULOCYTES NFR BLD AUTO: 0.7 % (ref 0–0.5)
LYMPHOCYTES # BLD AUTO: 0.9 K/UL (ref 1–4.8)
LYMPHOCYTES NFR BLD: 22.2 % (ref 18–48)
MCH RBC QN AUTO: 29.7 PG (ref 27–31)
MCHC RBC AUTO-ENTMCNC: 32.2 G/DL (ref 32–36)
MCV RBC AUTO: 92 FL (ref 82–98)
MONOCYTES # BLD AUTO: 0.2 K/UL (ref 0.3–1)
MONOCYTES NFR BLD: 5.1 % (ref 4–15)
NEUTROPHILS # BLD AUTO: 2.7 K/UL (ref 1.8–7.7)
NEUTROPHILS NFR BLD: 65 % (ref 38–73)
NRBC BLD-RTO: 0 /100 WBC
PLATELET # BLD AUTO: 183 K/UL (ref 150–450)
PMV BLD AUTO: 10.6 FL (ref 9.2–12.9)
POTASSIUM SERPL-SCNC: 4.1 MMOL/L (ref 3.5–5.1)
PROT SERPL-MCNC: 8.1 G/DL (ref 6–8.4)
RBC # BLD AUTO: 4.28 M/UL (ref 4–5.4)
SODIUM SERPL-SCNC: 138 MMOL/L (ref 136–145)
WBC # BLD AUTO: 4.14 K/UL (ref 3.9–12.7)

## 2021-03-31 PROCEDURE — 3078F PR MOST RECENT DIASTOLIC BLOOD PRESSURE < 80 MM HG: ICD-10-PCS | Mod: CPTII,S$GLB,, | Performed by: NURSE PRACTITIONER

## 2021-03-31 PROCEDURE — 1159F PR MEDICATION LIST DOCUMENTED IN MEDICAL RECORD: ICD-10-PCS | Mod: S$GLB,,, | Performed by: NURSE PRACTITIONER

## 2021-03-31 PROCEDURE — 36415 COLL VENOUS BLD VENIPUNCTURE: CPT | Performed by: INTERNAL MEDICINE

## 2021-03-31 PROCEDURE — 3077F SYST BP >= 140 MM HG: CPT | Mod: CPTII,S$GLB,, | Performed by: INTERNAL MEDICINE

## 2021-03-31 PROCEDURE — 85025 COMPLETE CBC W/AUTO DIFF WBC: CPT | Performed by: INTERNAL MEDICINE

## 2021-03-31 PROCEDURE — 3078F PR MOST RECENT DIASTOLIC BLOOD PRESSURE < 80 MM HG: ICD-10-PCS | Mod: CPTII,S$GLB,, | Performed by: INTERNAL MEDICINE

## 2021-03-31 PROCEDURE — 1101F PT FALLS ASSESS-DOCD LE1/YR: CPT | Mod: CPTII,S$GLB,, | Performed by: INTERNAL MEDICINE

## 2021-03-31 PROCEDURE — 3288F FALL RISK ASSESSMENT DOCD: CPT | Mod: CPTII,S$GLB,, | Performed by: INTERNAL MEDICINE

## 2021-03-31 PROCEDURE — 99999 PR PBB SHADOW E&M-EST. PATIENT-LVL IV: CPT | Mod: PBBFAC,,, | Performed by: INTERNAL MEDICINE

## 2021-03-31 PROCEDURE — 99205 PR OFFICE/OUTPT VISIT, NEW, LEVL V, 60-74 MIN: ICD-10-PCS | Mod: S$GLB,,, | Performed by: NURSE PRACTITIONER

## 2021-03-31 PROCEDURE — 1159F PR MEDICATION LIST DOCUMENTED IN MEDICAL RECORD: ICD-10-PCS | Mod: S$GLB,,, | Performed by: INTERNAL MEDICINE

## 2021-03-31 PROCEDURE — 1158F ADVNC CARE PLAN TLK DOCD: CPT | Mod: S$GLB,,, | Performed by: NURSE PRACTITIONER

## 2021-03-31 PROCEDURE — 86304 IMMUNOASSAY TUMOR CA 125: CPT | Performed by: INTERNAL MEDICINE

## 2021-03-31 PROCEDURE — 3078F DIAST BP <80 MM HG: CPT | Mod: CPTII,S$GLB,, | Performed by: NURSE PRACTITIONER

## 2021-03-31 PROCEDURE — 1123F PR ADV CARE PLAN DISCUSSED, PLAN OR SURROGATE DOCUMENTED: ICD-10-PCS | Mod: S$GLB,,, | Performed by: NURSE PRACTITIONER

## 2021-03-31 PROCEDURE — 1123F ACP DISCUSS/DSCN MKR DOCD: CPT | Mod: S$GLB,,, | Performed by: NURSE PRACTITIONER

## 2021-03-31 PROCEDURE — 3078F DIAST BP <80 MM HG: CPT | Mod: CPTII,S$GLB,, | Performed by: INTERNAL MEDICINE

## 2021-03-31 PROCEDURE — 99205 OFFICE O/P NEW HI 60 MIN: CPT | Mod: S$GLB,,, | Performed by: NURSE PRACTITIONER

## 2021-03-31 PROCEDURE — 1159F MED LIST DOCD IN RCRD: CPT | Mod: S$GLB,,, | Performed by: INTERNAL MEDICINE

## 2021-03-31 PROCEDURE — 1101F PR PT FALLS ASSESS DOC 0-1 FALLS W/OUT INJ PAST YR: ICD-10-PCS | Mod: CPTII,S$GLB,, | Performed by: INTERNAL MEDICINE

## 2021-03-31 PROCEDURE — 99999 PR PBB SHADOW E&M-EST. PATIENT-LVL IV: ICD-10-PCS | Mod: PBBFAC,,, | Performed by: INTERNAL MEDICINE

## 2021-03-31 PROCEDURE — 1126F PR PAIN SEVERITY QUANTIFIED, NO PAIN PRESENT: ICD-10-PCS | Mod: S$GLB,,, | Performed by: INTERNAL MEDICINE

## 2021-03-31 PROCEDURE — 1158F PR ADVANCE CARE PLANNING DISCUSS DOCUMENTED IN MEDICAL RECORD: ICD-10-PCS | Mod: S$GLB,,, | Performed by: NURSE PRACTITIONER

## 2021-03-31 PROCEDURE — 99215 OFFICE O/P EST HI 40 MIN: CPT | Mod: S$GLB,,, | Performed by: INTERNAL MEDICINE

## 2021-03-31 PROCEDURE — 1126F AMNT PAIN NOTED NONE PRSNT: CPT | Mod: S$GLB,,, | Performed by: INTERNAL MEDICINE

## 2021-03-31 PROCEDURE — 3077F SYST BP >= 140 MM HG: CPT | Mod: CPTII,S$GLB,, | Performed by: NURSE PRACTITIONER

## 2021-03-31 PROCEDURE — 1157F ADVNC CARE PLAN IN RCRD: CPT | Mod: S$GLB,,, | Performed by: INTERNAL MEDICINE

## 2021-03-31 PROCEDURE — 3077F PR MOST RECENT SYSTOLIC BLOOD PRESSURE >= 140 MM HG: ICD-10-PCS | Mod: CPTII,S$GLB,, | Performed by: INTERNAL MEDICINE

## 2021-03-31 PROCEDURE — 99215 PR OFFICE/OUTPT VISIT, EST, LEVL V, 40-54 MIN: ICD-10-PCS | Mod: S$GLB,,, | Performed by: INTERNAL MEDICINE

## 2021-03-31 PROCEDURE — 3288F PR FALLS RISK ASSESSMENT DOCUMENTED: ICD-10-PCS | Mod: CPTII,S$GLB,, | Performed by: INTERNAL MEDICINE

## 2021-03-31 PROCEDURE — 1159F MED LIST DOCD IN RCRD: CPT | Mod: S$GLB,,, | Performed by: NURSE PRACTITIONER

## 2021-03-31 PROCEDURE — 1157F PR ADVANCE CARE PLAN OR EQUIV PRESENT IN MEDICAL RECORD: ICD-10-PCS | Mod: S$GLB,,, | Performed by: INTERNAL MEDICINE

## 2021-03-31 PROCEDURE — 3077F PR MOST RECENT SYSTOLIC BLOOD PRESSURE >= 140 MM HG: ICD-10-PCS | Mod: CPTII,S$GLB,, | Performed by: NURSE PRACTITIONER

## 2021-03-31 PROCEDURE — 80053 COMPREHEN METABOLIC PANEL: CPT | Performed by: INTERNAL MEDICINE

## 2021-03-31 PROCEDURE — 91300 COVID-19, MRNA, LNP-S, PF, 30 MCG/0.3 ML DOSE VACCINE: CPT | Mod: PBBFAC | Performed by: FAMILY MEDICINE

## 2021-03-31 RX ORDER — TRAMADOL HYDROCHLORIDE 50 MG/1
50 TABLET ORAL EVERY 12 HOURS PRN
Qty: 60 TABLET | Refills: 0 | Status: SHIPPED | OUTPATIENT
Start: 2021-03-31 | End: 2021-09-07 | Stop reason: SDUPTHER

## 2021-04-21 ENCOUNTER — IMMUNIZATION (OUTPATIENT)
Dept: OBSTETRICS AND GYNECOLOGY | Facility: CLINIC | Age: 86
End: 2021-04-21
Payer: MEDICARE

## 2021-04-21 DIAGNOSIS — Z23 NEED FOR VACCINATION: Primary | ICD-10-CM

## 2021-04-21 PROCEDURE — 0002A COVID-19, MRNA, LNP-S, PF, 30 MCG/0.3 ML DOSE VACCINE: CPT | Mod: PBBFAC | Performed by: FAMILY MEDICINE

## 2021-04-21 PROCEDURE — 91300 COVID-19, MRNA, LNP-S, PF, 30 MCG/0.3 ML DOSE VACCINE: CPT | Mod: PBBFAC | Performed by: FAMILY MEDICINE

## 2021-09-07 ENCOUNTER — TELEPHONE (OUTPATIENT)
Dept: HEMATOLOGY/ONCOLOGY | Facility: CLINIC | Age: 86
End: 2021-09-07

## 2021-09-07 DIAGNOSIS — G89.3 NEOPLASM RELATED PAIN: ICD-10-CM

## 2021-09-08 RX ORDER — TRAMADOL HYDROCHLORIDE 50 MG/1
50 TABLET ORAL EVERY 12 HOURS PRN
Qty: 60 TABLET | Refills: 0 | Status: SHIPPED | OUTPATIENT
Start: 2021-09-08 | End: 2021-10-08

## 2021-09-09 ENCOUNTER — TELEPHONE (OUTPATIENT)
Dept: HEMATOLOGY/ONCOLOGY | Facility: CLINIC | Age: 86
End: 2021-09-09

## 2021-09-09 DIAGNOSIS — C56.9 MALIGNANT NEOPLASM OF OVARY, UNSPECIFIED LATERALITY: Primary | ICD-10-CM

## 2021-09-10 ENCOUNTER — TELEPHONE (OUTPATIENT)
Dept: HEMATOLOGY/ONCOLOGY | Facility: CLINIC | Age: 86
End: 2021-09-10

## 2021-10-08 ENCOUNTER — TELEPHONE (OUTPATIENT)
Dept: HEMATOLOGY/ONCOLOGY | Facility: CLINIC | Age: 86
End: 2021-10-08

## 2021-10-08 DIAGNOSIS — C56.9 MALIGNANT NEOPLASM OF OVARY, UNSPECIFIED LATERALITY: Primary | ICD-10-CM
